# Patient Record
Sex: FEMALE | Race: WHITE | NOT HISPANIC OR LATINO | Employment: FULL TIME | URBAN - METROPOLITAN AREA
[De-identification: names, ages, dates, MRNs, and addresses within clinical notes are randomized per-mention and may not be internally consistent; named-entity substitution may affect disease eponyms.]

---

## 2017-01-03 ENCOUNTER — LAB VISIT (OUTPATIENT)
Dept: LAB | Facility: HOSPITAL | Age: 67
End: 2017-01-03
Attending: FAMILY MEDICINE
Payer: MEDICARE

## 2017-01-03 DIAGNOSIS — Z00.00 PREVENTATIVE HEALTH CARE: ICD-10-CM

## 2017-01-03 LAB
ALBUMIN SERPL BCP-MCNC: 4.1 G/DL
ALP SERPL-CCNC: 82 U/L
ALT SERPL W/O P-5'-P-CCNC: 43 U/L
ANION GAP SERPL CALC-SCNC: 9 MMOL/L
AST SERPL-CCNC: 45 U/L
BASOPHILS # BLD AUTO: 0.04 K/UL
BASOPHILS NFR BLD: 0.6 %
BILIRUB SERPL-MCNC: 0.6 MG/DL
BUN SERPL-MCNC: 9 MG/DL
CALCIUM SERPL-MCNC: 9.6 MG/DL
CHLORIDE SERPL-SCNC: 98 MMOL/L
CHOLEST/HDLC SERPL: 2.7 {RATIO}
CO2 SERPL-SCNC: 30 MMOL/L
CREAT SERPL-MCNC: 0.7 MG/DL
DIFFERENTIAL METHOD: ABNORMAL
EOSINOPHIL # BLD AUTO: 0.3 K/UL
EOSINOPHIL NFR BLD: 4 %
ERYTHROCYTE [DISTWIDTH] IN BLOOD BY AUTOMATED COUNT: 12.8 %
EST. GFR  (AFRICAN AMERICAN): >60 ML/MIN/1.73 M^2
EST. GFR  (NON AFRICAN AMERICAN): >60 ML/MIN/1.73 M^2
GLUCOSE SERPL-MCNC: 105 MG/DL
HCT VFR BLD AUTO: 42.5 %
HDL/CHOLESTEROL RATIO: 36.4 %
HDLC SERPL-MCNC: 206 MG/DL
HDLC SERPL-MCNC: 75 MG/DL
HGB BLD-MCNC: 14.5 G/DL
LDLC SERPL CALC-MCNC: 102.4 MG/DL
LYMPHOCYTES # BLD AUTO: 2 K/UL
LYMPHOCYTES NFR BLD: 27.9 %
MCH RBC QN AUTO: 32.2 PG
MCHC RBC AUTO-ENTMCNC: 34.1 %
MCV RBC AUTO: 94 FL
MONOCYTES # BLD AUTO: 0.6 K/UL
MONOCYTES NFR BLD: 8 %
NEUTROPHILS # BLD AUTO: 4.3 K/UL
NEUTROPHILS NFR BLD: 59.2 %
NONHDLC SERPL-MCNC: 131 MG/DL
PLATELET # BLD AUTO: 303 K/UL
PMV BLD AUTO: 9.3 FL
POTASSIUM SERPL-SCNC: 4.8 MMOL/L
PROT SERPL-MCNC: 7.6 G/DL
RBC # BLD AUTO: 4.51 M/UL
SODIUM SERPL-SCNC: 137 MMOL/L
TRIGL SERPL-MCNC: 143 MG/DL
WBC # BLD AUTO: 7.23 K/UL

## 2017-01-03 PROCEDURE — 83036 HEMOGLOBIN GLYCOSYLATED A1C: CPT

## 2017-01-03 PROCEDURE — 80053 COMPREHEN METABOLIC PANEL: CPT

## 2017-01-03 PROCEDURE — 85025 COMPLETE CBC W/AUTO DIFF WBC: CPT

## 2017-01-03 PROCEDURE — 80061 LIPID PANEL: CPT

## 2017-01-03 PROCEDURE — 36415 COLL VENOUS BLD VENIPUNCTURE: CPT | Mod: PO

## 2017-01-04 LAB
ESTIMATED AVG GLUCOSE: 120 MG/DL
HBA1C MFR BLD HPLC: 5.8 %

## 2017-01-12 ENCOUNTER — OFFICE VISIT (OUTPATIENT)
Dept: FAMILY MEDICINE | Facility: CLINIC | Age: 67
End: 2017-01-12
Payer: MEDICARE

## 2017-01-12 VITALS
TEMPERATURE: 98 F | DIASTOLIC BLOOD PRESSURE: 70 MMHG | BODY MASS INDEX: 26.51 KG/M2 | HEART RATE: 80 BPM | HEIGHT: 67 IN | SYSTOLIC BLOOD PRESSURE: 128 MMHG | WEIGHT: 168.88 LBS

## 2017-01-12 DIAGNOSIS — Z00.00 PREVENTATIVE HEALTH CARE: Primary | ICD-10-CM

## 2017-01-12 DIAGNOSIS — Z12.31 ENCOUNTER FOR SCREENING MAMMOGRAM FOR BREAST CANCER: ICD-10-CM

## 2017-01-12 DIAGNOSIS — G47.00 INSOMNIA, UNSPECIFIED TYPE: ICD-10-CM

## 2017-01-12 PROCEDURE — 99999 PR PBB SHADOW E&M-EST. PATIENT-LVL III: CPT | Mod: PBBFAC,,, | Performed by: FAMILY MEDICINE

## 2017-01-12 PROCEDURE — 90670 PCV13 VACCINE IM: CPT | Mod: S$GLB,,, | Performed by: FAMILY MEDICINE

## 2017-01-12 PROCEDURE — G0009 ADMIN PNEUMOCOCCAL VACCINE: HCPCS | Mod: S$GLB,,, | Performed by: FAMILY MEDICINE

## 2017-01-12 PROCEDURE — 3078F DIAST BP <80 MM HG: CPT | Mod: S$GLB,,, | Performed by: FAMILY MEDICINE

## 2017-01-12 PROCEDURE — 3074F SYST BP LT 130 MM HG: CPT | Mod: S$GLB,,, | Performed by: FAMILY MEDICINE

## 2017-01-12 PROCEDURE — 99397 PER PM REEVAL EST PAT 65+ YR: CPT | Mod: S$GLB,,, | Performed by: FAMILY MEDICINE

## 2017-01-12 RX ORDER — ZOLPIDEM TARTRATE 10 MG/1
TABLET ORAL
Qty: 30 TABLET | Refills: 5 | Status: SHIPPED | OUTPATIENT
Start: 2017-01-12 | End: 2017-07-18 | Stop reason: SDUPTHER

## 2017-01-12 RX ORDER — NITROGLYCERIN 0.4 MG/1
0.4 TABLET SUBLINGUAL EVERY 5 MIN PRN
COMMUNITY
Start: 2016-12-09

## 2017-01-12 RX ORDER — METOPROLOL SUCCINATE 50 MG/1
TABLET, EXTENDED RELEASE ORAL
COMMUNITY
Start: 2017-01-09 | End: 2017-03-06 | Stop reason: SDUPTHER

## 2017-01-12 NOTE — MR AVS SNAPSHOT
[facility]  [address]  [facility] [address]  Phone: [phone]  Fax: [phone]                  Dottie Galeana   2017 9:20 AM   Office Visit    Description:  Female : 1950   Provider:  Josh Li MD   Department:  [facility]           Reason for Visit     Preventative Health Care           Diagnoses this Visit        Comments    Preventative health care    -  Primary     Encounter for screening mammogram for breast cancer         Insomnia, unspecified type                To Do List           Future Appointments        Provider Department Dept Phone    2017 11:15 AM [facility] MAMMO1 [facility] [phone]      Goals (5 Years of Data)     None      Follow-Up and Disposition     Return in about 1 year (around 2018).       These Medications        Disp Refills Start End    zolpidem (AMBIEN) 10 mg Tab 30 tablet 5 2017     TAKE ONE TABLET BY MOUTH AT BEDTIME AS NEEDED    Pharmacy: [place] [address] [address]  Ph #: [phone]         [facility]     [facility] Line -  Assistance  Registered nurses in the [facility] provide clinical advisement, health education, appointment booking, and other advisory services.  Call for this free service at [phone].             Medications           Message regarding Medications     Verify the changes and/or additions to your medication regime listed below are the same as discussed with your clinician today.  If any of these changes or additions are incorrect, please notify your healthcare provider.             Verify that the below list of medications is an accurate representation of the medications you are currently taking.  If none reported, the list may be blank. If incorrect, please contact your healthcare provider. Carry this list with you in case of emergency.           Current Medications      "alprazolam (XANAX) 0.25 MG tablet Take 1 tablet (0.25 mg total) by mouth 3 (three) times daily.    amlodipine (NORVASC) 5 MG tablet Take 1 tablet (5 mg total) by mouth once daily.    aspirin 325 MG tablet 1 Tablet(s) Oral PRN Every day.      metoprolol succinate (TOPROL-XL) 50 MG 24 hr tablet     multivitamin (THERAGRAN) per tablet 1 Tablet(s) Oral PRN Every day.      NITROSTAT 0.4 mg SL tablet     pantoprazole (PROTONIX) 40 MG tablet Take 1 tablet (40 mg total) by mouth once daily.    rosuvastatin (CRESTOR) 10 MG tablet TAKE ONE TABLET BY MOUTH ONCE DAILY IN THE EVENING    valsartan-hydrochlorothiazide (DIOVAN-HCT) 320-25 mg per tablet Take 1 tablet by mouth once daily.    venlafaxine (EFFEXOR-XR) 150 MG Cp24 Take 1 capsule (150 mg total) by mouth once daily.    zolpidem (AMBIEN) 10 mg Tab TAKE ONE TABLET BY MOUTH AT BEDTIME AS NEEDED    clobetasol (TEMOVATE) 0.05 % external solution Apply topically 2 (two) times daily.    ketoconazole (NIZORAL) 2 % shampoo Apply topically once daily.           Clinical Reference Information           Vital Signs - Last Recorded  Most recent update: 1/12/2017  9:30 AM by Vashti Urbano MA    BP Pulse Temp Ht Wt LMP    128/70 80 97.9 °F (36.6 °C) (Oral) 5' 7" (1.702 m) 76.6 kg (168 lb 14 oz) 04/21/1994    BMI                26.45 kg/m2          Blood Pressure          Most Recent Value    BP  128/70      Allergies as of 1/12/2017     No Known Drug Allergies      Immunizations Administered on Date of Encounter - 1/12/2017     Name Date Dose VIS Date Route    Pneumococcal Conjugate - 13 Valent 1/12/2017 0.5 mL 11/5/2015 Intramuscular      Orders Placed During Today's Visit      Normal Orders This Visit    Pneumococcal Conjugate Vaccine (13 Valent) (IM)     Future Labs/Procedures Expected by Expires    Mammo Digital Screening Bilat with CAD  1/12/2017 3/14/2018      "

## 2017-01-12 NOTE — PROGRESS NOTES
Subjective:       Patient ID: Dottie Galeana is a 66 y.o. female.    Chief Complaint: Preventative Health Care (Physical with labs prior)    HPI Comments: She is tolerating her present meds.  She is following with cardiology.    Mood stable.  Sleep is good.          Past Medical History   Diagnosis Date    Depression     Encounter for long-term (current) use of other medications     Generalized anxiety disorder     GERD (gastroesophageal reflux disease)     HTN (hypertension)     Hyperlipidemia     Insomnia     Stroke      ocular behind left eye       Past Surgical History   Procedure Laterality Date    Breast surgery       lumpectomy    Orif right wrist         Review of patient's allergies indicates:   Allergen Reactions    No known drug allergies        Social History     Social History    Marital status:      Spouse name: N/A    Number of children: N/A    Years of education: N/A     Occupational History    realtor      Social History Main Topics    Smoking status: Never Smoker    Smokeless tobacco: Never Used    Alcohol use 0.0 oz/week     1 - 2 Glasses of wine per week      Comment: 2 glasses wine per day    Drug use: No    Sexual activity: Not on file     Other Topics Concern    Not on file     Social History Narrative       Current Outpatient Prescriptions on File Prior to Visit   Medication Sig Dispense Refill    alprazolam (XANAX) 0.25 MG tablet Take 1 tablet (0.25 mg total) by mouth 3 (three) times daily. 60 tablet 5    amlodipine (NORVASC) 5 MG tablet Take 1 tablet (5 mg total) by mouth once daily. 30 tablet 11    aspirin 325 MG tablet 1 Tablet(s) Oral PRN Every day.        multivitamin (THERAGRAN) per tablet 1 Tablet(s) Oral PRN Every day.        pantoprazole (PROTONIX) 40 MG tablet Take 1 tablet (40 mg total) by mouth once daily. 30 tablet 11    rosuvastatin (CRESTOR) 10 MG tablet TAKE ONE TABLET BY MOUTH ONCE DAILY IN THE EVENING 30 tablet 11     "valsartan-hydrochlorothiazide (DIOVAN-HCT) 320-25 mg per tablet Take 1 tablet by mouth once daily. 30 tablet 11    venlafaxine (EFFEXOR-XR) 150 MG Cp24 Take 1 capsule (150 mg total) by mouth once daily. 30 capsule 11    clobetasol (TEMOVATE) 0.05 % external solution Apply topically 2 (two) times daily. 25 mL 6    ketoconazole (NIZORAL) 2 % shampoo Apply topically once daily. 120 mL 3     No current facility-administered medications on file prior to visit.        No family history on file.    Review of Systems   Constitutional: Positive for fatigue. Negative for appetite change, chills, fever and unexpected weight change.   HENT: Negative for sore throat and trouble swallowing.    Eyes: Negative for pain and visual disturbance.   Respiratory: Negative for cough, shortness of breath and wheezing.    Cardiovascular: Negative for chest pain and palpitations.   Gastrointestinal: Negative for abdominal pain, blood in stool, diarrhea, nausea and vomiting.   Genitourinary: Negative for difficulty urinating, dysuria and hematuria.   Musculoskeletal: Negative for arthralgias, gait problem and neck pain.   Skin: Negative for rash and wound.   Neurological: Negative for dizziness, weakness, numbness and headaches.   Hematological: Negative for adenopathy.   Psychiatric/Behavioral: Negative for dysphoric mood.       Objective:      Visit Vitals    /70    Pulse 80    Temp 97.9 °F (36.6 °C) (Oral)    Ht 5' 7" (1.702 m)    Wt 76.6 kg (168 lb 14 oz)    LMP 04/21/1994    BMI 26.45 kg/m2     Physical Exam   Constitutional: She is oriented to person, place, and time. She appears well-developed and well-nourished.   HENT:   Head: Normocephalic.   Mouth/Throat: Oropharynx is clear and moist. No oropharyngeal exudate or posterior oropharyngeal erythema.   Eyes: Conjunctivae and EOM are normal. Pupils are equal, round, and reactive to light.   Neck: Normal range of motion. Neck supple. No thyromegaly present. "   Cardiovascular: Normal rate, regular rhythm, S1 normal, S2 normal, normal heart sounds and intact distal pulses.  Exam reveals no gallop and no friction rub.    No murmur heard.  Pulmonary/Chest: Effort normal and breath sounds normal. She has no wheezes. She has no rales.   Abdominal: Normal appearance.   Musculoskeletal:        Right lower leg: She exhibits no edema.        Left lower leg: She exhibits no edema.   Lymphadenopathy:     She has no cervical adenopathy.   Neurological: She is alert and oriented to person, place, and time. No cranial nerve deficit. Gait normal.   Skin: Skin is warm and intact. No rash noted.   Psychiatric: She has a normal mood and affect.       Results for orders placed or performed in visit on 01/03/17   Comprehensive metabolic panel   Result Value Ref Range    Sodium 137 136 - 145 mmol/L    Potassium 4.8 3.5 - 5.1 mmol/L    Chloride 98 95 - 110 mmol/L    CO2 30 (H) 23 - 29 mmol/L    Glucose 105 70 - 110 mg/dL    BUN, Bld 9 8 - 23 mg/dL    Creatinine 0.7 0.5 - 1.4 mg/dL    Calcium 9.6 8.7 - 10.5 mg/dL    Total Protein 7.6 6.0 - 8.4 g/dL    Albumin 4.1 3.5 - 5.2 g/dL    Total Bilirubin 0.6 0.1 - 1.0 mg/dL    Alkaline Phosphatase 82 55 - 135 U/L    AST 45 (H) 10 - 40 U/L    ALT 43 10 - 44 U/L    Anion Gap 9 8 - 16 mmol/L    eGFR if African American >60.0 >60 mL/min/1.73 m^2    eGFR if non African American >60.0 >60 mL/min/1.73 m^2   Hemoglobin A1c   Result Value Ref Range    Hemoglobin A1C 5.8 4.5 - 6.2 %    Estimated Avg Glucose 120 68 - 131 mg/dL   Lipid panel   Result Value Ref Range    Cholesterol 206 (H) 120 - 199 mg/dL    Triglycerides 143 30 - 150 mg/dL    HDL 75 40 - 75 mg/dL    LDL Cholesterol 102.4 63.0 - 159.0 mg/dL    HDL/Chol Ratio 36.4 20.0 - 50.0 %    Total Cholesterol/HDL Ratio 2.7 2.0 - 5.0    Non-HDL Cholesterol 131 mg/dL   CBC auto differential   Result Value Ref Range    WBC 7.23 3.90 - 12.70 K/uL    RBC 4.51 4.00 - 5.40 M/uL    Hemoglobin 14.5 12.0 - 16.0 g/dL     Hematocrit 42.5 37.0 - 48.5 %    MCV 94 82 - 98 fL    MCH 32.2 (H) 27.0 - 31.0 pg    MCHC 34.1 32.0 - 36.0 %    RDW 12.8 11.5 - 14.5 %    Platelets 303 150 - 350 K/uL    MPV 9.3 9.2 - 12.9 fL    Gran # 4.3 1.8 - 7.7 K/uL    Lymph # 2.0 1.0 - 4.8 K/uL    Mono # 0.6 0.3 - 1.0 K/uL    Eos # 0.3 0.0 - 0.5 K/uL    Baso # 0.04 0.00 - 0.20 K/uL    Gran% 59.2 38.0 - 73.0 %    Lymph% 27.9 18.0 - 48.0 %    Mono% 8.0 4.0 - 15.0 %    Eosinophil% 4.0 0.0 - 8.0 %    Basophil% 0.6 0.0 - 1.9 %    Differential Method Automated        Assessment:       1. Preventative health care    2. Encounter for screening mammogram for breast cancer    3. Insomnia, unspecified type        Plan:       Preventative health care  -     Pneumococcal Conjugate Vaccine (13 Valent) (IM)    Encounter for screening mammogram for breast cancer  -     Cancel: Mammo Digital Screening Bilat with CAD; Future; Expected date: 1/12/17    Insomnia, unspecified type  -     zolpidem (AMBIEN) 10 mg Tab; TAKE ONE TABLET BY MOUTH AT BEDTIME AS NEEDED  Dispense: 30 tablet; Refill: 5    Other orders  -     Cancel: Pneumococcal Polysaccharide Vaccine (23 Valent) (SQ/IM)        Counseled on regular exercise, maintenance of a healthy weight, balanced diet rich in fruits/vegetables and lean protein, and avoidance of unhealthy habits like smoking and excessive alcohol intake.

## 2017-01-13 ENCOUNTER — HOSPITAL ENCOUNTER (OUTPATIENT)
Dept: RADIOLOGY | Facility: HOSPITAL | Age: 67
Discharge: HOME | End: 2017-01-13
Attending: FAMILY MEDICINE
Payer: MEDICARE

## 2017-01-13 DIAGNOSIS — Z12.31 ENCOUNTER FOR SCREENING MAMMOGRAM FOR BREAST CANCER: ICD-10-CM

## 2017-01-13 PROCEDURE — 77067 SCR MAMMO BI INCL CAD: CPT | Mod: 26,,, | Performed by: RADIOLOGY

## 2017-01-13 PROCEDURE — 77067 SCR MAMMO BI INCL CAD: CPT | Mod: TC

## 2017-01-13 PROCEDURE — 77063 BREAST TOMOSYNTHESIS BI: CPT | Mod: 26,,, | Performed by: RADIOLOGY

## 2017-01-16 ENCOUNTER — TELEPHONE (OUTPATIENT)
Dept: RADIOLOGY | Facility: HOSPITAL | Age: 67
End: 2017-01-16

## 2017-01-20 ENCOUNTER — HOSPITAL ENCOUNTER (OUTPATIENT)
Dept: RADIOLOGY | Facility: HOSPITAL | Age: 67
Discharge: HOME | End: 2017-01-20
Attending: FAMILY MEDICINE
Payer: MEDICARE

## 2017-01-20 DIAGNOSIS — R92.8 ABNORMAL MAMMOGRAM OF LEFT BREAST: ICD-10-CM

## 2017-01-20 PROCEDURE — 76642 ULTRASOUND BREAST LIMITED: CPT | Mod: TC,PO,LT

## 2017-01-20 PROCEDURE — 77061 BREAST TOMOSYNTHESIS UNI: CPT | Mod: TC,LT

## 2017-01-20 PROCEDURE — 77061 BREAST TOMOSYNTHESIS UNI: CPT | Mod: 26,LT,, | Performed by: RADIOLOGY

## 2017-01-20 PROCEDURE — 76642 ULTRASOUND BREAST LIMITED: CPT | Mod: 26,LT,, | Performed by: RADIOLOGY

## 2017-01-20 PROCEDURE — 77065 DX MAMMO INCL CAD UNI: CPT | Mod: 26,LT,, | Performed by: RADIOLOGY

## 2017-03-06 RX ORDER — METOPROLOL SUCCINATE 50 MG/1
50 TABLET, EXTENDED RELEASE ORAL DAILY
Qty: 30 TABLET | Refills: 11 | Status: SHIPPED | OUTPATIENT
Start: 2017-03-06 | End: 2017-08-28 | Stop reason: ALTCHOICE

## 2017-03-06 NOTE — TELEPHONE ENCOUNTER
----- Message from Lynnette Parish sent at 3/6/2017 11:24 AM CST -----  Contact: Keyur Sinclair, [phone] patient, Patient has lost her Cardiologist with the [facility] and will need a refill on her medication Rx Metroprolol generic (Toprol XL 50 MG). Please call into [facility] at [phone]. Please advise if you can refill. Thanks.

## 2017-05-09 ENCOUNTER — LAB VISIT (OUTPATIENT)
Dept: LAB | Facility: HOSPITAL | Age: 67
End: 2017-05-09
Attending: FAMILY MEDICINE
Payer: MEDICARE

## 2017-05-09 DIAGNOSIS — E78.5 DYSLIPIDEMIA: ICD-10-CM

## 2017-05-09 DIAGNOSIS — I15.9 LABILE SECONDARY HYPERTENSION: Primary | ICD-10-CM

## 2017-05-09 LAB
ALBUMIN SERPL BCP-MCNC: 4.1 G/DL
ALP SERPL-CCNC: 85 U/L
ALT SERPL W/O P-5'-P-CCNC: 26 U/L
ANION GAP SERPL CALC-SCNC: 10 MMOL/L
AST SERPL-CCNC: 29 U/L
BILIRUB SERPL-MCNC: 0.6 MG/DL
BUN SERPL-MCNC: 10 MG/DL
CALCIUM SERPL-MCNC: 9.7 MG/DL
CHLORIDE SERPL-SCNC: 102 MMOL/L
CHOLEST/HDLC SERPL: 2.9 {RATIO}
CO2 SERPL-SCNC: 26 MMOL/L
CREAT SERPL-MCNC: 0.7 MG/DL
EST. GFR  (AFRICAN AMERICAN): >60 ML/MIN/1.73 M^2
EST. GFR  (NON AFRICAN AMERICAN): >60 ML/MIN/1.73 M^2
GLUCOSE SERPL-MCNC: 110 MG/DL
HDL/CHOLESTEROL RATIO: 34.8 %
HDLC SERPL-MCNC: 210 MG/DL
HDLC SERPL-MCNC: 73 MG/DL
LDLC SERPL CALC-MCNC: 103.6 MG/DL
NONHDLC SERPL-MCNC: 137 MG/DL
POTASSIUM SERPL-SCNC: 4.2 MMOL/L
PROT SERPL-MCNC: 7.7 G/DL
SODIUM SERPL-SCNC: 138 MMOL/L
TRIGL SERPL-MCNC: 167 MG/DL

## 2017-05-09 PROCEDURE — 80061 LIPID PANEL: CPT

## 2017-05-09 PROCEDURE — 80053 COMPREHEN METABOLIC PANEL: CPT

## 2017-05-09 PROCEDURE — 82384 ASSAY THREE CATECHOLAMINES: CPT

## 2017-05-09 PROCEDURE — 83835 ASSAY OF METANEPHRINES: CPT

## 2017-05-09 PROCEDURE — 36415 COLL VENOUS BLD VENIPUNCTURE: CPT | Mod: PO

## 2017-05-09 PROCEDURE — 82088 ASSAY OF ALDOSTERONE: CPT

## 2017-05-12 LAB
ALDOST SERPL-MCNC: 17.9 NG/DL
ALDOST/RENIN PLAS-RTO: 7.2 RATIO
RENIN PLAS-CCNC: 2.5 NG/ML/HR

## 2017-05-15 LAB
CATECHOLS PLAS-MCNC: 1099 PG/ML
DOPAMINE SERPL-MCNC: 36 PG/ML
EPINEPH PLAS-MCNC: 58 PG/ML
METANEPH FREE SERPL-MCNC: 39 PG/ML
METANEPHS SERPL-MCNC: 174 PG/ML
NOREPINEPH PLAS-MCNC: 1005 PG/ML
NORMETANEPH FREE SERPL-MCNC: 135 PG/ML

## 2017-06-30 RX ORDER — ALPRAZOLAM 0.25 MG/1
0.25 TABLET ORAL 3 TIMES DAILY
Qty: 60 TABLET | Refills: 1 | Status: SHIPPED | OUTPATIENT
Start: 2017-06-30 | End: 2017-09-26 | Stop reason: SDUPTHER

## 2017-07-18 DIAGNOSIS — G47.00 INSOMNIA, UNSPECIFIED TYPE: ICD-10-CM

## 2017-07-18 RX ORDER — ZOLPIDEM TARTRATE 10 MG/1
TABLET ORAL
Qty: 30 TABLET | Refills: 0 | Status: SHIPPED | OUTPATIENT
Start: 2017-07-18 | End: 2017-08-23 | Stop reason: SDUPTHER

## 2017-08-21 RX ORDER — ROSUVASTATIN CALCIUM 10 MG/1
TABLET, COATED ORAL
Qty: 30 TABLET | Refills: 11 | Status: SHIPPED | OUTPATIENT
Start: 2017-08-21 | End: 2018-09-26 | Stop reason: SDUPTHER

## 2017-08-23 ENCOUNTER — PATIENT MESSAGE (OUTPATIENT)
Dept: FAMILY MEDICINE | Facility: CLINIC | Age: 67
End: 2017-08-23

## 2017-08-23 DIAGNOSIS — G47.00 INSOMNIA, UNSPECIFIED TYPE: ICD-10-CM

## 2017-08-25 ENCOUNTER — PATIENT MESSAGE (OUTPATIENT)
Dept: FAMILY MEDICINE | Facility: CLINIC | Age: 67
End: 2017-08-25

## 2017-08-25 RX ORDER — ZOLPIDEM TARTRATE 10 MG/1
TABLET ORAL
Qty: 30 TABLET | Refills: 0 | Status: SHIPPED | OUTPATIENT
Start: 2017-08-25 | End: 2017-09-25 | Stop reason: SDUPTHER

## 2017-08-28 ENCOUNTER — OFFICE VISIT (OUTPATIENT)
Dept: FAMILY MEDICINE | Facility: CLINIC | Age: 67
End: 2017-08-28
Payer: MEDICARE

## 2017-08-28 VITALS
DIASTOLIC BLOOD PRESSURE: 70 MMHG | HEART RATE: 80 BPM | BODY MASS INDEX: 26.89 KG/M2 | SYSTOLIC BLOOD PRESSURE: 130 MMHG | RESPIRATION RATE: 18 BRPM | TEMPERATURE: 98 F | HEIGHT: 67 IN | WEIGHT: 171.31 LBS

## 2017-08-28 DIAGNOSIS — I10 ESSENTIAL HYPERTENSION: ICD-10-CM

## 2017-08-28 DIAGNOSIS — Z00.00 PREVENTATIVE HEALTH CARE: Primary | ICD-10-CM

## 2017-08-28 DIAGNOSIS — G47.00 INSOMNIA, UNSPECIFIED TYPE: ICD-10-CM

## 2017-08-28 PROCEDURE — 99499 UNLISTED E&M SERVICE: CPT | Mod: S$GLB,,, | Performed by: FAMILY MEDICINE

## 2017-08-28 PROCEDURE — 3008F BODY MASS INDEX DOCD: CPT | Mod: S$GLB,,, | Performed by: FAMILY MEDICINE

## 2017-08-28 PROCEDURE — 3075F SYST BP GE 130 - 139MM HG: CPT | Mod: S$GLB,,, | Performed by: FAMILY MEDICINE

## 2017-08-28 PROCEDURE — 3078F DIAST BP <80 MM HG: CPT | Mod: S$GLB,,, | Performed by: FAMILY MEDICINE

## 2017-08-28 PROCEDURE — 99999 PR PBB SHADOW E&M-EST. PATIENT-LVL III: CPT | Mod: PBBFAC,,, | Performed by: FAMILY MEDICINE

## 2017-08-28 PROCEDURE — 99213 OFFICE O/P EST LOW 20 MIN: CPT | Mod: S$GLB,,, | Performed by: FAMILY MEDICINE

## 2017-08-28 RX ORDER — NEBIVOLOL 10 MG/1
10 TABLET ORAL DAILY
COMMUNITY
End: 2018-01-03 | Stop reason: SDUPTHER

## 2017-08-28 NOTE — PROGRESS NOTES
Subjective:       Patient ID: Dottie Galeana is a 66 y.o. female.    Chief Complaint: Insomnia (Medication refill: Tdap, new babies)      Here for 6 months for f/u.    No complaints today  She is tolerating her present meds.  She is following with cardiology.    HTN - controlled on present meds  Anxiety - Mood stable. Using xanax 3 times a week.  Sleep is good.            Past Medical History:   Diagnosis Date    Depression     Encounter for long-term (current) use of other medications     Generalized anxiety disorder     GERD (gastroesophageal reflux disease)     HTN (hypertension)     Hyperlipidemia     Insomnia     Stroke     ocular behind left eye       Past Surgical History:   Procedure Laterality Date    BREAST SURGERY      lumpectomy    orif right wrist         Review of patient's allergies indicates:   Allergen Reactions    No known drug allergies        Social History     Social History    Marital status:      Spouse name: N/A    Number of children: N/A    Years of education: N/A     Occupational History    realtor      Social History Main Topics    Smoking status: Never Smoker    Smokeless tobacco: Never Used    Alcohol use 0.0 oz/week     1 - 2 Glasses of wine per week      Comment: 2 glasses wine per day    Drug use: No    Sexual activity: Not on file     Other Topics Concern    Not on file     Social History Narrative    No narrative on file       Current Outpatient Prescriptions on File Prior to Visit   Medication Sig Dispense Refill    alprazolam (XANAX) 0.25 MG tablet Take 1 tablet (0.25 mg total) by mouth 3 (three) times daily. 60 tablet 1    amlodipine (NORVASC) 5 MG tablet Take 1 tablet (5 mg total) by mouth once daily. 30 tablet 11    aspirin 325 MG tablet 1 Tablet(s) Oral PRN Every day.        multivitamin (THERAGRAN) per tablet 1 Tablet(s) Oral PRN Every day.        pantoprazole (PROTONIX) 40 MG tablet Take 1 tablet (40 mg total) by mouth once daily. 30  "tablet 11    rosuvastatin (CRESTOR) 10 MG tablet TAKE ONE TABLET BY MOUTH ONCE DAILY IN THE EVENING 30 tablet 11    valsartan-hydrochlorothiazide (DIOVAN-HCT) 320-25 mg per tablet Take 1 tablet by mouth once daily. 30 tablet 11    venlafaxine (EFFEXOR-XR) 150 MG Cp24 Take 1 capsule (150 mg total) by mouth once daily. 30 capsule 11    zolpidem (AMBIEN) 10 mg Tab TAKE ONE TABLET BY MOUTH AT BEDTIME AS NEEDED 30 tablet 0    clobetasol (TEMOVATE) 0.05 % external solution Apply topically 2 (two) times daily. 25 mL 6    ketoconazole (NIZORAL) 2 % shampoo Apply topically once daily. 120 mL 3    NITROSTAT 0.4 mg SL tablet        No current facility-administered medications on file prior to visit.        No family history on file.    Review of Systems   Constitutional: Positive for fatigue. Negative for appetite change, chills, fever and unexpected weight change.   HENT: Negative for sore throat and trouble swallowing.    Eyes: Negative for pain and visual disturbance.   Respiratory: Negative for cough, shortness of breath and wheezing.    Cardiovascular: Negative for chest pain and palpitations.   Gastrointestinal: Negative for abdominal pain, blood in stool, diarrhea, nausea and vomiting.   Genitourinary: Negative for difficulty urinating, dysuria and hematuria.   Musculoskeletal: Negative for arthralgias, gait problem and neck pain.   Skin: Negative for rash and wound.   Neurological: Negative for dizziness, weakness, numbness and headaches.   Hematological: Negative for adenopathy.   Psychiatric/Behavioral: Negative for dysphoric mood.       Objective:      /70 (BP Location: Left arm, Patient Position: Sitting, BP Method: Large (Manual))   Pulse 80   Temp 98.2 °F (36.8 °C) (Oral)   Resp 18   Ht 5' 7" (1.702 m)   Wt 77.7 kg (171 lb 4.8 oz)   LMP 04/21/1994   BMI 26.83 kg/m²   Physical Exam   Constitutional: She is oriented to person, place, and time. She appears well-developed and well-nourished. "   HENT:   Head: Normocephalic.   Mouth/Throat: Oropharynx is clear and moist. No oropharyngeal exudate or posterior oropharyngeal erythema.   Eyes: Conjunctivae and EOM are normal. Pupils are equal, round, and reactive to light.   Neck: Normal range of motion. Neck supple. No thyromegaly present.   Cardiovascular: Normal rate, regular rhythm, S1 normal, S2 normal, normal heart sounds and intact distal pulses.  Exam reveals no gallop and no friction rub.    No murmur heard.  Pulmonary/Chest: Effort normal and breath sounds normal. She has no wheezes. She has no rales.   Abdominal: Normal appearance.   Musculoskeletal:        Right lower leg: She exhibits no edema.        Left lower leg: She exhibits no edema.   Lymphadenopathy:     She has no cervical adenopathy.   Neurological: She is alert and oriented to person, place, and time. No cranial nerve deficit. Gait normal.   Skin: Skin is warm and intact. No rash noted.   Psychiatric: She has a normal mood and affect.       Results for orders placed or performed in visit on 05/09/17   Aldosterone/Renin Activity Ratio   Result Value Ref Range    Aldosterone 17.9 ng/dL    Renin 2.5 ng/mL/hr    Aldosterone/Renin Activity Calculation 7.2 <=25.0 ratio   COMPREHENSIVE METABOLIC PANEL   Result Value Ref Range    Sodium 138 136 - 145 mmol/L    Potassium 4.2 3.5 - 5.1 mmol/L    Chloride 102 95 - 110 mmol/L    CO2 26 23 - 29 mmol/L    Glucose 110 70 - 110 mg/dL    BUN, Bld 10 8 - 23 mg/dL    Creatinine 0.7 0.5 - 1.4 mg/dL    Calcium 9.7 8.7 - 10.5 mg/dL    Total Protein 7.7 6.0 - 8.4 g/dL    Albumin 4.1 3.5 - 5.2 g/dL    Total Bilirubin 0.6 0.1 - 1.0 mg/dL    Alkaline Phosphatase 85 55 - 135 U/L    AST 29 10 - 40 U/L    ALT 26 10 - 44 U/L    Anion Gap 10 8 - 16 mmol/L    eGFR if African American >60.0 >60 mL/min/1.73 m^2    eGFR if non African American >60.0 >60 mL/min/1.73 m^2   LIPID PANEL   Result Value Ref Range    Cholesterol 210 (H) 120 - 199 mg/dL    Triglycerides 167 (H)  30 - 150 mg/dL    HDL 73 40 - 75 mg/dL    LDL Cholesterol 103.6 63.0 - 159.0 mg/dL    HDL/Chol Ratio 34.8 20.0 - 50.0 %    Total Cholesterol/HDL Ratio 2.9 2.0 - 5.0    Non-HDL Cholesterol 137 mg/dL   CATECHOLAMINES, FRACTIONATED, PLASMA   Result Value Ref Range    Dopamine 36 (H) pg/mL    Epinephrine 58 pg/mL    Norepinephrine 1005 pg/mL    Catecholamine, Plasma 1099 pg/mL   METANEPHRINES, PLASMA FREE   Result Value Ref Range    Metanephrine, Free 39 < OR = 57 pg/mL    Metanephrine, Total, Plasma 174 < OR = 205 pg/mL    Normetanephrine, Free 135 < OR = 148 pg/mL       Assessment:       1. Preventative health care    2. Insomnia, unspecified type    3. Essential hypertension        Plan:       Preventative health care    Insomnia, unspecified type    Essential hypertension    Other orders  -     Cancel: Tdap Vaccine      continue Ambien  Counseled on regular exercise, maintenance of a healthy weight, balanced diet rich in fruits/vegetables and lean protein, and avoidance of unhealthy habits like smoking and excessive alcohol intake.

## 2017-09-25 DIAGNOSIS — G47.00 INSOMNIA, UNSPECIFIED TYPE: ICD-10-CM

## 2017-09-25 RX ORDER — ZOLPIDEM TARTRATE 10 MG/1
TABLET ORAL
Qty: 30 TABLET | Refills: 5 | Status: SHIPPED | OUTPATIENT
Start: 2017-09-25 | End: 2018-04-10 | Stop reason: SDUPTHER

## 2017-09-27 RX ORDER — ALPRAZOLAM 0.25 MG/1
0.25 TABLET ORAL 3 TIMES DAILY
Qty: 60 TABLET | Refills: 1 | Status: SHIPPED | OUTPATIENT
Start: 2017-09-27 | End: 2018-01-03 | Stop reason: SDUPTHER

## 2017-10-05 DIAGNOSIS — F41.1 GENERALIZED ANXIETY DISORDER: ICD-10-CM

## 2017-10-06 RX ORDER — VENLAFAXINE HYDROCHLORIDE 150 MG/1
CAPSULE, EXTENDED RELEASE ORAL
Qty: 90 CAPSULE | Refills: 3 | Status: SHIPPED | OUTPATIENT
Start: 2017-10-06 | End: 2018-02-28 | Stop reason: SDUPTHER

## 2017-10-06 RX ORDER — AMLODIPINE BESYLATE 5 MG/1
TABLET ORAL
Qty: 90 TABLET | Refills: 3 | Status: SHIPPED | OUTPATIENT
Start: 2017-10-06 | End: 2018-08-14

## 2017-10-06 RX ORDER — VALSARTAN AND HYDROCHLOROTHIAZIDE 320; 25 MG/1; MG/1
TABLET, FILM COATED ORAL
Qty: 90 TABLET | Refills: 3 | Status: SHIPPED | OUTPATIENT
Start: 2017-10-06 | End: 2018-07-12

## 2017-10-06 RX ORDER — PANTOPRAZOLE SODIUM 40 MG/1
TABLET, DELAYED RELEASE ORAL
Qty: 90 TABLET | Refills: 3 | Status: SHIPPED | OUTPATIENT
Start: 2017-10-06 | End: 2018-12-01 | Stop reason: SDUPTHER

## 2017-10-20 ENCOUNTER — OFFICE VISIT (OUTPATIENT)
Dept: FAMILY MEDICINE | Facility: CLINIC | Age: 67
End: 2017-10-20
Payer: MEDICARE

## 2017-10-20 VITALS
WEIGHT: 173.5 LBS | DIASTOLIC BLOOD PRESSURE: 74 MMHG | HEIGHT: 67 IN | BODY MASS INDEX: 27.23 KG/M2 | TEMPERATURE: 99 F | OXYGEN SATURATION: 95 % | SYSTOLIC BLOOD PRESSURE: 132 MMHG | RESPIRATION RATE: 18 BRPM | HEART RATE: 85 BPM

## 2017-10-20 DIAGNOSIS — R05.9 COUGH: ICD-10-CM

## 2017-10-20 DIAGNOSIS — M79.10 MYALGIA: ICD-10-CM

## 2017-10-20 DIAGNOSIS — R50.9 FEVER, UNSPECIFIED FEVER CAUSE: Primary | ICD-10-CM

## 2017-10-20 LAB
FLUAV AG SPEC QL IA: NEGATIVE
FLUBV AG SPEC QL IA: NEGATIVE
SPECIMEN SOURCE: NORMAL

## 2017-10-20 PROCEDURE — 99213 OFFICE O/P EST LOW 20 MIN: CPT | Mod: S$GLB,,, | Performed by: NURSE PRACTITIONER

## 2017-10-20 PROCEDURE — 87400 INFLUENZA A/B EACH AG IA: CPT | Mod: PO

## 2017-10-20 PROCEDURE — 99999 PR PBB SHADOW E&M-EST. PATIENT-LVL IV: CPT | Mod: PBBFAC,,, | Performed by: NURSE PRACTITIONER

## 2017-10-20 RX ORDER — AZITHROMYCIN 250 MG/1
TABLET, FILM COATED ORAL
Qty: 6 TABLET | Refills: 0 | Status: SHIPPED | OUTPATIENT
Start: 2017-10-20 | End: 2017-10-25

## 2017-10-20 RX ORDER — TETANUS TOXOID, REDUCED DIPHTHERIA TOXOID AND ACELLULAR PERTUSSIS VACCINE, ADSORBED 5; 2.5; 8; 8; 2.5 [IU]/.5ML; [IU]/.5ML; UG/.5ML; UG/.5ML; UG/.5ML
SUSPENSION INTRAMUSCULAR
COMMUNITY
Start: 2017-08-28 | End: 2018-07-11

## 2017-10-20 NOTE — PATIENT INSTRUCTIONS
"  If no improvement in temp or myalgia over 24 hours being antibiotic    Viral Syndrome (Adult)  A viral illness may cause a number of symptoms. The symptoms depend on the part of the body that the virus affects. If it settles in your nose, throat, and lungs, it may cause cough, sore throat, congestion, and sometimes headache. If it settles in your stomach and intestinal tract, it may cause vomiting and diarrhea. Sometimes it causes vague symptoms like "aching all over," feeling tired, loss of appetite, or fever.  A viral illness usually lasts 1 to 2 weeks, but sometimes it lasts longer. In some cases, a more serious infection can look like a viral syndrome in the first few days of the illness. You may need another exam and additional tests to know the difference. Watch for the warning signs listed below.  Home care  Follow these guidelines for taking care of yourself at home:  · If symptoms are severe, rest at home for the first 2 to 3 days.  · Stay away from cigarette smoke - both your smoke and the smoke from others.  · You may use over-the-counter acetaminophen or ibuprofen for fever, muscle aching, and headache, unless another medicine was prescribed for this. If you have chronic liver or kidney disease or ever had a stomach ulcer or GI bleeding, talk with your doctor before using these medicines. No one who is younger than 18 and ill with a fever should take aspirin. It may cause severe disease or death.  · Your appetite may be poor, so a light diet is fine. Avoid dehydration by drinking 8 to 12 8-ounce glasses of fluids each day. This may include water; orange juice; lemonade; apple, grape, and cranberry juice; clear fruit drinks; electrolyte replacement and sports drinks; and decaffeinated teas and coffee. If you have been diagnosed with a kidney disease, ask your doctor how much and what types of fluids you should drink to prevent dehydration. If you have kidney disease, drinking too much fluid can cause it " build up in the your body and be dangerous to your health.  · Over-the-counter remedies won't shorten the length of the illness but may be helpful for cough, sore throat; and nasal and sinus congestion. Don't use decongestants if you have high blood pressure.  Follow-up care  Follow up with your healthcare provider if you do not improve over the next week.  Call 911  Get emergency medical care if any of the following occur:  · Convulsion  · Feeling weak, dizzy, or like you are going to faint  · Chest pain, shortness of breath, wheezing, or difficulty breathing  When to seek medical advice  Call your healthcare provider right away if any of these occur:  · Cough with lots of colored sputum (mucus) or blood in your sputum  · Chest pain, shortness of breath, wheezing, or difficulty breathing  · Severe headache; face, neck, or ear pain  · Severe, constant pain in the lower right side of your belly (abdominal)  · Continued vomiting (cant keep liquids down)  · Frequent diarrhea (more than 5 times a day); blood (red or black color) or mucus in diarrhea  · Feeling weak, dizzy, or like you are going to faint  · Extreme thirst  · Fever of 100.4°F (38°C) or higher, or as directed by your healthcare provider  Date Last Reviewed: 9/25/2015  © 8803-5244 [employer]. [address] [place] [address], [place] [address]. All rights reserved. This information is not intended as a substitute for professional medical care. Always follow your healthcare professional's instructions.

## 2017-10-20 NOTE — PROGRESS NOTES
Subjective:       Patient ID: Dottie Galeana is a 67 y.o. female.    Chief Complaint: Cough  She was last seen in primary care by Dr. Li on 08/28/2017. This is her first time seeing me in the clinic.   Cough   This is a new problem. The current episode started 1 to 4 weeks ago (10 days). Associated symptoms include a fever. Associated symptoms comments: 101.1 last night, sore chest,myalgia. Treatments tried: tylenol, ibuprofen,zyrtec. The treatment provided mild relief.     Vitals:    10/20/17 0914   BP: 132/74   Pulse: 85   Resp: 18   Temp: 98.7 °F (37.1 °C)     Review of Systems   Constitutional: Positive for fever.   Respiratory: Positive for cough.      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 Vital signs are normal. She appears well-developed and well-nourished. She is active and cooperative.   HENT:   Head: Normocephalic and atraumatic.   Right Ear: Hearing, tympanic membrane, external ear and ear canal normal.   Left Ear: Hearing, tympanic membrane, external ear and ear canal normal.   Nose: Rhinorrhea present.   Mouth/Throat: Uvula is midline, oropharynx is clear and moist and mucous membranes are normal.   Eyes: Lids are normal.   Neck: Normal range of motion and full passive range of motion without pain. Neck supple.   Cardiovascular: Normal rate, regular rhythm and normal heart sounds.    Abdominal: Soft. Bowel sounds are normal. There is no tenderness.   Musculoskeletal: Normal range of motion.   Lymphadenopathy:        Head (right side): No submental, no submandibular, no tonsillar, no preauricular, no posterior  auricular and no occipital adenopathy present.        Head (left side): No submental, no submandibular, no tonsillar, no preauricular, no posterior auricular and no occipital adenopathy present.     She has no cervical adenopathy.   Neurological: She is alert and oriented to person, place, and time.   Skin: Skin is warm, dry and intact.   Psychiatric: She has a normal mood and affect. Her speech is normal and behavior is normal. Judgment and thought content normal. Cognition and memory are normal.   Nursing note and vitals reviewed.      Assessment & Plan:       Fever, unspecified fever cause  -     Influenza antigen Nasopharyngeal Swab  -     azithromycin (Z-HARMONY) 250 MG tablet; Take 2 tablets by mouth on day 1; Take 1 tablet by mouth on days 2-5  Dispense: 6 tablet; Refill: 0    Cough  -     Influenza antigen Nasopharyngeal Swab  -     azithromycin (Z-HARMONY) 250 MG tablet; Take 2 tablets by mouth on day 1; Take 1 tablet by mouth on days 2-5  Dispense: 6 tablet; Refill: 0    Myalgia  -     Influenza antigen Nasopharyngeal Swab  -     azithromycin (Z-HARMONY) 250 MG tablet; Take 2 tablets by mouth on day 1; Take 1 tablet by mouth on days 2-5  Dispense: 6 tablet; Refill: 0    If no improvement in temp or myalgia over 24 hours being antibiotic        Return if symptoms worsen or fail to improve.

## 2017-12-28 RX ORDER — OSELTAMIVIR PHOSPHATE 75 MG/1
75 CAPSULE ORAL DAILY
Qty: 10 CAPSULE | Refills: 0 | Status: SHIPPED | OUTPATIENT
Start: 2017-12-28 | End: 2018-01-07

## 2017-12-28 NOTE — TELEPHONE ENCOUNTER
----- Message from Christine Adhikari sent at 12/28/2017  9:13 AM CST -----  Patient has been exposed to flu and is not experiencing symptoms she is requesting a prescription for Tamiflu and cough medication [facility] at [phone].    Thank you

## 2017-12-28 NOTE — TELEPHONE ENCOUNTER
Pt requesting script for Tamiflu.   Patient states her live-in grandchildren tested positive for influenza (Dr. Joann Mukherjee). *Please advise.

## 2018-01-02 ENCOUNTER — NURSE TRIAGE (OUTPATIENT)
Dept: ADMINISTRATIVE | Facility: CLINIC | Age: 68
End: 2018-01-02

## 2018-01-02 NOTE — TELEPHONE ENCOUNTER
Reason for Disposition   SEVERE coughing spells (e.g., whooping sound after coughing, vomiting after coughing)    Answer Assessment - Initial Assessment Questions  Pt called Thursday for what she considered flu sx's. No appt but tamiflu called in - c/o now of dry cough, post nasal drip,nasal congestion , night sweats. Afebrile now but unable to sleep due to cough. Taking compounded  cough med prescribed by  with no relief- hoarse .    Protocols used: ST COUGH - ACUTE NON-PRODUCTIVE-A-AH

## 2018-01-03 ENCOUNTER — OFFICE VISIT (OUTPATIENT)
Dept: URGENT CARE | Facility: CLINIC | Age: 68
End: 2018-01-03
Payer: MEDICARE

## 2018-01-03 VITALS
DIASTOLIC BLOOD PRESSURE: 85 MMHG | TEMPERATURE: 99 F | OXYGEN SATURATION: 97 % | RESPIRATION RATE: 14 BRPM | HEART RATE: 73 BPM | SYSTOLIC BLOOD PRESSURE: 120 MMHG

## 2018-01-03 DIAGNOSIS — Z20.828 EXPOSURE TO THE FLU: ICD-10-CM

## 2018-01-03 DIAGNOSIS — B34.9 VIRAL SYNDROME: Primary | ICD-10-CM

## 2018-01-03 PROCEDURE — 96372 THER/PROPH/DIAG INJ SC/IM: CPT | Mod: S$GLB,,, | Performed by: EMERGENCY MEDICINE

## 2018-01-03 PROCEDURE — 99203 OFFICE O/P NEW LOW 30 MIN: CPT | Mod: 25,S$GLB,, | Performed by: PHYSICIAN ASSISTANT

## 2018-01-03 RX ORDER — HYDROCODONE BITARTRATE AND HOMATROPINE METHYLBROMIDE 1.5; 5 MG/5ML; MG/5ML
5 SYRUP ORAL EVERY 6 HOURS PRN
Qty: 115 ML | Refills: 0 | Status: SHIPPED | OUTPATIENT
Start: 2018-01-03 | End: 2018-01-08

## 2018-01-03 RX ORDER — BETAMETHASONE SODIUM PHOSPHATE AND BETAMETHASONE ACETATE 3; 3 MG/ML; MG/ML
9 INJECTION, SUSPENSION INTRA-ARTICULAR; INTRALESIONAL; INTRAMUSCULAR; SOFT TISSUE ONCE
Status: COMPLETED | OUTPATIENT
Start: 2018-01-03 | End: 2018-01-03

## 2018-01-03 RX ORDER — NEBIVOLOL 10 MG/1
10 TABLET ORAL DAILY
Qty: 30 TABLET | Refills: 11 | Status: SHIPPED | OUTPATIENT
Start: 2018-01-03 | End: 2019-01-09 | Stop reason: SDUPTHER

## 2018-01-03 RX ORDER — ALPRAZOLAM 0.25 MG/1
0.25 TABLET ORAL 3 TIMES DAILY
Qty: 60 TABLET | Refills: 1 | Status: SHIPPED | OUTPATIENT
Start: 2018-01-03 | End: 2018-06-19 | Stop reason: SDUPTHER

## 2018-01-03 RX ADMIN — BETAMETHASONE SODIUM PHOSPHATE AND BETAMETHASONE ACETATE 9 MG: 3; 3 INJECTION, SUSPENSION INTRA-ARTICULAR; INTRALESIONAL; INTRAMUSCULAR; SOFT TISSUE at 03:01

## 2018-01-03 NOTE — PROGRESS NOTES
Subjective:       Patient ID: Dottie Galeana is a 67 y.o. female.    Vitals:  oral temperature is 98.6 °F (37 °C). Her blood pressure is 120/85 and her pulse is 73. Her respiration is 14 and oxygen saturation is 97%.     Chief Complaint: Cough (Pt has been on tamiflu for a week as prescribed by her pcp.)    Patient's grandson was positive for flu last week and patient started having symptoms a few days afterwards. Was Rx Tamiflu by PCP. No visit or testing performed. Completed the Tamiflu. Still having cough. Concerned mostly about complications from the Flu since still having some symptoms. Current symptoms are not as severe as several days ago.       Cough   This is a new problem. The current episode started in the past 7 days. The problem has been gradually worsening. Associated symptoms include chills (resolved several days ago), a fever (resolved several days ago), headaches, myalgias (resolved several days ago), nasal congestion and a sore throat. Pertinent negatives include no chest pain, ear pain, eye redness, shortness of breath or wheezing.     Review of Systems   Constitution: Positive for chills (resolved several days ago) and fever (resolved several days ago). Negative for malaise/fatigue.   HENT: Positive for congestion and sore throat. Negative for ear pain and hoarse voice.    Eyes: Negative for discharge and redness.   Cardiovascular: Negative for chest pain, dyspnea on exertion and leg swelling.   Respiratory: Positive for cough. Negative for shortness of breath, sputum production and wheezing.    Musculoskeletal: Positive for myalgias (resolved several days ago).   Gastrointestinal: Negative for abdominal pain and nausea.   Neurological: Positive for headaches.       Objective:      Physical Exam   Constitutional: She is oriented to person, place, and time. She appears well-developed and well-nourished.  Non-toxic appearance. She does not have a sickly appearance. She does not appear ill.  No distress.   HENT:   Head: Normocephalic and atraumatic.   Right Ear: Hearing, tympanic membrane, external ear and ear canal normal.   Left Ear: Hearing, tympanic membrane, external ear and ear canal normal.   Nose: No mucosal edema. No epistaxis. Right sinus exhibits no maxillary sinus tenderness and no frontal sinus tenderness. Left sinus exhibits no maxillary sinus tenderness and no frontal sinus tenderness.   Mouth/Throat: Uvula is midline and mucous membranes are normal. No uvula swelling. Posterior oropharyngeal erythema present. No oropharyngeal exudate.   Bilateral clear nasal congestion and erythema    Eyes: Pupils are equal, round, and reactive to light.   Neck: Normal range of motion. Neck supple.   Cardiovascular: Normal rate, regular rhythm and normal heart sounds.  Exam reveals no gallop and no friction rub.    No murmur heard.  Pulmonary/Chest: Effort normal and breath sounds normal. No respiratory distress. She has no decreased breath sounds. She has no wheezes. She has no rhonchi. She has no rales.   Musculoskeletal: Normal range of motion.   Lymphadenopathy:        Head (right side): No submental, no submandibular, no preauricular, no posterior auricular and no occipital adenopathy present.        Head (left side): No submental, no submandibular, no preauricular, no posterior auricular and no occipital adenopathy present.     She has no cervical adenopathy.        Right cervical: No posterior cervical adenopathy present.       Left cervical: No posterior cervical adenopathy present.        Right: No supraclavicular adenopathy present.        Left: No supraclavicular adenopathy present.   Neurological: She is alert and oriented to person, place, and time. She is not disoriented. Coordination and gait normal.   Skin: No abrasion, no ecchymosis, no laceration and no rash noted. No erythema.   Psychiatric: She has a normal mood and affect. Her behavior is normal.   Nursing note and vitals reviewed.       Assessment:       1. Viral syndrome    2. Exposure to the flu        Plan:         Viral syndrome  -     betamethasone acetate-betamethasone sodium phosphate injection 9 mg; Inject 1.5 mLs (9 mg total) into the muscle once.  -     HYDROCODONE BIT/HOMATROP ME-BR (HYDROCODONE-HOMATROPINE 5-1.5 MG/5ML) 5-1.5 mg/5 mL (5 mL) Syrp; Take 5 mLs by mouth every 6 (six) hours as needed (cough).  Dispense: 115 mL; Refill: 0    Exposure to the flu

## 2018-01-03 NOTE — PATIENT INSTRUCTIONS
- Please return here or go to the Emergency Department for any concerns or worsening of condition.   - If you were prescribed a narcotic medication, do not drive or operate heavy equipment or machinery while taking these medications.  - Please follow up with your primary care provider (PCP) or discussed specialist(s) as needed.           Influenza (Adult)    Influenza is also called the flu. It is a viral illness that affects the air passages of your lungs. It is different from the common cold. The flu can easily be passed from one to person to another. It may be spread through the air by coughing and sneezing. Or it can be spread by touching the sick person and then touching your own eyes, nose, or mouth.  The flu starts 1 to 3 days after you are exposed to the flu virus. It may last for 1 to 2 weeks but many people feel tired or fatigued for many weeks afterward. You usually dont need to take antibiotics unless you have a complication. This might be an ear or sinus infection or pneumonia.  Symptoms of the flu may be mild or severe. They can include extreme tiredness (wanting to stay in bed all day), chills, fevers, muscle aches, soreness with eye movement, headache, and a dry, hacking cough.  Home care  Follow these guidelines when caring for yourself at home:  · Avoid being around cigarette smoke, whether yours or other peoples.  · Acetaminophen or ibuprofen will help ease your fever, muscle aches, and headache. Dont give aspirin to anyone younger than 18 who has the flu. Aspirin can harm the liver.  · Nausea and loss of appetite are common with the flu. Eat light meals. Drink 6 to 8 glasses of liquids every day. Good choices are water, sport drinks, soft drinks without caffeine, juices, tea, and soup. Extra fluids will also help loosen secretions in your nose and lungs.  · Over-the-counter cold medicines will not make the flu go away faster. But the medicines may help with coughing, sore throat, and  congestion in your nose and sinuses. Dont use a decongestant if you have high blood pressure.  · Stay home until your fever has been gone for at least 24 hours without using medicine to reduce fever.  Follow-up care  Follow up with your healthcare provider, or as advised, if you are not getting better over the next week.  If you are age 65 or older, talk with your provider about getting a pneumococcal vaccine every 5 years. You should also get this vaccine if you have chronic asthma or COPD. All adults should get a flu vaccine every fall. Ask your provider about this.  When to seek medical advice  Call your healthcare provider right away if any of these occur:  · Cough with lots of colored mucus (sputum) or blood in your mucus  · Chest pain, shortness of breath, wheezing, or trouble breathing  · Severe headache, or face, neck, or ear pain  · New rash with fever  · Fever of 100.4°F (38°C) or higher, or as directed by your healthcare provider  · Confusion, behavior change, or seizure  · Severe weakness or dizziness  · You get a new fever or cough after getting better for a few days  Date Last Reviewed: 1/1/2017  © 3194-0759 [employer]. [address] [place] [address], [place] [address]. All rights reserved. This information is not intended as a substitute for professional medical care. Always follow your healthcare professional's instructions.

## 2018-01-15 ENCOUNTER — OFFICE VISIT (OUTPATIENT)
Dept: URGENT CARE | Facility: CLINIC | Age: 68
End: 2018-01-15
Payer: MEDICARE

## 2018-01-15 VITALS
HEIGHT: 67 IN | TEMPERATURE: 97 F | BODY MASS INDEX: 27 KG/M2 | HEART RATE: 70 BPM | DIASTOLIC BLOOD PRESSURE: 81 MMHG | OXYGEN SATURATION: 98 % | RESPIRATION RATE: 16 BRPM | SYSTOLIC BLOOD PRESSURE: 116 MMHG | WEIGHT: 172 LBS

## 2018-01-15 DIAGNOSIS — N30.00 ACUTE CYSTITIS WITHOUT HEMATURIA: Primary | ICD-10-CM

## 2018-01-15 LAB
BILIRUB SERPL-MCNC: NEGATIVE MG/DL
BLOOD, POC UA: POSITIVE 10
GLUCOSE UR QL STRIP: NEGATIVE
KETONES UR QL STRIP: NEGATIVE
LEUKOCYTE ESTERASE URINE, POC: POSITIVE
NITRITE, POC UA: NEGATIVE
PH, POC UA: 6 (ref 5–8)
PROTEIN, POC: POSITIVE 10
SPECIFIC GRAVITY, POC UA: 1.02 (ref 1–1.03)
UROBILINOGEN, POC UA: NORMAL

## 2018-01-15 PROCEDURE — 81000 URINALYSIS NONAUTO W/SCOPE: CPT | Mod: S$GLB,,, | Performed by: INTERNAL MEDICINE

## 2018-01-15 PROCEDURE — 99213 OFFICE O/P EST LOW 20 MIN: CPT | Mod: 25,S$GLB,, | Performed by: INTERNAL MEDICINE

## 2018-01-15 RX ORDER — PHENAZOPYRIDINE HYDROCHLORIDE 200 MG/1
200 TABLET, FILM COATED ORAL 3 TIMES DAILY PRN
Qty: 20 TABLET | Refills: 0 | Status: SHIPPED | OUTPATIENT
Start: 2018-01-15 | End: 2018-02-09

## 2018-01-15 RX ORDER — SULFAMETHOXAZOLE AND TRIMETHOPRIM 800; 160 MG/1; MG/1
1 TABLET ORAL 2 TIMES DAILY
Qty: 20 TABLET | Refills: 0 | Status: SHIPPED | OUTPATIENT
Start: 2018-01-15 | End: 2018-01-25

## 2018-01-15 NOTE — PROGRESS NOTES
"Subjective:       Patient ID: Dottie Galeana is a 67 y.o. female.    Vitals:  height is 5' 7" (1.702 m) and weight is 78 kg (172 lb). Her temperature is 97.4 °F (36.3 °C). Her blood pressure is 116/81 and her pulse is 70. Her respiration is 16 and oxygen saturation is 98%.     Chief Complaint: Urinary Tract Infection    Urinary Tract Infection    This is a new problem. The current episode started in the past 7 days. The problem has been gradually worsening. Associated symptoms include urgency. Pertinent negatives include no chills, hematuria, nausea, vomiting or rash.     Review of Systems   Constitution: Negative for chills and fever.   Skin: Negative for itching and rash.   Musculoskeletal: Positive for back pain.   Gastrointestinal: Negative for abdominal pain, diarrhea, nausea and vomiting.   Genitourinary: Positive for dysuria and urgency. Negative for bladder incontinence and hematuria.       Objective:      Physical Exam   Constitutional: She is oriented to person, place, and time. She appears well-developed and well-nourished. No distress.   HENT:   Head: Normocephalic.   Right Ear: External ear normal.   Left Ear: External ear normal.   Nose: Nose normal. No nasal deformity. No epistaxis.   Mouth/Throat: Oropharynx is clear and moist and mucous membranes are normal.   Eyes: Conjunctivae and lids are normal.   Neck: Trachea normal, normal range of motion and phonation normal. Neck supple.   Cardiovascular: Normal rate, regular rhythm, normal heart sounds and normal pulses.    Pulmonary/Chest: Effort normal and breath sounds normal.   Abdominal: Soft. Normal appearance and bowel sounds are normal. There is no tenderness. There is no CVA tenderness.   Mild suprapubic TTP   Neurological: She is alert and oriented to person, place, and time.   Skin: Skin is warm, dry and intact. No rash noted.   Psychiatric: She has a normal mood and affect. Her speech is normal. Cognition and memory are normal.   Nursing " note and vitals reviewed.      Assessment:       1. Acute cystitis without hematuria        Plan:         Acute cystitis without hematuria  -     POCT URINALYSIS  -     Urine culture  -     sulfamethoxazole-trimethoprim 800-160mg (BACTRIM DS) 800-160 mg Tab; Take 1 tablet by mouth 2 (two) times daily.  Dispense: 20 tablet; Refill: 0  -     phenazopyridine (PYRIDIUM) 200 MG tablet; Take 1 tablet (200 mg total) by mouth 3 (three) times daily as needed for Pain.  Dispense: 20 tablet; Refill: 0

## 2018-01-15 NOTE — PATIENT INSTRUCTIONS

## 2018-01-19 ENCOUNTER — TELEPHONE (OUTPATIENT)
Dept: URGENT CARE | Facility: CLINIC | Age: 68
End: 2018-01-19

## 2018-01-19 LAB
BACTERIA UR CULT: ABNORMAL
BACTERIA UR CULT: ABNORMAL
OTHER ANTIBIOTIC SUSC ISLT: ABNORMAL

## 2018-02-04 ENCOUNTER — OFFICE VISIT (OUTPATIENT)
Dept: URGENT CARE | Facility: CLINIC | Age: 68
End: 2018-02-04
Payer: MEDICARE

## 2018-02-04 VITALS
WEIGHT: 172 LBS | SYSTOLIC BLOOD PRESSURE: 119 MMHG | OXYGEN SATURATION: 98 % | HEIGHT: 67 IN | TEMPERATURE: 99 F | HEART RATE: 79 BPM | RESPIRATION RATE: 16 BRPM | DIASTOLIC BLOOD PRESSURE: 79 MMHG | BODY MASS INDEX: 27 KG/M2

## 2018-02-04 DIAGNOSIS — R09.82 POSTNASAL DRIP: Primary | ICD-10-CM

## 2018-02-04 DIAGNOSIS — R05.8 POST-VIRAL COUGH SYNDROME: ICD-10-CM

## 2018-02-04 PROCEDURE — 3008F BODY MASS INDEX DOCD: CPT | Mod: S$GLB,,, | Performed by: INTERNAL MEDICINE

## 2018-02-04 PROCEDURE — 99213 OFFICE O/P EST LOW 20 MIN: CPT | Mod: S$GLB,,, | Performed by: INTERNAL MEDICINE

## 2018-02-04 PROCEDURE — 1159F MED LIST DOCD IN RCRD: CPT | Mod: S$GLB,,, | Performed by: INTERNAL MEDICINE

## 2018-02-04 RX ORDER — MOMETASONE FUROATE 50 UG/1
1 SPRAY, METERED NASAL 2 TIMES DAILY
Qty: 17 G | Refills: 1 | Status: SHIPPED | OUTPATIENT
Start: 2018-02-04 | End: 2018-04-10

## 2018-02-04 RX ORDER — METHYLPREDNISOLONE 4 MG/1
TABLET ORAL
Qty: 1 PACKAGE | Refills: 0 | Status: SHIPPED | OUTPATIENT
Start: 2018-02-04 | End: 2018-02-09

## 2018-02-04 RX ORDER — BENZONATATE 200 MG/1
200 CAPSULE ORAL 3 TIMES DAILY PRN
Qty: 30 CAPSULE | Refills: 1 | Status: SHIPPED | OUTPATIENT
Start: 2018-02-04 | End: 2018-02-14

## 2018-02-04 RX ORDER — CETIRIZINE HYDROCHLORIDE 10 MG/1
10 TABLET ORAL DAILY
COMMUNITY
End: 2018-06-06

## 2018-02-04 NOTE — PROGRESS NOTES
"Subjective:       Patient ID: Dottie Galeana is a 67 y.o. female.    Vitals:  height is 5' 7" (1.702 m) and weight is 78 kg (172 lb). Her temperature is 98.8 °F (37.1 °C). Her blood pressure is 119/79 and her pulse is 79. Her respiration is 16 and oxygen saturation is 98%.     Chief Complaint: Cough and Headache    She reports persistent cough productive of scant, clear sputum.  She denies chest pain, SOB.  Cough is worse at night. She does have a postnasal drip and the sniffles but does not use the Flonase regularly.        Cough   This is a new problem. The current episode started 1 to 4 weeks ago. The problem has been unchanged. Associated symptoms include headaches. Pertinent negatives include no chest pain, chills, fever, rash, shortness of breath or wheezing.   Headache    Associated symptoms include coughing. Pertinent negatives include no fever or nausea.     Review of Systems   Constitution: Negative for chills and fever.   HENT: Negative for congestion.    Cardiovascular: Negative for chest pain and dyspnea on exertion.   Respiratory: Positive for cough. Negative for shortness of breath and wheezing.    Skin: Negative for rash.   Musculoskeletal: Negative for joint pain.   Gastrointestinal: Negative for diarrhea and nausea.   Neurological: Positive for headaches.       Objective:      Physical Exam   Constitutional: She is oriented to person, place, and time. She appears well-developed and well-nourished. She is cooperative.  Non-toxic appearance. She does not appear ill. No distress.   HENT:   Head: Normocephalic.   Right Ear: Hearing, tympanic membrane, external ear and ear canal normal.   Left Ear: Hearing, tympanic membrane, external ear and ear canal normal.   Nose: Nose normal. No mucosal edema, rhinorrhea or nasal deformity. No epistaxis. Right sinus exhibits no maxillary sinus tenderness and no frontal sinus tenderness. Left sinus exhibits no maxillary sinus tenderness and no frontal sinus " tenderness.   Mouth/Throat: Uvula is midline, oropharynx is clear and moist and mucous membranes are normal. No trismus in the jaw. Normal dentition. No uvula swelling. No posterior oropharyngeal erythema.   Eyes: Conjunctivae and lids are normal.   Sclera clear bilat   Neck: Trachea normal, full passive range of motion without pain and phonation normal. Neck supple.   Cardiovascular: Normal rate, regular rhythm, normal heart sounds and normal pulses.    Pulmonary/Chest: Effort normal and breath sounds normal. No respiratory distress. She has no wheezes. She has no rales.   Abdominal: Soft. Normal appearance and bowel sounds are normal.   Lymphadenopathy:     She has no cervical adenopathy.   Neurological: She is alert and oriented to person, place, and time. She exhibits normal muscle tone. Coordination normal.   Skin: Skin is warm, dry and intact. No rash noted.   Psychiatric: She has a normal mood and affect. Her speech is normal. Cognition and memory are normal.   Nursing note and vitals reviewed.      Assessment:       1. Postnasal drip    2. Post-viral cough syndrome        Plan:         Postnasal drip  -     mometasone (NASONEX) 50 mcg/actuation nasal spray; 1 spray by Nasal route 2 (two) times daily.  Dispense: 17 g; Refill: 1  -     methylPREDNISolone (MEDROL DOSEPACK) 4 mg tablet; Take all pills on each row once daily  Dispense: 1 Package; Refill: 0    Post-viral cough syndrome  -     benzonatate (TESSALON) 200 MG capsule; Take 1 capsule (200 mg total) by mouth 3 (three) times daily as needed for Cough.  Dispense: 30 capsule; Refill: 1

## 2018-02-04 NOTE — PATIENT INSTRUCTIONS
Self-Care for Sore Throats    USE NASACORT NASAL SPRAY TWICE DAILY FOR AT LEAST ONE WEEK AND THEN DECREASE TO JUST AT NIGHT UNTIL SYMPTOMS HAVE COMPLETELY RESOLVED    Sore throats happen for many reasons, such as colds, allergies, and infections caused by viruses or bacteria. In any case, your throat becomes red and sore. Your goal for self-care is to reduce your discomfort while giving your throat a chance to heal.    Moisten and soothe your throat  Tips include the following:  · Try a sip of water first thing after waking up.  · Keep your throat moist by drinking 6 or more glasses of clear liquids every day.  · Run a cool-air humidifier in your room overnight.  · Avoid cigarette smoke.   · Suck on throat lozenges, cough drops, hard candy, ice chips, or frozen fruit-juice bars. Use the sugar-free versions if your diet or medical condition requires them.  Gargle to ease irritation  Gargling every hour or 2 can ease irritation. Try gargling with 1 of these solutions:  · 1/4 teaspoon of salt in 1/2 cup of warm water  · An over-the-counter anesthetic gargle  Use medicine for more relief  Over-the-counter medicine can reduce sore throat symptoms. Ask your pharmacist if you have questions about which medicine to use:  · Ease pain with anesthetic sprays. Aspirin or an aspirin substitute also helps. Remember, never give aspirin to anyone 18 or younger, or if you are already taking blood thinners.   · For sore throats caused by allergies, try antihistamines to block the allergic reaction.  · Remember: unless a sore throat is caused by a bacterial infection, antibiotics wont help you.  Prevent future sore throats  Prevention tips include the following:  · Stop smoking or reduce contact with secondhand smoke. Smoke irritates the tender throat lining.  · Limit contact with pets and with allergy-causing substances, such as pollen and mold.  · When youre around someone with a sore throat or cold, wash your hands often to  keep viruses or bacteria from spreading.  · Dont strain your vocal cords.  Call your healthcare provider  Contact your healthcare provider if you have:  · A temperature over 101°F (38.3°C)  · White spots on the throat  · Great difficulty swallowing  · Trouble breathing  · A skin rash  · Recent exposure to someone else with strep bacteria  · Severe hoarseness and swollen glands in the neck or jaw   Date Last Reviewed: 8/1/2016  © 2695-7318 [employer]. [address] [place], [place] [id number]. All rights reserved. This information is not intended as a substitute for professional medical care. Always follow your healthcare professional's instructions.

## 2018-02-06 ENCOUNTER — OFFICE VISIT (OUTPATIENT)
Dept: PODIATRY | Facility: CLINIC | Age: 68
End: 2018-02-06
Payer: MEDICARE

## 2018-02-06 ENCOUNTER — HOSPITAL ENCOUNTER (OUTPATIENT)
Dept: RADIOLOGY | Facility: HOSPITAL | Age: 68
Discharge: HOME | End: 2018-02-06
Attending: PODIATRIST
Payer: MEDICARE

## 2018-02-06 VITALS — BODY MASS INDEX: 26.68 KG/M2 | WEIGHT: 170 LBS | HEIGHT: 67 IN

## 2018-02-06 DIAGNOSIS — S92.301A CLOSED DISPLACED FRACTURE OF METATARSAL BONE OF RIGHT FOOT, UNSPECIFIED METATARSAL, INITIAL ENCOUNTER: ICD-10-CM

## 2018-02-06 DIAGNOSIS — S90.31XA CONTUSION OF FOOT INCLUDING TOES, RIGHT, INITIAL ENCOUNTER: Primary | ICD-10-CM

## 2018-02-06 DIAGNOSIS — M79.671 FOOT PAIN, RIGHT: ICD-10-CM

## 2018-02-06 DIAGNOSIS — S90.31XA CONTUSION OF FOOT INCLUDING TOES, RIGHT, INITIAL ENCOUNTER: ICD-10-CM

## 2018-02-06 DIAGNOSIS — S92.301A CLOSED NONDISPLACED FRACTURE OF METATARSAL BONE OF RIGHT FOOT, UNSPECIFIED METATARSAL, INITIAL ENCOUNTER: ICD-10-CM

## 2018-02-06 DIAGNOSIS — S90.121A CONTUSION OF FOOT INCLUDING TOES, RIGHT, INITIAL ENCOUNTER: Primary | ICD-10-CM

## 2018-02-06 DIAGNOSIS — S90.121A CONTUSION OF FOOT INCLUDING TOES, RIGHT, INITIAL ENCOUNTER: ICD-10-CM

## 2018-02-06 PROCEDURE — 1159F MED LIST DOCD IN RCRD: CPT | Mod: S$GLB,,, | Performed by: PODIATRIST

## 2018-02-06 PROCEDURE — 3008F BODY MASS INDEX DOCD: CPT | Mod: S$GLB,,, | Performed by: PODIATRIST

## 2018-02-06 PROCEDURE — 73630 X-RAY EXAM OF FOOT: CPT | Mod: TC,PO,RT

## 2018-02-06 PROCEDURE — 99999 PR PBB SHADOW E&M-EST. PATIENT-LVL III: CPT | Mod: PBBFAC,,, | Performed by: PODIATRIST

## 2018-02-06 PROCEDURE — 73630 X-RAY EXAM OF FOOT: CPT | Mod: 26,RT,, | Performed by: RADIOLOGY

## 2018-02-06 PROCEDURE — 99203 OFFICE O/P NEW LOW 30 MIN: CPT | Mod: S$GLB,,, | Performed by: PODIATRIST

## 2018-02-06 PROCEDURE — 1126F AMNT PAIN NOTED NONE PRSNT: CPT | Mod: S$GLB,,, | Performed by: PODIATRIST

## 2018-02-06 RX ORDER — MELOXICAM 15 MG/1
15 TABLET ORAL DAILY
Qty: 30 TABLET | Refills: 0 | Status: SHIPPED | OUTPATIENT
Start: 2018-02-06 | End: 2018-04-10

## 2018-02-06 NOTE — PROGRESS NOTES
Subjective:      Patient ID: Dottie Galeana is a 67 y.o. female.    Chief Complaint: Foot Problem (Possible broken right foot)    Sharp deep pain/swelling right forefoot.  sudden onset, no improvement since last night tripping on carpet, aggravated by increased weight bearing, shoe gear, pressure.  No previous medical treatment.  OTC pain med not helping.  Denies surgery right foot.      Review of Systems   Constitution: Negative for chills, diaphoresis, fever, malaise/fatigue and night sweats.   Cardiovascular: Negative for claudication, cyanosis, leg swelling and syncope.   Skin: Negative for color change, dry skin, nail changes, rash, suspicious lesions and unusual hair distribution.   Musculoskeletal: Negative for falls, joint pain, joint swelling, muscle cramps, muscle weakness and stiffness.   Gastrointestinal: Negative for constipation, diarrhea, nausea and vomiting.   Neurological: Negative for brief paralysis, disturbances in coordination, focal weakness, numbness, paresthesias, sensory change and tremors.           Objective:      Physical Exam   Constitutional: She is oriented to person, place, and time. She appears well-developed and well-nourished. She is cooperative. No distress.   Cardiovascular:   Pulses:       Popliteal pulses are 2+ on the right side, and 2+ on the left side.        Dorsalis pedis pulses are 2+ on the right side, and 2+ on the left side.        Posterior tibial pulses are 2+ on the right side, and 2+ on the left side.   Capillary refill 3 seconds all toes/distal feet, all toes/both feet warm to touch.      Negative lymphadenopathy bilateral popliteal fossa and tarsal tunnel.      Negavie lower extremity edema bilateral.     Musculoskeletal:        Right ankle: She exhibits normal range of motion, no swelling, no ecchymosis, no deformity, no laceration and normal pulse. Achilles tendon normal. Achilles tendon exhibits no pain, no defect and normal Gerard's test results.    Pain to palpation with guarding right forefoot.     No dang ecchymosis.    No gross deformity or loss of function.    Otherwise, Normal angle, base, station of gait. All ten toes without clubbing, cyanosis, or signs of ischemia.  No pain to palpation bilateral lower extremities.  Range of motion, stability, muscle strength, and muscle tone normal bilateral feet and legs.     Lymphadenopathy:   Negative lymphadenopathy bilateral popliteal fossa and tarsal tunnel.    Negative lymphangitic streaking bilateral feet/ankles/legs.   Neurological: She is alert and oriented to person, place, and time. She has normal strength. She displays no atrophy and no tremor. No sensory deficit. She exhibits normal muscle tone. Gait normal.   Reflex Scores:       Patellar reflexes are 2+ on the right side and 2+ on the left side.       Achilles reflexes are 2+ on the right side and 2+ on the left side.  Negative tinel sign to percussion sural, superficial peroneal, deep peroneal, saphenous, and posterior tibial nerves right and left ankles and feet.     Skin: Skin is warm, dry and intact. Capillary refill takes 2 to 3 seconds. No abrasion, no bruising, no burn, no ecchymosis, no laceration, no lesion and no rash noted. She is not diaphoretic. No cyanosis or erythema. No pallor. Nails show no clubbing.     Skin is normal age and health appropriate color, turgor, texture, and temperature bilateral lower extremities without ulceration, hyperpigmentation, discoloration, masses nodules or cords palpated.  No ecchymosis, erythema, edema, or cardinal signs of infection bilateral lower extremities.     Psychiatric: She has a normal mood and affect.             Assessment:       Encounter Diagnoses   Name Primary?    Contusion of foot including toes, right, initial encounter Yes    Closed nondisplaced fracture of metatarsal bone of right foot, unspecified metatarsal, initial encounter     Foot pain, right          Plan:       Dottie was  seen today for foot problem.    Diagnoses and all orders for this visit:    Contusion of foot including toes, right, initial encounter  -     X-Ray Foot Complete Right; Future  -     ORTHOTIC DEVICE (DME)    Closed nondisplaced fracture of metatarsal bone of right foot, unspecified metatarsal, initial encounter  -     X-Ray Foot Complete Right; Future  -     ORTHOTIC DEVICE (DME)    Foot pain, right  -     X-Ray Foot Complete Right; Future  -     ORTHOTIC DEVICE (DME)    Other orders  -     meloxicam (MOBIC) 15 MG tablet; Take 1 tablet (15 mg total) by mouth once daily.      I counseled the patient on her conditions, their implications and medical management.    Dispense fx boot right.  Ambulate to tolerance with quad cane for stability.    xrays right foot.    ACE right in neutral position.    RIICE.    Rx meloxicam.          No Follow-up on file.

## 2018-02-07 ENCOUNTER — HOSPITAL ENCOUNTER (OUTPATIENT)
Dept: RADIOLOGY | Facility: HOSPITAL | Age: 68
Discharge: HOME | End: 2018-02-07
Attending: PODIATRIST
Payer: MEDICARE

## 2018-02-07 DIAGNOSIS — M79.671 FOOT PAIN, RIGHT: ICD-10-CM

## 2018-02-07 DIAGNOSIS — S90.121A CONTUSION OF FOOT INCLUDING TOES, RIGHT, INITIAL ENCOUNTER: ICD-10-CM

## 2018-02-07 DIAGNOSIS — S92.301A CLOSED DISPLACED FRACTURE OF METATARSAL BONE OF RIGHT FOOT, UNSPECIFIED METATARSAL, INITIAL ENCOUNTER: ICD-10-CM

## 2018-02-07 DIAGNOSIS — S90.31XA CONTUSION OF FOOT INCLUDING TOES, RIGHT, INITIAL ENCOUNTER: ICD-10-CM

## 2018-02-07 PROCEDURE — 73700 CT LOWER EXTREMITY W/O DYE: CPT | Mod: TC,PO,RT

## 2018-02-07 PROCEDURE — 73700 CT LOWER EXTREMITY W/O DYE: CPT | Mod: 26,RT,, | Performed by: RADIOLOGY

## 2018-02-08 ENCOUNTER — TELEPHONE (OUTPATIENT)
Dept: FAMILY MEDICINE | Facility: CLINIC | Age: 68
End: 2018-02-08

## 2018-02-08 ENCOUNTER — TELEPHONE (OUTPATIENT)
Dept: PODIATRY | Facility: CLINIC | Age: 68
End: 2018-02-08

## 2018-02-08 NOTE — TELEPHONE ENCOUNTER
----- Message from Alpesh Davalos sent at 2/8/2018 10:29 AM CST -----  Contact: Patient  Dottie, [phone], returning nurse's call about scheduling pre-op appointment for broken foot. Please advise. Thanks.   DEPRESSION/ACTING OUT BEHAVIORS/SUICIDAL/PARANOIA/AGITATION/IRRITABILITY/ANXIETY

## 2018-02-08 NOTE — TELEPHONE ENCOUNTER
Spoke with  Lilia, she would like to have surgery on Thursday if possible, has an appointment with Dr Li to be cleared on 02/09/2018.

## 2018-02-08 NOTE — TELEPHONE ENCOUNTER
----- Message from Fabby Muirza sent at 2/7/2018  4:22 PM CST -----  Contact: self [phone]  She fell and broke her foot and she needs surgery with Dr Hook.  He needs clearance  For that surgery.  Can you fit her in or can she see someone else?  Your 1st available is 2/27.  Please call her.  Thank you!

## 2018-02-08 NOTE — TELEPHONE ENCOUNTER
----- Message from Lynnette Parish sent at 2/8/2018 10:46 AM CST -----  Contact: Patient  Dottie, patient [phone], Calling to speak with the Doctor regarding surgery, possibly next 2/15/18 Thursday. Stated a personal friend of Dr Hook. Please advise. Thanks.

## 2018-02-08 NOTE — TELEPHONE ENCOUNTER
----- Message from Lynnette Parish sent at 2/8/2018 10:46 AM CST -----  Contact: Patient  Dottie, patient [phone], Calling to speak with the Doctor regarding surgery, possibly next 2/15/18 Thursday. Stated a personal friend of Dr Hook. Please advise. Thanks.

## 2018-02-09 ENCOUNTER — LAB VISIT (OUTPATIENT)
Dept: LAB | Facility: HOSPITAL | Age: 68
End: 2018-02-09
Attending: FAMILY MEDICINE
Payer: MEDICARE

## 2018-02-09 ENCOUNTER — OFFICE VISIT (OUTPATIENT)
Dept: FAMILY MEDICINE | Facility: CLINIC | Age: 68
End: 2018-02-09
Payer: MEDICARE

## 2018-02-09 VITALS
OXYGEN SATURATION: 97 % | BODY MASS INDEX: 26.68 KG/M2 | TEMPERATURE: 98 F | HEIGHT: 67 IN | WEIGHT: 170 LBS | DIASTOLIC BLOOD PRESSURE: 68 MMHG | SYSTOLIC BLOOD PRESSURE: 126 MMHG | HEART RATE: 78 BPM

## 2018-02-09 DIAGNOSIS — I10 ESSENTIAL HYPERTENSION: ICD-10-CM

## 2018-02-09 DIAGNOSIS — S92.321G CLOSED DISPLACED FRACTURE OF SECOND METATARSAL BONE OF RIGHT FOOT WITH DELAYED HEALING, SUBSEQUENT ENCOUNTER: ICD-10-CM

## 2018-02-09 DIAGNOSIS — S92.331G: ICD-10-CM

## 2018-02-09 DIAGNOSIS — Z01.818 PREOP EXAMINATION: Primary | ICD-10-CM

## 2018-02-09 DIAGNOSIS — Z01.818 PREOP EXAMINATION: ICD-10-CM

## 2018-02-09 LAB
ALBUMIN SERPL BCP-MCNC: 3.7 G/DL
ALP SERPL-CCNC: 93 U/L
ALT SERPL W/O P-5'-P-CCNC: 34 U/L
ANION GAP SERPL CALC-SCNC: 10 MMOL/L
AST SERPL-CCNC: 24 U/L
BASOPHILS # BLD AUTO: 0.05 K/UL
BASOPHILS NFR BLD: 0.3 %
BILIRUB SERPL-MCNC: 0.5 MG/DL
BUN SERPL-MCNC: 17 MG/DL
CALCIUM SERPL-MCNC: 9.7 MG/DL
CHLORIDE SERPL-SCNC: 93 MMOL/L
CO2 SERPL-SCNC: 30 MMOL/L
CREAT SERPL-MCNC: 0.8 MG/DL
DIFFERENTIAL METHOD: ABNORMAL
EOSINOPHIL # BLD AUTO: 0.1 K/UL
EOSINOPHIL NFR BLD: 0.5 %
ERYTHROCYTE [DISTWIDTH] IN BLOOD BY AUTOMATED COUNT: 12.5 %
EST. GFR  (AFRICAN AMERICAN): >60 ML/MIN/1.73 M^2
EST. GFR  (NON AFRICAN AMERICAN): >60 ML/MIN/1.73 M^2
GLUCOSE SERPL-MCNC: 134 MG/DL
HCT VFR BLD AUTO: 42.2 %
HGB BLD-MCNC: 14.4 G/DL
IMM GRANULOCYTES # BLD AUTO: 0.2 K/UL
IMM GRANULOCYTES NFR BLD AUTO: 1 %
LYMPHOCYTES # BLD AUTO: 3.2 K/UL
LYMPHOCYTES NFR BLD: 16 %
MCH RBC QN AUTO: 32.4 PG
MCHC RBC AUTO-ENTMCNC: 34.1 G/DL
MCV RBC AUTO: 95 FL
MONOCYTES # BLD AUTO: 1.5 K/UL
MONOCYTES NFR BLD: 7.5 %
NEUTROPHILS # BLD AUTO: 14.7 K/UL
NEUTROPHILS NFR BLD: 74.7 %
NRBC BLD-RTO: 0 /100 WBC
PLATELET # BLD AUTO: 417 K/UL
PMV BLD AUTO: 8.8 FL
POTASSIUM SERPL-SCNC: 3.6 MMOL/L
PROT SERPL-MCNC: 7.3 G/DL
RBC # BLD AUTO: 4.44 M/UL
SODIUM SERPL-SCNC: 133 MMOL/L
WBC # BLD AUTO: 19.72 K/UL

## 2018-02-09 PROCEDURE — 36415 COLL VENOUS BLD VENIPUNCTURE: CPT | Mod: PO

## 2018-02-09 PROCEDURE — 80053 COMPREHEN METABOLIC PANEL: CPT

## 2018-02-09 PROCEDURE — 99213 OFFICE O/P EST LOW 20 MIN: CPT | Mod: S$GLB,,, | Performed by: FAMILY MEDICINE

## 2018-02-09 PROCEDURE — 3008F BODY MASS INDEX DOCD: CPT | Mod: S$GLB,,, | Performed by: FAMILY MEDICINE

## 2018-02-09 PROCEDURE — 1159F MED LIST DOCD IN RCRD: CPT | Mod: S$GLB,,, | Performed by: FAMILY MEDICINE

## 2018-02-09 PROCEDURE — 99499 UNLISTED E&M SERVICE: CPT | Mod: S$GLB,,, | Performed by: FAMILY MEDICINE

## 2018-02-09 PROCEDURE — 1126F AMNT PAIN NOTED NONE PRSNT: CPT | Mod: S$GLB,,, | Performed by: FAMILY MEDICINE

## 2018-02-09 PROCEDURE — 99999 PR PBB SHADOW E&M-EST. PATIENT-LVL IV: CPT | Mod: PBBFAC,,, | Performed by: FAMILY MEDICINE

## 2018-02-09 PROCEDURE — 85025 COMPLETE CBC W/AUTO DIFF WBC: CPT

## 2018-02-09 NOTE — PROGRESS NOTES
Subjective:       Patient ID: Dottie Galeana is a 67 y.o. female.    Chief Complaint: Pre-op Exam (foot surgery)    Here for preop for right foot surgery.  She fell and fractured the foot 3 days ago after tripping.  She will be getting surgery with Dr. Hook next week.    CT of foot showed:  1. Fractures are noted involving the first metatarsal base, second metatarsal base, and third metatarsal base as described above along with fracture involving the medial cuneiform      BP has been well-controlled    C/o resolving URI symptoms        Past Medical History:   Diagnosis Date    Depression     Encounter for long-term (current) use of other medications     Generalized anxiety disorder     GERD (gastroesophageal reflux disease)     HTN (hypertension)     Hyperlipidemia     Insomnia     Stroke     ocular behind left eye       Past Surgical History:   Procedure Laterality Date    BREAST SURGERY      lumpectomy    orif right wrist         Review of patient's allergies indicates:   Allergen Reactions    No known drug allergies        Social History     Social History    Marital status:      Spouse name: N/A    Number of children: N/A    Years of education: N/A     Occupational History    realtor      Social History Main Topics    Smoking status: Never Smoker    Smokeless tobacco: Never Used    Alcohol use 0.0 oz/week     1 - 2 Glasses of wine per week      Comment: 2 glasses wine per day    Drug use: No    Sexual activity: Not on file     Other Topics Concern    Not on file     Social History Narrative    No narrative on file       Current Outpatient Prescriptions on File Prior to Visit   Medication Sig Dispense Refill    ALPRAZolam (XANAX) 0.25 MG tablet Take 1 tablet (0.25 mg total) by mouth 3 (three) times daily. 60 tablet 1    amlodipine (NORVASC) 5 MG tablet TAKE 1 TABLET ONE TIME DAILY 90 tablet 3    benzonatate (TESSALON) 200 MG capsule Take 1 capsule (200 mg total) by mouth 3  (three) times daily as needed for Cough. 30 capsule 1    cetirizine (ZYRTEC) 10 MG tablet Take 10 mg by mouth once daily.      meloxicam (MOBIC) 15 MG tablet Take 1 tablet (15 mg total) by mouth once daily. 30 tablet 0    mometasone (NASONEX) 50 mcg/actuation nasal spray 1 spray by Nasal route 2 (two) times daily. 17 g 1    multivitamin (THERAGRAN) per tablet 1 Tablet(s) Oral PRN Every day.        nebivolol (BYSTOLIC) 10 MG Tab Take 1 tablet (10 mg total) by mouth once daily. 30 tablet 11    NITROSTAT 0.4 mg SL tablet       pantoprazole (PROTONIX) 40 MG tablet TAKE 1 TABLET ONE TIME DAILY 90 tablet 3    rosuvastatin (CRESTOR) 10 MG tablet TAKE ONE TABLET BY MOUTH ONCE DAILY IN THE EVENING 30 tablet 11    valsartan-hydrochlorothiazide (DIOVAN-HCT) 320-25 mg per tablet TAKE 1 TABLET ONE TIME DAILY 90 tablet 3    venlafaxine (EFFEXOR-XR) 150 MG Cp24 TAKE 1 CAPSULE ONE TIME DAILY 90 capsule 3    zolpidem (AMBIEN) 10 mg Tab TAKE ONE TABLET BY MOUTH AT BEDTIME AS NEEDED 30 tablet 5    aspirin 325 MG tablet 1 Tablet(s) Oral PRN Every day.        BOOSTRIX TDAP 2.5-8-5 Lf-mcg-Lf/0.5mL Syrg injection        No current facility-administered medications on file prior to visit.        No family history on file.    Review of Systems   Constitutional: Negative for appetite change, chills, fever and unexpected weight change.   HENT: Negative for sore throat and trouble swallowing.    Eyes: Negative for pain and visual disturbance.   Respiratory: Negative for cough, shortness of breath and wheezing.    Cardiovascular: Negative for chest pain and palpitations.   Gastrointestinal: Negative for abdominal pain, blood in stool, diarrhea, nausea and vomiting.   Genitourinary: Negative for difficulty urinating, dysuria and hematuria.   Musculoskeletal: Positive for arthralgias (right foot). Negative for gait problem and neck pain.   Skin: Negative for rash and wound.   Neurological: Negative for dizziness, weakness, numbness  "and headaches.   Hematological: Negative for adenopathy.   Psychiatric/Behavioral: Negative for dysphoric mood.       Objective:      /68 (BP Location: Left arm, Patient Position: Sitting, BP Method: Medium (Manual))   Pulse 78   Temp 97.7 °F (36.5 °C) (Oral)   Ht 5' 7" (1.702 m)   Wt 77.1 kg (170 lb)   LMP 04/21/1994   SpO2 97%   BMI 26.63 kg/m²   Physical Exam   Constitutional: She is oriented to person, place, and time. She appears well-developed and well-nourished.   HENT:   Head: Normocephalic.   Mouth/Throat: Oropharynx is clear and moist. No oropharyngeal exudate or posterior oropharyngeal erythema.   Eyes: Conjunctivae and EOM are normal. Pupils are equal, round, and reactive to light.   Neck: Normal range of motion. Neck supple. No thyromegaly present.   Cardiovascular: Normal rate, regular rhythm, S1 normal, S2 normal, normal heart sounds and intact distal pulses.  Exam reveals no gallop and no friction rub.    No murmur heard.  Pulmonary/Chest: Effort normal and breath sounds normal. She has no wheezes. She has no rales.   Abdominal: Normal appearance.   Musculoskeletal:        Right lower leg: She exhibits no edema.        Left lower leg: She exhibits no edema.   Right foot walking boot in place   Lymphadenopathy:     She has no cervical adenopathy.   Neurological: She is alert and oriented to person, place, and time. No cranial nerve deficit. Gait normal.   Skin: Skin is warm and intact. No rash noted.   Psychiatric: She has a normal mood and affect.       Labs reviewed    Assessment:       1. Preop examination    2. Essential hypertension    3. Closed displaced fracture of second metatarsal bone of right foot with delayed healing, subsequent encounter    4. Closed displaced fracture of third metatarsal bone of right foot with delayed healing, subsequent encounter        Plan:       Preop examination  -     CBC auto differential; Future; Expected date: 02/09/2018  -     Comprehensive " metabolic panel; Future; Expected date: 02/09/2018    Essential hypertension    Closed displaced fracture of second metatarsal bone of right foot with delayed healing, subsequent encounter    Closed displaced fracture of third metatarsal bone of right foot with delayed healing, subsequent encounter        Medically cleared for surgery  Hold aspirin starting now  Elevated WBC likely related to resolving URI or actual toe fractures. No other infectious symptoms present  BP well controlled  Continue other present medications  Counseled on regular exercise, maintenance of a healthy weight, balanced diet rich in fruits/vegetables and lean protein, and avoidance of unhealthy habits like smoking and excessive alcohol intake.

## 2018-02-12 NOTE — TELEPHONE ENCOUNTER
Appointment scheduled for 02/20/2018 at 1:30 Pm to discuss surgery and sign consents. Appointment time at her request offered appointment on 02/14/2018 she refused.

## 2018-02-14 ENCOUNTER — OFFICE VISIT (OUTPATIENT)
Dept: PODIATRY | Facility: CLINIC | Age: 68
End: 2018-02-14
Payer: MEDICARE

## 2018-02-14 VITALS — BODY MASS INDEX: 26.68 KG/M2 | WEIGHT: 170 LBS | HEIGHT: 67 IN

## 2018-02-14 DIAGNOSIS — S92.301A CLOSED DISPLACED FRACTURE OF METATARSAL BONE OF RIGHT FOOT, UNSPECIFIED METATARSAL, INITIAL ENCOUNTER: ICD-10-CM

## 2018-02-14 DIAGNOSIS — S90.31XA CONTUSION OF FOOT INCLUDING TOES, RIGHT, INITIAL ENCOUNTER: Primary | ICD-10-CM

## 2018-02-14 DIAGNOSIS — S90.121A CONTUSION OF FOOT INCLUDING TOES, RIGHT, INITIAL ENCOUNTER: Primary | ICD-10-CM

## 2018-02-14 DIAGNOSIS — M79.671 FOOT PAIN, RIGHT: ICD-10-CM

## 2018-02-14 PROCEDURE — 99499 UNLISTED E&M SERVICE: CPT | Mod: S$GLB,,, | Performed by: PODIATRIST

## 2018-02-14 PROCEDURE — 99999 PR PBB SHADOW E&M-EST. PATIENT-LVL III: CPT | Mod: PBBFAC,,, | Performed by: PODIATRIST

## 2018-02-14 RX ORDER — LIDOCAINE HYDROCHLORIDE 20 MG/ML
JELLY TOPICAL
Qty: 30 ML | Refills: 2 | Status: SHIPPED | OUTPATIENT
Start: 2018-02-14 | End: 2018-06-06

## 2018-02-14 RX ORDER — TRAMADOL HYDROCHLORIDE 50 MG/1
50 TABLET ORAL EVERY 6 HOURS PRN
Qty: 30 TABLET | Refills: 0 | Status: SHIPPED | OUTPATIENT
Start: 2018-02-14 | End: 2018-02-24

## 2018-02-14 NOTE — PROGRESS NOTES
Subjective:      Patient ID: Dottie Galeana is a 67 y.o. female.    Chief Complaint: Foot Problem (broken right foot sign consents)    Sharp deep pain/swelling right forefoot.  sudden onset, reasonable improvement since last visit, aggravated by increased weight bearing, shoe gear, pressure.  Nsaid, nwb, fx boot help.  Denies surgery right foot.  CT right shows displaced fracture right foot 3rd metatarsal base with non displaced fx 1 and 2.      Review of Systems   Constitution: Negative for chills, diaphoresis, fever, malaise/fatigue and night sweats.   Cardiovascular: Negative for claudication, cyanosis, leg swelling and syncope.   Skin: Negative for color change, dry skin, nail changes, rash, suspicious lesions and unusual hair distribution.   Musculoskeletal: Negative for falls, joint pain, joint swelling, muscle cramps, muscle weakness and stiffness.   Gastrointestinal: Negative for constipation, diarrhea, nausea and vomiting.   Neurological: Negative for brief paralysis, disturbances in coordination, focal weakness, numbness, paresthesias, sensory change and tremors.           Objective:      Physical Exam   Constitutional: She is oriented to person, place, and time. She appears well-developed and well-nourished. She is cooperative. No distress.   Cardiovascular:   Pulses:       Popliteal pulses are 2+ on the right side, and 2+ on the left side.        Dorsalis pedis pulses are 2+ on the right side, and 2+ on the left side.        Posterior tibial pulses are 2+ on the right side, and 2+ on the left side.   Capillary refill 3 seconds all toes/distal feet, all toes/both feet warm to touch.      Negative lymphadenopathy bilateral popliteal fossa and tarsal tunnel.      Negavie lower extremity edema bilateral.     Musculoskeletal:        Right ankle: She exhibits normal range of motion, no swelling, no ecchymosis, no deformity, no laceration and normal pulse. Achilles tendon normal. Achilles tendon exhibits  no pain, no defect and normal Gerard's test results.   Pain to palpation with minimal guarding right foot.     Ecchymosis forefoot and plantar arch.    No gross deformity or loss of function.    Otherwise, Normal angle, base, station of gait. All ten toes without clubbing, cyanosis, or signs of ischemia.  No pain to palpation bilateral lower extremities.  Range of motion, stability, muscle strength, and muscle tone normal bilateral feet and legs.     Lymphadenopathy:   Negative lymphadenopathy bilateral popliteal fossa and tarsal tunnel.    Negative lymphangitic streaking bilateral feet/ankles/legs.   Neurological: She is alert and oriented to person, place, and time. She has normal strength. She displays no atrophy and no tremor. No sensory deficit. She exhibits normal muscle tone. Gait normal.   Reflex Scores:       Patellar reflexes are 2+ on the right side and 2+ on the left side.       Achilles reflexes are 2+ on the right side and 2+ on the left side.  Negative tinel sign to percussion sural, superficial peroneal, deep peroneal, saphenous, and posterior tibial nerves right and left ankles and feet.     Skin: Skin is warm, dry and intact. Capillary refill takes 2 to 3 seconds. No abrasion, no bruising, no burn, no ecchymosis, no laceration, no lesion and no rash noted. She is not diaphoretic. No cyanosis or erythema. No pallor. Nails show no clubbing.     Skin is normal age and health appropriate color, turgor, texture, and temperature bilateral lower extremities without ulceration, hyperpigmentation, discoloration, masses nodules or cords palpated.  No ecchymosis, erythema, edema, or cardinal signs of infection bilateral lower extremities.     Psychiatric: She has a normal mood and affect.             Assessment:       Encounter Diagnoses   Name Primary?    Contusion of foot including toes, right, initial encounter Yes    Foot pain, right     Closed displaced fracture of metatarsal bone of right foot,  unspecified metatarsal, initial encounter          Plan:       Dottie was seen today for foot problem.    Diagnoses and all orders for this visit:    Contusion of foot including toes, right, initial encounter    Foot pain, right    Closed displaced fracture of metatarsal bone of right foot, unspecified metatarsal, initial encounter      I counseled the patient on her conditions, their implications and medical management.    Discussed more certain of fx healing with orif, but it may heal without symptom without surgery - its just more uncertain of the position and will the abnormal position cause symptoms.    Discussed patient options of orif, closed reduction, or observation with osteotomy only if symptomatic after healing attempted.  Declines present surgery.      Continue nwb right in fx boot/crutches.    xrays right foot before next visit.    ACE right in neutral position.    RIICE.    Continue prn meloxicam.    Rx tramadol, lidocaine gel.        Follow-up in about 6 weeks (around 3/28/2018).

## 2018-02-16 ENCOUNTER — TELEPHONE (OUTPATIENT)
Dept: PODIATRY | Facility: CLINIC | Age: 68
End: 2018-02-16

## 2018-02-16 NOTE — TELEPHONE ENCOUNTER
Spoke with pt. Informed her Rx would need to be handed to her b/c it is a hard script. Pt stated that was fine. I suggest to bring the Rx to our [place] location on Tuesday and have the pt pick it up since the pt lives in [place]. Pt agreed that would be the best solution.

## 2018-02-16 NOTE — TELEPHONE ENCOUNTER
----- Message from Kasia Casey sent at 2/15/2018  3:40 PM CST -----  Call pt at [phone] / pharmacy has not received the prescription for tramodal / please advise     [facility]/pharmacy #[id number] - [place] - [address]  [address]  [facility] [address]  Phone: [phone] Fax: [phone]

## 2018-02-20 ENCOUNTER — TELEPHONE (OUTPATIENT)
Dept: PODIATRY | Facility: CLINIC | Age: 68
End: 2018-02-20

## 2018-02-20 NOTE — TELEPHONE ENCOUNTER
Called pt informed her that her Rx would be downstairs at  for . Pt stated that she would have her daughter pick it up.

## 2018-02-28 DIAGNOSIS — F41.1 GENERALIZED ANXIETY DISORDER: ICD-10-CM

## 2018-02-28 RX ORDER — VENLAFAXINE HYDROCHLORIDE 150 MG/1
CAPSULE, EXTENDED RELEASE ORAL
Qty: 90 CAPSULE | Refills: 3 | Status: SHIPPED | OUTPATIENT
Start: 2018-02-28 | End: 2018-06-19 | Stop reason: SDUPTHER

## 2018-03-06 ENCOUNTER — OFFICE VISIT (OUTPATIENT)
Dept: PODIATRY | Facility: CLINIC | Age: 68
End: 2018-03-06
Payer: MEDICARE

## 2018-03-06 ENCOUNTER — HOSPITAL ENCOUNTER (OUTPATIENT)
Dept: RADIOLOGY | Facility: HOSPITAL | Age: 68
Discharge: HOME | End: 2018-03-06
Attending: PODIATRIST
Payer: MEDICARE

## 2018-03-06 VITALS — BODY MASS INDEX: 26.64 KG/M2 | HEIGHT: 67 IN | WEIGHT: 169.75 LBS

## 2018-03-06 DIAGNOSIS — S90.121A CONTUSION OF FOOT INCLUDING TOES, RIGHT, INITIAL ENCOUNTER: Primary | ICD-10-CM

## 2018-03-06 DIAGNOSIS — S90.121A CONTUSION OF FOOT INCLUDING TOES, RIGHT, INITIAL ENCOUNTER: ICD-10-CM

## 2018-03-06 DIAGNOSIS — S90.31XA CONTUSION OF FOOT INCLUDING TOES, RIGHT, INITIAL ENCOUNTER: ICD-10-CM

## 2018-03-06 DIAGNOSIS — S90.31XA CONTUSION OF FOOT INCLUDING TOES, RIGHT, INITIAL ENCOUNTER: Primary | ICD-10-CM

## 2018-03-06 DIAGNOSIS — M79.671 FOOT PAIN, RIGHT: ICD-10-CM

## 2018-03-06 DIAGNOSIS — S92.301A CLOSED DISPLACED FRACTURE OF METATARSAL BONE OF RIGHT FOOT, UNSPECIFIED METATARSAL, INITIAL ENCOUNTER: ICD-10-CM

## 2018-03-06 PROCEDURE — 73630 X-RAY EXAM OF FOOT: CPT | Mod: TC,PO,RT

## 2018-03-06 PROCEDURE — 99024 POSTOP FOLLOW-UP VISIT: CPT | Mod: S$GLB,,, | Performed by: PODIATRIST

## 2018-03-06 PROCEDURE — 99999 PR PBB SHADOW E&M-EST. PATIENT-LVL III: CPT | Mod: PBBFAC,,, | Performed by: PODIATRIST

## 2018-03-06 PROCEDURE — 73630 X-RAY EXAM OF FOOT: CPT | Mod: 26,RT,, | Performed by: RADIOLOGY

## 2018-03-06 NOTE — PROGRESS NOTES
Subjective:      Patient ID: Dottie Galeana is a 67 y.o. female.    Chief Complaint: Foot Injury (right foot check up )    Sharp deep pain/swelling right forefoot.  sudden onset falling 2/4/2018, reasonable improvement since last visit, aggravated by increased weight bearing, shoe gear, pressure.  Nsaid, fx boot help. Admits walking everywhere in fx boot without symptom.  Denies surgery right foot.  CT right shows displaced fracture right foot 3rd metatarsal base with non displaced fx 1 and 2.      Review of Systems   Constitution: Negative for chills, diaphoresis, fever, malaise/fatigue and night sweats.   Cardiovascular: Negative for claudication, cyanosis, leg swelling and syncope.   Skin: Negative for color change, dry skin, nail changes, rash, suspicious lesions and unusual hair distribution.   Musculoskeletal: Negative for falls, joint pain, joint swelling, muscle cramps, muscle weakness and stiffness.   Gastrointestinal: Negative for constipation, diarrhea, nausea and vomiting.   Neurological: Negative for brief paralysis, disturbances in coordination, focal weakness, numbness, paresthesias, sensory change and tremors.           Objective:      Physical Exam   Constitutional: She is oriented to person, place, and time. She appears well-developed and well-nourished. She is cooperative. No distress.   Cardiovascular:   Pulses:       Popliteal pulses are 2+ on the right side, and 2+ on the left side.        Dorsalis pedis pulses are 2+ on the right side, and 2+ on the left side.        Posterior tibial pulses are 2+ on the right side, and 2+ on the left side.   Capillary refill 3 seconds all toes/distal feet, all toes/both feet warm to touch.      Negative lymphadenopathy bilateral popliteal fossa and tarsal tunnel.      Negavie lower extremity edema bilateral.     Musculoskeletal:        Right ankle: She exhibits normal range of motion, no swelling, no ecchymosis, no deformity, no laceration and normal  pulse. Achilles tendon normal. Achilles tendon exhibits no pain, no defect and normal Gerard's test results.   No pain to palpation with minimal guarding right foot.     Ecchymosis resolved forefoot and plantar arch.    No gross deformity or loss of function.    Otherwise, Normal angle, base, station of gait. All ten toes without clubbing, cyanosis, or signs of ischemia.  No pain to palpation bilateral lower extremities.  Range of motion, stability, muscle strength, and muscle tone normal bilateral feet and legs.     Lymphadenopathy:   Negative lymphadenopathy bilateral popliteal fossa and tarsal tunnel.    Negative lymphangitic streaking bilateral feet/ankles/legs.   Neurological: She is alert and oriented to person, place, and time. She has normal strength. She displays no atrophy and no tremor. No sensory deficit. She exhibits normal muscle tone. Gait normal.   Reflex Scores:       Patellar reflexes are 2+ on the right side and 2+ on the left side.       Achilles reflexes are 2+ on the right side and 2+ on the left side.  Negative tinel sign to percussion sural, superficial peroneal, deep peroneal, saphenous, and posterior tibial nerves right and left ankles and feet.     Skin: Skin is warm, dry and intact. Capillary refill takes 2 to 3 seconds. No abrasion, no bruising, no burn, no ecchymosis, no laceration, no lesion and no rash noted. She is not diaphoretic. No cyanosis or erythema. No pallor. Nails show no clubbing.     Skin is normal age and health appropriate color, turgor, texture, and temperature bilateral lower extremities without ulceration, hyperpigmentation, discoloration, masses nodules or cords palpated.  No ecchymosis, erythema, edema, or cardinal signs of infection bilateral lower extremities.     Psychiatric: She has a normal mood and affect.             Assessment:       Encounter Diagnoses   Name Primary?    Contusion of foot including toes, right, initial encounter Yes    Foot pain, right      Closed displaced fracture of metatarsal bone of right foot, unspecified metatarsal, initial encounter          Plan:       Dottie was seen today for foot injury.    Diagnoses and all orders for this visit:    Contusion of foot including toes, right, initial encounter    Foot pain, right    Closed displaced fracture of metatarsal bone of right foot, unspecified metatarsal, initial encounter      I counseled the patient on her conditions, their implications and medical management.    Continue wb right in fx boot/crutches.    xrays right foot before next visit.    ACE right in neutral position.    RIICE    Continue prn meloxicam.            Follow-up in about 1 month (around 4/6/2018).

## 2018-04-04 ENCOUNTER — HOSPITAL ENCOUNTER (OUTPATIENT)
Dept: RADIOLOGY | Facility: HOSPITAL | Age: 68
Discharge: HOME | End: 2018-04-04
Attending: PODIATRIST
Payer: MEDICARE

## 2018-04-04 DIAGNOSIS — M79.671 FOOT PAIN, RIGHT: ICD-10-CM

## 2018-04-04 DIAGNOSIS — S92.301A CLOSED DISPLACED FRACTURE OF METATARSAL BONE OF RIGHT FOOT, UNSPECIFIED METATARSAL, INITIAL ENCOUNTER: ICD-10-CM

## 2018-04-04 DIAGNOSIS — S90.31XA CONTUSION OF FOOT INCLUDING TOES, RIGHT, INITIAL ENCOUNTER: ICD-10-CM

## 2018-04-04 DIAGNOSIS — S90.121A CONTUSION OF FOOT INCLUDING TOES, RIGHT, INITIAL ENCOUNTER: ICD-10-CM

## 2018-04-04 PROCEDURE — 73630 X-RAY EXAM OF FOOT: CPT | Mod: 26,RT,, | Performed by: RADIOLOGY

## 2018-04-04 PROCEDURE — 73630 X-RAY EXAM OF FOOT: CPT | Mod: TC,FY,PO,RT

## 2018-04-06 ENCOUNTER — TELEPHONE (OUTPATIENT)
Dept: FAMILY MEDICINE | Facility: CLINIC | Age: 68
End: 2018-04-06

## 2018-04-06 NOTE — TELEPHONE ENCOUNTER
----- Message from Bailey Rubio sent at 4/6/2018 12:51 PM CDT -----  Contact: Patient's , Al  Patient's  is calling to get a sooner appointment for patient then 5/30 and all I had to offer him was 5/9 and they thought that wasn't soon enough because the patient has had a cough for a while now that they doctor knows about and just can't get rid of it.  Call Back#[phone]  Thanks

## 2018-04-10 ENCOUNTER — HOSPITAL ENCOUNTER (OUTPATIENT)
Dept: RADIOLOGY | Facility: HOSPITAL | Age: 68
Discharge: HOME | End: 2018-04-10
Attending: NURSE PRACTITIONER
Payer: MEDICARE

## 2018-04-10 ENCOUNTER — OFFICE VISIT (OUTPATIENT)
Dept: FAMILY MEDICINE | Facility: CLINIC | Age: 68
End: 2018-04-10
Payer: MEDICARE

## 2018-04-10 ENCOUNTER — OFFICE VISIT (OUTPATIENT)
Dept: PODIATRY | Facility: CLINIC | Age: 68
End: 2018-04-10
Payer: MEDICARE

## 2018-04-10 ENCOUNTER — TELEPHONE (OUTPATIENT)
Dept: FAMILY MEDICINE | Facility: CLINIC | Age: 68
End: 2018-04-10

## 2018-04-10 VITALS
WEIGHT: 165.44 LBS | HEIGHT: 67 IN | WEIGHT: 172.81 LBS | OXYGEN SATURATION: 98 % | HEART RATE: 82 BPM | RESPIRATION RATE: 16 BRPM | TEMPERATURE: 98 F | BODY MASS INDEX: 27.12 KG/M2 | BODY MASS INDEX: 25.97 KG/M2 | SYSTOLIC BLOOD PRESSURE: 124 MMHG | DIASTOLIC BLOOD PRESSURE: 82 MMHG | HEIGHT: 67 IN

## 2018-04-10 DIAGNOSIS — F32.5 MAJOR DEPRESSION IN COMPLETE REMISSION: ICD-10-CM

## 2018-04-10 DIAGNOSIS — R05.3 CHRONIC COUGH: ICD-10-CM

## 2018-04-10 DIAGNOSIS — G47.00 INSOMNIA, UNSPECIFIED TYPE: ICD-10-CM

## 2018-04-10 DIAGNOSIS — S92.301A CLOSED DISPLACED FRACTURE OF METATARSAL BONE OF RIGHT FOOT, UNSPECIFIED METATARSAL, INITIAL ENCOUNTER: Primary | ICD-10-CM

## 2018-04-10 DIAGNOSIS — I10 ESSENTIAL HYPERTENSION: ICD-10-CM

## 2018-04-10 DIAGNOSIS — R05.3 CHRONIC COUGH: Primary | ICD-10-CM

## 2018-04-10 DIAGNOSIS — R05.8 POST-VIRAL COUGH SYNDROME: Primary | ICD-10-CM

## 2018-04-10 DIAGNOSIS — F41.9 ANXIETY: ICD-10-CM

## 2018-04-10 PROCEDURE — 99024 POSTOP FOLLOW-UP VISIT: CPT | Mod: S$GLB,,, | Performed by: PODIATRIST

## 2018-04-10 PROCEDURE — 71046 X-RAY EXAM CHEST 2 VIEWS: CPT | Mod: 26,,, | Performed by: RADIOLOGY

## 2018-04-10 PROCEDURE — 71046 X-RAY EXAM CHEST 2 VIEWS: CPT | Mod: TC,FY,PO

## 2018-04-10 PROCEDURE — 99214 OFFICE O/P EST MOD 30 MIN: CPT | Mod: S$GLB,,, | Performed by: NURSE PRACTITIONER

## 2018-04-10 PROCEDURE — 3074F SYST BP LT 130 MM HG: CPT | Mod: CPTII,S$GLB,, | Performed by: NURSE PRACTITIONER

## 2018-04-10 PROCEDURE — 99999 PR PBB SHADOW E&M-EST. PATIENT-LVL V: CPT | Mod: PBBFAC,,, | Performed by: NURSE PRACTITIONER

## 2018-04-10 PROCEDURE — 3079F DIAST BP 80-89 MM HG: CPT | Mod: CPTII,S$GLB,, | Performed by: NURSE PRACTITIONER

## 2018-04-10 PROCEDURE — 99499 UNLISTED E&M SERVICE: CPT | Mod: S$GLB,,, | Performed by: NURSE PRACTITIONER

## 2018-04-10 PROCEDURE — 99999 PR PBB SHADOW E&M-EST. PATIENT-LVL III: CPT | Mod: PBBFAC,,, | Performed by: PODIATRIST

## 2018-04-10 RX ORDER — BUDESONIDE AND FORMOTEROL FUMARATE DIHYDRATE 160; 4.5 UG/1; UG/1
2 AEROSOL RESPIRATORY (INHALATION) EVERY 12 HOURS
Qty: 1 INHALER | Refills: 0 | Status: SHIPPED | OUTPATIENT
Start: 2018-04-10 | End: 2018-04-10

## 2018-04-10 RX ORDER — ZOLPIDEM TARTRATE 10 MG/1
TABLET ORAL
Qty: 30 TABLET | Refills: 5 | Status: SHIPPED | OUTPATIENT
Start: 2018-04-10 | End: 2018-10-10 | Stop reason: SDUPTHER

## 2018-04-10 NOTE — TELEPHONE ENCOUNTER
----- Message from Kasia Casey sent at 4/10/2018 12:38 PM CDT -----  Type:  Pharmacy Calling to Clarify an RX    Name of Caller: Chucky   Pharm[facility] Name: [facility] Prescription Name:  Symbicort /  What do they need to clarify?: insurance not covering /  Best Call Back Number:  [phone]   Additional Information:  Pt is there waiting

## 2018-04-10 NOTE — PROGRESS NOTES
Subjective:      Patient ID: Dottie Galeana is a 67 y.o. female.    Chief Complaint: Foot Pain (right)    Sharp deep pain/swelling right forefoot.  sudden onset falling 2/4/2018, reasonable improvement since last visit, aggravated by increased weight bearing, shoe gear, pressure.  Nsaid, fx boot help. Admits walking everywhere in fx boot without symptom.  Denies surgery right foot.  CT right shows displaced fracture right foot 3rd metatarsal base with non displaced fx 1 and 2.    Ambulating in athletic shoes full WB past month without symptom except occasional soreness.  xrays show osseous bridging in acceptable position.      Review of Systems   Constitution: Negative for chills, diaphoresis, fever, malaise/fatigue and night sweats.   Cardiovascular: Negative for claudication, cyanosis, leg swelling and syncope.   Skin: Negative for color change, dry skin, nail changes, rash, suspicious lesions and unusual hair distribution.   Musculoskeletal: Negative for falls, joint pain, joint swelling, muscle cramps, muscle weakness and stiffness.   Gastrointestinal: Negative for constipation, diarrhea, nausea and vomiting.   Neurological: Negative for brief paralysis, disturbances in coordination, focal weakness, numbness, paresthesias, sensory change and tremors.           Objective:      Physical Exam   Constitutional: She is oriented to person, place, and time. She appears well-developed and well-nourished. She is cooperative. No distress.   Cardiovascular:   Pulses:       Popliteal pulses are 2+ on the right side, and 2+ on the left side.        Dorsalis pedis pulses are 2+ on the right side, and 2+ on the left side.        Posterior tibial pulses are 2+ on the right side, and 2+ on the left side.   Capillary refill 3 seconds all toes/distal feet, all toes/both feet warm to touch.      Negative lymphadenopathy bilateral popliteal fossa and tarsal tunnel.      Negavie lower extremity edema bilateral.      Musculoskeletal:        Right ankle: She exhibits normal range of motion, no swelling, no ecchymosis, no deformity, no laceration and normal pulse. Achilles tendon normal. Achilles tendon exhibits no pain, no defect and normal Gerard's test results.   No pain to palpation with no guarding right foot.     No gross deformity or loss of function.    Otherwise, Normal angle, base, station of gait. All ten toes without clubbing, cyanosis, or signs of ischemia.  No pain to palpation bilateral lower extremities.  Range of motion, stability, muscle strength, and muscle tone normal bilateral feet and legs.     Lymphadenopathy:   Negative lymphadenopathy bilateral popliteal fossa and tarsal tunnel.    Negative lymphangitic streaking bilateral feet/ankles/legs.   Neurological: She is alert and oriented to person, place, and time. She has normal strength. She displays no atrophy and no tremor. No sensory deficit. She exhibits normal muscle tone. Gait normal.   Reflex Scores:       Patellar reflexes are 2+ on the right side and 2+ on the left side.       Achilles reflexes are 2+ on the right side and 2+ on the left side.  Negative tinel sign to percussion sural, superficial peroneal, deep peroneal, saphenous, and posterior tibial nerves right and left ankles and feet.     Skin: Skin is warm, dry and intact. Capillary refill takes 2 to 3 seconds. No abrasion, no bruising, no burn, no ecchymosis, no laceration, no lesion and no rash noted. She is not diaphoretic. No cyanosis or erythema. No pallor. Nails show no clubbing.     Skin is normal age and health appropriate color, turgor, texture, and temperature bilateral lower extremities without ulceration, hyperpigmentation, discoloration, masses nodules or cords palpated.  No ecchymosis, erythema, edema, or cardinal signs of infection bilateral lower extremities.     Psychiatric: She has a normal mood and affect.             Assessment:       Encounter Diagnosis   Name Primary?     Closed displaced fracture of metatarsal bone of right foot, unspecified metatarsal, initial encounter Yes         Plan:       Dottie was seen today for foot pain.    Diagnoses and all orders for this visit:    Closed displaced fracture of metatarsal bone of right foot, unspecified metatarsal, initial encounter      I counseled the patient on her conditions, their implications and medical management.    Continue wb right in athletic shoes.  Gradually may introduce other shoes as tolerated.    Prn nsaid.            Follow-up if symptoms worsen or fail to improve.

## 2018-04-10 NOTE — PATIENT INSTRUCTIONS
Discharge Instructions: Using an Inhaler  Your healthcare provider prescribed medicine that you will breathe in (inhale) by using an inhaler or metered-dose inhaler (MDI). An inhaler is a pressurized sprayer that gives you a measured amount of medicine. A spacer (holding chamber) may also be used. Spacers make it easier to inhale medicine correctly. Using the inhaler the right way is important so that the medicine gets to your lungs to help you breathe better. MDIs are a common type of inhaler, and these instructions refer to this type of inhaler. Other type of inhalers are also available. These include dry powder inhalers (DPIs). Check with your healthcare provider if you aren't sure which type of inhaler you are to use when you get home.  Home care  MDI without spacer  · Remove the cap and shake the inhaler.  · Take a deep breath and breathe out (exhale) all the way.  · Place the inhaler in your mouth. Close your lips around it.  · As you breathe in deeply, press down on the inhaler to release the medicine. Hold your breath for a count of 10, or as long as you can comfortably. Then slowly breathe out.          MDI with spacer  · Remove the caps from the inhaler and spacer and shake the inhaler.  · Take a deep breath and breathe out (exhale) all the way. Put the spacer between your teeth and close your lips tightly around the spacer.  · Spray 1 puff into the spacer by pressing down on the inhaler. Then slowly breathe in as deeply as you can. If you breathe in too quickly, you may hear a whistling sound in the spacer.  · Take the spacer out of your mouth. Hold your breath for a count of 10, or as long as you can comfortably. Then slowly breathe out.    Follow-up with your provider  As soon as you can, make all of your follow-up appointments as directed by our staff.  When to call 911  Call 911 right away if you have:  · Shortness of breath that does not get better after taking your quick-relief medicine  · Trouble  walking and talking because of shortness of breath  · Blue lips or fingernails  · Severe wheezing  · Peak flow readings less than 50% of your personal best   Date Last Reviewed: 10/1/2016  © 2959-6685 [employer] [address] [place] [address], [place] [address]. All rights reserved. This information is not intended as a substitute for professional medical care. Always follow your healthcare professional's instructions.        Inhaler Use  The inhaler that you were prescribed contains a potent medicine. It should only be used as directed. The medicine in your inhaler must be breathed deeply into your lungs for it to work. It will not work at all if it only reaches your mouth and throat. Follow the instructions below for best results. And remember to follow your asthma action plan as given to you by your doctor.  1. Keep your inhaler at room temperature.  2. Hold the inhaler so that the part that goes into your mouth is at the bottom.  3. Shake the inhaler well and remove the cap.  4. Breathe out through your mouth to fully empty your lungs.  5. Place the inhaler in your mouth and close your lips tightly around it. (Or hold the inhaler 1 to 2 inches from your open mouth if told to do so by your healthcare provider.)  6. Squeeze the inhaler as you breathe in slowly through your mouth until your lungs are full of air, drawing the medicine deep into your lungs.  7. Hold your breath for 10 seconds, or as long as you can comfortably hold your breath. Then breathe out slowly.  8. If you have been advised to take 2 puffs, wait 5 minutes, then repeat steps 3-7 above. Waiting 5 minutes between puffs will alow the medicine to open up your lungs so the second puff can get deeper into the lungs. Replace the cap when done.  9. If you were prescribed both a steroid inhaler and a bronchodilator inhaler, use the bronchodilator first to open the air passages. Wait 5 minutes, then use the steroid inhaler.  10. Rinse your mouth  with water and spit it out (especially after using a steroid inhaler). This is very important if you are using a steroid inhaler to prevent thrush, a mild yeast infection of the mouth and back of the throat.  11. A special chamber (spacer) may be prescribed that attaches to your inhaler. This increases the amount of medicine that goes to your lungs. It also improves how well each treatment works. Ask your doctor about this if you did not receive one.    Keep it clean  Remove the metal canister and do not immerse it in water. Then clean the plastic mouthpiece, cap, and spacer if you have one, by rinsing them well in warm running water for 30 to 60 seconds. Shake off excess water and allow the mouthpiece to dry completely (overnight is recommended). This should be done once a week. If you need the inhaler before the mouthpiece is dry, shake off excess water, replace canister, and test spray 2 times (away from the face).  Warning  A steroid inhaler is used to prevent an asthma attack. Do not use this to treat an acute wheezing episode. Use only bronchodilator inhalers (quick relief) to treat an acute asthma attack.  If you find that your medicine is not working and you need to use it more often than prescribed, this could be a sign that your asthma is getting worse. Go to the emergency room or urgent care right away. An asthma attack is easiest to treat in the early stages before it becomes severe.  When to seek medical advice  Get prompt medical attention if any of the following occur:  · Increased wheezing or shortness of breath  · Need to use your inhalers more often than usual without relief  · Fever of 100.4°F (38°C) or higher, or as directed by your healthcare provider  · Coughing up lots of dark-colored or bloody sputum (mucus)  · Chest pain with each breath  · Blue lips or fingernails  · Peak flow reading less than 50% of your normal best  Date Last Reviewed: 12/2/2015  © 3424-3124 [employer]  [address] [place] [address]. All rights reserved. This information is not intended as a substitute for professional medical care. Always follow your healthcare professional's instructions.

## 2018-04-10 NOTE — TELEPHONE ENCOUNTER
Symbicort not covered. Needs a replacement. Pharmacist states that no alternative is coming up on his end. Please advise

## 2018-04-10 NOTE — PROGRESS NOTES
Subjective:       Patient ID: Dottie Galeana is a 67 y.o. female.    Chief Complaint: Cough (3 months )    Here today with chronic cough - patient Dr Li - has been seen 3 times before for the same issues - given steroids oral, given bactrim and cough meds- still with cough - was ill previously   This is the first time I have seen her for this.       Cough   This is a chronic problem. The current episode started more than 1 month ago (3 months ). The problem has been gradually worsening. The problem occurs constantly. The cough is productive of sputum. Pertinent negatives include no chest pain, chills, ear congestion, ear pain, fever, headaches, heartburn, hemoptysis, myalgias, nasal congestion, postnasal drip, rash, rhinorrhea, sore throat, shortness of breath, sweats, weight loss or wheezing. Nothing aggravates the symptoms. She has tried prescription cough suppressant and rest (cough meds and drops ) for the symptoms. The treatment provided mild relief. There is no history of asthma.     Vitals:    04/10/18 1131   BP: 124/82   Pulse: 82   Resp: 16   Temp: 97.7 °F (36.5 °C)     Review of Systems   Constitutional: Negative.  Negative for chills, diaphoresis, fatigue, fever and weight loss.   HENT: Negative.  Negative for congestion, ear pain, postnasal drip, rhinorrhea, sinus pain, sinus pressure and sore throat.    Eyes: Negative.    Respiratory: Positive for cough. Negative for apnea, hemoptysis, chest tightness, shortness of breath and wheezing.    Cardiovascular: Negative.  Negative for chest pain.   Gastrointestinal: Negative.  Negative for abdominal pain, diarrhea, heartburn and nausea.   Endocrine: Negative.    Genitourinary: Negative.  Negative for dysuria and hematuria.   Musculoskeletal: Negative.  Negative for myalgias.   Skin: Negative.  Negative for color change and rash.   Allergic/Immunologic: Negative.    Neurological: Negative.  Negative for speech difficulty, numbness and headaches.    Hematological: Negative.    Psychiatric/Behavioral: Negative.        Past Medical History:   Diagnosis Date    Depression     Encounter for long-term (current) use of other medications     Generalized anxiety disorder     GERD (gastroesophageal reflux disease)     HTN (hypertension)     Hyperlipidemia     Insomnia     Stroke     ocular behind left eye     Objective:      Physical Exam   Constitutional: She is oriented to person, place, and time. She appears well-developed and well-nourished.   HENT:   Head: Normocephalic and atraumatic.   Right Ear: External ear normal.   Left Ear: External ear normal.   Nose: Nose normal.   Mouth/Throat: Oropharynx is clear and moist.   Eyes: Conjunctivae and EOM are normal. Pupils are equal, round, and reactive to light.   Neck: Neck supple.   Cardiovascular: Normal rate, regular rhythm, normal heart sounds and intact distal pulses.    No murmur heard.  Pulmonary/Chest: Effort normal and breath sounds normal. No respiratory distress. She has no wheezes. She has no rales.   Abdominal: Soft. Bowel sounds are normal.   Musculoskeletal: Normal range of motion.   Neurological: She is alert and oriented to person, place, and time.   Skin: Skin is warm and dry.   Psychiatric: She has a normal mood and affect. Her behavior is normal.   Nursing note and vitals reviewed.      Assessment:       1. Chronic cough    2. Insomnia, unspecified type    3. Essential hypertension    4. Major depression in complete remission    5. Anxiety        Plan:       Chronic cough  -     X-Ray Chest PA And Lateral; Future; Expected date: 04/10/2018  -     budesonide-formoterol 160-4.5 mcg (SYMBICORT) 160-4.5 mcg/actuation HFAA; Inhale 2 puffs into the lungs every 12 (twelve) hours. Controller  Dispense: 1 Inhaler; Refill: 0    Insomnia, unspecified type  -     zolpidem (AMBIEN) 10 mg Tab; TAKE ONE TABLET BY MOUTH AT BEDTIME AS NEEDED  Dispense: 30 tablet; Refill: 5    Essential hypertension  Stable  on meds     Major depression in complete remission  Anxiety  On effexor and Xanax PRN - stable     Fu with Dr Li as directed

## 2018-04-10 NOTE — TELEPHONE ENCOUNTER
Tell her to go to [email or web address] and print off a FREE trial coupon for dulera - I am going to change it - she should only need 1 inhaler - so this will work for her - tell her to bring that to the pharmacy

## 2018-04-11 ENCOUNTER — TELEPHONE (OUTPATIENT)
Dept: FAMILY MEDICINE | Facility: CLINIC | Age: 68
End: 2018-04-11

## 2018-04-11 RX ORDER — FLUTICASONE FUROATE AND VILANTEROL 100; 25 UG/1; UG/1
1 POWDER RESPIRATORY (INHALATION) DAILY
Qty: 1 EACH | Refills: 0 | Status: SHIPPED | OUTPATIENT
Start: 2018-04-11 | End: 2018-05-30

## 2018-04-11 NOTE — TELEPHONE ENCOUNTER
We will try breo - the coupon works for medicare patients - I am not sure why they wont run it correctly - changed to BREO - sent to pharmacy

## 2018-04-11 NOTE — TELEPHONE ENCOUNTER
Spoke to patient. Patient states she cannot use any coupons because she has medicare. She states that she has tried to in the past. Please advise.

## 2018-04-11 NOTE — TELEPHONE ENCOUNTER
She can use the free inhaler one - I will print it and fax it to [facility] - call [facility] to make sure they got It and can run it please

## 2018-05-30 ENCOUNTER — OFFICE VISIT (OUTPATIENT)
Dept: FAMILY MEDICINE | Facility: CLINIC | Age: 68
End: 2018-05-30
Payer: MEDICARE

## 2018-05-30 VITALS
BODY MASS INDEX: 27.41 KG/M2 | HEIGHT: 67 IN | SYSTOLIC BLOOD PRESSURE: 110 MMHG | RESPIRATION RATE: 18 BRPM | HEART RATE: 69 BPM | DIASTOLIC BLOOD PRESSURE: 70 MMHG | OXYGEN SATURATION: 96 % | WEIGHT: 174.63 LBS

## 2018-05-30 DIAGNOSIS — G47.00 INSOMNIA, UNSPECIFIED TYPE: ICD-10-CM

## 2018-05-30 DIAGNOSIS — Z12.39 BREAST CANCER SCREENING: ICD-10-CM

## 2018-05-30 DIAGNOSIS — Z78.0 MENOPAUSE: ICD-10-CM

## 2018-05-30 DIAGNOSIS — I10 ESSENTIAL HYPERTENSION: Primary | ICD-10-CM

## 2018-05-30 DIAGNOSIS — Z00.00 PREVENTATIVE HEALTH CARE: ICD-10-CM

## 2018-05-30 DIAGNOSIS — R05.3 CHRONIC COUGH: ICD-10-CM

## 2018-05-30 DIAGNOSIS — F41.9 ANXIETY: ICD-10-CM

## 2018-05-30 PROCEDURE — 3074F SYST BP LT 130 MM HG: CPT | Mod: CPTII,S$GLB,, | Performed by: FAMILY MEDICINE

## 2018-05-30 PROCEDURE — G0009 ADMIN PNEUMOCOCCAL VACCINE: HCPCS | Mod: S$GLB,,, | Performed by: FAMILY MEDICINE

## 2018-05-30 PROCEDURE — 99214 OFFICE O/P EST MOD 30 MIN: CPT | Mod: S$GLB,,, | Performed by: FAMILY MEDICINE

## 2018-05-30 PROCEDURE — 99999 PR PBB SHADOW E&M-EST. PATIENT-LVL IV: CPT | Mod: PBBFAC,,, | Performed by: FAMILY MEDICINE

## 2018-05-30 PROCEDURE — 99499 UNLISTED E&M SERVICE: CPT | Mod: S$GLB,,, | Performed by: FAMILY MEDICINE

## 2018-05-30 PROCEDURE — 3078F DIAST BP <80 MM HG: CPT | Mod: CPTII,S$GLB,, | Performed by: FAMILY MEDICINE

## 2018-05-30 PROCEDURE — 90732 PPSV23 VACC 2 YRS+ SUBQ/IM: CPT | Mod: S$GLB,,, | Performed by: FAMILY MEDICINE

## 2018-05-30 NOTE — PROGRESS NOTES
Subjective:       Patient ID: Dottie Galeana is a 67 y.o. female.    Chief Complaint: Follow-up (Possibe uti taking Azo)      Here for 6 months for f/u.    She is tolerating her present meds.  She is following with cardiology.    HTN - controlled on present meds  HLD - controlled on Crestor  Anxiety - Mood stable. Using xanax 3 times a week.  Sleep is good on Ambien 10mg QHS    C/o persistent cough since December with some white mucus.  She is taking protonix daily            Past Medical History:   Diagnosis Date    Depression     Encounter for long-term (current) use of other medications     Generalized anxiety disorder     GERD (gastroesophageal reflux disease)     HTN (hypertension)     Hyperlipidemia     Insomnia     Stroke     ocular behind left eye       Past Surgical History:   Procedure Laterality Date    BREAST SURGERY      lumpectomy    orif right wrist         Review of patient's allergies indicates:   Allergen Reactions    No known drug allergies        Social History     Social History    Marital status:      Spouse name: N/A    Number of children: N/A    Years of education: N/A     Occupational History    realtor      Social History Main Topics    Smoking status: Never Smoker    Smokeless tobacco: Never Used    Alcohol use 0.0 oz/week     1 - 2 Glasses of wine per week      Comment: 2 glasses wine per day    Drug use: No    Sexual activity: Not on file     Other Topics Concern    Not on file     Social History Narrative    No narrative on file       Current Outpatient Prescriptions on File Prior to Visit   Medication Sig Dispense Refill    ALPRAZolam (XANAX) 0.25 MG tablet Take 1 tablet (0.25 mg total) by mouth 3 (three) times daily. 60 tablet 1    amlodipine (NORVASC) 5 MG tablet TAKE 1 TABLET ONE TIME DAILY 90 tablet 3    aspirin 325 MG tablet 1 Tablet(s) Oral PRN Every day.        cetirizine (ZYRTEC) 10 MG tablet Take 10 mg by mouth once daily.       multivitamin (THERAGRAN) per tablet 1 Tablet(s) Oral PRN Every day.        nebivolol (BYSTOLIC) 10 MG Tab Take 1 tablet (10 mg total) by mouth once daily. 30 tablet 11    NITROSTAT 0.4 mg SL tablet       pantoprazole (PROTONIX) 40 MG tablet TAKE 1 TABLET ONE TIME DAILY 90 tablet 3    rosuvastatin (CRESTOR) 10 MG tablet TAKE ONE TABLET BY MOUTH ONCE DAILY IN THE EVENING 30 tablet 11    valsartan-hydrochlorothiazide (DIOVAN-HCT) 320-25 mg per tablet TAKE 1 TABLET ONE TIME DAILY 90 tablet 3    venlafaxine (EFFEXOR-XR) 150 MG Cp24 TAKE 1 CAPSULE EVERY DAY 90 capsule 3    zolpidem (AMBIEN) 10 mg Tab TAKE ONE TABLET BY MOUTH AT BEDTIME AS NEEDED 30 tablet 5    BOOSTRIX TDAP 2.5-8-5 Lf-mcg-Lf/0.5mL Syrg injection       lidocaine HCL 2% (XYLOCAINE) 2 % jelly Apply topically as needed. Apply to affected area right foot once nightly. 30 mL 2    [DISCONTINUED] fluticasone-vilanterol (BREO ELLIPTA) 100-25 mcg/dose diskus inhaler Inhale 1 puff into the lungs once daily. Controller 1 each 0     No current facility-administered medications on file prior to visit.        No family history on file.    Review of Systems   Constitutional: Positive for fatigue. Negative for appetite change, chills, fever and unexpected weight change.   HENT: Negative for sore throat and trouble swallowing.    Eyes: Negative for pain and visual disturbance.   Respiratory: Positive for cough. Negative for shortness of breath and wheezing.    Cardiovascular: Negative for chest pain and palpitations.   Gastrointestinal: Negative for abdominal pain, blood in stool, diarrhea, nausea and vomiting.   Genitourinary: Negative for difficulty urinating, dysuria and hematuria.   Musculoskeletal: Negative for arthralgias, gait problem and neck pain.   Skin: Negative for rash and wound.   Neurological: Negative for dizziness, weakness, numbness and headaches.   Hematological: Negative for adenopathy.   Psychiatric/Behavioral: Negative for dysphoric mood.     "   Objective:      /70   Pulse 69   Resp 18   Ht 5' 7" (1.702 m)   Wt 79.2 kg (174 lb 9.7 oz)   LMP 04/21/1994   SpO2 96%   BMI 27.35 kg/m²   Physical Exam   Constitutional: She is oriented to person, place, and time. She appears well-developed and well-nourished.   HENT:   Head: Normocephalic.   Mouth/Throat: Oropharynx is clear and moist. No oropharyngeal exudate or posterior oropharyngeal erythema.   Eyes: Conjunctivae and EOM are normal. Pupils are equal, round, and reactive to light.   Neck: Normal range of motion. Neck supple. No thyromegaly present.   Cardiovascular: Normal rate, regular rhythm, S1 normal, S2 normal, normal heart sounds and intact distal pulses.  Exam reveals no gallop and no friction rub.    No murmur heard.  Pulmonary/Chest: Effort normal and breath sounds normal. She has no wheezes. She has no rales.   Abdominal: Normal appearance.   Musculoskeletal:        Right lower leg: She exhibits no edema.        Left lower leg: She exhibits no edema.   Lymphadenopathy:     She has no cervical adenopathy.   Neurological: She is alert and oriented to person, place, and time. No cranial nerve deficit. Gait normal.   Skin: Skin is warm and intact. No rash noted.   Psychiatric: She has a normal mood and affect.       Results for orders placed or performed in visit on 02/09/18   CBC auto differential   Result Value Ref Range    WBC 19.72 (H) 3.90 - 12.70 K/uL    RBC 4.44 4.00 - 5.40 M/uL    Hemoglobin 14.4 12.0 - 16.0 g/dL    Hematocrit 42.2 37.0 - 48.5 %    MCV 95 82 - 98 fL    MCH 32.4 (H) 27.0 - 31.0 pg    MCHC 34.1 32.0 - 36.0 g/dL    RDW 12.5 11.5 - 14.5 %    Platelets 417 (H) 150 - 350 K/uL    MPV 8.8 (L) 9.2 - 12.9 fL    Immature Granulocytes 1.0 (H) 0.0 - 0.5 %    Gran # (ANC) 14.7 (H) 1.8 - 7.7 K/uL    Immature Grans (Abs) 0.20 (H) 0.00 - 0.04 K/uL    Lymph # 3.2 1.0 - 4.8 K/uL    Mono # 1.5 (H) 0.3 - 1.0 K/uL    Eos # 0.1 0.0 - 0.5 K/uL    Baso # 0.05 0.00 - 0.20 K/uL    nRBC 0 0 " /100 WBC    Gran% 74.7 (H) 38.0 - 73.0 %    Lymph% 16.0 (L) 18.0 - 48.0 %    Mono% 7.5 4.0 - 15.0 %    Eosinophil% 0.5 0.0 - 8.0 %    Basophil% 0.3 0.0 - 1.9 %    Differential Method Automated    Comprehensive metabolic panel   Result Value Ref Range    Sodium 133 (L) 136 - 145 mmol/L    Potassium 3.6 3.5 - 5.1 mmol/L    Chloride 93 (L) 95 - 110 mmol/L    CO2 30 (H) 23 - 29 mmol/L    Glucose 134 (H) 70 - 110 mg/dL    BUN, Bld 17 8 - 23 mg/dL    Creatinine 0.8 0.5 - 1.4 mg/dL    Calcium 9.7 8.7 - 10.5 mg/dL    Total Protein 7.3 6.0 - 8.4 g/dL    Albumin 3.7 3.5 - 5.2 g/dL    Total Bilirubin 0.5 0.1 - 1.0 mg/dL    Alkaline Phosphatase 93 55 - 135 U/L    AST 24 10 - 40 U/L    ALT 34 10 - 44 U/L    Anion Gap 10 8 - 16 mmol/L    eGFR if African American >60.0 >60 mL/min/1.73 m^2    eGFR if non African American >60.0 >60 mL/min/1.73 m^2       Assessment:       1. Essential hypertension    2. Insomnia, unspecified type    3. Anxiety    4. Preventative health care    5. Breast cancer screening    6. Menopause    7. Chronic cough        Plan:       Essential hypertension    Insomnia, unspecified type    Anxiety    Preventative health care  -     (In Office Administered) Pneumococcal Polysaccharide Vaccine (23 Valent) (SQ/IM)    Breast cancer screening  -     Mammo Digital Screening Bilateral With CAD; Future; Expected date: 05/30/2018    Menopause  -     DXA Bone Density Spine And Hip; Future; Expected date: 08/30/2018    Chronic cough      continue Ambien and xanax and other present meds  Ok to try OTC Zyrtec for likely allergic cough  Counseled on regular exercise, maintenance of a healthy weight, balanced diet rich in fruits/vegetables and lean protein, and avoidance of unhealthy habits like smoking and excessive alcohol intake.

## 2018-06-01 ENCOUNTER — TELEPHONE (OUTPATIENT)
Dept: FAMILY MEDICINE | Facility: CLINIC | Age: 68
End: 2018-06-01

## 2018-06-06 ENCOUNTER — OFFICE VISIT (OUTPATIENT)
Dept: FAMILY MEDICINE | Facility: CLINIC | Age: 68
End: 2018-06-06
Payer: MEDICARE

## 2018-06-06 VITALS
BODY MASS INDEX: 27.55 KG/M2 | DIASTOLIC BLOOD PRESSURE: 80 MMHG | WEIGHT: 175.5 LBS | TEMPERATURE: 98 F | HEART RATE: 95 BPM | SYSTOLIC BLOOD PRESSURE: 108 MMHG | HEIGHT: 67 IN | OXYGEN SATURATION: 96 %

## 2018-06-06 DIAGNOSIS — Z86.73 HISTORY OF CEREBRAL INFARCTION: ICD-10-CM

## 2018-06-06 DIAGNOSIS — R09.82 PND (POST-NASAL DRIP): ICD-10-CM

## 2018-06-06 DIAGNOSIS — R05.3 CHRONIC COUGH: ICD-10-CM

## 2018-06-06 DIAGNOSIS — R30.0 DYSURIA: Primary | ICD-10-CM

## 2018-06-06 LAB
BACTERIA #/AREA URNS HPF: ABNORMAL /HPF
BILIRUB UR QL STRIP: NEGATIVE
CLARITY UR: ABNORMAL
COLOR UR: YELLOW
GLUCOSE UR QL STRIP: NEGATIVE
HGB UR QL STRIP: ABNORMAL
KETONES UR QL STRIP: NEGATIVE
LEUKOCYTE ESTERASE UR QL STRIP: ABNORMAL
MICROSCOPIC COMMENT: ABNORMAL
NITRITE UR QL STRIP: NEGATIVE
NON-SQ EPI CELLS #/AREA URNS HPF: 1 /HPF
PH UR STRIP: 8 [PH] (ref 5–8)
PROT UR QL STRIP: NEGATIVE
RBC #/AREA URNS HPF: 3 /HPF (ref 0–4)
SP GR UR STRIP: <=1.005 (ref 1–1.03)
SQUAMOUS #/AREA URNS HPF: 1 /HPF
URN SPEC COLLECT METH UR: ABNORMAL
WBC #/AREA URNS HPF: 50 /HPF (ref 0–5)
WBC CLUMPS URNS QL MICRO: ABNORMAL

## 2018-06-06 PROCEDURE — 99214 OFFICE O/P EST MOD 30 MIN: CPT | Mod: S$GLB,,, | Performed by: NURSE PRACTITIONER

## 2018-06-06 PROCEDURE — 3079F DIAST BP 80-89 MM HG: CPT | Mod: CPTII,S$GLB,, | Performed by: NURSE PRACTITIONER

## 2018-06-06 PROCEDURE — 87077 CULTURE AEROBIC IDENTIFY: CPT

## 2018-06-06 PROCEDURE — 87086 URINE CULTURE/COLONY COUNT: CPT

## 2018-06-06 PROCEDURE — 87186 SC STD MICRODIL/AGAR DIL: CPT

## 2018-06-06 PROCEDURE — 99999 PR PBB SHADOW E&M-EST. PATIENT-LVL V: CPT | Mod: PBBFAC,,, | Performed by: NURSE PRACTITIONER

## 2018-06-06 PROCEDURE — 87088 URINE BACTERIA CULTURE: CPT

## 2018-06-06 PROCEDURE — 81000 URINALYSIS NONAUTO W/SCOPE: CPT | Mod: PO

## 2018-06-06 PROCEDURE — 3074F SYST BP LT 130 MM HG: CPT | Mod: CPTII,S$GLB,, | Performed by: NURSE PRACTITIONER

## 2018-06-06 RX ORDER — FLUTICASONE PROPIONATE 50 MCG
1 SPRAY, SUSPENSION (ML) NASAL DAILY
Qty: 1 BOTTLE | Refills: 6 | Status: SHIPPED | OUTPATIENT
Start: 2018-06-06 | End: 2019-06-07 | Stop reason: SDUPTHER

## 2018-06-06 RX ORDER — NITROFURANTOIN 25; 75 MG/1; MG/1
100 CAPSULE ORAL 2 TIMES DAILY
Qty: 30 CAPSULE | Refills: 0 | Status: SHIPPED | OUTPATIENT
Start: 2018-06-06 | End: 2018-07-11 | Stop reason: ALTCHOICE

## 2018-06-06 NOTE — PROGRESS NOTES
Subjective:       Patient ID: Dottie Galeana is a 67 y.o. female.    Chief Complaint: Urinary Tract Infection (pt believes she has a uti)    Urinary Tract Infection    This is a new problem. The current episode started in the past 7 days. The problem has been gradually worsening. The quality of the pain is described as aching. Associated symptoms include frequency. Pertinent negatives include no behavior changes, chills, discharge, flank pain, hematuria, hesitancy, nausea, possible pregnancy, sweats, urgency, vomiting, weight loss, bubble bath use, constipation, rash or withholding.     Vitals:    06/06/18 1302   BP: 108/80   Pulse: 95   Temp: 98.1 °F (36.7 °C)     Review of Systems   Constitutional: Negative.  Negative for chills, diaphoresis, fatigue, fever and weight loss.   HENT: Negative.    Eyes: Negative.    Respiratory: Negative.  Negative for cough, shortness of breath and wheezing.    Cardiovascular: Negative.  Negative for chest pain.   Gastrointestinal: Negative.  Negative for abdominal pain, constipation, diarrhea, nausea and vomiting.   Endocrine: Negative.    Genitourinary: Positive for dysuria and frequency. Negative for flank pain, hematuria, hesitancy and urgency.   Musculoskeletal: Negative.    Skin: Negative.  Negative for color change and rash.   Allergic/Immunologic: Negative.    Neurological: Negative.  Negative for speech difficulty and numbness.   Hematological: Negative.    Psychiatric/Behavioral: Negative.        Past Medical History:   Diagnosis Date    Depression     Encounter for long-term (current) use of other medications     Generalized anxiety disorder     GERD (gastroesophageal reflux disease)     HTN (hypertension)     Hyperlipidemia     Insomnia     Stroke     ocular behind left eye     Objective:      Physical Exam   Constitutional: She is oriented to person, place, and time. She appears well-developed and well-nourished.   HENT:   Head: Normocephalic and  atraumatic.   Right Ear: External ear normal.   Left Ear: External ear normal.   Mouth/Throat: Oropharynx is clear and moist.   Eyes: Conjunctivae and EOM are normal. Pupils are equal, round, and reactive to light.   Neck: Neck supple.   Cardiovascular: Normal rate, regular rhythm, normal heart sounds and intact distal pulses.    Pulmonary/Chest: Effort normal and breath sounds normal. No respiratory distress. She has no wheezes.   Abdominal: Soft. Bowel sounds are normal. She exhibits no distension. There is no tenderness. There is no guarding.   Musculoskeletal: Normal range of motion.   Neurological: She is alert and oriented to person, place, and time.   Skin: Skin is warm and dry.   Psychiatric: She has a normal mood and affect. Her behavior is normal.   Nursing note and vitals reviewed.      Assessment:       1. Dysuria    2. Chronic cough    3. PND (post-nasal drip)    4. History of cerebral infarction        Plan:       Dysuria  -     Urine culture  -     Urinalysis Microscopic  -     Urinalysis  -     nitrofurantoin, macrocrystal-monohydrate, (MACROBID) 100 MG capsule; Take 1 capsule (100 mg total) by mouth 2 (two) times daily.  Dispense: 30 capsule; Refill: 0  Discussed that she needs to take 5 days of meds and can use this med post- intercourse - only take night after intercourse and one day after.   Call if any increased pain or fever worsening     Chronic cough  Add zyrtec     PND (post-nasal drip)  -     fluticasone (FLONASE) 50 mcg/actuation nasal spray; 1 spray (50 mcg total) by Each Nare route once daily.  Dispense: 1 Bottle; Refill: 6    History of cerebral infarction  Stable on meds       Fu if not better

## 2018-06-09 LAB — BACTERIA UR CULT: NORMAL

## 2018-06-18 ENCOUNTER — TELEPHONE (OUTPATIENT)
Dept: FAMILY MEDICINE | Facility: CLINIC | Age: 68
End: 2018-06-18

## 2018-06-18 NOTE — TELEPHONE ENCOUNTER
----- Message from Angela Ware sent at 6/18/2018  1:55 PM CDT -----  Verifying that you received there paperwork on patient.  Please call Olesya Valles at [phone].

## 2018-06-19 DIAGNOSIS — F41.1 GENERALIZED ANXIETY DISORDER: ICD-10-CM

## 2018-06-19 RX ORDER — VENLAFAXINE HYDROCHLORIDE 150 MG/1
CAPSULE, EXTENDED RELEASE ORAL
Qty: 90 CAPSULE | Refills: 3 | Status: SHIPPED | OUTPATIENT
Start: 2018-06-19 | End: 2018-09-24 | Stop reason: SDUPTHER

## 2018-06-20 RX ORDER — ALPRAZOLAM 0.25 MG/1
TABLET ORAL
Qty: 60 TABLET | Refills: 1 | Status: SHIPPED | OUTPATIENT
Start: 2018-06-20 | End: 2018-07-10 | Stop reason: SDUPTHER

## 2018-06-26 ENCOUNTER — TELEPHONE (OUTPATIENT)
Dept: FAMILY MEDICINE | Facility: CLINIC | Age: 68
End: 2018-06-26

## 2018-06-26 NOTE — TELEPHONE ENCOUNTER
----- Message from Uir Mason sent at 6/26/2018  2:26 PM CDT -----  Contact: Reena a nurse with [employer]  Reena a nurse with [employer] need to speak with a nurse regarding patient bone density orders, Please call back at [phone], she also stated the second fax was sent on the 19th.

## 2018-06-28 ENCOUNTER — HOSPITAL ENCOUNTER (OUTPATIENT)
Dept: RADIOLOGY | Facility: HOSPITAL | Age: 68
Discharge: HOME | End: 2018-06-28
Attending: FAMILY MEDICINE
Payer: MEDICARE

## 2018-06-28 DIAGNOSIS — Z78.0 MENOPAUSE: ICD-10-CM

## 2018-06-28 DIAGNOSIS — Z12.39 BREAST CANCER SCREENING: ICD-10-CM

## 2018-06-28 PROCEDURE — 77080 DXA BONE DENSITY AXIAL: CPT | Mod: TC,PO

## 2018-06-28 PROCEDURE — 77080 DXA BONE DENSITY AXIAL: CPT | Mod: 26,,, | Performed by: RADIOLOGY

## 2018-06-28 PROCEDURE — 77063 BREAST TOMOSYNTHESIS BI: CPT | Mod: 26,,, | Performed by: RADIOLOGY

## 2018-06-28 PROCEDURE — 77067 SCR MAMMO BI INCL CAD: CPT | Mod: 26,,, | Performed by: RADIOLOGY

## 2018-06-28 PROCEDURE — 77067 SCR MAMMO BI INCL CAD: CPT | Mod: TC,PO

## 2018-07-02 ENCOUNTER — PES CALL (OUTPATIENT)
Dept: ADMINISTRATIVE | Facility: CLINIC | Age: 68
End: 2018-07-02

## 2018-07-10 RX ORDER — ALPRAZOLAM 0.25 MG/1
TABLET ORAL
Qty: 60 TABLET | Refills: 1 | Status: SHIPPED | OUTPATIENT
Start: 2018-07-10 | End: 2019-02-26 | Stop reason: SDUPTHER

## 2018-07-11 ENCOUNTER — LAB VISIT (OUTPATIENT)
Dept: LAB | Facility: HOSPITAL | Age: 68
End: 2018-07-11
Attending: NURSE PRACTITIONER
Payer: MEDICARE

## 2018-07-11 ENCOUNTER — OFFICE VISIT (OUTPATIENT)
Dept: FAMILY MEDICINE | Facility: CLINIC | Age: 68
End: 2018-07-11
Payer: MEDICARE

## 2018-07-11 VITALS
OXYGEN SATURATION: 99 % | DIASTOLIC BLOOD PRESSURE: 78 MMHG | BODY MASS INDEX: 27.41 KG/M2 | WEIGHT: 174.63 LBS | SYSTOLIC BLOOD PRESSURE: 118 MMHG | RESPIRATION RATE: 16 BRPM | HEIGHT: 67 IN | HEART RATE: 92 BPM | TEMPERATURE: 98 F

## 2018-07-11 DIAGNOSIS — R25.2 MUSCLE CRAMP: ICD-10-CM

## 2018-07-11 DIAGNOSIS — F41.1 GENERALIZED ANXIETY DISORDER: ICD-10-CM

## 2018-07-11 DIAGNOSIS — N32.89 BLADDER SPASMS: ICD-10-CM

## 2018-07-11 DIAGNOSIS — K21.9 GASTROESOPHAGEAL REFLUX DISEASE WITHOUT ESOPHAGITIS: ICD-10-CM

## 2018-07-11 DIAGNOSIS — R05.9 COUGH: ICD-10-CM

## 2018-07-11 DIAGNOSIS — R42 DIZZY SPELLS: ICD-10-CM
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
## 2018-07-11 LAB
ALBUMIN SERPL BCP-MCNC: 4.1 G/DL
ALP SERPL-CCNC: 95 U/L
ALT SERPL W/O P-5'-P-CCNC: 23 U/L
ANION GAP SERPL CALC-SCNC: 12 MMOL/L
AST SERPL-CCNC: 25 U/L
BASOPHILS # BLD AUTO: 0.08 K/UL
BASOPHILS NFR BLD: 0.9 %
BILIRUB SERPL-MCNC: 0.4 MG/DL
BILIRUB UR QL STRIP: NEGATIVE
BUN SERPL-MCNC: 13 MG/DL
CALCIUM SERPL-MCNC: 9.7 MG/DL
CHLORIDE SERPL-SCNC: 91 MMOL/L
CLARITY UR: CLEAR
CO2 SERPL-SCNC: 28 MMOL/L
COLOR UR: YELLOW
CREAT SERPL-MCNC: 0.8 MG/DL
DIFFERENTIAL METHOD: ABNORMAL
EOSINOPHIL # BLD AUTO: 0.1 K/UL
EOSINOPHIL NFR BLD: 1.3 %
ERYTHROCYTE [DISTWIDTH] IN BLOOD BY AUTOMATED COUNT: 12.4 %
EST. GFR  (AFRICAN AMERICAN): >60 ML/MIN/1.73 M^2
EST. GFR  (NON AFRICAN AMERICAN): >60 ML/MIN/1.73 M^2
GLUCOSE SERPL-MCNC: 179 MG/DL
GLUCOSE UR QL STRIP: NEGATIVE
HCT VFR BLD AUTO: 41.5 %
HGB BLD-MCNC: 13.9 G/DL
HGB UR QL STRIP: NEGATIVE
IMM GRANULOCYTES # BLD AUTO: 0.03 K/UL
IMM GRANULOCYTES NFR BLD AUTO: 0.3 %
KETONES UR QL STRIP: NEGATIVE
LEUKOCYTE ESTERASE UR QL STRIP: NEGATIVE
LYMPHOCYTES # BLD AUTO: 2.4 K/UL
LYMPHOCYTES NFR BLD: 26.5 %
MCH RBC QN AUTO: 32.5 PG
MCHC RBC AUTO-ENTMCNC: 33.5 G/DL
MCV RBC AUTO: 97 FL
MICROSCOPIC COMMENT: ABNORMAL
MONOCYTES # BLD AUTO: 0.5 K/UL
MONOCYTES NFR BLD: 6 %
NEUTROPHILS # BLD AUTO: 5.8 K/UL
NEUTROPHILS NFR BLD: 65 %
NITRITE UR QL STRIP: NEGATIVE
NRBC BLD-RTO: 0 /100 WBC
PH UR STRIP: 7 [PH] (ref 5–8)
PLATELET # BLD AUTO: 390 K/UL
PMV BLD AUTO: 8.8 FL
POTASSIUM SERPL-SCNC: 3.7 MMOL/L
PROT SERPL-MCNC: 7.5 G/DL
PROT UR QL STRIP: NEGATIVE
RBC # BLD AUTO: 4.28 M/UL
SODIUM SERPL-SCNC: 131 MMOL/L
SP GR UR STRIP: <=1.005 (ref 1–1.03)
SQUAMOUS #/AREA URNS HPF: 1 /HPF
TSH SERPL DL<=0.005 MIU/L-ACNC: 2.21 UIU/ML
URN SPEC COLLECT METH UR: ABNORMAL
WBC # BLD AUTO: 8.94 K/UL
WBC #/AREA URNS HPF: 1 /HPF (ref 0–5)
WBC CLUMPS URNS QL MICRO: ABNORMAL

## 2018-07-11 PROCEDURE — 81000 URINALYSIS NONAUTO W/SCOPE: CPT | Mod: PO

## 2018-07-11 PROCEDURE — 84443 ASSAY THYROID STIM HORMONE: CPT

## 2018-07-11 PROCEDURE — 80053 COMPREHEN METABOLIC PANEL: CPT

## 2018-07-11 PROCEDURE — 36415 COLL VENOUS BLD VENIPUNCTURE: CPT | Mod: PO

## 2018-07-11 PROCEDURE — 3074F SYST BP LT 130 MM HG: CPT | Mod: CPTII,S$GLB,, | Performed by: NURSE PRACTITIONER

## 2018-07-11 PROCEDURE — 85025 COMPLETE CBC W/AUTO DIFF WBC: CPT

## 2018-07-11 PROCEDURE — 99999 PR PBB SHADOW E&M-EST. PATIENT-LVL III: CPT | Mod: PBBFAC,,, | Performed by: NURSE PRACTITIONER

## 2018-07-11 PROCEDURE — 86618 LYME DISEASE ANTIBODY: CPT

## 2018-07-11 PROCEDURE — 3078F DIAST BP <80 MM HG: CPT | Mod: CPTII,S$GLB,, | Performed by: NURSE PRACTITIONER

## 2018-07-11 PROCEDURE — 99214 OFFICE O/P EST MOD 30 MIN: CPT | Mod: S$GLB,,, | Performed by: NURSE PRACTITIONER

## 2018-07-11 NOTE — PROGRESS NOTES
Subjective:       Patient ID: Dottie Galeana is a 67 y.o. female.    Chief Complaint: Cough (clear sputum for month); Muscle Pain; Dizziness; and Spasms (groin area)    Ms. Galeana is a new patient to me. She presents today for tick bite    HPI   She found a tick on her head a few days ago and is concerned about lyme disease. Pulled tick off without difficulty. Started having dizzy spells, generalized myalgias/arthralgias, bladder spasms. She denies SOB, numbness/tingling, headache, neck stiffness, visual change, tinnitus.     Dizziness: started Sunday--lightheaded/near syncope feeling, yesterday did feel spinning sensation. Has checked BP at home during spells and it is normal. Tolerating PO. Lasts seconds to minutes. Denies ear pain/congestion. No dizziness currently. Denies palpitations     Bladders spasms: suffers from tilted bladder. Denies dysuria, frequency    Cough: dry, >6 months. Has tried flonase without relief. On pantoprazole for reflux, denies burning sensation. Sounds like throat clearance. CXR without abnormality   Vitals:    07/11/18 1215   BP: 118/78   Pulse: 92   Resp: 16   Temp: 98.4 °F (36.9 °C)     Review of Systems   Constitutional: Negative for appetite change, diaphoresis and fever.   HENT: Negative for ear pain, facial swelling, hearing loss, postnasal drip and trouble swallowing.    Eyes: Negative for photophobia, discharge, redness and visual disturbance.   Respiratory: Positive for cough. Negative for shortness of breath.    Cardiovascular: Negative for chest pain and palpitations.   Gastrointestinal: Negative for abdominal pain and diarrhea.   Genitourinary: Negative for difficulty urinating.   Musculoskeletal: Positive for arthralgias and myalgias. Negative for gait problem.   Skin: Negative for rash and wound.   Neurological: Positive for dizziness and light-headedness. Negative for facial asymmetry, speech difficulty, weakness and numbness.   Psychiatric/Behavioral: Negative  for confusion. The patient is not nervous/anxious.        Past Medical History:   Diagnosis Date    Depression     Encounter for long-term (current) use of other medications     Generalized anxiety disorder     GERD (gastroesophageal reflux disease)     HTN (hypertension)     Hyperlipidemia     Insomnia     Stroke     ocular behind left eye     Objective:      Physical Exam   Constitutional: She is oriented to person, place, and time. She does not have a sickly appearance. No distress.   HENT:   Head: Normocephalic.   Right Ear: Hearing, tympanic membrane, external ear and ear canal normal.   Left Ear: Hearing, tympanic membrane, external ear and ear canal normal.   Nose: Nose normal.   Mouth/Throat: Uvula is midline, oropharynx is clear and moist and mucous membranes are normal.   Negative erythema migrans rash    Eyes: Conjunctivae and lids are normal.   Neck: No JVD present. No tracheal deviation present.   Cardiovascular: Normal rate and normal heart sounds.    Pulmonary/Chest: Effort normal and breath sounds normal.   Abdominal: Normal appearance. She exhibits no distension.   Musculoskeletal: She exhibits no deformity.   Neurological: She is alert and oriented to person, place, and time.   Skin: She is not diaphoretic. No pallor.   Psychiatric: She has a normal mood and affect. Her speech is normal and behavior is normal. Judgment and thought content normal. Cognition and memory are normal.   Nursing note and vitals reviewed.      Assessment:       1. Tick bite, initial encounter    2. Dizzy spells    3. Muscle cramp    4. Bladder spasms    5. Generalized anxiety disorder    6. Cough    7. Gastroesophageal reflux disease without esophagitis        Plan:       Tick bite, initial encounter  -     B. burgdorferi Abs (Lyme Disease); Future; Expected date: 07/11/2018    Dizzy spells  -     CBC auto differential; Future; Expected date: 07/11/2018  -     Comprehensive metabolic panel; Future; Expected date:  07/11/2018  -     TSH; Future; Expected date: 07/11/2018  -     B. burgdorferi Abs (Lyme Disease); Future; Expected date: 07/11/2018    Muscle cramp  -     B. burgdorferi Abs (Lyme Disease); Future; Expected date: 07/11/2018    Bladder spasms  -     Urinalysis  -     Urinalysis Microscopic  -     B. burgdorferi Abs (Lyme Disease); Future; Expected date: 07/11/2018    Generalized anxiety disorder  -     TSH; Future; Expected date: 07/11/2018    Cough  -     ranitidine (ZANTAC) 300 MG tablet; Take 1 tablet (300 mg total) by mouth every evening.  Dispense: 30 tablet; Refill: 5    Gastroesophageal reflux disease without esophagitis  -     ranitidine (ZANTAC) 300 MG tablet; Take 1 tablet (300 mg total) by mouth every evening.  Dispense: 30 tablet; Refill: 5        Follow-up for eval if s/s worsen/fail to improve/new symptoms.

## 2018-07-12 ENCOUNTER — TELEPHONE (OUTPATIENT)
Dept: FAMILY MEDICINE | Facility: CLINIC | Age: 68
End: 2018-07-12

## 2018-07-12 DIAGNOSIS — I10 ESSENTIAL HYPERTENSION: Primary | ICD-10-CM

## 2018-07-12 RX ORDER — VALSARTAN AND HYDROCHLOROTHIAZIDE 320; 12.5 MG/1; MG/1
1 TABLET, FILM COATED ORAL DAILY
Qty: 30 TABLET | Refills: 0 | Status: SHIPPED | OUTPATIENT
Start: 2018-07-12 | End: 2018-07-23 | Stop reason: SDUPTHER

## 2018-07-12 NOTE — TELEPHONE ENCOUNTER
"Spoke with pt. States that her and pharmacy have resolved the issue. States that she had received call from pharmacy stating Abtx was ready for . Pt was confused, she had no reason to be on Abtx.  States that there is a customer of pharmacy that is "one letter" different with names.   No additional needs voiced at this time.   "

## 2018-07-12 NOTE — TELEPHONE ENCOUNTER
----- Message from Suyapa Arnold sent at 7/12/2018  1:17 PM CDT -----  Contact: patient  Type: Needs Medical Advice    Who Called:  patient  Symptoms (please be specific):    How long has patient had these symptoms:    Pharmacy name and phone #:    Best Call Back Number: [phone]  Additional Information: have questions regarding medications that was prescribe today? Call back

## 2018-07-13 LAB — B BURGDOR AB SER IA-ACNC: 0.06 INDEX VALUE

## 2018-07-23 ENCOUNTER — TELEPHONE (OUTPATIENT)
Dept: FAMILY MEDICINE | Facility: CLINIC | Age: 68
End: 2018-07-23

## 2018-07-23 RX ORDER — LOSARTAN POTASSIUM AND HYDROCHLOROTHIAZIDE 12.5; 1 MG/1; MG/1
1 TABLET ORAL DAILY
Qty: 90 TABLET | Refills: 3 | Status: SHIPPED | OUTPATIENT
Start: 2018-07-23 | End: 2018-07-27 | Stop reason: SINTOL

## 2018-07-23 NOTE — TELEPHONE ENCOUNTER
----- Message from Haja Zurita sent at 7/23/2018  9:26 AM CDT -----  Type: Needs Medical Advice    Who Called:  Self   Symptoms (please be specific):  NA  How long has patient had these symptoms:  OSCAR   Pharmacy name and phone #:  [facility]  Best Call Back Number: [phone]  Additional Information: Patient called asking for an alternative rx for valsartan

## 2018-07-26 ENCOUNTER — OFFICE VISIT (OUTPATIENT)
Dept: FAMILY MEDICINE | Facility: CLINIC | Age: 68
End: 2018-07-26
Payer: MEDICARE

## 2018-07-26 ENCOUNTER — LAB VISIT (OUTPATIENT)
Dept: LAB | Facility: HOSPITAL | Age: 68
End: 2018-07-26
Attending: NURSE PRACTITIONER
Payer: MEDICARE

## 2018-07-26 VITALS
TEMPERATURE: 98 F | RESPIRATION RATE: 16 BRPM | BODY MASS INDEX: 27.27 KG/M2 | WEIGHT: 173.75 LBS | SYSTOLIC BLOOD PRESSURE: 118 MMHG | HEIGHT: 67 IN | DIASTOLIC BLOOD PRESSURE: 76 MMHG | OXYGEN SATURATION: 96 % | HEART RATE: 64 BPM

## 2018-07-26 DIAGNOSIS — I10 ESSENTIAL HYPERTENSION: ICD-10-CM

## 2018-07-26 DIAGNOSIS — R09.A2 GLOBUS SENSATION: ICD-10-CM

## 2018-07-26 DIAGNOSIS — R73.9 HYPERGLYCEMIA: ICD-10-CM

## 2018-07-26 DIAGNOSIS — I10 ESSENTIAL HYPERTENSION: Primary | ICD-10-CM

## 2018-07-26 LAB
ANION GAP SERPL CALC-SCNC: 11 MMOL/L
BUN SERPL-MCNC: 9 MG/DL
CALCIUM SERPL-MCNC: 9.8 MG/DL
CHLORIDE SERPL-SCNC: 93 MMOL/L
CO2 SERPL-SCNC: 27 MMOL/L
CREAT SERPL-MCNC: 0.7 MG/DL
EST. GFR  (AFRICAN AMERICAN): >60 ML/MIN/1.73 M^2
EST. GFR  (NON AFRICAN AMERICAN): >60 ML/MIN/1.73 M^2
GLUCOSE SERPL-MCNC: 159 MG/DL
POTASSIUM SERPL-SCNC: 3.8 MMOL/L
SODIUM SERPL-SCNC: 131 MMOL/L

## 2018-07-26 PROCEDURE — 3078F DIAST BP <80 MM HG: CPT | Mod: CPTII,S$GLB,, | Performed by: NURSE PRACTITIONER

## 2018-07-26 PROCEDURE — 99499 UNLISTED E&M SERVICE: CPT | Mod: S$GLB,,, | Performed by: NURSE PRACTITIONER

## 2018-07-26 PROCEDURE — 36415 COLL VENOUS BLD VENIPUNCTURE: CPT | Mod: PO

## 2018-07-26 PROCEDURE — 99214 OFFICE O/P EST MOD 30 MIN: CPT | Mod: S$GLB,,, | Performed by: NURSE PRACTITIONER

## 2018-07-26 PROCEDURE — 99999 PR PBB SHADOW E&M-EST. PATIENT-LVL V: CPT | Mod: PBBFAC,,, | Performed by: NURSE PRACTITIONER

## 2018-07-26 PROCEDURE — 80048 BASIC METABOLIC PNL TOTAL CA: CPT

## 2018-07-26 PROCEDURE — 3074F SYST BP LT 130 MM HG: CPT | Mod: CPTII,S$GLB,, | Performed by: NURSE PRACTITIONER

## 2018-07-26 NOTE — PROGRESS NOTES
Subjective:       Patient ID: Dottie Galeana is a 67 y.o. female.    Chief Complaint: Cough and Fatigue    Ms. Galeana is a known patient to me. She presents today for FU    HPI   HCTZ was decreased from 25 to 12.5 due to hyponatremia. She also has been switched from valsartan-hctz to losartan-hctz due to valsartan recall---HTN remains controlled. Reports muscle aches and dizziness have resolved since decreased dose of HCTZ    Repeat BMP drawn today--in process  Last labs showed hyperglycemia: history of gestational diabetes. Check a1c    Globus sensation/GERD: gradually worsening, needs to be scoped; last scope 2014 with dilation per Dr. Santiago  Vitals:    07/26/18 1304   BP: 118/76   Pulse: 64   Resp: 16   Temp: 98.2 °F (36.8 °C)     Review of Systems   Constitutional: Negative for diaphoresis and fever.   HENT: Positive for trouble swallowing. Negative for facial swelling.    Eyes: Negative for discharge and redness.   Respiratory: Positive for cough. Negative for shortness of breath.    Cardiovascular: Negative for chest pain and palpitations.   Gastrointestinal: Negative for abdominal pain and diarrhea.   Genitourinary: Negative for difficulty urinating.   Musculoskeletal: Negative for gait problem.   Skin: Negative for rash and wound.   Neurological: Negative for facial asymmetry and speech difficulty.   Psychiatric/Behavioral: Negative for confusion. The patient is not nervous/anxious.        Past Medical History:   Diagnosis Date    Depression     Encounter for long-term (current) use of other medications     Generalized anxiety disorder     GERD (gastroesophageal reflux disease)     HTN (hypertension)     Hyperlipidemia     Insomnia     Stroke     ocular behind left eye     Objective:      Physical Exam   Constitutional: She is oriented to person, place, and time. She does not have a sickly appearance. No distress.   HENT:   Head: Normocephalic.   Right Ear: Hearing normal.   Left Ear:  Hearing normal.   Nose: Nose normal.   Eyes: Conjunctivae and lids are normal.   Neck: No JVD present. No tracheal deviation present.   Cardiovascular: Normal rate and normal heart sounds.    Pulmonary/Chest: Effort normal and breath sounds normal.   Abdominal: Normal appearance. She exhibits no distension.   Musculoskeletal: She exhibits no deformity.   Neurological: She is alert and oriented to person, place, and time.   Skin: She is not diaphoretic. No pallor.   Psychiatric: She has a normal mood and affect. Her speech is normal and behavior is normal. Judgment and thought content normal. Cognition and memory are normal.   Nursing note and vitals reviewed.      Assessment:       1. Essential hypertension    2. Globus sensation    3. Hyperglycemia        Plan:       Essential hypertension   controlled    Globus sensation  -     Case request GI: EGD (ESOPHAGOGASTRODUODENOSCOPY)    Hyperglycemia  -     Hemoglobin A1c; Future; Expected date: 07/26/2018  - Diabetic diet, exercise, weight loss        Follow-up for routinely q6months with PCP, with me as needed.

## 2018-07-27 ENCOUNTER — PATIENT MESSAGE (OUTPATIENT)
Dept: FAMILY MEDICINE | Facility: CLINIC | Age: 68
End: 2018-07-27

## 2018-07-27 DIAGNOSIS — I10 ESSENTIAL HYPERTENSION: Primary | ICD-10-CM

## 2018-07-27 DIAGNOSIS — E87.1 HYPONATREMIA: ICD-10-CM

## 2018-07-27 RX ORDER — LOSARTAN POTASSIUM 100 MG/1
100 TABLET ORAL DAILY
Qty: 90 TABLET | Refills: 1 | Status: SHIPPED | OUTPATIENT
Start: 2018-07-27 | End: 2019-01-20 | Stop reason: SDUPTHER

## 2018-08-06 ENCOUNTER — LAB VISIT (OUTPATIENT)
Dept: LAB | Facility: HOSPITAL | Age: 68
End: 2018-08-06
Attending: NURSE PRACTITIONER
Payer: MEDICARE

## 2018-08-06 DIAGNOSIS — E87.1 HYPONATREMIA: ICD-10-CM

## 2018-08-06 LAB
ANION GAP SERPL CALC-SCNC: 11 MMOL/L
BUN SERPL-MCNC: 13 MG/DL
CALCIUM SERPL-MCNC: 9.7 MG/DL
CHLORIDE SERPL-SCNC: 103 MMOL/L
CO2 SERPL-SCNC: 26 MMOL/L
CREAT SERPL-MCNC: 0.8 MG/DL
EST. GFR  (AFRICAN AMERICAN): >60 ML/MIN/1.73 M^2
EST. GFR  (NON AFRICAN AMERICAN): >60 ML/MIN/1.73 M^2
GLUCOSE SERPL-MCNC: 123 MG/DL
POTASSIUM SERPL-SCNC: 4.2 MMOL/L
SODIUM SERPL-SCNC: 140 MMOL/L

## 2018-08-06 PROCEDURE — 36415 COLL VENOUS BLD VENIPUNCTURE: CPT | Mod: PO

## 2018-08-06 PROCEDURE — 80048 BASIC METABOLIC PNL TOTAL CA: CPT

## 2018-08-07 ENCOUNTER — PATIENT MESSAGE (OUTPATIENT)
Dept: FAMILY MEDICINE | Facility: CLINIC | Age: 68
End: 2018-08-07

## 2018-08-08 ENCOUNTER — LAB VISIT (OUTPATIENT)
Dept: LAB | Facility: HOSPITAL | Age: 68
End: 2018-08-08
Attending: NURSE PRACTITIONER
Payer: MEDICARE

## 2018-08-08 ENCOUNTER — PATIENT MESSAGE (OUTPATIENT)
Dept: FAMILY MEDICINE | Facility: CLINIC | Age: 68
End: 2018-08-08

## 2018-08-08 ENCOUNTER — OFFICE VISIT (OUTPATIENT)
Dept: FAMILY MEDICINE | Facility: CLINIC | Age: 68
End: 2018-08-08
Payer: MEDICARE

## 2018-08-08 VITALS
TEMPERATURE: 98 F | BODY MASS INDEX: 27.44 KG/M2 | HEIGHT: 67 IN | SYSTOLIC BLOOD PRESSURE: 118 MMHG | OXYGEN SATURATION: 98 % | WEIGHT: 174.81 LBS | HEART RATE: 86 BPM | DIASTOLIC BLOOD PRESSURE: 74 MMHG

## 2018-08-08 DIAGNOSIS — E87.1 HYPONATREMIA: ICD-10-CM

## 2018-08-08 DIAGNOSIS — I10 ESSENTIAL HYPERTENSION: Primary | ICD-10-CM

## 2018-08-08 LAB — SODIUM SERPL-SCNC: 139 MMOL/L

## 2018-08-08 PROCEDURE — 99999 PR PBB SHADOW E&M-EST. PATIENT-LVL IV: CPT | Mod: PBBFAC,,, | Performed by: NURSE PRACTITIONER

## 2018-08-08 PROCEDURE — 99213 OFFICE O/P EST LOW 20 MIN: CPT | Mod: S$GLB,,, | Performed by: NURSE PRACTITIONER

## 2018-08-08 PROCEDURE — 3078F DIAST BP <80 MM HG: CPT | Mod: CPTII,S$GLB,, | Performed by: NURSE PRACTITIONER

## 2018-08-08 PROCEDURE — 84295 ASSAY OF SERUM SODIUM: CPT

## 2018-08-08 PROCEDURE — 36415 COLL VENOUS BLD VENIPUNCTURE: CPT | Mod: PO

## 2018-08-08 PROCEDURE — 3074F SYST BP LT 130 MM HG: CPT | Mod: CPTII,S$GLB,, | Performed by: NURSE PRACTITIONER

## 2018-08-08 NOTE — PROGRESS NOTES
Subjective:       Patient ID: Dottie Galeana is a 67 y.o. female.    Chief Complaint: Results    Ms. Galeana is a known patient to me. She presents today for HTN FU    HPI   Recall hyponatremic on hctz-losartan, HCTZ DCd and sodium corrected. She is worried that is was a false normal reading because she had chinese food (high Na) meal night before, would like to recheck to be sure. BP controlled today, reports this AM before taking medications she was 130/95 (takes bystolic, losartan and amlodipine all in AM)  Vitals:    08/08/18 1332   BP: 118/74   Pulse: 86   Temp: 97.5 °F (36.4 °C)     Review of Systems   Constitutional: Negative for diaphoresis and fever.   HENT: Negative for facial swelling and trouble swallowing.    Eyes: Negative for discharge and redness.   Respiratory: Negative for cough and shortness of breath.    Cardiovascular: Negative for chest pain and palpitations.   Genitourinary: Negative for difficulty urinating.   Musculoskeletal: Negative for gait problem.   Skin: Negative for rash and wound.   Neurological: Negative for facial asymmetry and speech difficulty.   Psychiatric/Behavioral: Negative for confusion. The patient is not nervous/anxious.        Past Medical History:   Diagnosis Date    Depression     Encounter for long-term (current) use of other medications     Generalized anxiety disorder     GERD (gastroesophageal reflux disease)     HTN (hypertension)     Hyperlipidemia     Insomnia     Stroke     ocular behind left eye     Objective:      Physical Exam   Constitutional: She is oriented to person, place, and time. She does not have a sickly appearance. No distress.   HENT:   Head: Normocephalic.   Right Ear: Hearing normal.   Left Ear: Hearing normal.   Nose: Nose normal.   Eyes: Conjunctivae and lids are normal.   Neck: No JVD present. No tracheal deviation present.   Cardiovascular: Normal rate and normal heart sounds.    Pulmonary/Chest: Effort normal and breath  sounds normal.   Musculoskeletal: She exhibits no deformity.   Neurological: She is alert and oriented to person, place, and time.   Skin: She is not diaphoretic. No pallor.   Psychiatric: She has a normal mood and affect. Her speech is normal and behavior is normal. Judgment and thought content normal. Cognition and memory are normal.   Nursing note and vitals reviewed.      Assessment:       1. Essential hypertension    2. Hyponatremia        Plan:       Essential hypertension   Start taking amlodipine in PM   Continue losartan and bystolic in AM    Continue checking BP at home; FU if >140/90 or <100/60    Hyponatremia  -     Sodium; Future; Expected date: 08/08/2018        Follow-up for annual with PCP end of year, me as needed.

## 2018-08-09 ENCOUNTER — PATIENT MESSAGE (OUTPATIENT)
Dept: FAMILY MEDICINE | Facility: CLINIC | Age: 68
End: 2018-08-09

## 2018-08-13 ENCOUNTER — PATIENT MESSAGE (OUTPATIENT)
Dept: FAMILY MEDICINE | Facility: CLINIC | Age: 68
End: 2018-08-13

## 2018-08-13 NOTE — TELEPHONE ENCOUNTER
Pt called- I advised pt message was sent to Meghan this morning and we are in clinic- someone will contact her once reviewed. Pt advised me bp today was 148/98 and wants to know if any changes should be made.     Please see message below.

## 2018-08-13 NOTE — TELEPHONE ENCOUNTER
Please see [employer] message below:    Pt states BP has been running high over weekend since changing medication: 146/104, 152/91, 140/98, 139/84, and Sunday 139/100.  Please advise.

## 2018-08-14 ENCOUNTER — TELEPHONE (OUTPATIENT)
Dept: FAMILY MEDICINE | Facility: CLINIC | Age: 68
End: 2018-08-14

## 2018-08-14 RX ORDER — AMLODIPINE BESYLATE 5 MG/1
10 TABLET ORAL DAILY
Qty: 90 TABLET | Refills: 3
Start: 2018-08-14 | End: 2018-09-24 | Stop reason: SDUPTHER

## 2018-08-14 NOTE — TELEPHONE ENCOUNTER
Spoke with patient, advised to increase amlodipine to 10mg nightly. Continue checking BP at home and update me with numbers

## 2018-08-16 ENCOUNTER — PATIENT MESSAGE (OUTPATIENT)
Dept: FAMILY MEDICINE | Facility: CLINIC | Age: 68
End: 2018-08-16

## 2018-08-17 ENCOUNTER — ANESTHESIA EVENT (OUTPATIENT)
Dept: ENDOSCOPY | Facility: HOSPITAL | Age: 68
End: 2018-08-17
Payer: MEDICARE

## 2018-08-17 ENCOUNTER — HOSPITAL ENCOUNTER (OUTPATIENT)
Facility: HOSPITAL | Age: 68
Discharge: HOME | End: 2018-08-17
Attending: INTERNAL MEDICINE | Admitting: INTERNAL MEDICINE
Payer: MEDICARE

## 2018-08-17 ENCOUNTER — ANESTHESIA (OUTPATIENT)
Dept: ENDOSCOPY | Facility: HOSPITAL | Age: 68
End: 2018-08-17
Payer: MEDICARE

## 2018-08-17 VITALS
HEIGHT: 67 IN | TEMPERATURE: 98 F | OXYGEN SATURATION: 94 % | HEART RATE: 65 BPM | RESPIRATION RATE: 16 BRPM | BODY MASS INDEX: 27.15 KG/M2 | DIASTOLIC BLOOD PRESSURE: 63 MMHG | WEIGHT: 173 LBS | SYSTOLIC BLOOD PRESSURE: 145 MMHG

## 2018-08-17 DIAGNOSIS — R13.10 DYSPHAGIA: ICD-10-CM

## 2018-08-17 PROCEDURE — 37000009 HC ANESTHESIA EA ADD 15 MINS: Mod: PO | Performed by: INTERNAL MEDICINE

## 2018-08-17 PROCEDURE — 43248 EGD GUIDE WIRE INSERTION: CPT | Mod: PO | Performed by: INTERNAL MEDICINE

## 2018-08-17 PROCEDURE — D9220A PRA ANESTHESIA: Mod: CRNA,,, | Performed by: NURSE ANESTHETIST, CERTIFIED REGISTERED

## 2018-08-17 PROCEDURE — 43239 EGD BIOPSY SINGLE/MULTIPLE: CPT | Mod: PO | Performed by: INTERNAL MEDICINE

## 2018-08-17 PROCEDURE — 88305 TISSUE EXAM BY PATHOLOGIST: CPT | Mod: 26,,, | Performed by: PATHOLOGY

## 2018-08-17 PROCEDURE — 43248 EGD GUIDE WIRE INSERTION: CPT | Mod: ,,, | Performed by: INTERNAL MEDICINE

## 2018-08-17 PROCEDURE — 88305 TISSUE EXAM BY PATHOLOGIST: CPT | Mod: 59 | Performed by: PATHOLOGY

## 2018-08-17 PROCEDURE — D9220A PRA ANESTHESIA: Mod: ANES,,, | Performed by: ANESTHESIOLOGY

## 2018-08-17 PROCEDURE — 37000008 HC ANESTHESIA 1ST 15 MINUTES: Mod: PO | Performed by: INTERNAL MEDICINE

## 2018-08-17 PROCEDURE — 27201012 HC FORCEPS, HOT/COLD, DISP: Mod: PO | Performed by: INTERNAL MEDICINE

## 2018-08-17 PROCEDURE — 25000003 PHARM REV CODE 250: Mod: PO | Performed by: INTERNAL MEDICINE

## 2018-08-17 PROCEDURE — 63600175 PHARM REV CODE 636 W HCPCS: Mod: PO | Performed by: NURSE ANESTHETIST, CERTIFIED REGISTERED

## 2018-08-17 RX ORDER — LIDOCAINE HCL/PF 100 MG/5ML
SYRINGE (ML) INTRAVENOUS
Status: DISCONTINUED | OUTPATIENT
Start: 2018-08-17 | End: 2018-08-17

## 2018-08-17 RX ORDER — PROPOFOL 10 MG/ML
VIAL (ML) INTRAVENOUS
Status: DISCONTINUED | OUTPATIENT
Start: 2018-08-17 | End: 2018-08-17

## 2018-08-17 RX ORDER — SODIUM CHLORIDE, SODIUM LACTATE, POTASSIUM CHLORIDE, CALCIUM CHLORIDE 600; 310; 30; 20 MG/100ML; MG/100ML; MG/100ML; MG/100ML
INJECTION, SOLUTION INTRAVENOUS CONTINUOUS
Status: DISCONTINUED | OUTPATIENT
Start: 2018-08-17 | End: 2018-08-17 | Stop reason: HOSPADM

## 2018-08-17 RX ORDER — SODIUM CHLORIDE 0.9 % (FLUSH) 0.9 %
3 SYRINGE (ML) INJECTION
Status: DISCONTINUED | OUTPATIENT
Start: 2018-08-17 | End: 2018-08-17 | Stop reason: HOSPADM

## 2018-08-17 RX ADMIN — PROPOFOL 50 MG: 10 INJECTION, EMULSION INTRAVENOUS at 08:08

## 2018-08-17 RX ADMIN — SODIUM CHLORIDE, SODIUM LACTATE, POTASSIUM CHLORIDE, AND CALCIUM CHLORIDE: .6; .31; .03; .02 INJECTION, SOLUTION INTRAVENOUS at 08:08

## 2018-08-17 RX ADMIN — LIDOCAINE HYDROCHLORIDE 100 MG: 20 INJECTION, SOLUTION INTRAVENOUS at 08:08

## 2018-08-17 RX ADMIN — PROPOFOL 125 MG: 10 INJECTION, EMULSION INTRAVENOUS at 08:08

## 2018-08-17 NOTE — ANESTHESIA POSTPROCEDURE EVALUATION
"Anesthesia Post Evaluation    Patient: Dottie Galeana    Procedure(s) Performed: Procedure(s) (LRB):  EGD (ESOPHAGOGASTRODUODENOSCOPY) (N/A)    Final Anesthesia Type: general  Patient location during evaluation: PACU  Patient participation: Yes- Able to Participate  Level of consciousness: awake and alert  Post-procedure vital signs: reviewed and stable  Pain management: adequate  Airway patency: patent  PONV status at discharge: No PONV  Anesthetic complications: no      Cardiovascular status: hemodynamically stable and blood pressure returned to baseline  Respiratory status: unassisted, spontaneous ventilation and room air  Hydration status: euvolemic  Follow-up not needed.        Visit Vitals  BP (!) 145/63   Pulse 65   Temp 36.6 °C (97.9 °F) (Skin)   Resp 16   Ht 5' 7" (1.702 m)   Wt 78.5 kg (173 lb)   LMP 04/21/1994   SpO2 (!) 94%   Breastfeeding? No   BMI 27.10 kg/m²       Pain/Mercedes Score: Pain Assessment Performed: Yes (8/17/2018  9:25 AM)  Presence of Pain: denies (8/17/2018  8:09 AM)        "

## 2018-08-17 NOTE — DISCHARGE SUMMARY
Discharge Note  Short Stay      SUMMARY     Admit Date: 8/17/2018    Attending Physician: Al Santiago MD     Discharge Physician: Al Santiago MD    Discharge Date: 8/17/2018 9:02 AM    Final Diagnosis: Globus sensation [F45.8]    Disposition: HOME OR SELF CARE    Patient Instructions:   Current Discharge Medication List      CONTINUE these medications which have NOT CHANGED    Details   ALPRAZolam (XANAX) 0.25 MG tablet TAKE ONE TABLET THREE TIMES A DAY AS NEEDED.  Qty: 60 tablet, Refills: 1      amLODIPine (NORVASC) 5 MG tablet Take 2 tablets (10 mg total) by mouth once daily.  Qty: 90 tablet, Refills: 3      aspirin 325 MG tablet Take 325 mg by mouth once daily.       fluticasone (FLONASE) 50 mcg/actuation nasal spray 1 spray (50 mcg total) by Each Nare route once daily.  Qty: 1 Bottle, Refills: 6    Associated Diagnoses: PND (post-nasal drip)      losartan (COZAAR) 100 MG tablet Take 1 tablet (100 mg total) by mouth once daily.  Qty: 90 tablet, Refills: 1    Associated Diagnoses: Essential hypertension      multivitamin (THERAGRAN) per tablet Take 1 tablet by mouth once daily.       nebivolol (BYSTOLIC) 10 MG Tab Take 1 tablet (10 mg total) by mouth once daily.  Qty: 30 tablet, Refills: 11      pantoprazole (PROTONIX) 40 MG tablet TAKE 1 TABLET ONE TIME DAILY  Qty: 90 tablet, Refills: 3      ranitidine (ZANTAC) 300 MG tablet Take 1 tablet (300 mg total) by mouth every evening.  Qty: 30 tablet, Refills: 5    Associated Diagnoses: Cough; Gastroesophageal reflux disease without esophagitis      rosuvastatin (CRESTOR) 10 MG tablet TAKE ONE TABLET BY MOUTH ONCE DAILY IN THE EVENING  Qty: 30 tablet, Refills: 11      venlafaxine (EFFEXOR-XR) 150 MG Cp24 TAKE 1 CAPSULE EVERY DAY  Qty: 90 capsule, Refills: 3    Associated Diagnoses: Generalized anxiety disorder      zolpidem (AMBIEN) 10 mg Tab TAKE ONE TABLET BY MOUTH AT BEDTIME AS NEEDED  Qty: 30 tablet, Refills: 5    Associated Diagnoses: Insomnia,  unspecified type      NITROSTAT 0.4 mg SL tablet Place 0.4 mg under the tongue every 5 (five) minutes as needed.              Discharge Procedure Orders (must include Diet, Follow-up, Activity)    Follow Up:  Follow up with PCP as previously scheduled  Resume routine diet.  Activity as tolerated.    No driving day of procedure.

## 2018-08-17 NOTE — [employer] PATIENT INSTRUCTIONS
Discharge Summary/Instructions after an Endoscopic Procedure  Patient Name: Dottie Galeana  Patient MRN: [id number]  Patient YOB: 1950  Friday, August 17, 2018  Al Santiago MD  RESTRICTIONS:  During your procedure today, you received medications for sedation.  These   medications may affect your judgment, balance and coordination.  Therefore,   for 24 hours, you have the following restrictions:   - DO NOT drive a car, operate machinery, make legal/financial decisions,   sign important papers or drink alcohol.    ACTIVITY:  Today: no heavy lifting, straining or running due to procedural   sedation/anesthesia.  The following day: return to full activity including work.  DIET:  Eat and drink normally unless instructed otherwise.     TREATMENT FOR COMMON SIDE EFFECTS:  - Mild abdominal pain, nausea, belching, bloating or excessive gas:  rest,   eat lightly and use a heating pad.  - Sore Throat: treat with throat lozenges and/or gargle with warm salt   water.  - Because air was used during the procedure, expelling large amounts of air   from your rectum or belching is normal.  - If a bowel prep was taken, you may not have a bowel movement for 1-3 days.    This is normal.  SYMPTOMS TO WATCH FOR AND REPORT TO YOUR PHYSICIAN:  1. Abdominal pain or bloating, other than gas cramps.  2. Chest pain.  3. Back pain.  4. Signs of infection such as: chills or fever occurring within 24 hours   after the procedure.  5. Rectal bleeding, which would show as bright red, maroon, or black stools.   (A tablespoon of blood from the rectum is not serious, especially if   hemorrhoids are present.)  6. Vomiting.  7. Weakness or dizziness.  GO DIRECTLY TO THE NEAREST EMERGENCY ROOM IF YOU HAVE ANY OF THE FOLLOWING:      Difficulty breathing              Chills and/or fever over 101 F   Persistent vomiting and/or vomiting blood   Severe abdominal pain   Severe chest pain   Black, tarry stools   Bleeding- more than one  tablespoon   Any other symptom or condition that you feel may need urgent attention  Your doctor recommends these additional instructions:  If any biopsies were taken, your doctors clinic will contact you in 1 to 2   weeks with any results.  We are waiting for your pathology results.   Continue your present medications.   You are being discharged to home.  For questions, problems or results please call your physician - Al Santiago MD at Work: [phone].  EMERGENCY PHONE NUMBER: [phone], LAB RESULTS: [phone]  IF A COMPLICATION OR EMERGENCY SITUATION ARISES AND YOU ARE UNABLE TO REACH   YOUR PHYSICIAN - GO DIRECTLY TO THE EMERGENCY ROOM.  ___________________________________________  Nurse Signature  ___________________________________________  Patient/Designated Responsible Party Signature  Al Santiago MD  8/17/2018 9:02:02 AM  This report has been verified and signed electronically.  [employer]

## 2018-08-17 NOTE — ANESTHESIA PREPROCEDURE EVALUATION
08/17/2018  Dottie Galeana is a 67 y.o., female.    Anesthesia Evaluation         Review of Systems  Anesthesia Hx:  No problems with previous Anesthesia   Social:  Non-Smoker, No Alcohol Use    Hematology/Oncology:  Hematology Normal        Cardiovascular:   Hypertension hyperlipidemia    Pulmonary:  Pulmonary Normal    Renal/:  Renal/ Normal     Hepatic/GI:   GERD    Neurological:   CVA (ocular stroke)    Endocrine:  Endocrine Normal Hx   Psych:   anxiety          Physical Exam  General:  Well nourished    Airway/Jaw/Neck:  Airway Findings: Mouth Opening: Normal General Airway Assessment: Adult  Mallampati: II  Jaw/Neck Findings:  Neck ROM: Extension Decreased, Mild       Chest/Lungs:  Chest/Lungs Findings: Clear to auscultation, Normal Respiratory Rate     Heart/Vascular:  Heart Findings: Rate: Normal  Rhythm: Regular Rhythm  Sounds: Normal  Heart murmur: negative Vascular Findings: Normal (No carotid bruits.)       Mental Status:  Mental Status Findings:  Cooperative, Alert and Oriented         Anesthesia Plan  Type of Anesthesia, risks & benefits discussed:  Anesthesia Type:  general  Patient's Preference:   Intra-op Monitoring Plan:   Intra-op Monitoring Plan Comments:   Post Op Pain Control Plan:   Post Op Pain Control Plan Comments:   Induction:   IV  Beta Blocker:  Patient is on a Beta-Blocker and has received one dose within the past 24 hours (No further documentation required).       Informed Consent: Patient understands risks and agrees with Anesthesia plan.  Questions answered. Anesthesia consent signed with patient.  ASA Score: 2     Day of Surgery Review of History & Physical:        Anesthesia Plan Notes: Propofol general.        Ready For Surgery From Anesthesia Perspective.

## 2018-08-17 NOTE — PLAN OF CARE
Discharge instructions given to patient and son.  Patient verbalized understanding.  In NAD, VSS.

## 2018-08-17 NOTE — H&P
History & Physical - Short Stay  Gastroenterology      SUBJECTIVE:     Procedure: EGD    Chief Complaint/Indication for Procedure: Dysphagia and Reflux    PTA Medications   Medication Sig    ALPRAZolam (XANAX) 0.25 MG tablet TAKE ONE TABLET THREE TIMES A DAY AS NEEDED.    amLODIPine (NORVASC) 5 MG tablet Take 2 tablets (10 mg total) by mouth once daily. (Patient taking differently: Take 5 mg by mouth 2 (two) times daily. )    aspirin 325 MG tablet Take 325 mg by mouth once daily.     fluticasone (FLONASE) 50 mcg/actuation nasal spray 1 spray (50 mcg total) by Each Nare route once daily.    losartan (COZAAR) 100 MG tablet Take 1 tablet (100 mg total) by mouth once daily.    multivitamin (THERAGRAN) per tablet Take 1 tablet by mouth once daily.     nebivolol (BYSTOLIC) 10 MG Tab Take 1 tablet (10 mg total) by mouth once daily.    pantoprazole (PROTONIX) 40 MG tablet TAKE 1 TABLET ONE TIME DAILY    ranitidine (ZANTAC) 300 MG tablet Take 1 tablet (300 mg total) by mouth every evening.    rosuvastatin (CRESTOR) 10 MG tablet TAKE ONE TABLET BY MOUTH ONCE DAILY IN THE EVENING    venlafaxine (EFFEXOR-XR) 150 MG Cp24 TAKE 1 CAPSULE EVERY DAY    zolpidem (AMBIEN) 10 mg Tab TAKE ONE TABLET BY MOUTH AT BEDTIME AS NEEDED    NITROSTAT 0.4 mg SL tablet Place 0.4 mg under the tongue every 5 (five) minutes as needed.        Review of patient's allergies indicates:   Allergen Reactions    No known drug allergies         Past Medical History:   Diagnosis Date    Depression     Encounter for long-term (current) use of other medications     General anesthetics causing adverse effect in therapeutic use     Generalized anxiety disorder     GERD (gastroesophageal reflux disease)     HTN (hypertension)     Hyperlipidemia     Hyponatremia     Insomnia     Stroke     ocular behind left eye     Past Surgical History:   Procedure Laterality Date    BREAST BIOPSY Left 1998    benign    ORIF WRIST FRACTURE Right       Family History   Problem Relation Age of Onset    Ovarian cancer Mother 42     Social History     Tobacco Use    Smoking status: Never Smoker    Smokeless tobacco: Never Used   Substance Use Topics    Alcohol use: Yes     Alcohol/week: 8.4 oz     Types: 14 Glasses of wine per week     Comment: 2 glasses wine per day    Drug use: No         OBJECTIVE:     Vital Signs (Most Recent)  Temp: 97.7 °F (36.5 °C) (08/17/18 0815)  Pulse: 82 (08/17/18 0815)  Resp: 16 (08/17/18 0815)  BP: (!) 158/87 (08/17/18 0815)  SpO2: 100 % (08/17/18 0815)    Physical Exam:                                                       GENERAL:  Comfortable, in no acute distress.                                 HEENT EXAM:  Nonicteric.  No adenopathy.  Oropharynx is clear.               NECK:  Supple.                                                               LUNGS:  Clear.                                                               CARDIAC:  Regular rate and rhythm.  S1, S2.  No murmur.                      ABDOMEN:  Soft, positive bowel sounds, nontender.  No hepatosplenomegaly or masses.  No rebound or guarding.                                             EXTREMITIES:  No edema.     MENTAL STATUS:  Normal, alert and oriented.      ASSESSMENT/PLAN:     Assessment: Dysphagia and Reflux    Plan: EGD    Anesthesia Plan: General    ASA Grade: ASA 2 - Patient with mild systemic disease with no functional limitations    MALLAMPATI SCORE:  I (soft palate, uvula, fauces, and tonsillar pillars visible)

## 2018-08-17 NOTE — TRANSFER OF CARE
"Anesthesia Transfer of Care Note    Patient: Dottie Galeana    Procedure(s) Performed: Procedure(s) (LRB):  EGD (ESOPHAGOGASTRODUODENOSCOPY) (N/A)    Patient location: PACU    Anesthesia Type: general    Transport from OR: Transported from OR on room air with adequate spontaneous ventilation    Post pain: adequate analgesia    Post assessment: no apparent anesthetic complications and tolerated procedure well    Post vital signs: stable    Level of consciousness: awake    Nausea/Vomiting: no nausea/vomiting    Complications: none    Transfer of care protocol was followed      Last vitals:   Visit Vitals  BP (!) 158/87 (BP Location: Right arm, Patient Position: Lying)   Pulse 82   Temp 36.5 °C (97.7 °F) (Skin)   Resp 16   Ht 5' 7" (1.702 m)   Wt 78.5 kg (173 lb)   LMP 04/21/1994   SpO2 100%   Breastfeeding? No   BMI 27.10 kg/m²     "

## 2018-09-24 DIAGNOSIS — F41.1 GENERALIZED ANXIETY DISORDER: ICD-10-CM

## 2018-09-24 RX ORDER — AMLODIPINE BESYLATE 5 MG/1
TABLET ORAL
Qty: 90 TABLET | Refills: 3 | Status: SHIPPED | OUTPATIENT
Start: 2018-09-24 | End: 2019-05-31 | Stop reason: DRUGHIGH

## 2018-09-24 RX ORDER — VENLAFAXINE HYDROCHLORIDE 150 MG/1
CAPSULE, EXTENDED RELEASE ORAL
Qty: 90 CAPSULE | Refills: 3 | Status: SHIPPED | OUTPATIENT
Start: 2018-09-24 | End: 2019-08-26 | Stop reason: SDUPTHER

## 2018-09-26 RX ORDER — ROSUVASTATIN CALCIUM 10 MG/1
TABLET, COATED ORAL
Qty: 30 TABLET | Refills: 11 | Status: SHIPPED | OUTPATIENT
Start: 2018-09-26 | End: 2019-10-23 | Stop reason: SDUPTHER

## 2018-09-26 NOTE — TELEPHONE ENCOUNTER
----- Message from Sondra Ortega sent at 9/26/2018  2:02 PM CDT -----   Matilda friend  Calling   For a  Refill on [facility] // please call [phone]

## 2018-09-26 NOTE — TELEPHONE ENCOUNTER
----- Message from Sondra Ortega sent at 9/26/2018  2:02 PM CDT -----   Matilda friend  Calling   For a  Refill on [facility] // please call [phone]

## 2018-10-10 DIAGNOSIS — G47.00 INSOMNIA, UNSPECIFIED TYPE: ICD-10-CM

## 2018-10-10 RX ORDER — ZOLPIDEM TARTRATE 10 MG/1
TABLET ORAL
Qty: 30 TABLET | Refills: 5 | Status: SHIPPED | OUTPATIENT
Start: 2018-10-10 | End: 2019-02-26 | Stop reason: SDUPTHER

## 2018-10-17 ENCOUNTER — OFFICE VISIT (OUTPATIENT)
Dept: FAMILY MEDICINE | Facility: CLINIC | Age: 68
End: 2018-10-17
Payer: MEDICARE

## 2018-10-17 VITALS
WEIGHT: 175.69 LBS | SYSTOLIC BLOOD PRESSURE: 134 MMHG | RESPIRATION RATE: 14 BRPM | TEMPERATURE: 98 F | DIASTOLIC BLOOD PRESSURE: 80 MMHG | HEART RATE: 81 BPM | HEIGHT: 67 IN | BODY MASS INDEX: 27.57 KG/M2 | OXYGEN SATURATION: 95 %

## 2018-10-17 DIAGNOSIS — E78.5 HYPERLIPIDEMIA, UNSPECIFIED HYPERLIPIDEMIA TYPE: ICD-10-CM

## 2018-10-17 DIAGNOSIS — G47.00 INSOMNIA, UNSPECIFIED TYPE: ICD-10-CM

## 2018-10-17 DIAGNOSIS — F41.9 ANXIETY: ICD-10-CM

## 2018-10-17 DIAGNOSIS — I10 ESSENTIAL HYPERTENSION: Primary | ICD-10-CM

## 2018-10-17 PROCEDURE — 99999 PR PBB SHADOW E&M-EST. PATIENT-LVL IV: CPT | Mod: PBBFAC,,, | Performed by: FAMILY MEDICINE

## 2018-10-17 PROCEDURE — 3075F SYST BP GE 130 - 139MM HG: CPT | Mod: CPTII,,, | Performed by: FAMILY MEDICINE

## 2018-10-17 PROCEDURE — 1100F PTFALLS ASSESS-DOCD GE2>/YR: CPT | Mod: CPTII,,, | Performed by: FAMILY MEDICINE

## 2018-10-17 PROCEDURE — 99214 OFFICE O/P EST MOD 30 MIN: CPT | Mod: PBBFAC,PO | Performed by: FAMILY MEDICINE

## 2018-10-17 PROCEDURE — 3288F FALL RISK ASSESSMENT DOCD: CPT | Mod: CPTII,,, | Performed by: FAMILY MEDICINE

## 2018-10-17 PROCEDURE — 3079F DIAST BP 80-89 MM HG: CPT | Mod: CPTII,,, | Performed by: FAMILY MEDICINE

## 2018-10-17 PROCEDURE — 99214 OFFICE O/P EST MOD 30 MIN: CPT | Mod: S$PBB,,, | Performed by: FAMILY MEDICINE

## 2018-10-17 NOTE — PROGRESS NOTES
Subjective:       Patient ID: Dottie Galeana is a 68 y.o. female.    Chief Complaint: Follow-up (Pt stated her sodium level was really low, she experienced muscle spasms, and fainting. )      Here for 6 months for f/u.    She is tolerating her present meds.  She is following with cardiology.    Low sodium resolvd after stopping HCTZ    HTN - controlled on amlodipine 10mg, losartan 100mg, Bystolic 10mg; BP has been well-controlled  HLD - controlled on Crestor 10mg  Anxiety - Mood stable. Using xanax 0.25mg 3 times a week.  Sleep is good on Ambien 10mg QHS    She is acitvely exercising about weekly              Past Medical History:   Diagnosis Date    Depression     Encounter for long-term (current) use of other medications     General anesthetics causing adverse effect in therapeutic use     Generalized anxiety disorder     GERD (gastroesophageal reflux disease)     HTN (hypertension)     Hyperlipidemia     Hyponatremia     Insomnia     Stroke     ocular behind left eye       Past Surgical History:   Procedure Laterality Date    BREAST BIOPSY Left 1998    benign    EGD (ESOPHAGOGASTRODUODENOSCOPY) N/A 8/17/2018    [facility]    EGD (ESOPHAGOGASTRODUODENOSCOPY) N/A 4/25/2014    [facility]    EGD (ESOPHAGOGASTRODUODENOSCOPY) N/A 4/30/2013    [facility]    ESOPHAGOGASTRODUODENOSCOPY N/A 8/17/2018    Procedure: EGD (ESOPHAGOGASTRODUODENOSCOPY);  Surgeon: Al Santiago MD;  Location: [facility];  Service: Endoscopy;  Laterality: N/A;    ORIF WRIST FRACTURE Right        Review of patient's allergies indicates:   Allergen Reactions    No known drug allergies        Social History     Socioeconomic History    Marital status:      Spouse name: Not on file    Number of children: Not on file    Years of education: Not on file    Highest education level: Not on file   Social  Needs    Financial resource strain: Not on file    Food insecurity - worry: Not on file    Food insecurity - inability: Not on file    Transportation needs - medical: Not on file    Transportation needs - non-medical: Not on file   Occupational History    Occupation: realtor   Tobacco Use    Smoking status: Never Smoker    Smokeless tobacco: Never Used   Substance and Sexual Activity    Alcohol use: Yes     Alcohol/week: 8.4 oz     Types: 14 Glasses of wine per week     Comment: 2 glasses wine per day    Drug use: No    Sexual activity: Not on file   Other Topics Concern    Not on file   Social History Narrative    Not on file       Current Outpatient Medications on File Prior to Visit   Medication Sig Dispense Refill    ALPRAZolam (XANAX) 0.25 MG tablet TAKE ONE TABLET THREE TIMES A DAY AS NEEDED. 60 tablet 1    amLODIPine (NORVASC) 5 MG tablet TAKE 1 TABLET EVERY DAY (Patient taking differently: Take 2 tablets by mouth, one in the morning and one at night) 90 tablet 3    aspirin 325 MG tablet Take 325 mg by mouth once daily.       fluticasone (FLONASE) 50 mcg/actuation nasal spray 1 spray (50 mcg total) by Each Nare route once daily. 1 Bottle 6    losartan (COZAAR) 100 MG tablet Take 1 tablet (100 mg total) by mouth once daily. 90 tablet 1    multivitamin (THERAGRAN) per tablet Take 1 tablet by mouth once daily.       nebivolol (BYSTOLIC) 10 MG Tab Take 1 tablet (10 mg total) by mouth once daily. 30 tablet 11    pantoprazole (PROTONIX) 40 MG tablet TAKE 1 TABLET ONE TIME DAILY 90 tablet 3    rosuvastatin (CRESTOR) 10 MG tablet TAKE ONE TABLET BY MOUTH ONCE DAILY IN THE EVENING 30 tablet 11    venlafaxine (EFFEXOR-XR) 150 MG Cp24 TAKE 1 CAPSULE EVERY DAY 90 capsule 3    zolpidem (AMBIEN) 10 mg Tab TAKE ONE TABLET BY MOUTH AT BEDTIME AS NEEDED 30 tablet 5    NITROSTAT 0.4 mg SL tablet Place 0.4 mg under the tongue every 5 (five) minutes as needed.       ranitidine (ZANTAC) 300 MG tablet  "Take 1 tablet (300 mg total) by mouth every evening. 30 tablet 5     No current facility-administered medications on file prior to visit.        Family History   Problem Relation Age of Onset    Ovarian cancer Mother 42       Review of Systems   Constitutional: Positive for fatigue. Negative for appetite change, chills, fever and unexpected weight change.   HENT: Negative for sore throat and trouble swallowing.    Eyes: Negative for pain and visual disturbance.   Respiratory: Negative for cough, shortness of breath and wheezing.    Cardiovascular: Negative for chest pain and palpitations.   Gastrointestinal: Negative for abdominal pain, blood in stool, diarrhea, nausea and vomiting.   Genitourinary: Negative for difficulty urinating, dysuria and hematuria.   Musculoskeletal: Negative for arthralgias, gait problem and neck pain.   Skin: Negative for rash and wound.   Neurological: Negative for dizziness, weakness, numbness and headaches.   Hematological: Negative for adenopathy.   Psychiatric/Behavioral: Negative for dysphoric mood.       Objective:      /80   Pulse 81   Temp 97.9 °F (36.6 °C) (Oral)   Resp 14   Ht 5' 7" (1.702 m)   Wt 79.7 kg (175 lb 11.3 oz)   LMP 04/21/1994   SpO2 95%   BMI 27.52 kg/m²   Physical Exam   Constitutional: She is oriented to person, place, and time. She appears well-developed and well-nourished.   HENT:   Head: Normocephalic.   Mouth/Throat: Oropharynx is clear and moist. No oropharyngeal exudate or posterior oropharyngeal erythema.   Eyes: Conjunctivae and EOM are normal. Pupils are equal, round, and reactive to light.   Neck: Normal range of motion. Neck supple. No thyromegaly present.   Cardiovascular: Normal rate, regular rhythm, S1 normal, S2 normal, normal heart sounds and intact distal pulses. Exam reveals no gallop and no friction rub.   No murmur heard.  Pulmonary/Chest: Effort normal and breath sounds normal. She has no wheezes. She has no rales.   Abdominal: " Normal appearance.   Musculoskeletal:        Right lower leg: She exhibits no edema.        Left lower leg: She exhibits no edema.   Lymphadenopathy:     She has no cervical adenopathy.   Neurological: She is alert and oriented to person, place, and time. No cranial nerve deficit. Gait normal.   Skin: Skin is warm and intact. No rash noted.   Psychiatric: She has a normal mood and affect.       Results for orders placed or performed in visit on 08/08/18   Sodium   Result Value Ref Range    Sodium 139 136 - 145 mmol/L       Assessment:       1. Essential hypertension    2. Anxiety    3. Insomnia, unspecified type    4. Hyperlipidemia, unspecified hyperlipidemia type        Plan:       Essential hypertension    Anxiety    Insomnia, unspecified type    Hyperlipidemia, unspecified hyperlipidemia type        Continue present BP medications  continue Ambien and xanax and other present meds  Counseled on regular exercise, maintenance of a healthy weight, balanced diet rich in fruits/vegetables and lean protein, and avoidance of unhealthy habits like smoking and excessive alcohol intake.

## 2018-11-01 ENCOUNTER — PATIENT MESSAGE (OUTPATIENT)
Dept: FAMILY MEDICINE | Facility: CLINIC | Age: 68
End: 2018-11-01

## 2018-11-02 ENCOUNTER — PATIENT MESSAGE (OUTPATIENT)
Dept: FAMILY MEDICINE | Facility: CLINIC | Age: 68
End: 2018-11-02

## 2018-11-02 RX ORDER — ALENDRONATE SODIUM 70 MG/1
70 TABLET ORAL
Qty: 4 TABLET | Refills: 11 | Status: SHIPPED | OUTPATIENT
Start: 2018-11-02 | End: 2019-02-26

## 2018-11-02 NOTE — TELEPHONE ENCOUNTER
With osteopenia and a compression fracture, she is advised to take calcium 1200mg and vitamin D 2,000 IU daily in addition to regular walking to help strengthen her bones.  Medication is also advised at this time. Erx for Fosamax weekly sent.  We will repeat the BMD in 1 year.

## 2018-11-05 ENCOUNTER — PATIENT MESSAGE (OUTPATIENT)
Dept: FAMILY MEDICINE | Facility: CLINIC | Age: 68
End: 2018-11-05

## 2018-11-05 NOTE — TELEPHONE ENCOUNTER
LOV 10/17/2018 for a non related issue.   Was seen in walk in clinic and was diagnosed with compression fracture, but is not satisfied with the results of that testing.     Scheduled for tomorrow with Leila.

## 2018-11-06 ENCOUNTER — OFFICE VISIT (OUTPATIENT)
Dept: FAMILY MEDICINE | Facility: CLINIC | Age: 68
End: 2018-11-06
Payer: MEDICARE

## 2018-11-06 ENCOUNTER — HOSPITAL ENCOUNTER (OUTPATIENT)
Dept: RADIOLOGY | Facility: HOSPITAL | Age: 68
Discharge: HOME | End: 2018-11-06
Attending: NURSE PRACTITIONER
Payer: MEDICARE

## 2018-11-06 VITALS
WEIGHT: 174.19 LBS | OXYGEN SATURATION: 95 % | BODY MASS INDEX: 27.34 KG/M2 | HEART RATE: 74 BPM | TEMPERATURE: 98 F | HEIGHT: 67 IN | DIASTOLIC BLOOD PRESSURE: 84 MMHG | SYSTOLIC BLOOD PRESSURE: 126 MMHG

## 2018-11-06 DIAGNOSIS — M54.50 LOW BACK PAIN, NON-SPECIFIC: ICD-10-CM

## 2018-11-06 DIAGNOSIS — M54.50 ACUTE BILATERAL LOW BACK PAIN WITHOUT SCIATICA: ICD-10-CM

## 2018-11-06 DIAGNOSIS — S32.010D CLOSED COMPRESSION FRACTURE OF L1 LUMBAR VERTEBRA WITH ROUTINE HEALING, SUBSEQUENT ENCOUNTER: Primary | ICD-10-CM

## 2018-11-06 DIAGNOSIS — S32.010D CLOSED COMPRESSION FRACTURE OF L1 LUMBAR VERTEBRA WITH ROUTINE HEALING, SUBSEQUENT ENCOUNTER: ICD-10-CM

## 2018-11-06 PROCEDURE — 3079F DIAST BP 80-89 MM HG: CPT | Mod: CPTII,HCWC,S$GLB, | Performed by: NURSE PRACTITIONER

## 2018-11-06 PROCEDURE — 1101F PT FALLS ASSESS-DOCD LE1/YR: CPT | Mod: CPTII,HCWC,S$GLB, | Performed by: NURSE PRACTITIONER

## 2018-11-06 PROCEDURE — 72110 X-RAY EXAM L-2 SPINE 4/>VWS: CPT | Mod: 26,HCWC,, | Performed by: RADIOLOGY

## 2018-11-06 PROCEDURE — 72110 X-RAY EXAM L-2 SPINE 4/>VWS: CPT | Mod: TC,FY,HCWC,PO

## 2018-11-06 PROCEDURE — 99214 OFFICE O/P EST MOD 30 MIN: CPT | Mod: HCWC,S$GLB,, | Performed by: NURSE PRACTITIONER

## 2018-11-06 PROCEDURE — 3074F SYST BP LT 130 MM HG: CPT | Mod: CPTII,HCWC,S$GLB, | Performed by: NURSE PRACTITIONER

## 2018-11-06 PROCEDURE — 99999 PR PBB SHADOW E&M-EST. PATIENT-LVL V: CPT | Mod: PBBFAC,HCWC,, | Performed by: NURSE PRACTITIONER

## 2018-11-06 RX ORDER — TRAMADOL HYDROCHLORIDE 50 MG/1
TABLET ORAL
Refills: 0 | COMMUNITY
Start: 2018-11-02 | End: 2019-02-26

## 2018-11-06 RX ORDER — HYDROCODONE BITARTRATE AND ACETAMINOPHEN 7.5; 325 MG/1; MG/1
1 TABLET ORAL EVERY 6 HOURS PRN
Qty: 20 TABLET | Refills: 0 | Status: SHIPPED | OUTPATIENT
Start: 2018-11-06 | End: 2019-02-26

## 2018-11-06 NOTE — PROGRESS NOTES
Subjective:       Patient ID: Dottie Galeana is a 68 y.o. female.    Chief Complaint: Compression fracture (pain 8 out 10)    Here today she is following up on Fall at home - She went to ortho walk in and they told her she had compression fracture with xray done - pain 8/10- fell 1 week ago   She fell on her buttocks   Dr Li has reviewed her BMD and placed her on fosamax due to this being 2nd fracture in 18 months   She reports that she didn't have too much pain the first day - she called back and they gave her pain meds         Back Pain   This is a new problem. The current episode started in the past 7 days. The problem occurs daily. The problem has been gradually worsening since onset. The pain is present in the lumbar spine. The quality of the pain is described as aching. The pain does not radiate. The pain is at a severity of 8/10. The pain is severe. The pain is the same all the time. The symptoms are aggravated by standing. Pertinent negatives include no abdominal pain, bladder incontinence, bowel incontinence, chest pain, dysuria, fever, headaches, leg pain, numbness, paresis, paresthesias, pelvic pain, perianal numbness, tingling, weakness or weight loss.     Vitals:    11/06/18 0904   BP: 126/84   Pulse: 74   Temp: 97.8 °F (36.6 °C)     Review of Systems   Constitutional: Negative.  Negative for diaphoresis, fatigue, fever and weight loss.   HENT: Negative.    Eyes: Negative.    Respiratory: Negative.  Negative for cough, shortness of breath and wheezing.    Cardiovascular: Negative.  Negative for chest pain.   Gastrointestinal: Negative.  Negative for abdominal pain, bowel incontinence, diarrhea and nausea.   Endocrine: Negative.    Genitourinary: Negative.  Negative for bladder incontinence, dysuria, hematuria and pelvic pain.   Musculoskeletal: Positive for arthralgias, back pain and gait problem.   Skin: Negative.  Negative for color change and rash.   Allergic/Immunologic: Negative.     Neurological: Negative for tingling, speech difficulty, weakness, numbness, headaches and paresthesias.   Hematological: Negative.    Psychiatric/Behavioral: Negative.        Past Medical History:   Diagnosis Date    Depression     Encounter for long-term (current) use of other medications     General anesthetics causing adverse effect in therapeutic use     Generalized anxiety disorder     GERD (gastroesophageal reflux disease)     HTN (hypertension)     Hyperlipidemia     Hyponatremia     Insomnia     Stroke     ocular behind left eye     Objective:      Physical Exam   Constitutional: She is oriented to person, place, and time. She appears well-developed and well-nourished.   HENT:   Head: Normocephalic and atraumatic.   Mouth/Throat: Oropharynx is clear and moist.   Eyes: Conjunctivae and EOM are normal. Pupils are equal, round, and reactive to light.   Neck: Neck supple.   Cardiovascular: Normal rate, regular rhythm, normal heart sounds and intact distal pulses.   Pulmonary/Chest: Effort normal and breath sounds normal.   Abdominal: Soft. Bowel sounds are normal.   Musculoskeletal:        Lumbar back: She exhibits decreased range of motion, tenderness, bony tenderness and pain.        Back:    Neurological: She is alert and oriented to person, place, and time.   Skin: Skin is warm and dry.   Psychiatric: She has a normal mood and affect. Her behavior is normal.   Nursing note and vitals reviewed.      Assessment:       1. Closed compression fracture of L1 lumbar vertebra with routine healing, subsequent encounter    2. Low back pain, non-specific    3. Acute bilateral low back pain without sciatica        Plan:       Closed compression fracture of L1 lumbar vertebra with routine healing, subsequent encounter  -     X-Ray Lumbar Spine Complete 5 View; Future; Expected date: 11/06/2018  -     MRI Lumbar Spine Without Contrast; Future; Expected date: 11/06/2018  -     Ambulatory referral to Pain  Clinic  -     HYDROcodone-acetaminophen (NORCO) 7.5-325 mg per tablet; Take 1 tablet by mouth every 6 (six) hours as needed for Pain.  Dispense: 20 tablet; Refill: 0    Low back pain, non-specific  -     X-Ray Lumbar Spine Complete 5 View; Future; Expected date: 11/06/2018  -     MRI Lumbar Spine Without Contrast; Future; Expected date: 11/06/2018  -     Ambulatory referral to Pain Clinic    Acute bilateral low back pain without sciatica  -     X-Ray Lumbar Spine Complete 5 View; Future; Expected date: 11/06/2018  -     MRI Lumbar Spine Without Contrast; Future; Expected date: 11/06/2018  -     Ambulatory referral to Pain Clinic  -     HYDROcodone-acetaminophen (NORCO) 7.5-325 mg per tablet; Take 1 tablet by mouth every 6 (six) hours as needed for Pain.  Dispense: 20 tablet; Refill: 0            Keep taking the fosamax   Get MRI tonight and will forward message to Dr Cano for ? Injection

## 2018-11-07 ENCOUNTER — PATIENT MESSAGE (OUTPATIENT)
Dept: FAMILY MEDICINE | Facility: CLINIC | Age: 68
End: 2018-11-07

## 2018-11-07 ENCOUNTER — HOSPITAL ENCOUNTER (OUTPATIENT)
Dept: RADIOLOGY | Facility: HOSPITAL | Age: 68
Discharge: HOME | End: 2018-11-07
Attending: NURSE PRACTITIONER
Payer: MEDICARE

## 2018-11-07 ENCOUNTER — OFFICE VISIT (OUTPATIENT)
Dept: PAIN MEDICINE | Facility: CLINIC | Age: 68
End: 2018-11-07
Payer: MEDICARE

## 2018-11-07 VITALS
RESPIRATION RATE: 18 BRPM | OXYGEN SATURATION: 97 % | DIASTOLIC BLOOD PRESSURE: 85 MMHG | HEART RATE: 82 BPM | SYSTOLIC BLOOD PRESSURE: 132 MMHG | WEIGHT: 173.06 LBS | BODY MASS INDEX: 27.16 KG/M2 | HEIGHT: 67 IN | TEMPERATURE: 98 F

## 2018-11-07 DIAGNOSIS — M48.50XA VERTEBRAL COMPRESSION FRACTURE: ICD-10-CM

## 2018-11-07 DIAGNOSIS — M54.50 ACUTE BILATERAL LOW BACK PAIN WITHOUT SCIATICA: ICD-10-CM

## 2018-11-07 DIAGNOSIS — S32.010A CLOSED COMPRESSION FRACTURE OF FIRST LUMBAR VERTEBRA, INITIAL ENCOUNTER: Primary | ICD-10-CM

## 2018-11-07 DIAGNOSIS — S32.010D CLOSED COMPRESSION FRACTURE OF L1 LUMBAR VERTEBRA WITH ROUTINE HEALING, SUBSEQUENT ENCOUNTER: ICD-10-CM

## 2018-11-07 DIAGNOSIS — M47.816 LUMBAR SPONDYLOSIS: ICD-10-CM

## 2018-11-07 DIAGNOSIS — M54.50 LOW BACK PAIN, NON-SPECIFIC: ICD-10-CM

## 2018-11-07 PROCEDURE — 99999 PR PBB SHADOW E&M-EST. PATIENT-LVL III: CPT | Mod: PBBFAC,HCWC,, | Performed by: ANESTHESIOLOGY

## 2018-11-07 PROCEDURE — 1101F PT FALLS ASSESS-DOCD LE1/YR: CPT | Mod: CPTII,HCWC,S$GLB, | Performed by: ANESTHESIOLOGY

## 2018-11-07 PROCEDURE — 3075F SYST BP GE 130 - 139MM HG: CPT | Mod: CPTII,HCWC,S$GLB, | Performed by: ANESTHESIOLOGY

## 2018-11-07 PROCEDURE — 3079F DIAST BP 80-89 MM HG: CPT | Mod: CPTII,HCWC,S$GLB, | Performed by: ANESTHESIOLOGY

## 2018-11-07 PROCEDURE — 72148 MRI LUMBAR SPINE W/O DYE: CPT | Mod: TC,HCWC,PO

## 2018-11-07 PROCEDURE — 99204 OFFICE O/P NEW MOD 45 MIN: CPT | Mod: HCWC,S$GLB,, | Performed by: ANESTHESIOLOGY

## 2018-11-07 PROCEDURE — 72148 MRI LUMBAR SPINE W/O DYE: CPT | Mod: 26,HCWC,, | Performed by: RADIOLOGY

## 2018-11-07 RX ORDER — SODIUM CHLORIDE, SODIUM LACTATE, POTASSIUM CHLORIDE, CALCIUM CHLORIDE 600; 310; 30; 20 MG/100ML; MG/100ML; MG/100ML; MG/100ML
INJECTION, SOLUTION INTRAVENOUS CONTINUOUS
Status: CANCELLED | OUTPATIENT
Start: 2018-11-13

## 2018-11-07 NOTE — PROGRESS NOTES
[facility] New Patient Evaluation    Referred by: Louise Velazquez NP  Reason for referral: back pain    CC:   Chief Complaint   Patient presents with    Establish Care    Low-back Pain      Last 3 PDI Scores 11/7/2018   Pain Disability Index (PDI) 8       HPI:   Dottie Galeana is a 68 y.o. female who complains of back pain    Here today she is following up on Fall at home - She went to ortho walk in and they told her she had compression fracture with xray done - pain 8/10- fell 1 week ago     Onset: 1 week ago  Inciting Event: slipped and fell, landed on her rear end  Progression: since onset, pain is unchanged  Current Pain Score: 8/10  Typical Range: 4-10/10  Timing: constant  Quality: Aching and Dull  Radiation: no  Associated numbness or weakness: no numbness, no weakness  Exacerbated by: back flexion, activity  Allievated by: laying down, rest, ice  Is Pain Level Acceptable?: No    Previous Therapies:  PT/OT:   HEP: ice  Interventions:   Surgery:  Medications:   - NSAIDS: aleve  - MSK Relaxants:   - TCAs:   - SNRIs:   - Topicals:   - Anticonvulsants:  - Opioids: hydrocodone    Current Pain Medications:  1. hydrocodone    History:    Current Outpatient Medications:     alendronate (FOSAMAX) 70 MG tablet, Take 1 tablet (70 mg total) by mouth every 7 days., Disp: 4 tablet, Rfl: 11    ALPRAZolam (XANAX) 0.25 MG tablet, TAKE ONE TABLET THREE TIMES A DAY AS NEEDED., Disp: 60 tablet, Rfl: 1    amLODIPine (NORVASC) 5 MG tablet, TAKE 1 TABLET EVERY DAY (Patient taking differently: Take 2 tablets by mouth, one in the morning and one at night), Disp: 90 tablet, Rfl: 3    aspirin 325 MG tablet, Take 325 mg by mouth once daily. , Disp: , Rfl:     fluticasone (FLONASE) 50 mcg/actuation nasal spray, 1 spray (50 mcg total) by Each Nare route once daily., Disp: 1 Bottle, Rfl: 6    HYDROcodone-acetaminophen (NORCO) 7.5-325 mg per tablet, Take 1 tablet by mouth every 6 (six) hours as needed for Pain.,  Disp: 20 tablet, Rfl: 0    losartan (COZAAR) 100 MG tablet, Take 1 tablet (100 mg total) by mouth once daily., Disp: 90 tablet, Rfl: 1    multivitamin (THERAGRAN) per tablet, Take 1 tablet by mouth once daily. , Disp: , Rfl:     nebivolol (BYSTOLIC) 10 MG Tab, Take 1 tablet (10 mg total) by mouth once daily., Disp: 30 tablet, Rfl: 11    NITROSTAT 0.4 mg SL tablet, Place 0.4 mg under the tongue every 5 (five) minutes as needed. , Disp: , Rfl:     pantoprazole (PROTONIX) 40 MG tablet, TAKE 1 TABLET ONE TIME DAILY, Disp: 90 tablet, Rfl: 3    rosuvastatin (CRESTOR) 10 MG tablet, TAKE ONE TABLET BY MOUTH ONCE DAILY IN THE EVENING, Disp: 30 tablet, Rfl: 11    traMADol (ULTRAM) 50 mg tablet, , Disp: , Rfl: 0    venlafaxine (EFFEXOR-XR) 150 MG Cp24, TAKE 1 CAPSULE EVERY DAY, Disp: 90 capsule, Rfl: 3    zolpidem (AMBIEN) 10 mg Tab, TAKE ONE TABLET BY MOUTH AT BEDTIME AS NEEDED, Disp: 30 tablet, Rfl: 5    Past Medical History:   Diagnosis Date    Depression     Encounter for long-term (current) use of other medications     General anesthetics causing adverse effect in therapeutic use     Generalized anxiety disorder     GERD (gastroesophageal reflux disease)     HTN (hypertension)     Hyperlipidemia     Hyponatremia     Insomnia     Stroke     ocular behind left eye       Past Surgical History:   Procedure Laterality Date    BREAST BIOPSY Left 1998    benign    EGD (ESOPHAGOGASTRODUODENOSCOPY) N/A 8/17/2018    [facility]    EGD (ESOPHAGOGASTRODUODENOSCOPY) N/A 4/25/2014    [facility]    EGD (ESOPHAGOGASTRODUODENOSCOPY) N/A 4/30/2013    [facility]    ESOPHAGOGASTRODUODENOSCOPY N/A 8/17/2018    Procedure: EGD (ESOPHAGOGASTRODUODENOSCOPY);  Surgeon: Al Santiago MD;  Location: [facility];  Service: Endoscopy;  Laterality: N/A;    ORIF WRIST FRACTURE Right        Family History   Problem  "Relation Age of Onset    Ovarian cancer Mother 42       Social History     Socioeconomic History    Marital status:      Spouse name: None    Number of children: None    Years of education: None    Highest education level: None   Social Needs    Financial resource strain: None    Food insecurity - worry: None    Food insecurity - inability: None    Transportation needs - medical: None    Transportation needs - non-medical: None   Occupational History    Occupation: realtor   Tobacco Use    Smoking status: Never Smoker    Smokeless tobacco: Never Used   Substance and Sexual Activity    Alcohol use: Yes     Alcohol/week: 8.4 oz     Types: 14 Glasses of wine per week     Comment: 2 glasses wine per day    Drug use: No    Sexual activity: None   Other Topics Concern    None   Social History Narrative    None       Review of patient's allergies indicates:   Allergen Reactions    No known drug allergies        Review of Systems:  General ROS: negative for - fever, night sweats or weight loss  Psychological ROS: negative for - hallucinations, hostility or irritability  Hematological and Lymphatic ROS: negative for - bleeding problems  Endocrine ROS: negative for - temperature intolerance or unexpected weight changes  Respiratory ROS: no cough, shortness of breath, or wheezing  Cardiovascular ROS: no chest pain or dyspnea on exertion  Gastrointestinal ROS: positive for - heartburn  Musculoskeletal ROS: negative for - gait disturbance or muscular weakness  Neurological ROS: negative for - bowel and bladder control changes, numbness/tingling or weakness  Dermatological ROS: negative for rash    Physical Exam:  Vitals:    11/07/18 1022   BP: 132/85   Pulse: 82   Resp: 18   Temp: 97.7 °F (36.5 °C)   TempSrc: Oral   SpO2: 97%   Weight: 78.5 kg (173 lb 1 oz)   Height: 5' 7" (1.702 m)   PainSc:   4   PainLoc: Back     Body mass index is 27.11 kg/m².    Gen: NAD  Gait: gait intact  Psych:  Mood appropriate " for given condition  HEENT: eyes anicteric   GI: Abd soft  CV: 2+ pedal pulses  Lungs: breathing unlabored   ROM: limited AROM of the L spine in all planes, full ROM at ankles, knees and hips  Lumbar flexion 90 degrees, extension 50 degrees, side bending 30 degrees.    Sensation: intact to light touch in all dermatomes tested from L2-S1 bilaterally  Reflexes: 2+ b/l patella and Achilles  Palpation: Diffusely tender over lumbar paraspinals  +TTP over the midline L1 vertebrae   Tone: normal in the b/l knees and hips   Lymph: no lymphadenopathy at groin or popliteal fossa  Skin: intact  Extremities: No edema in b/l ankles or hands         Right Left   L2/3 Iliacus Hip flexion  5  5   L3/4 Qudratus Femoris Knee Extension  5  5   L4/5 Tib Anterior Ankle Dorsiflexion   5  5   L5/S1 Extensor Hallicus Longus Great toe extension  5  5   L4/5 Tib Anterior/Posterior Inversion  5  5   L5/S1 Extensor Digitorum Longus, Peronues Eversion  5  5   L5/S1 Glut Med Hip Abudction  5  5   S1/2 Glut max and Hamstring Hip Extension  5  5   S1/2 Gastroc/Soleus Plantar Fexion  5  5       Imaging:  MRI lumbar spine 11/6/18  FINDINGS:  There is a proximally 40% anterior wedging of the L1 vertebral body. There is mixed edema and sclerosis involving much of the vertebral body suggesting a subacute fracture. Edema extends into the anterior pedicles. I do not see evidence of significant retropulsion.  The conus terminates at L2 and has an unremarkable appearance. There is mild anterior osteophyte formation throughout the lumbar region.    There is diffuse disc desiccation with loss of disc space height seen at L3/L4 and L4/L5.  Mixed but primarily fatty degenerative endplate is changes are noted at multiple levels the most prominently L3/L4 through L5/S1.  The lumbar vertebrae otherwise display normal signal, morphology and alignment.  The individual disc levels appears follows:    T12/L1: There is mild annular disc bulging with mild effacement of  the anterior thecal sac.  The foramina are intact.    L1/L2: There is annular disc bulging with mild effacement of the anterior thecal sac and there is mild degenerative facet disease.    L2/L3: Annular disc bulging is evident with a mild superimposed right posterolateral disc protrusion with extension into the inferior right foramen.  There is mild narrowing of the canal and there is mild ligamentous and facet hypertrophy.    L3/L4: There is annular disc bulging with a superimposed left posterolateral small disc extrusion distorting the left anterolateral canal and left foramen.  The exiting left L3 nerve root is contacted.  Degenerative facet changes are noted bilaterally.    L4/L5: There is annular disc bulging which combines with degenerative facet disease and a right posterolateral disc extrusion to produce moderate right and mild left-sided foraminal narrowing.  The central canal is mildly distorted.  Bilateral facet effusions are noted.    L5/S1: Moderate degenerative facet changes are present in the central canal is only mildly distorted.  There is mild inferior foraminal narrowing.    Lumbar xray 11/6/18  FINDINGS:  There is a mild-to-moderate superior endplate compression deformity of the L1 vertebral body which is age indeterminate.  There is, however, a provided history of L1 compression fracture.  Comparison with any prior studies would be of interest.  There is no significant retropulsion into the spinal canal at this level.  There is mild kyphosis at the T12-L1 level.  There is moderate disc space narrowing at the L4-5 level.  There is facet joint arthropathy at the lower 2 lumbar levels.  There is also moderate disc space narrowing at the L3-4 level with mild disc space narrowing at the L2-3 level.  There is multilevel marginal osteophyte formation.    Labs:  BMP  Lab Results   Component Value Date     08/08/2018    K 4.2 08/06/2018     08/06/2018    CO2 26 08/06/2018    BUN 13 08/06/2018     CREATININE 0.8 08/06/2018    CALCIUM 9.7 08/06/2018    ANIONGAP 11 08/06/2018    ESTGFRAFRICA >60.0 08/06/2018    EGFRNONAA >60.0 08/06/2018     Lab Results   Component Value Date    ALT 23 07/11/2018    AST 25 07/11/2018    ALKPHOS 95 07/11/2018    BILITOT 0.4 07/11/2018       Assessment:  Problem List Items Addressed This Visit        Neuro    Closed compression fracture of L1 lumbar vertebra - Primary      Other Visit Diagnoses     Vertebral compression fracture        Relevant Orders    HME - OTHER    Lumbar spondylosis            Treatment Plan:   68 y.o. year old female with acute lower back pain secondary to L1 vertebral compression fracture.  Her pain started about a week ago after she slipped and fell in her bedroom.  No radicular pain, LE weakness, or LE numbness.  MRI consistent with subacute L1 vertebral body compression fracture with about 40% height loss without significant retropulsion.  Her pain is limiting her mobility and interfering with her ADL's.  Will schedule for L1 kyphoplasty.  Procedure explained using an anatomical model.  Risks, benefits, alternatives explained to patient who verbalized understanding, including increased risk of infection, bleeding, need for additional procedures or surgery, and nerve damage.  Questions regarding the procedure, risks, expected outcome, and possible side effects were solicited and answered to the patient's satisfaction.  MARGIE wishes to proceed with the injection.  Verbal and written consent were obtained in clinic today.  Will fit her for TLSO brace for back stability in the office today.  Certainly reasonable for patient to continue short term hydrocodone 7.5/325mg prn for pain related to compression fracture.  She takes ASA 325mg daily.  She has not taken it since 11/2/18 and will continue to hold until after procedure.  Follow up in the office 2 weeks post procedure.     : Reviewed and consistent with medication use as prescribed.  Fill Date ID  Written Drug Qty Days Prescriber Rx # Pharmacy Refill Daily Dose * Pymt Type    11/06/2018  2   11/06/2018  Hydrocodone-Acetamin 7.5-325  20 [address]  [id number] M a (5427)  0 30.00 [place]   11/02/2018  2   10/10/2018  Zolpidem Tartrate 10 MG Tablet  30 30 Ke Komal  [id number] M a (5427)  1  [place]   11/02/2018  2   11/02/2018  Tramadol Hcl 50 MG Tablet  30 8 Ch Md  [id number] M a (5427)  0 18.75 [place]   10/10/2018  2   10/10/2018  Zolpidem Tartrate 10 MG Tablet  30 30 Ke Komal  [id number] [place] a (5427)  [place]   09/05/2018  2   06/20/2018  Alprazolam 0.25 MG Tablet  60 20 Ke Komal  [id number] M a (5427)  1  [place]   09/05/2018  2   04/10/2018  Zolpidem Tartrate 10 MG Tablet  30 30 St Sta  [id number] M a (5427)  5  [place]   08/04/2018  2   04/10/2018  Zolpidem Tartrate 10 MG Tablet  30 30 St Sta  [id number] M a (5427)  4  [place]   07/13/2018  2   07/10/2018  Alprazolam 0.25 MG Tablet  60 20 Ri Mar  [id number] Hum (9851)  [place]   07/09/2018  2   04/10/2018  Zolpidem Tartrate 10 MG Tablet  30 30 St Sta  [id number] M a (5427)  3  [place]   06/20/2018  2   06/20/2018  Alprazolam 0.25 MG Tablet  60 20 Ke Komal  [id number] M a (5427)  [place]   06/06/2018  2   04/10/2018  Zolpidem Tartrate 10 MG Tablet  30 30 St Sta  [id number] Bra (3043)  2  [place]   05/09/2018  2   04/10/2018  Zolpidem Tartrate 10 MG Tablet  30 30 St Sta  [id number] Bra (7423)  1  [place]   04/10/2018  2   04/10/2018  Zolpidem Tartrate 10 MG Tablet  30 30 St Sta  [id number] Bra (9523)  [place]   03/01/2018  2   09/25/2017  Zolpidem Tartrate 10 MG Tablet  30 30 Ke Komal  [id number] Bra (1500)  5  [place]   02/16/2018  2   01/03/2018  Alprazolam 0.25 MG Tablet  60 20 Ke Komal  [id number] Bra (4616)  [place]   01/29/2018  2   09/25/2017  Zolpidem Tartrate 10 MG Tablet  30 30 Ke Komal  [id number] Bra (2998)  4  [place]   01/03/2018  2   01/03/2018  Alprazolam 0.25 MG Tablet  60 20 Ke Komal   [id number] Bra (3393)  [place]   01/03/2018  2   01/03/2018  Hydrocodone-Homatropine Syrup  115 6 Ma [place]  [id number] Bra (7103)  0 19.17 M[place]   12/29/2017  2   09/25/2017  Zolpidem Tartrate 10 MG Tablet  30 30 Ke Komal  [id number] Bra (3393)  3  [place] 11/30/2017  2   09/25/2017  Zolpidem Tartrate 10 MG Tablet  30 30 Ke Komal  [id number] Bra (3393)  2  [place] 11/20/2017  2   09/27/2017  Alprazolam 0.25 MG Tablet  60 20 Ke Komal  [id number] Bra (3393)  1  [place] 11/02/2017  2   09/25/2017  Zolpidem Tartrate 10 MG Tablet  30 30 Ke Komal  [id number] Bra (3393)  1  [place] 09/27/2017  2   09/27/2017  Alprazolam 0.25 MG Tablet  60 20 Ke Komal  [id number] Bra (3393)  0  [place] 09/26/2017  2   09/25/2017  Zolpidem Tartrate 10 MG Tablet  30 30 Ke Komal  [id number] Bra (3393)  0  [place] 09/04/2017  2   06/30/2017  Alprazolam 0.25 MG Tablet  60 20 Ke Komal  [id number] Bra (3393)  1  [place] 08/25/2017  2   08/25/2017  Zolpidem Tartrate 10 MG Tablet  30 30 Ke Komal  [id number] Bra (3393)  0  [place] 07/18/2017  2   07/18/2017  Zolpidem Tartrate 10 MG Tablet  30 30 Ke Komal  [id number] Bra (3393)  0  [place] 07/01/2017  2   06/30/2017  Alprazolam 0.25 MG Tablet  60 20 Ke Komal  [id number] Bra (3393)  0  [place] 06/14/2017  1   01/12/2017  Zolpidem Tartrate 10 MG Tablet  30 30 Ke Komal  [id number] Bra (3393)  3  [place] 05/19/2017  1   12/22/2016  Alprazolam 0.25 MG Tablet  60 20 Ke Komal  [id number] Bra (3393)  4  [place] 05/12/2017  1   01/12/2017  Zolpidem Tartrate 10 MG Tablet  30 30 Ke Komal  [id number] Bra (8752)  2  [place] 04/17/2017  1   12/22/2016  Alprazolam 0.25 MG Tablet  60 20 Ke Komal  [id number] Bra (2032)  3  [place] 04/14/2017  1   01/12/2017  Zolpidem Tartrate 10 MG Tablet  30 30 Ke Komal  [id number] Bra (2297)  1  [place] 03/11/2017  1   12/22/2016  Alprazolam 0.25 MG Tablet  60 20 Ke Komal  [id number] Bra (2142)  2  [place] 03/07/2017   2   11/17/2016  Zolpidem Tartrate 10 MG Tablet  30 30 Ke Komal  [id number] Hum (9851)  2  [place]   02/04/2017  2   11/17/2016  Zolpidem Tartrate 10 MG Tablet  30 30 Ke Komal  [id number] Hum (9851)  1  [place]   01/28/2017  1   12/22/2016  Alprazolam 0.25 MG Tablet  60 20 Ke Komal  [id number] Bra (4063)  1  [place]   01/12/2017  1   01/12/2017  Zolpidem Tartrate 10 MG Tablet  30 30 Ke Komal  [id number] Bra (8143)  0  [place]   12/22/2016  1   12/22/2016  Alprazolam 0.25 MG Tablet  60 20 Ke Komal  [id number] Bra (9543)  0  [place]   11/19/2016  2   11/17/2016  Zolpidem Tartrate 10 MG Tablet  30 30 Ke Komal  [id number] Hum (9851)  0  [place]   11/14/2016  1   06/08/2016  Zolpidem Tartrate 10 MG Tablet  30 30 Ke Komal  [id number] Bra (6443)  5  [place]

## 2018-11-07 NOTE — LETTER
November 7, 2018      Louise Velazquez, RAUDEL  [address] [facility] [address] [place] [address]           [facility]  [address] [facility] [address] [place] [address]  Phone: [phone]  Fax: [phone]          Patient: Dottie Galeana   MR Number: [id number]   YOB: 1950   Date of Visit: 11/7/2018       Dear Louise Velazquez:    Thank you for referring Dottie Galeana to me for evaluation. Attached you will find relevant portions of my assessment and plan of care.    If you have questions, please do not hesitate to call me. I look forward to following Dottie Galeana along with you.    Sincerely,    Russ Gramajo MD    Enclosure  CC:  No Recipients    If you would like to receive this communication electronically, please contact [email or web address] or [phone] to request more information on [employer] access.    For providers and/or their staff who would like to refer a patient to [facility], please contact us through our one-stop-shop provider referral line, [facility], at [phone].    If you feel you have received this communication in error or would no longer like to receive these types of communications, please e-mail [email or web address]

## 2018-11-07 NOTE — H&P (VIEW-ONLY)
[facility] New Patient Evaluation    Referred by: Louise Velazquez NP  Reason for referral: back pain    CC:   Chief Complaint   Patient presents with    Establish Care    Low-back Pain      Last 3 PDI Scores 11/7/2018   Pain Disability Index (PDI) 8       HPI:   Dottie Galeana is a 68 y.o. female who complains of back pain    Here today she is following up on Fall at home - She went to ortho walk in and they told her she had compression fracture with xray done - pain 8/10- fell 1 week ago     Onset: 1 week ago  Inciting Event: slipped and fell, landed on her rear end  Progression: since onset, pain is unchanged  Current Pain Score: 8/10  Typical Range: 4-10/10  Timing: constant  Quality: Aching and Dull  Radiation: no  Associated numbness or weakness: no numbness, no weakness  Exacerbated by: back flexion, activity  Allievated by: laying down, rest, ice  Is Pain Level Acceptable?: No    Previous Therapies:  PT/OT:   HEP: ice  Interventions:   Surgery:  Medications:   - NSAIDS: aleve  - MSK Relaxants:   - TCAs:   - SNRIs:   - Topicals:   - Anticonvulsants:  - Opioids: hydrocodone    Current Pain Medications:  1. hydrocodone    History:    Current Outpatient Medications:     alendronate (FOSAMAX) 70 MG tablet, Take 1 tablet (70 mg total) by mouth every 7 days., Disp: 4 tablet, Rfl: 11    ALPRAZolam (XANAX) 0.25 MG tablet, TAKE ONE TABLET THREE TIMES A DAY AS NEEDED., Disp: 60 tablet, Rfl: 1    amLODIPine (NORVASC) 5 MG tablet, TAKE 1 TABLET EVERY DAY (Patient taking differently: Take 2 tablets by mouth, one in the morning and one at night), Disp: 90 tablet, Rfl: 3    aspirin 325 MG tablet, Take 325 mg by mouth once daily. , Disp: , Rfl:     fluticasone (FLONASE) 50 mcg/actuation nasal spray, 1 spray (50 mcg total) by Each Nare route once daily., Disp: 1 Bottle, Rfl: 6    HYDROcodone-acetaminophen (NORCO) 7.5-325 mg per tablet, Take 1 tablet by mouth every 6 (six) hours as needed for Pain.,  Disp: 20 tablet, Rfl: 0    losartan (COZAAR) 100 MG tablet, Take 1 tablet (100 mg total) by mouth once daily., Disp: 90 tablet, Rfl: 1    multivitamin (THERAGRAN) per tablet, Take 1 tablet by mouth once daily. , Disp: , Rfl:     nebivolol (BYSTOLIC) 10 MG Tab, Take 1 tablet (10 mg total) by mouth once daily., Disp: 30 tablet, Rfl: 11    NITROSTAT 0.4 mg SL tablet, Place 0.4 mg under the tongue every 5 (five) minutes as needed. , Disp: , Rfl:     pantoprazole (PROTONIX) 40 MG tablet, TAKE 1 TABLET ONE TIME DAILY, Disp: 90 tablet, Rfl: 3    rosuvastatin (CRESTOR) 10 MG tablet, TAKE ONE TABLET BY MOUTH ONCE DAILY IN THE EVENING, Disp: 30 tablet, Rfl: 11    traMADol (ULTRAM) 50 mg tablet, , Disp: , Rfl: 0    venlafaxine (EFFEXOR-XR) 150 MG Cp24, TAKE 1 CAPSULE EVERY DAY, Disp: 90 capsule, Rfl: 3    zolpidem (AMBIEN) 10 mg Tab, TAKE ONE TABLET BY MOUTH AT BEDTIME AS NEEDED, Disp: 30 tablet, Rfl: 5    Past Medical History:   Diagnosis Date    Depression     Encounter for long-term (current) use of other medications     General anesthetics causing adverse effect in therapeutic use     Generalized anxiety disorder     GERD (gastroesophageal reflux disease)     HTN (hypertension)     Hyperlipidemia     Hyponatremia     Insomnia     Stroke     ocular behind left eye       Past Surgical History:   Procedure Laterality Date    BREAST BIOPSY Left 1998    benign    EGD (ESOPHAGOGASTRODUODENOSCOPY) N/A 8/17/2018    [facility]    EGD (ESOPHAGOGASTRODUODENOSCOPY) N/A 4/25/2014    Performed by [facility]    EGD (ESOPHAGOGASTRODUODENOSCOPY) N/A 4/30/2013    Per[facility]    ESOPHAGOGASTRODUODENOSCOPY N/A 8/17/2018    Procedure: EGD (ESOPHAGOGASTRODUODENOSCOPY);  Surgeon: Al Santiago MD;  Location: [facility];  Service: Endoscopy;  Laterality: N/A;    ORIF WRIST FRACTURE Right        Family History   Problem  "Relation Age of Onset    Ovarian cancer Mother 42       Social History     Socioeconomic History    Marital status:      Spouse name: None    Number of children: None    Years of education: None    Highest education level: None   Social Needs    Financial resource strain: None    Food insecurity - worry: None    Food insecurity - inability: None    Transportation needs - medical: None    Transportation needs - non-medical: None   Occupational History    Occupation: realtor   Tobacco Use    Smoking status: Never Smoker    Smokeless tobacco: Never Used   Substance and Sexual Activity    Alcohol use: Yes     Alcohol/week: 8.4 oz     Types: 14 Glasses of wine per week     Comment: 2 glasses wine per day    Drug use: No    Sexual activity: None   Other Topics Concern    None   Social History Narrative    None       Review of patient's allergies indicates:   Allergen Reactions    No known drug allergies        Review of Systems:  General ROS: negative for - fever, night sweats or weight loss  Psychological ROS: negative for - hallucinations, hostility or irritability  Hematological and Lymphatic ROS: negative for - bleeding problems  Endocrine ROS: negative for - temperature intolerance or unexpected weight changes  Respiratory ROS: no cough, shortness of breath, or wheezing  Cardiovascular ROS: no chest pain or dyspnea on exertion  Gastrointestinal ROS: positive for - heartburn  Musculoskeletal ROS: negative for - gait disturbance or muscular weakness  Neurological ROS: negative for - bowel and bladder control changes, numbness/tingling or weakness  Dermatological ROS: negative for rash    Physical Exam:  Vitals:    11/07/18 1022   BP: 132/85   Pulse: 82   Resp: 18   Temp: 97.7 °F (36.5 °C)   TempSrc: Oral   SpO2: 97%   Weight: 78.5 kg (173 lb 1 oz)   Height: 5' 7" (1.702 m)   PainSc:   4   PainLoc: Back     Body mass index is 27.11 kg/m².    Gen: NAD  Gait: gait intact  Psych:  Mood appropriate " for given condition  HEENT: eyes anicteric   GI: Abd soft  CV: 2+ pedal pulses  Lungs: breathing unlabored   ROM: limited AROM of the L spine in all planes, full ROM at ankles, knees and hips  Lumbar flexion 90 degrees, extension 50 degrees, side bending 30 degrees.    Sensation: intact to light touch in all dermatomes tested from L2-S1 bilaterally  Reflexes: 2+ b/l patella and Achilles  Palpation: Diffusely tender over lumbar paraspinals  +TTP over the midline L1 vertebrae   Tone: normal in the b/l knees and hips   Lymph: no lymphadenopathy at groin or popliteal fossa  Skin: intact  Extremities: No edema in b/l ankles or hands         Right Left   L2/3 Iliacus Hip flexion  5  5   L3/4 Qudratus Femoris Knee Extension  5  5   L4/5 Tib Anterior Ankle Dorsiflexion   5  5   L5/S1 Extensor Hallicus Longus Great toe extension  5  5   L4/5 Tib Anterior/Posterior Inversion  5  5   L5/S1 Extensor Digitorum Longus, Peronues Eversion  5  5   L5/S1 Glut Med Hip Abudction  5  5   S1/2 Glut max and Hamstring Hip Extension  5  5   S1/2 Gastroc/Soleus Plantar Fexion  5  5       Imaging:  MRI lumbar spine 11/6/18  FINDINGS:  There is a proximally 40% anterior wedging of the L1 vertebral body. There is mixed edema and sclerosis involving much of the vertebral body suggesting a subacute fracture. Edema extends into the anterior pedicles. I do not see evidence of significant retropulsion.  The conus terminates at L2 and has an unremarkable appearance. There is mild anterior osteophyte formation throughout the lumbar region.    There is diffuse disc desiccation with loss of disc space height seen at L3/L4 and L4/L5.  Mixed but primarily fatty degenerative endplate is changes are noted at multiple levels the most prominently L3/L4 through L5/S1.  The lumbar vertebrae otherwise display normal signal, morphology and alignment.  The individual disc levels appears follows:    T12/L1: There is mild annular disc bulging with mild effacement of  the anterior thecal sac.  The foramina are intact.    L1/L2: There is annular disc bulging with mild effacement of the anterior thecal sac and there is mild degenerative facet disease.    L2/L3: Annular disc bulging is evident with a mild superimposed right posterolateral disc protrusion with extension into the inferior right foramen.  There is mild narrowing of the canal and there is mild ligamentous and facet hypertrophy.    L3/L4: There is annular disc bulging with a superimposed left posterolateral small disc extrusion distorting the left anterolateral canal and left foramen.  The exiting left L3 nerve root is contacted.  Degenerative facet changes are noted bilaterally.    L4/L5: There is annular disc bulging which combines with degenerative facet disease and a right posterolateral disc extrusion to produce moderate right and mild left-sided foraminal narrowing.  The central canal is mildly distorted.  Bilateral facet effusions are noted.    L5/S1: Moderate degenerative facet changes are present in the central canal is only mildly distorted.  There is mild inferior foraminal narrowing.    Lumbar xray 11/6/18  FINDINGS:  There is a mild-to-moderate superior endplate compression deformity of the L1 vertebral body which is age indeterminate.  There is, however, a provided history of L1 compression fracture.  Comparison with any prior studies would be of interest.  There is no significant retropulsion into the spinal canal at this level.  There is mild kyphosis at the T12-L1 level.  There is moderate disc space narrowing at the L4-5 level.  There is facet joint arthropathy at the lower 2 lumbar levels.  There is also moderate disc space narrowing at the L3-4 level with mild disc space narrowing at the L2-3 level.  There is multilevel marginal osteophyte formation.    Labs:  BMP  Lab Results   Component Value Date     08/08/2018    K 4.2 08/06/2018     08/06/2018    CO2 26 08/06/2018    BUN 13 08/06/2018     CREATININE 0.8 08/06/2018    CALCIUM 9.7 08/06/2018    ANIONGAP 11 08/06/2018    ESTGFRAFRICA >60.0 08/06/2018    EGFRNONAA >60.0 08/06/2018     Lab Results   Component Value Date    ALT 23 07/11/2018    AST 25 07/11/2018    ALKPHOS 95 07/11/2018    BILITOT 0.4 07/11/2018       Assessment:  Problem List Items Addressed This Visit        Neuro    Closed compression fracture of L1 lumbar vertebra - Primary      Other Visit Diagnoses     Vertebral compression fracture        Relevant Orders    HME - OTHER    Lumbar spondylosis            Treatment Plan:   68 y.o. year old female with acute lower back pain secondary to L1 vertebral compression fracture.  Her pain started about a week ago after she slipped and fell in her bedroom.  No radicular pain, LE weakness, or LE numbness.  MRI consistent with subacute L1 vertebral body compression fracture with about 40% height loss without significant retropulsion.  Her pain is limiting her mobility and interfering with her ADL's.  Will schedule for L1 kyphoplasty.  Procedure explained using an anatomical model.  Risks, benefits, alternatives explained to patient who verbalized understanding, including increased risk of infection, bleeding, need for additional procedures or surgery, and nerve damage.  Questions regarding the procedure, risks, expected outcome, and possible side effects were solicited and answered to the patient's satisfaction.  MARGIE wishes to proceed with the injection.  Verbal and written consent were obtained in clinic today.  Will fit her for TLSO brace for back stability in the office today.  Certainly reasonable for patient to continue short term hydrocodone 7.5/325mg prn for pain related to compression fracture.  She takes ASA 325mg daily.  She has not taken it since 11/2/18 and will continue to hold until after procedure.  Follow up in the office 2 weeks post procedure.     : Reviewed and consistent with medication use as prescribed.  Fill Date ID  Written Drug Qty Days Prescriber Rx # Pharmacy Refill Daily Dose * Pymt Type    11/06/2018  2   11/06/2018  Hydrocodone-Acetamin 7.5-325  [address]  [id number] M a (5427)  0 30.00 [place]   11/02/2018  2   10/10/2018  Zolpidem Tartrate 10 MG Tablet  30 30 Ke Komal  [id number] M a (5427)  1  [place]   11/02/2018  2   11/02/2018  Tramadol Hcl 50 MG Tablet  30 8 Ch Md  [id number] M a (5427)  0 18.75 [place]   10/10/2018  2   10/10/2018  Zolpidem Tartrate 10 MG Tablet  30 30 Ke Komal  [id number] M a (5427)  [place]   09/05/2018  2   06/20/2018  Alprazolam 0.25 MG Tablet  60 20 Ke Komal  [id number] M a (5427)  1  [place]   09/05/2018  2   04/10/2018  Zolpidem Tartrate 10 MG Tablet  30 30 St Sta  [id number] M a (5427)  5  [place]   08/04/2018  2   04/10/2018  Zolpidem Tartrate 10 MG Tablet  30 30 St Sta  [id number] M a (5427)  4  [place]   07/13/2018  2   07/10/2018  Alprazolam 0.25 MG Tablet  60 20 Ri Mar  [id number] Hum (9851)  [place]   07/09/2018  2   04/10/2018  Zolpidem Tartrate 10 MG Tablet  30 30 St Sta  [id number] M a (5427)  3  [place]   06/20/2018  2   06/20/2018  Alprazolam 0.25 MG Tablet  60 20 Ke Komal  [id number] M a (5427)  [place]   06/06/2018  2   04/10/2018  Zolpidem Tartrate 10 MG Tablet  30 30 St Sta  [id number] Bra (2693)  2  [place]   05/09/2018  2   04/10/2018  Zolpidem Tartrate 10 MG Tablet  30 30 St Sta  [id number] Bra (5483)  1  [place]   04/10/2018  2   04/10/2018  Zolpidem Tartrate 10 MG Tablet  30 30 St Sta  [id number] Bra (3043)  [place]   03/01/2018  2   09/25/2017  Zolpidem Tartrate 10 MG Tablet  30 30 Ke Komal  [id number] Bra (4117)  5  [place]   02/16/2018  2   01/03/2018  Alprazolam 0.25 MG Tablet  60 20 Ke Komal  [id number] Bra (7104)  [place]   01/29/2018  2   09/25/2017  Zolpidem Tartrate 10 MG Tablet  30 30 Ke Komal  [id number] Bra (1579)  4  [place]   01/03/2018  2   01/03/2018  Alprazolam 0.25 MG Tablet  60 20 Ke Komal   [id number] Bra (3393)  [place]   01/03/2018  2   01/03/2018  Hydrocodone-Homatropine Syrup  115 6 Ma [place]  [id number] Bra (8163)  0 19.17 M[place]   12/29/2017  2   09/25/2017  Zolpidem Tartrate 10 MG Tablet  30 30 Ke Komal  [id number] Bra (3393)  3  [place] 11/30/2017  2   09/25/2017  Zolpidem Tartrate 10 MG Tablet  30 30 Ke Komal  [id number] Bra (3393)  2  [place] 11/20/2017  2   09/27/2017  Alprazolam 0.25 MG Tablet  60 20 Ke Komal  [id number] Bra (3393)  1  [place] 11/02/2017  2   09/25/2017  Zolpidem Tartrate 10 MG Tablet  30 30 Ke Komal  [id number] Bra (3393)  1  [place] 09/27/2017  2   09/27/2017  Alprazolam 0.25 MG Tablet  60 20 Ke Komal  [id number] Bra (3393)  0  [place] 09/26/2017  2   09/25/2017  Zolpidem Tartrate 10 MG Tablet  30 30 Ke Komal  [id number] Bra (3393)  0  [place] 09/04/2017  2   06/30/2017  Alprazolam 0.25 MG Tablet  60 20 Ke Komal  [id number] Bra (3393)  1  [place] 08/25/2017  2   08/25/2017  Zolpidem Tartrate 10 MG Tablet  30 30 Ke Komal  [id number] Bra (3393)  0  [place] 07/18/2017  2   07/18/2017  Zolpidem Tartrate 10 MG Tablet  30 30 Ke Komal  [id number] Bra (3393)  0  [place] 07/01/2017  2   06/30/2017  Alprazolam 0.25 MG Tablet  60 20 Ke Komal  [id number] Bra (3393)  0  [place] 06/14/2017  1   01/12/2017  Zolpidem Tartrate 10 MG Tablet  30 30 Ke Komal  [id number] Bra (3393)  3  [place] 05/19/2017  1   12/22/2016  Alprazolam 0.25 MG Tablet  60 20 Ke Komal  [id number] Bra (3393)  4  [place] 05/12/2017  1   01/12/2017  Zolpidem Tartrate 10 MG Tablet  30 30 Ke Komal  [id number] Bra (3784)  2  [place] 04/17/2017  1   12/22/2016  Alprazolam 0.25 MG Tablet  60 20 Ke Komal  [id number] Bra (7394)  3  [place] 04/14/2017  1   01/12/2017  Zolpidem Tartrate 10 MG Tablet  30 30 Ke Komal  [id number] Bra (9575)  1  [place] 03/11/2017  1   12/22/2016  Alprazolam 0.25 MG Tablet  60 20 Ke Komal  [id number] Bra (3838)  2  [place] 03/07/2017   2   11/17/2016  Zolpidem Tartrate 10 MG Tablet  30 30 Ke Komal  [id number] Hum (9851)  2  [place]   02/04/2017  2   11/17/2016  Zolpidem Tartrate 10 MG Tablet  30 30 Ke Komal  [id number] Hum (9851)  1  [place]   01/28/2017  1   12/22/2016  Alprazolam 0.25 MG Tablet  60 20 Ke Komal  [id number] Bra (2553)  1  [place]   01/12/2017  1   01/12/2017  Zolpidem Tartrate 10 MG Tablet  30 30 Ke Komal  [id number] Bra (4023)  0  [place]   12/22/2016  1   12/22/2016  Alprazolam 0.25 MG Tablet  60 20 Ke Komal  [id number] Bra (8823)  0  [place]   11/19/2016  2   11/17/2016  Zolpidem Tartrate 10 MG Tablet  30 30 Ke Komal  [id number] Hum (9851)  0  [place]   11/14/2016  1   06/08/2016  Zolpidem Tartrate 10 MG Tablet  30 30 Ke Komal  [id number] Bra (1333)  5  [place]

## 2018-11-12 ENCOUNTER — ANESTHESIA EVENT (OUTPATIENT)
Dept: SURGERY | Facility: HOSPITAL | Age: 68
End: 2018-11-12
Payer: MEDICARE

## 2018-11-12 DIAGNOSIS — M51.36 DDD (DEGENERATIVE DISC DISEASE), LUMBAR: Primary | ICD-10-CM

## 2018-11-12 RX ORDER — FENTANYL CITRATE 50 UG/ML
25 INJECTION, SOLUTION INTRAMUSCULAR; INTRAVENOUS EVERY 5 MIN PRN
Status: CANCELLED | OUTPATIENT
Start: 2018-11-12

## 2018-11-12 RX ORDER — OXYCODONE HYDROCHLORIDE 5 MG/1
5 TABLET ORAL
Status: CANCELLED | OUTPATIENT
Start: 2018-11-12

## 2018-11-12 RX ORDER — HYDROMORPHONE HYDROCHLORIDE 2 MG/ML
0.2 INJECTION, SOLUTION INTRAMUSCULAR; INTRAVENOUS; SUBCUTANEOUS EVERY 5 MIN PRN
Status: CANCELLED | OUTPATIENT
Start: 2018-11-12

## 2018-11-12 RX ORDER — DIPHENHYDRAMINE HYDROCHLORIDE 50 MG/ML
25 INJECTION INTRAMUSCULAR; INTRAVENOUS EVERY 6 HOURS PRN
Status: CANCELLED | OUTPATIENT
Start: 2018-11-12

## 2018-11-12 RX ORDER — MEPERIDINE HYDROCHLORIDE 50 MG/ML
12.5 INJECTION INTRAMUSCULAR; INTRAVENOUS; SUBCUTANEOUS ONCE AS NEEDED
Status: CANCELLED | OUTPATIENT
Start: 2018-11-12 | End: 2018-11-12

## 2018-11-13 ENCOUNTER — ANESTHESIA (OUTPATIENT)
Dept: SURGERY | Facility: HOSPITAL | Age: 68
End: 2018-11-13
Payer: MEDICARE

## 2018-11-13 ENCOUNTER — HOSPITAL ENCOUNTER (OUTPATIENT)
Facility: HOSPITAL | Age: 68
Discharge: HOME | End: 2018-11-13
Attending: ANESTHESIOLOGY | Admitting: ANESTHESIOLOGY
Payer: MEDICARE

## 2018-11-13 ENCOUNTER — HOSPITAL ENCOUNTER (OUTPATIENT)
Dept: RADIOLOGY | Facility: HOSPITAL | Age: 68
Discharge: HOME | End: 2018-11-13
Attending: ANESTHESIOLOGY | Admitting: ANESTHESIOLOGY
Payer: MEDICARE

## 2018-11-13 VITALS
HEIGHT: 67 IN | BODY MASS INDEX: 27.34 KG/M2 | TEMPERATURE: 98 F | RESPIRATION RATE: 18 BRPM | SYSTOLIC BLOOD PRESSURE: 140 MMHG | OXYGEN SATURATION: 98 % | WEIGHT: 174.19 LBS | DIASTOLIC BLOOD PRESSURE: 76 MMHG | HEART RATE: 72 BPM

## 2018-11-13 DIAGNOSIS — M51.36 DDD (DEGENERATIVE DISC DISEASE), LUMBAR: ICD-10-CM

## 2018-11-13 DIAGNOSIS — S32.010A CLOSED COMPRESSION FRACTURE OF FIRST LUMBAR VERTEBRA, INITIAL ENCOUNTER: Primary | ICD-10-CM

## 2018-11-13 PROCEDURE — 25000003 PHARM REV CODE 250: Mod: HCWC,PO | Performed by: NURSE ANESTHETIST, CERTIFIED REGISTERED

## 2018-11-13 PROCEDURE — 76000 FLUOROSCOPY <1 HR PHYS/QHP: CPT | Mod: TC,HCWC,PO

## 2018-11-13 PROCEDURE — 36000707: Mod: HCWC,PO | Performed by: ANESTHESIOLOGY

## 2018-11-13 PROCEDURE — 71000033 HC RECOVERY, INTIAL HOUR: Mod: HCWC,PO | Performed by: ANESTHESIOLOGY

## 2018-11-13 PROCEDURE — 22514 PERQ VERTEBRAL AUGMENTATION: CPT | Mod: HCWC,,, | Performed by: ANESTHESIOLOGY

## 2018-11-13 PROCEDURE — 27201423 OPTIME MED/SURG SUP & DEVICES STERILE SUPPLY: Mod: HCWC,PO | Performed by: ANESTHESIOLOGY

## 2018-11-13 PROCEDURE — 25500020 PHARM REV CODE 255: Mod: HCWC,PO | Performed by: ANESTHESIOLOGY

## 2018-11-13 PROCEDURE — 25000003 PHARM REV CODE 250: Mod: HCWC,PO | Performed by: ANESTHESIOLOGY

## 2018-11-13 PROCEDURE — 37000009 HC ANESTHESIA EA ADD 15 MINS: Mod: HCWC,PO | Performed by: ANESTHESIOLOGY

## 2018-11-13 PROCEDURE — 63600175 PHARM REV CODE 636 W HCPCS: Mod: HCWC,PO | Performed by: NURSE ANESTHETIST, CERTIFIED REGISTERED

## 2018-11-13 PROCEDURE — 37000008 HC ANESTHESIA 1ST 15 MINUTES: Mod: HCWC,PO | Performed by: ANESTHESIOLOGY

## 2018-11-13 PROCEDURE — C1713 ANCHOR/SCREW BN/BN,TIS/BN: HCPCS | Mod: HCWC,PO | Performed by: ANESTHESIOLOGY

## 2018-11-13 PROCEDURE — 71000015 HC POSTOP RECOV 1ST HR: Mod: HCWC,PO | Performed by: ANESTHESIOLOGY

## 2018-11-13 PROCEDURE — 63600175 PHARM REV CODE 636 W HCPCS: Mod: HCWC,PO | Performed by: ANESTHESIOLOGY

## 2018-11-13 PROCEDURE — 36000706: Mod: HCWC,PO | Performed by: ANESTHESIOLOGY

## 2018-11-13 PROCEDURE — D9220A PRA ANESTHESIA: Mod: HCWC,ANES,, | Performed by: ANESTHESIOLOGY

## 2018-11-13 PROCEDURE — C1726 CATH, BAL DIL, NON-VASCULAR: HCPCS | Mod: HCWC,PO | Performed by: ANESTHESIOLOGY

## 2018-11-13 DEVICE — CEMENT AUTOPLEX KIT W/VERTEPLE: Type: IMPLANTABLE DEVICE | Site: BACK | Status: FUNCTIONAL

## 2018-11-13 RX ORDER — ACETAMINOPHEN 10 MG/ML
INJECTION, SOLUTION INTRAVENOUS
Status: DISCONTINUED | OUTPATIENT
Start: 2018-11-13 | End: 2018-11-13

## 2018-11-13 RX ORDER — FENTANYL CITRATE 50 UG/ML
25 INJECTION, SOLUTION INTRAMUSCULAR; INTRAVENOUS EVERY 5 MIN PRN
Status: DISCONTINUED | OUTPATIENT
Start: 2018-11-13 | End: 2018-11-13 | Stop reason: HOSPADM

## 2018-11-13 RX ORDER — LIDOCAINE HCL/PF 100 MG/5ML
SYRINGE (ML) INTRAVENOUS
Status: DISCONTINUED | OUTPATIENT
Start: 2018-11-13 | End: 2018-11-13

## 2018-11-13 RX ORDER — OXYCODONE HYDROCHLORIDE 5 MG/1
5 TABLET ORAL
Status: DISCONTINUED | OUTPATIENT
Start: 2018-11-13 | End: 2018-11-13 | Stop reason: HOSPADM

## 2018-11-13 RX ORDER — LIDOCAINE HYDROCHLORIDE 10 MG/ML
1 INJECTION, SOLUTION EPIDURAL; INFILTRATION; INTRACAUDAL; PERINEURAL ONCE
Status: COMPLETED | OUTPATIENT
Start: 2018-11-13 | End: 2018-11-13

## 2018-11-13 RX ORDER — MEPERIDINE HYDROCHLORIDE 50 MG/ML
12.5 INJECTION INTRAMUSCULAR; INTRAVENOUS; SUBCUTANEOUS ONCE AS NEEDED
Status: DISCONTINUED | OUTPATIENT
Start: 2018-11-13 | End: 2018-11-13 | Stop reason: HOSPADM

## 2018-11-13 RX ORDER — PROPOFOL 10 MG/ML
VIAL (ML) INTRAVENOUS
Status: DISCONTINUED | OUTPATIENT
Start: 2018-11-13 | End: 2018-11-13

## 2018-11-13 RX ORDER — GLYCOPYRROLATE 0.2 MG/ML
INJECTION INTRAMUSCULAR; INTRAVENOUS
Status: DISCONTINUED | OUTPATIENT
Start: 2018-11-13 | End: 2018-11-13

## 2018-11-13 RX ORDER — EPHEDRINE SULFATE 50 MG/ML
INJECTION, SOLUTION INTRAVENOUS
Status: DISCONTINUED | OUTPATIENT
Start: 2018-11-13 | End: 2018-11-13

## 2018-11-13 RX ORDER — DIPHENHYDRAMINE HYDROCHLORIDE 50 MG/ML
25 INJECTION INTRAMUSCULAR; INTRAVENOUS EVERY 6 HOURS PRN
Status: DISCONTINUED | OUTPATIENT
Start: 2018-11-13 | End: 2018-11-13 | Stop reason: HOSPADM

## 2018-11-13 RX ORDER — HYDROMORPHONE HYDROCHLORIDE 2 MG/ML
0.2 INJECTION, SOLUTION INTRAMUSCULAR; INTRAVENOUS; SUBCUTANEOUS EVERY 5 MIN PRN
Status: DISCONTINUED | OUTPATIENT
Start: 2018-11-13 | End: 2018-11-13 | Stop reason: HOSPADM

## 2018-11-13 RX ORDER — SODIUM CHLORIDE, SODIUM LACTATE, POTASSIUM CHLORIDE, CALCIUM CHLORIDE 600; 310; 30; 20 MG/100ML; MG/100ML; MG/100ML; MG/100ML
INJECTION, SOLUTION INTRAVENOUS CONTINUOUS
Status: DISCONTINUED | OUTPATIENT
Start: 2018-11-13 | End: 2018-11-13

## 2018-11-13 RX ORDER — CEFAZOLIN SODIUM 2 G/50ML
2 SOLUTION INTRAVENOUS
Status: COMPLETED | OUTPATIENT
Start: 2018-11-13 | End: 2018-11-13

## 2018-11-13 RX ORDER — SODIUM CHLORIDE, SODIUM LACTATE, POTASSIUM CHLORIDE, CALCIUM CHLORIDE 600; 310; 30; 20 MG/100ML; MG/100ML; MG/100ML; MG/100ML
INJECTION, SOLUTION INTRAVENOUS CONTINUOUS
Status: DISCONTINUED | OUTPATIENT
Start: 2018-11-13 | End: 2018-11-13 | Stop reason: HOSPADM

## 2018-11-13 RX ORDER — FENTANYL CITRATE 50 UG/ML
INJECTION, SOLUTION INTRAMUSCULAR; INTRAVENOUS
Status: DISCONTINUED | OUTPATIENT
Start: 2018-11-13 | End: 2018-11-13

## 2018-11-13 RX ORDER — PROPOFOL 10 MG/ML
VIAL (ML) INTRAVENOUS CONTINUOUS PRN
Status: DISCONTINUED | OUTPATIENT
Start: 2018-11-13 | End: 2018-11-13

## 2018-11-13 RX ORDER — LIDOCAINE HYDROCHLORIDE 10 MG/ML
INJECTION, SOLUTION EPIDURAL; INFILTRATION; INTRACAUDAL; PERINEURAL
Status: DISCONTINUED | OUTPATIENT
Start: 2018-11-13 | End: 2018-11-13 | Stop reason: HOSPADM

## 2018-11-13 RX ORDER — MIDAZOLAM HYDROCHLORIDE 1 MG/ML
INJECTION, SOLUTION INTRAMUSCULAR; INTRAVENOUS
Status: DISCONTINUED | OUTPATIENT
Start: 2018-11-13 | End: 2018-11-13

## 2018-11-13 RX ADMIN — SODIUM CHLORIDE, SODIUM LACTATE, POTASSIUM CHLORIDE, AND CALCIUM CHLORIDE: .6; .31; .03; .02 INJECTION, SOLUTION INTRAVENOUS at 06:11

## 2018-11-13 RX ADMIN — LIDOCAINE HYDROCHLORIDE 50 MG: 20 INJECTION PARENTERAL at 06:11

## 2018-11-13 RX ADMIN — FENTANYL CITRATE 25 MCG: 50 INJECTION, SOLUTION INTRAMUSCULAR; INTRAVENOUS at 07:11

## 2018-11-13 RX ADMIN — LIDOCAINE HYDROCHLORIDE: 10 INJECTION, SOLUTION EPIDURAL; INFILTRATION; INTRACAUDAL; PERINEURAL at 06:11

## 2018-11-13 RX ADMIN — PROPOFOL 100 MCG/KG/MIN: 10 INJECTION, EMULSION INTRAVENOUS at 07:11

## 2018-11-13 RX ADMIN — KETAMINE HYDROCHLORIDE 25 MG: 100 INJECTION, SOLUTION, CONCENTRATE INTRAMUSCULAR; INTRAVENOUS at 07:11

## 2018-11-13 RX ADMIN — ACETAMINOPHEN 1000 MG: 10 INJECTION, SOLUTION INTRAVENOUS at 07:11

## 2018-11-13 RX ADMIN — GLYCOPYRROLATE 0.2 MG: 0.2 INJECTION, SOLUTION INTRAMUSCULAR; INTRAVENOUS at 07:11

## 2018-11-13 RX ADMIN — CEFAZOLIN SODIUM 2 G: 2 SOLUTION INTRAVENOUS at 07:11

## 2018-11-13 RX ADMIN — EPHEDRINE SULFATE 5 MG: 50 INJECTION, SOLUTION INTRAMUSCULAR; INTRAVENOUS; SUBCUTANEOUS at 07:11

## 2018-11-13 RX ADMIN — PROPOFOL 50 MG: 10 INJECTION, EMULSION INTRAVENOUS at 06:11

## 2018-11-13 RX ADMIN — EPHEDRINE SULFATE 10 MG: 50 INJECTION, SOLUTION INTRAMUSCULAR; INTRAVENOUS; SUBCUTANEOUS at 07:11

## 2018-11-13 RX ADMIN — MIDAZOLAM HYDROCHLORIDE 2 MG: 1 INJECTION, SOLUTION INTRAMUSCULAR; INTRAVENOUS at 06:11

## 2018-11-13 NOTE — DISCHARGE INSTRUCTIONS
Discharge Instructions: After Your Surgery  Youve just had surgery. During surgery, you were given medicine called anesthesia to keep you relaxed and free of pain. After surgery, you may have some pain or nausea. This is common. Here are some tips for feeling better and getting well after surgery.     Stay on schedule with your medicine.   Going home  Your healthcare provider will show you how to take care of yourself when you go home. He or she will also answer your questions. Have an adult family member or friend drive you home. For the first 24 hours after your surgery:  · Do not drive or use heavy equipment.  · Do not make important decisions or sign legal papers.  · Do not drink alcohol.  · Have someone stay with you, if needed. He or she can watch for problems and help keep you safe.  Be sure to go to all follow-up visits with your healthcare provider. And rest after your surgery for as long as your healthcare provider tells you to.  Coping with pain  If you have pain after surgery, pain medicine will help you feel better. Take it as told, before pain becomes severe. Also, ask your healthcare provider or pharmacist about other ways to control pain. This might be with heat, ice, or relaxation. And follow any other instructions your surgeon or nurse gives you.  Tips for taking pain medicine  To get the best relief possible, remember these points:  · Pain medicines can upset your stomach. Taking them with a little food may help.  · Most pain relievers taken by mouth need at least 20 to 30 minutes to start to work.  · Taking medicine on a schedule can help you remember to take it. Try to time your medicine so that you can take it before starting an activity. This might be before you get dressed, go for a walk, or sit down for dinner.  · Constipation is a common side effect of pain medicines. Call your healthcare provider before taking any medicines such as laxatives or stool softeners to help ease constipation.  Also ask if you should skip any foods. Drinking lots of fluids and eating foods such as fruits and vegetables that are high in fiber can also help. Remember, do not take laxatives unless your surgeon has prescribed them.  · Drinking alcohol and taking pain medicine can cause dizziness and slow your breathing. It can even be deadly. Do not drink alcohol while taking pain medicine.  · Pain medicine can make you react more slowly to things. Do not drive or run machinery while taking pain medicine.  Your healthcare provider may tell you to take acetaminophen to help ease your pain. Ask him or her how much you are supposed to take each day. Acetaminophen or other pain relievers may interact with your prescription medicines or other over-the-counter (OTC) medicines. Some prescription medicines have acetaminophen and other ingredients. Using both prescription and OTC acetaminophen for pain can cause you to overdose. Read the labels on your OTC medicines with care. This will help you to clearly know the list of ingredients, how much to take, and any warnings. It may also help you not take too much acetaminophen. If you have questions or do not understand the information, ask your pharmacist or healthcare provider to explain it to you before you take the OTC medicine.  Managing nausea  Some people have an upset stomach after surgery. This is often because of anesthesia, pain, or pain medicine, or the stress of surgery. These tips will help you handle nausea and eat healthy foods as you get better. If you were on a special food plan before surgery, ask your healthcare provider if you should follow it while you get better. These tips may help:  · Do not push yourself to eat. Your body will tell you when to eat and how much.  · Start off with clear liquids and soup. They are easier to digest.  · Next try semi-solid foods, such as mashed potatoes, applesauce, and gelatin, as you feel ready.  · Slowly move to solid foods. Dont  eat fatty, rich, or spicy foods at first.  · Do not force yourself to have 3 large meals a day. Instead eat smaller amounts more often.  · Take pain medicines with a small amount of solid food, such as crackers or toast, to avoid nausea.     Call your surgeon if  · You still have pain an hour after taking medicine. The medicine may not be strong enough.  · You feel too sleepy, dizzy, or groggy. The medicine may be too strong.  · You have side effects like nausea, vomiting, or skin changes, such as rash, itching, or hives.       If you have obstructive sleep apnea  You were given anesthesia medicine during surgery to keep you comfortable and free of pain. After surgery, you may have more apnea spells because of this medicine and other medicines you were given. The spells may last longer than usual.   At home:  · Keep using the continuous positive airway pressure (CPAP) device when you sleep. Unless your healthcare provider tells you not to, use it when you sleep, day or night. CPAP is a common device used to treat obstructive sleep apnea.  · Talk with your provider before taking any pain medicine, muscle relaxants, or sedatives. Your provider will tell you about the possible dangers of taking these medicines.  Date Last Reviewed: 12/1/2016 © 2000-2017 [employer]. [address] [place] [address]. All rights reserved. This information is not intended as a substitute for professional medical care. Always follow your healthcare professional's instructions.        Discharge Instructions for Kyphoplasty  Fractures in the bones of the spine (vertebrae) can cause severe back pain and loss of movement. You had a procedure called kyphoplasty to cement the fractures in your spine, restore the height of the vertebrae, and help relieve pain. Using image-guided X-rays, your doctor made 1 or 2 small cuts (incisions) in your back for each vertebra treated. The doctor put a balloon on each side of the  broken vertebra and inflated the balloons until they expanded the right amount. Then the balloons were removed. The spaces created by the balloons were filled with orthopedic cement. This gave strength and stability to your vertebra. The following are instructions to help you care for your back when you are at home.  Home care  · Take your medicine exactly as directed.  · Remove the small bandages on your incision 24 to 48 hours after the surgery.  · Dont shower or soak in a bathtub for 1 to 2 days after the surgery. SHOWERS ONLY 4 DAYS  · Use an ice pack or bag of frozen peas--or something similar--wrapped in a thin towel to reduce the swelling and pain around incision sites. Put the ice pack on the area for 20 minutes, then remove it for 20 minutes. Repeat as needed.  · Wear your brace, if you were told to do so by your doctor. And to help stay flexible, bend as much as the brace allows you to.  · For the first 1 to 2 days after the surgery, keep your head raised up when you are lying down.  · Take short walks. Start by walking for 5 minutes at a time. Then gradually build up your time and distance.  · Dont drive for 2 days after surgery. And never drive while taking opioid pain medicine.  · Ask your healthcare provider when you can begin lifting objects again. Ask him or her about any weight limits for lifting.  Follow-up  Make a follow-up appointment as directed by your doctor.     When to seek medical attention  Call 911 right away if you have any of the following:  · Chest pain  · Shortness of breath  Otherwise, call your doctor right away if you have any of the following:  · Increased redness, swelling, drainage, or warmth around the incision sites  · Severe pain at the incision site  · Weakness, numbness, or tingling in your legs  · Fever above 100.4°F  (38.0°C) or shaking chills   Date Last Reviewed: 11/16/2015 © 2000-2017 [employer]. [address] [place] [address], [place] [address]. All rights  reserved. This information is not intended as a substitute for professional medical care. Always follow your healthcare professional's instructions.

## 2018-11-13 NOTE — OP NOTE
PROCEDURE PERFORMED: Transpedicular Kyphoplasty at the L1 level(s), on the right side                                                                               PREPROCEDURE DIAGNOSIS:                                                      1.  Compression fracture of the lumbar vertebra at the L1 level(s).         2.  Pain at these levels of compression fracture.                            3.  No myelopathy or retropulsed fragments.                                                                                                             POSTPROCEDURE DIAGNOSIS:   Same    SURGEON:  Russ Gramajo MD     Assistant:  Dr. Cano                                                                                                                                                                               Anesthesia: MAC                                                                                                              LOCAL ANESTHETIC USED:  Lidocaine 1% at each side of vertebra, 1 ml at each level                                                                               ESTIMATED BLOOD LOSS: <10cc                                                                                                                        COMPLICATIONS: none                                                                                                                                 BONE MARROW BIOPSY:  none                                                                               TECHNIQUE: A time out was taken to identify patient, procedure and side, and allergies were reviewed. With the patient lying in the prone position and after receiving the intravenous sedation, the area was prepped and draped using the usual sterile fashion, using ChloraPrep and a body fenestrated drape. The area of the compression fracture(s) was determined under fluoroscopic guidance using the AP and lateral views.  Local anesthetic was given with a  25-gauge 1.5 inch needle.  That was followed with completing the local anesthetic injection with a 3.5inch 22-gauge spinal needle going down all the way to the periosteum of the desired pedicle. A 2-mm longitudinal incision was made around the 22-gauge spinal needle to allow introduction of the trocar and cannula to the vertebral body.  Through a transpedicular approach, the trocar and cannula were introduced and advanced slowly.  Once in the posterior one-third of the vertebral body, a drill was placed and advanced to the anterior one-third of the vertebral body. This was performed L1 at each level.  Kyphon balloons were then placed in each trochar and inflated under live fluoroscopy until noted to be filling the vertebral body, no height change noted, no movement of the end plates noted. The methylmethacrylate resin was mixed and approximately 1.5ml was injected using the right trocars. no escape was observed while introducing the cement and this was done under live fluoroscopy.  The cannula was applied to each trocar under live fluoroscopy as well.  Each trocar and cannula was removed under live fluoroscopy in a very slow fashion to observe the contrast-impregnated cement. After the trocars were removed, the sites were cleaned and dried. Dermabond was then used to close the incisions.                                                                                The patient was monitored after the procedure with no change in neuromuscular status. The patient was given post-procedure and discharge instructions at home.  Instruction regarding bandage were given.  The patient was advised to call if developing any severe pain neurologic changes. Otherwise the patient will follow up with us in 1 week at our clinic.

## 2018-11-13 NOTE — ANESTHESIA PREPROCEDURE EVALUATION
11/13/2018  Dottie Galeana is a 68 y.o., female.    Pre-op Assessment    I have reviewed the Patient Summary Reports.     I have reviewed the Nursing Notes.   I have reviewed the Medications.     Review of Systems  Anesthesia Hx:  Hx of Anesthetic complications  Denies Family Hx of Anesthesia complications.  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    Endocrine:  Endocrine Normal Hx   Psych:   Psychiatric History anxiety          Physical Exam  General:  Well nourished    Airway/Jaw/Neck:  Airway Findings: Mouth Opening: Normal General Airway Assessment: Adult  Mallampati: II  Jaw/Neck Findings:  Neck ROM: Extension Decreased, Mild       Chest/Lungs:  Chest/Lungs Findings: Clear to auscultation, Normal Respiratory Rate     Heart/Vascular:  Heart Findings: Rate: Normal  Rhythm: Regular Rhythm  Sounds: Normal  Heart murmur: negative Vascular Findings: Normal (No carotid bruits.)       Mental Status:  Mental Status Findings:  Cooperative, Alert and Oriented         Anesthesia Plan  Type of Anesthesia, risks & benefits discussed:  Anesthesia Type:  MAC  Patient's Preference:   Intra-op Monitoring Plan:   Intra-op Monitoring Plan Comments:   Post Op Pain Control Plan:   Post Op Pain Control Plan Comments:   Induction:   IV  Beta Blocker:  Patient is on a Beta-Blocker and has received one dose within the past 24 hours (No further documentation required).       Informed Consent: Patient understands risks and agrees  with Anesthesia plan.  Questions answered. Anesthesia consent signed with patient.  ASA Score: 2     Day of Surgery Review of History & Physical:            Ready For Surgery From Anesthesia Perspective.

## 2018-11-13 NOTE — DISCHARGE SUMMARY
[facility]  Discharge Note  Short Stay    Admit Date: 11/13/2018    Discharge Date: 11/13/2018    Attending Physician: Russ Gramajo     Discharge Provider: Russ Gramajo    Diagnoses:  Active Hospital Problems    Diagnosis  POA    Closed compression fracture of L1 lumbar vertebra [S32.010A]  Yes      Resolved Hospital Problems   No resolved problems to display.       Discharged Condition: Good    Final Diagnoses: Closed compression fracture of first lumbar vertebra, initial encounter [S32.010A]    Disposition: Home or Self Care    Hospital Course: No complications, uneventful    Outcome of Hospitalization, Treatment, Procedure, or Surgery:  Patient was admitted for outpatient interventional pain management procedure. The patient tolerated the procedure well with no complications.    Follow up/Patient Instructions:  Follow up as scheduled in Pain Management office in 3-4 weeks.  Patient has received instructions and follow up date and time.    Medications:  Continue previous medications    Discharge Procedure Orders   Notify your health care provider if you experience any of the following:  temperature >100.4     Notify your health care provider if you experience any of the following:  persistent nausea and vomiting or diarrhea     Notify your health care provider if you experience any of the following:  severe uncontrolled pain     Notify your health care provider if you experience any of the following:  redness, tenderness, or signs of infection (pain, swelling, redness, odor or green/yellow discharge around incision site)     Notify your health care provider if you experience any of the following:  difficulty breathing or increased cough     Notify your health care provider if you experience any of the following:  severe persistent headache     Notify your health care provider if you experience any of the following:  worsening rash     Notify your health care provider if you experience any of the  following:  persistent dizziness, light-headedness, or visual disturbances     Notify your health care provider if you experience any of the following:  increased confusion or weakness     Activity as tolerated         Discharge Procedure Orders (must include Diet, Follow-up, Activity):   Discharge Procedure Orders (must include Diet, Follow-up, Activity)   Notify your health care provider if you experience any of the following:  temperature >100.4     Notify your health care provider if you experience any of the following:  persistent nausea and vomiting or diarrhea     Notify your health care provider if you experience any of the following:  severe uncontrolled pain     Notify your health care provider if you experience any of the following:  redness, tenderness, or signs of infection (pain, swelling, redness, odor or green/yellow discharge around incision site)     Notify your health care provider if you experience any of the following:  difficulty breathing or increased cough     Notify your health care provider if you experience any of the following:  severe persistent headache     Notify your health care provider if you experience any of the following:  worsening rash     Notify your health care provider if you experience any of the following:  persistent dizziness, light-headedness, or visual disturbances     Notify your health care provider if you experience any of the following:  increased confusion or weakness     Activity as tolerated

## 2018-11-13 NOTE — TRANSFER OF CARE
"Anesthesia Transfer of Care Note    Patient: Dottie Galeana    Procedure(s) Performed: Procedure(s) (LRB):  Kyphoplasty L1 (N/A)    Patient location: PACU    Anesthesia Type: MAC    Transport from OR: Transported from OR on room air with adequate spontaneous ventilation    Post pain: adequate analgesia    Post assessment: no apparent anesthetic complications    Post vital signs: stable    Level of consciousness: awake, alert and oriented    Nausea/Vomiting: no nausea/vomiting    Complications: none    Transfer of care protocol was followed      Last vitals:   Visit Vitals  /78 (BP Location: Right arm, Patient Position: Lying)   Pulse 70   Temp 36.4 °C (97.5 °F) (Skin)   Resp 18   Ht 5' 7" (1.702 m)   Wt 79 kg (174 lb 2.6 oz)   LMP 04/21/1994   SpO2 99%   Breastfeeding? No   BMI 27.28 kg/m²     " ----- Message from Georgie Dawson MA sent at 3/20/2018 10:09 AM CDT -----  Contact: patient called  Please call the patient [phone] she need to talk to you about her insurance and her medications. Thank you.

## 2018-11-13 NOTE — INTERVAL H&P NOTE
The patient has been examined and the H&P has been reviewed:    I concur with the findings and no changes have occurred since H&P was written.    Anesthesia/Surgery risks, benefits and alternative options discussed and understood by patient/family.          Active Hospital Problems    Diagnosis  POA    Closed compression fracture of L1 lumbar vertebra [S32.010A]  Yes      Resolved Hospital Problems   No resolved problems to display.

## 2018-11-13 NOTE — ANESTHESIA POSTPROCEDURE EVALUATION
"Anesthesia Post Evaluation    Patient: Dottie Galeana    Procedure(s) Performed: Procedure(s) (LRB):  Kyphoplasty L1 (N/A)    Final Anesthesia Type: general  Patient location during evaluation: PACU  Patient participation: Yes- Able to Participate  Level of consciousness: awake and alert and oriented  Post-procedure vital signs: reviewed and stable  Pain management: adequate  Airway patency: patent  PONV status at discharge: No PONV  Anesthetic complications: no      Cardiovascular status: blood pressure returned to baseline  Respiratory status: unassisted, spontaneous ventilation and room air  Hydration status: euvolemic  Follow-up not needed.        Visit Vitals  /69 (BP Location: Left arm, Patient Position: Lying)   Pulse 84   Temp 36.6 °C (97.8 °F) (Skin)   Resp 18   Ht 5' 7" (1.702 m)   Wt 79 kg (174 lb 2.6 oz)   LMP 04/21/1994   SpO2 98%   Breastfeeding? No   BMI 27.28 kg/m²       Pain/Mercedes Score: Pain Assessment Performed: Yes (11/13/2018  8:00 AM)  Presence of Pain: non-verbal indicators absent (11/13/2018  8:00 AM)  Mercedes Score: 9 (11/13/2018  8:00 AM)        "

## 2018-11-16 ENCOUNTER — OFFICE VISIT (OUTPATIENT)
Dept: PAIN MEDICINE | Facility: CLINIC | Age: 68
End: 2018-11-16
Payer: MEDICARE

## 2018-11-16 VITALS
SYSTOLIC BLOOD PRESSURE: 138 MMHG | WEIGHT: 173.94 LBS | OXYGEN SATURATION: 96 % | TEMPERATURE: 97 F | HEART RATE: 87 BPM | BODY MASS INDEX: 27.24 KG/M2 | RESPIRATION RATE: 18 BRPM | DIASTOLIC BLOOD PRESSURE: 91 MMHG

## 2018-11-16 DIAGNOSIS — S32.010D CLOSED COMPRESSION FRACTURE OF L1 LUMBAR VERTEBRA WITH ROUTINE HEALING, SUBSEQUENT ENCOUNTER: ICD-10-CM

## 2018-11-16 DIAGNOSIS — M47.816 LUMBAR SPONDYLOSIS: Primary | ICD-10-CM

## 2018-11-16 PROCEDURE — 3075F SYST BP GE 130 - 139MM HG: CPT | Mod: CPTII,HCWC,S$GLB, | Performed by: ANESTHESIOLOGY

## 2018-11-16 PROCEDURE — 99999 PR PBB SHADOW E&M-EST. PATIENT-LVL III: CPT | Mod: PBBFAC,HCWC,, | Performed by: ANESTHESIOLOGY

## 2018-11-16 PROCEDURE — 99214 OFFICE O/P EST MOD 30 MIN: CPT | Mod: HCWC,S$GLB,, | Performed by: ANESTHESIOLOGY

## 2018-11-16 PROCEDURE — 3080F DIAST BP >= 90 MM HG: CPT | Mod: CPTII,HCWC,S$GLB, | Performed by: ANESTHESIOLOGY

## 2018-11-16 PROCEDURE — 1101F PT FALLS ASSESS-DOCD LE1/YR: CPT | Mod: CPTII,HCWC,S$GLB, | Performed by: ANESTHESIOLOGY

## 2018-11-16 NOTE — PROGRESS NOTES
[facility] Follow up Evaluation    Referred by: Louise Velazquez NP  Reason for referral: back pain    CC:   Chief Complaint   Patient presents with    Follow-up      Last 3 PDI Scores 11/16/2018 11/7/2018   Pain Disability Index (PDI) 0 8     Interval HPI 11/16/18: patient with complete relief of spinal pain following kyphoplasty on 11/13/18.  Today c/o of axial lower back pain, dull and aching.    HPI:   Dottie Galeana is a 68 y.o. female who complains of back pain    Here today she is following up on Fall at home - She went to ortho walk in and they told her she had compression fracture with xray done - pain 8/10- fell 1 week ago     Onset: 1 week ago  Inciting Event: slipped and fell, landed on her rear end  Progression: since onset, pain is unchanged  Current Pain Score: 8/10  Typical Range: 4-10/10  Timing: constant  Quality: Aching and Dull  Radiation: no  Associated numbness or weakness: no numbness, no weakness  Exacerbated by: back flexion, activity  Allievated by: laying down, rest, ice  Is Pain Level Acceptable?: No    Previous Therapies:  PT/OT:   HEP: ice  Interventions:   Surgery:  Medications:   - NSAIDS: aleve  - MSK Relaxants:   - TCAs:   - SNRIs:   - Topicals:   - Anticonvulsants:  - Opioids: hydrocodone    Current Pain Medications:  1. hydrocodone    History:    Current Outpatient Medications:     alendronate (FOSAMAX) 70 MG tablet, Take 1 tablet (70 mg total) by mouth every 7 days., Disp: 4 tablet, Rfl: 11    ALPRAZolam (XANAX) 0.25 MG tablet, TAKE ONE TABLET THREE TIMES A DAY AS NEEDED., Disp: 60 tablet, Rfl: 1    amLODIPine (NORVASC) 5 MG tablet, TAKE 1 TABLET EVERY DAY (Patient taking differently: Take 2 tablets by mouth, one in the morning and one at night), Disp: 90 tablet, Rfl: 3    aspirin 325 MG tablet, Take 325 mg by mouth once daily. , Disp: , Rfl:     fluticasone (FLONASE) 50 mcg/actuation nasal spray, 1 spray (50 mcg total) by Each Nare route once daily.,  Disp: 1 Bottle, Rfl: 6    HYDROcodone-acetaminophen (NORCO) 7.5-325 mg per tablet, Take 1 tablet by mouth every 6 (six) hours as needed for Pain., Disp: 20 tablet, Rfl: 0    losartan (COZAAR) 100 MG tablet, Take 1 tablet (100 mg total) by mouth once daily., Disp: 90 tablet, Rfl: 1    multivitamin (THERAGRAN) per tablet, Take 1 tablet by mouth once daily. , Disp: , Rfl:     nebivolol (BYSTOLIC) 10 MG Tab, Take 1 tablet (10 mg total) by mouth once daily., Disp: 30 tablet, Rfl: 11    NITROSTAT 0.4 mg SL tablet, Place 0.4 mg under the tongue every 5 (five) minutes as needed. , Disp: , Rfl:     pantoprazole (PROTONIX) 40 MG tablet, TAKE 1 TABLET ONE TIME DAILY, Disp: 90 tablet, Rfl: 3    rosuvastatin (CRESTOR) 10 MG tablet, TAKE ONE TABLET BY MOUTH ONCE DAILY IN THE EVENING, Disp: 30 tablet, Rfl: 11    traMADol (ULTRAM) 50 mg tablet, , Disp: , Rfl: 0    venlafaxine (EFFEXOR-XR) 150 MG Cp24, TAKE 1 CAPSULE EVERY DAY, Disp: 90 capsule, Rfl: 3    zolpidem (AMBIEN) 10 mg Tab, TAKE ONE TABLET BY MOUTH AT BEDTIME AS NEEDED, Disp: 30 tablet, Rfl: 5    Past Medical History:   Diagnosis Date    Arthritis     Depression     General anesthetics causing adverse effect in therapeutic use     Generalized anxiety disorder     GERD (gastroesophageal reflux disease)     History of seasonal allergies     HTN (hypertension)     Hyperlipidemia     Hyponatremia     Insomnia     Osteoporosis     Stress incontinence     Stroke     ocular behind left eye       Past Surgical History:   Procedure Laterality Date    BREAST BIOPSY Left 1998    benign    EGD (ESOPHAGOGASTRODUODENOSCOPY) N/A 8/17/2018    Per[facility] at [facility]    EGD (ESOPHAGOGASTRODUODENOSCOPY) N/A 4/25/2014    [facility]    EGD (ESOPHAGOGASTRODUODENOSCOPY) N/A 4/30/2013    [facility]    ESOPHAGOGASTRODUODENOSCOPY N/A 8/17/2018    Procedure: EGD  (ESOPHAGOGASTRODUODENOSCOPY);  Surgeon: Al Santiago MD;  Location: [facility] ENDO;  Service: Endoscopy;  Laterality: N/A;    FIXATION KYPHOPLASTY N/A 11/13/2018    Procedure: Kyphoplasty L1;  Surgeon: Russ Gramajo MD;  Location: [facility] OR;  Service: Pain Management;  Laterality: N/A;    Kyphoplasty L1 N/A 11/13/2018    Per[facility] by [facility] at [facility] OR    ORIF WRIST FRACTURE Right        Family History   Problem Relation Age of Onset    Ovarian cancer Mother 42       Social History     Socioeconomic History    Marital status:      Spouse name: None    Number of children: None    Years of education: None    Highest education level: None   Social Needs    Financial resource strain: None    Food insecurity - worry: None    Food insecurity - inability: None    Transportation needs - medical: None    Transportation needs - non-medical: None   Occupational History    Occupation: realtor   Tobacco Use    Smoking status: Never Smoker    Smokeless tobacco: Never Used   Substance and Sexual Activity    Alcohol use: Yes     Alcohol/week: 8.4 oz     Types: 14 Glasses of wine per week     Comment: 2 glasses wine per day    Drug use: Yes     Types: Hydrocodone, Benzodiazepines    Sexual activity: None   Other Topics Concern    None   Social History Narrative    None       Review of patient's allergies indicates:   Allergen Reactions    No known drug allergies        Review of Systems:  General ROS: negative for - fever, night sweats or weight loss  Psychological ROS: negative for - hallucinations, hostility or irritability  Hematological and Lymphatic ROS: negative for - bleeding problems  Endocrine ROS: negative for - temperature intolerance or unexpected weight changes  Respiratory ROS: no cough, shortness of breath, or wheezing  Cardiovascular ROS: no chest pain or dyspnea on exertion  Gastrointestinal ROS: positive for - heartburn  Musculoskeletal ROS: negative for - gait disturbance  or muscular weakness  Neurological ROS: negative for - bowel and bladder control changes, numbness/tingling or weakness  Dermatological ROS: negative for rash    Physical Exam:  Vitals:    11/16/18 1130   BP: (!) 138/91   Pulse: 87   Resp: 18   Temp: 96.9 °F (36.1 °C)   TempSrc: Oral   SpO2: 96%   Weight: 78.9 kg (173 lb 15.1 oz)   PainSc: 0-No pain     Body mass index is 27.24 kg/m².    Gen: NAD  Gait: gait intact  Psych:  Mood appropriate for given condition  HEENT: eyes anicteric   GI: Abd soft  CV: 2+ pedal pulses  Lungs: breathing unlabored   ROM: limited AROM of the L spine in all planes, full ROM at ankles, knees and hips  Lumbar flexion 90 degrees, extension 50 degrees, side bending 30 degrees.    Sensation: intact to light touch in all dermatomes tested from L2-S1 bilaterally  Reflexes: 2+ b/l patella and Achilles  Palpation: Diffusely tender over lumbar paraspinals  +TTP over the midline L1 vertebrae   Tone: normal in the b/l knees and hips   Lymph: no lymphadenopathy at groin or popliteal fossa  Skin: intact  Extremities: No edema in b/l ankles or hands       Right Left   L2/3 Iliacus Hip flexion  5  5   L3/4 Qudratus Femoris Knee Extension  5  5   L4/5 Tib Anterior Ankle Dorsiflexion   5  5   L5/S1 Extensor Hallicus Longus Great toe extension  5  5   L4/5 Tib Anterior/Posterior Inversion  5  5   L5/S1 Extensor Digitorum Longus, Peronues Eversion  5  5   L5/S1 Glut Med Hip Abudction  5  5   S1/2 Glut max and Hamstring Hip Extension  5  5   S1/2 Gastroc/Soleus Plantar Fexion  5  5       Imaging:  MRI lumbar spine 11/6/18  FINDINGS:  There is a proximally 40% anterior wedging of the L1 vertebral body. There is mixed edema and sclerosis involving much of the vertebral body suggesting a subacute fracture. Edema extends into the anterior pedicles. I do not see evidence of significant retropulsion.  The conus terminates at L2 and has an unremarkable appearance. There is mild anterior osteophyte formation  throughout the lumbar region.    There is diffuse disc desiccation with loss of disc space height seen at L3/L4 and L4/L5.  Mixed but primarily fatty degenerative endplate is changes are noted at multiple levels the most prominently L3/L4 through L5/S1.  The lumbar vertebrae otherwise display normal signal, morphology and alignment.  The individual disc levels appears follows:    T12/L1: There is mild annular disc bulging with mild effacement of the anterior thecal sac.  The foramina are intact.    L1/L2: There is annular disc bulging with mild effacement of the anterior thecal sac and there is mild degenerative facet disease.    L2/L3: Annular disc bulging is evident with a mild superimposed right posterolateral disc protrusion with extension into the inferior right foramen.  There is mild narrowing of the canal and there is mild ligamentous and facet hypertrophy.    L3/L4: There is annular disc bulging with a superimposed left posterolateral small disc extrusion distorting the left anterolateral canal and left foramen.  The exiting left L3 nerve root is contacted.  Degenerative facet changes are noted bilaterally.    L4/L5: There is annular disc bulging which combines with degenerative facet disease and a right posterolateral disc extrusion to produce moderate right and mild left-sided foraminal narrowing.  The central canal is mildly distorted.  Bilateral facet effusions are noted.    L5/S1: Moderate degenerative facet changes are present in the central canal is only mildly distorted.  There is mild inferior foraminal narrowing.    Lumbar xray 11/6/18  FINDINGS:  There is a mild-to-moderate superior endplate compression deformity of the L1 vertebral body which is age indeterminate.  There is, however, a provided history of L1 compression fracture.  Comparison with any prior studies would be of interest.  There is no significant retropulsion into the spinal canal at this level.  There is mild kyphosis at the  T12-L1 level.  There is moderate disc space narrowing at the L4-5 level.  There is facet joint arthropathy at the lower 2 lumbar levels.  There is also moderate disc space narrowing at the L3-4 level with mild disc space narrowing at the L2-3 level.  There is multilevel marginal osteophyte formation.    Labs:  BMP  Lab Results   Component Value Date     08/08/2018    K 4.2 08/06/2018     08/06/2018    CO2 26 08/06/2018    BUN 13 08/06/2018    CREATININE 0.8 08/06/2018    CALCIUM 9.7 08/06/2018    ANIONGAP 11 08/06/2018    ESTGFRAFRICA >60.0 08/06/2018    EGFRNONAA >60.0 08/06/2018     Lab Results   Component Value Date    ALT 23 07/11/2018    AST 25 07/11/2018    ALKPHOS 95 07/11/2018    BILITOT 0.4 07/11/2018       Assessment:  Problem List Items Addressed This Visit        Neuro    Closed compression fracture of L1 lumbar vertebra    Lumbar spondylosis - Primary    Relevant Orders    Ambulatory Referral to Physical/Occupational Therapy        Treatment Plan:   68 y.o. year old female with acute lower back pain secondary to L1 vertebral compression fracture.  Her pain started about a week ago after she slipped and fell in her bedroom.  No radicular pain, LE weakness, or LE numbness.  MRI consistent with subacute L1 vertebral body compression fracture with about 40% height loss without significant retropulsion.  Her pain is limiting her mobility and interfering with her ADL's.  Will schedule for L1 kyphoplasty.  Procedure explained using an anatomical model.  Risks, benefits, alternatives explained to patient who verbalized understanding, including increased risk of infection, bleeding, need for additional procedures or surgery, and nerve damage.  Questions regarding the procedure, risks, expected outcome, and possible side effects were solicited and answered to the patient's satisfaction.  MARGIE wishes to proceed with the injection.  Verbal and written consent were obtained in clinic today.  Will fit  her for TLSO brace for back stability in the office today.  Certainly reasonable for patient to continue short term hydrocodone 7.5/325mg prn for pain related to compression fracture.  She takes ASA 325mg daily.  She has not taken it since 11/2/18 and will continue to hold until after procedure.  Follow up in the office 2 weeks post procedure.     11/16/18 - patient with complete relief of spinal pain following kyphoplasty on 11/13/18.  Today she c/o some residual axial lower back pain.  MRI consistent with bilateral lumbar facet arthropathy.  Will continue ibuprofen prn, start physical therapy.  Referral given for physical therapy to improve function and strength, and to receive training towards establishing a safe and effective home exercise program (HEP).  Follow up in the office in 2 months if pain doesn't improve or worsens with conservative treatment.     : Reviewed and consistent with medication use as prescribed.

## 2018-11-29 RX ORDER — ALPRAZOLAM 0.25 MG/1
TABLET ORAL
Qty: 60 TABLET | Refills: 1 | Status: SHIPPED | OUTPATIENT
Start: 2018-11-29 | End: 2019-02-20 | Stop reason: SDUPTHER

## 2018-12-02 RX ORDER — PANTOPRAZOLE SODIUM 40 MG/1
TABLET, DELAYED RELEASE ORAL
Qty: 90 TABLET | Refills: 3 | Status: SHIPPED | OUTPATIENT
Start: 2018-12-02 | End: 2019-11-01 | Stop reason: SDUPTHER

## 2018-12-20 ENCOUNTER — TELEPHONE (OUTPATIENT)
Dept: FAMILY MEDICINE | Facility: CLINIC | Age: 68
End: 2018-12-20

## 2018-12-20 DIAGNOSIS — R25.2 LEG CRAMPS: Primary | ICD-10-CM

## 2018-12-20 NOTE — TELEPHONE ENCOUNTER
----- Message from Harper Flores sent at 12/20/2018 12:29 PM CST -----  Contact: Self  Patient is requesting that Meghan put lab orders in the system to have her sodium levels checked, she is having the same feeling.  Call back Dottie at [phone] (home) to let her know so she can have them done.

## 2018-12-20 NOTE — TELEPHONE ENCOUNTER
Phoned pt in regards to message. Pt is having same symptoms as last time that she saw Meghan with leg cramps. Please review and advise. Thank you. CLC

## 2018-12-21 ENCOUNTER — LAB VISIT (OUTPATIENT)
Dept: LAB | Facility: HOSPITAL | Age: 68
End: 2018-12-21
Attending: NURSE PRACTITIONER
Payer: MEDICARE

## 2018-12-21 DIAGNOSIS — R25.2 LEG CRAMPS: ICD-10-CM

## 2018-12-21 LAB
ALBUMIN SERPL BCP-MCNC: 3.9 G/DL
ALP SERPL-CCNC: 106 U/L
ALT SERPL W/O P-5'-P-CCNC: 23 U/L
ANION GAP SERPL CALC-SCNC: 9 MMOL/L
AST SERPL-CCNC: 26 U/L
BILIRUB SERPL-MCNC: 0.4 MG/DL
BUN SERPL-MCNC: 11 MG/DL
CALCIUM SERPL-MCNC: 9.6 MG/DL
CHLORIDE SERPL-SCNC: 101 MMOL/L
CO2 SERPL-SCNC: 27 MMOL/L
CREAT SERPL-MCNC: 0.7 MG/DL
EST. GFR  (AFRICAN AMERICAN): >60 ML/MIN/1.73 M^2
EST. GFR  (NON AFRICAN AMERICAN): >60 ML/MIN/1.73 M^2
GLUCOSE SERPL-MCNC: 123 MG/DL
POTASSIUM SERPL-SCNC: 4.9 MMOL/L
PROT SERPL-MCNC: 7.2 G/DL
SODIUM SERPL-SCNC: 137 MMOL/L

## 2018-12-21 PROCEDURE — 36415 COLL VENOUS BLD VENIPUNCTURE: CPT | Mod: PO

## 2018-12-21 PROCEDURE — 80053 COMPREHEN METABOLIC PANEL: CPT

## 2019-01-09 DIAGNOSIS — I10 ESSENTIAL HYPERTENSION: Primary | ICD-10-CM

## 2019-01-09 NOTE — PROGRESS NOTES
Refill Authorization Note     is requesting a refill authorization.    Brief assessment and rationale for refill: APPROVE: prr  Amount/Quantity of medication ordered: 90d        Refills Authorized: Yes  If authorized number of refills: 2           Medication Therapy Plan: HTN LCO 10/18 controlled; approve 9 more  Name and strength of medication: BYSTOLIC 10 MG TAB 10 TAB  How patient will take medication: t1t qd  Medication reconciliation completed: No        Comments:   BP Readings from Last 3 Encounters:   11/16/18 (!) 138/91   11/13/18 (!) 140/76   11/07/18 132/85     Pulse Readings from Last 3 Encounters:   11/16/18 87   11/13/18 72   11/07/18 82

## 2019-01-10 RX ORDER — NEBIVOLOL HYDROCHLORIDE 10 MG/1
TABLET ORAL
Qty: 90 TABLET | Refills: 2 | Status: SHIPPED | OUTPATIENT
Start: 2019-01-10 | End: 2019-10-02 | Stop reason: SDUPTHER

## 2019-01-20 DIAGNOSIS — I10 ESSENTIAL HYPERTENSION: ICD-10-CM

## 2019-01-21 RX ORDER — LOSARTAN POTASSIUM 100 MG/1
100 TABLET ORAL DAILY
Qty: 90 TABLET | Refills: 1 | Status: SHIPPED | OUTPATIENT
Start: 2019-01-21 | End: 2019-07-21 | Stop reason: SDUPTHER

## 2019-02-21 RX ORDER — ALPRAZOLAM 0.25 MG/1
TABLET ORAL
Qty: 60 TABLET | Refills: 0 | Status: SHIPPED | OUTPATIENT
Start: 2019-02-21 | End: 2019-02-26 | Stop reason: SDUPTHER

## 2019-02-21 NOTE — PROGRESS NOTES
Refill Authorization Note     is requesting a refill authorization.    Brief assessment and rationale for refill: ROUTE: op  Name and strength of medication: ALPRAZolam (XANAX) 0.25 MG tablet            Medication reconciliation completed: No                         Comments:   APPOINTMENTS (past 12 m or future 3m authorizing provider)  LAST VISIT DATE  Josh Li MD 10/17/2018         NEXT VISIT DATE  Josh Li MD Visit date not found

## 2019-02-22 ENCOUNTER — TELEPHONE (OUTPATIENT)
Dept: URGENT CARE | Facility: CLINIC | Age: 69
End: 2019-02-22

## 2019-02-22 ENCOUNTER — HOSPITAL ENCOUNTER (OUTPATIENT)
Dept: RADIOLOGY | Facility: HOSPITAL | Age: 69
Discharge: HOME | End: 2019-02-22
Attending: FAMILY MEDICINE
Payer: MEDICARE

## 2019-02-22 ENCOUNTER — OFFICE VISIT (OUTPATIENT)
Dept: URGENT CARE | Facility: CLINIC | Age: 69
End: 2019-02-22
Payer: MEDICARE

## 2019-02-22 VITALS — RESPIRATION RATE: 16 BRPM | WEIGHT: 173 LBS | BODY MASS INDEX: 27.15 KG/M2 | HEIGHT: 67 IN

## 2019-02-22 DIAGNOSIS — R22.1 LOCALIZED SWELLING, MASS AND LUMP, NECK: Primary | ICD-10-CM

## 2019-02-22 DIAGNOSIS — R22.1 LOCALIZED SWELLING, MASS AND LUMP, NECK: ICD-10-CM

## 2019-02-22 PROCEDURE — 76536 US EXAM OF HEAD AND NECK: CPT | Mod: TC,HCNC,PO

## 2019-02-22 PROCEDURE — 76536 US SOFT TISSUE HEAD NECK THYROID: ICD-10-PCS | Mod: 26,HCNC,, | Performed by: RADIOLOGY

## 2019-02-22 PROCEDURE — 76536 US EXAM OF HEAD AND NECK: CPT | Mod: 26,HCNC,, | Performed by: RADIOLOGY

## 2019-02-22 PROCEDURE — 1101F PT FALLS ASSESS-DOCD LE1/YR: CPT | Mod: CPTII,S$GLB,, | Performed by: FAMILY MEDICINE

## 2019-02-22 PROCEDURE — 99214 PR OFFICE/OUTPT VISIT, EST, LEVL IV, 30-39 MIN: ICD-10-PCS | Mod: S$GLB,,, | Performed by: FAMILY MEDICINE

## 2019-02-22 PROCEDURE — 1101F PR PT FALLS ASSESS DOC 0-1 FALLS W/OUT INJ PAST YR: ICD-10-PCS | Mod: CPTII,S$GLB,, | Performed by: FAMILY MEDICINE

## 2019-02-22 PROCEDURE — 99214 OFFICE O/P EST MOD 30 MIN: CPT | Mod: S$GLB,,, | Performed by: FAMILY MEDICINE

## 2019-02-22 NOTE — PROGRESS NOTES
"Subjective:       Patient ID: Dottie Galeana is a 68 y.o. female.    Vitals:  height is 5' 7" (1.702 m) and weight is 78.5 kg (173 lb). Her respiration is 16.     Chief Complaint: Rash    Pt has "bump" on the left side of her neck x 3 days. Pt states there is no pain.      Rash   This is a new problem. The affected locations include the neck. Pertinent negatives include no congestion, cough, diarrhea, fatigue, fever, shortness of breath, sore throat or vomiting.       Constitution: Negative for chills, fatigue and fever.   HENT: Negative for congestion and sore throat.    Neck: Negative for painful lymph nodes.   Cardiovascular: Negative for chest pain and leg swelling.   Eyes: Negative for double vision and blurred vision.   Respiratory: Negative for cough and shortness of breath.    Gastrointestinal: Negative for nausea, vomiting and diarrhea.   Genitourinary: Negative for dysuria, frequency, urgency and history of kidney stones.   Musculoskeletal: Negative for joint pain, joint swelling, muscle cramps and muscle ache.   Skin: Negative for color change, pale and bruising.   Allergic/Immunologic: Negative for seasonal allergies.   Neurological: Negative for dizziness, history of vertigo, light-headedness, passing out and headaches.   Hematologic/Lymphatic: Negative for swollen lymph nodes.   Psychiatric/Behavioral: Negative for nervous/anxious, sleep disturbance and depression. The patient is not nervous/anxious.        Objective:      Physical Exam   Constitutional: She is oriented to person, place, and time. She appears well-developed and well-nourished. She is cooperative.  Non-toxic appearance. She does not appear ill. No distress.   HENT:   Head: Normocephalic and atraumatic.   Right Ear: Hearing, tympanic membrane, external ear and ear canal normal.   Left Ear: Hearing, tympanic membrane, external ear and ear canal normal.   Nose: Nose normal. No mucosal edema, rhinorrhea or nasal deformity. No " epistaxis. Right sinus exhibits no maxillary sinus tenderness and no frontal sinus tenderness. Left sinus exhibits no maxillary sinus tenderness and no frontal sinus tenderness.   Mouth/Throat: Uvula is midline, oropharynx is clear and moist and mucous membranes are normal. No trismus in the jaw. Normal dentition. No uvula swelling. No posterior oropharyngeal erythema.   Eyes: Conjunctivae and lids are normal. No scleral icterus.   Sclera clear bilat   Neck: Trachea normal, normal range of motion, full passive range of motion without pain and phonation normal. Neck supple.   Pt has some supraclavicular soft tissue enlargement. No tenderness. More pronounced with valsalva   Cardiovascular: Normal rate, regular rhythm, normal heart sounds, intact distal pulses and normal pulses.   Pulmonary/Chest: Effort normal and breath sounds normal. No respiratory distress.   Abdominal: Normal appearance. She exhibits no distension. There is no tenderness.   Musculoskeletal: Normal range of motion. She exhibits no edema or deformity.   Lymphadenopathy:     She has no cervical adenopathy.   Neurological: She is alert and oriented to person, place, and time. She exhibits normal muscle tone. Coordination normal.   Skin: Skin is warm, dry and intact. She is not diaphoretic. No pallor.   Psychiatric: She has a normal mood and affect. Her speech is normal and behavior is normal. Judgment and thought content normal. Cognition and memory are normal.   Nursing note and vitals reviewed.      Assessment:       1. Localized swelling, mass and lump, neck        Plan:         Localized swelling, mass and lump, neck  -     US Soft Tissue Head Neck Thyroid; Future; Expected date: 02/22/2019    pt was with family member and another provider told her 'you should get to urgent care for that.' no constitutional sx of dyspnea, night sweats, fever, LAD or any other abnormal sx. Will send for US as pt is concerned something is wrong. Advised likely a  benign finding. Pt reassured but would still prefer imaging. Has f/u with PCP on wed.

## 2019-02-25 ENCOUNTER — TELEPHONE (OUTPATIENT)
Dept: URGENT CARE | Facility: CLINIC | Age: 69
End: 2019-02-25

## 2019-02-26 ENCOUNTER — OFFICE VISIT (OUTPATIENT)
Dept: FAMILY MEDICINE | Facility: CLINIC | Age: 69
End: 2019-02-26
Payer: MEDICARE

## 2019-02-26 VITALS
OXYGEN SATURATION: 96 % | WEIGHT: 170.63 LBS | BODY MASS INDEX: 26.78 KG/M2 | TEMPERATURE: 98 F | HEIGHT: 67 IN | RESPIRATION RATE: 20 BRPM | HEART RATE: 72 BPM | SYSTOLIC BLOOD PRESSURE: 120 MMHG | DIASTOLIC BLOOD PRESSURE: 84 MMHG

## 2019-02-26 DIAGNOSIS — G47.00 INSOMNIA, UNSPECIFIED TYPE: ICD-10-CM

## 2019-02-26 DIAGNOSIS — F41.1 GENERALIZED ANXIETY DISORDER: Primary | ICD-10-CM

## 2019-02-26 DIAGNOSIS — E04.1 THYROID NODULE: Primary | ICD-10-CM

## 2019-02-26 PROCEDURE — 3079F PR MOST RECENT DIASTOLIC BLOOD PRESSURE 80-89 MM HG: ICD-10-PCS | Mod: HCNC,CPTII,S$GLB, | Performed by: NURSE PRACTITIONER

## 2019-02-26 PROCEDURE — 99214 OFFICE O/P EST MOD 30 MIN: CPT | Mod: HCNC,S$GLB,, | Performed by: NURSE PRACTITIONER

## 2019-02-26 PROCEDURE — 3079F DIAST BP 80-89 MM HG: CPT | Mod: HCNC,CPTII,S$GLB, | Performed by: NURSE PRACTITIONER

## 2019-02-26 PROCEDURE — 99214 PR OFFICE/OUTPT VISIT, EST, LEVL IV, 30-39 MIN: ICD-10-PCS | Mod: HCNC,S$GLB,, | Performed by: NURSE PRACTITIONER

## 2019-02-26 PROCEDURE — 1101F PT FALLS ASSESS-DOCD LE1/YR: CPT | Mod: HCNC,CPTII,S$GLB, | Performed by: NURSE PRACTITIONER

## 2019-02-26 PROCEDURE — 3074F SYST BP LT 130 MM HG: CPT | Mod: HCNC,CPTII,S$GLB, | Performed by: NURSE PRACTITIONER

## 2019-02-26 PROCEDURE — 99999 PR PBB SHADOW E&M-EST. PATIENT-LVL III: CPT | Mod: PBBFAC,HCNC,, | Performed by: NURSE PRACTITIONER

## 2019-02-26 PROCEDURE — 99999 PR PBB SHADOW E&M-EST. PATIENT-LVL III: ICD-10-PCS | Mod: PBBFAC,HCNC,, | Performed by: NURSE PRACTITIONER

## 2019-02-26 PROCEDURE — 1101F PR PT FALLS ASSESS DOC 0-1 FALLS W/OUT INJ PAST YR: ICD-10-PCS | Mod: HCNC,CPTII,S$GLB, | Performed by: NURSE PRACTITIONER

## 2019-02-26 PROCEDURE — 3074F PR MOST RECENT SYSTOLIC BLOOD PRESSURE < 130 MM HG: ICD-10-PCS | Mod: HCNC,CPTII,S$GLB, | Performed by: NURSE PRACTITIONER

## 2019-02-26 RX ORDER — ZOLPIDEM TARTRATE 10 MG/1
10 TABLET ORAL NIGHTLY PRN
Qty: 30 TABLET | Refills: 5 | Status: CANCELLED | OUTPATIENT
Start: 2019-02-26

## 2019-02-26 RX ORDER — ALPRAZOLAM 0.25 MG/1
TABLET ORAL
Qty: 60 TABLET | Refills: 5 | Status: SHIPPED | OUTPATIENT
Start: 2019-02-26 | End: 2019-08-26 | Stop reason: SDUPTHER

## 2019-02-26 RX ORDER — ZOLPIDEM TARTRATE 10 MG/1
10 TABLET ORAL NIGHTLY PRN
Qty: 30 TABLET | Refills: 5 | Status: SHIPPED | OUTPATIENT
Start: 2019-02-26 | End: 2019-08-26 | Stop reason: SDUPTHER

## 2019-02-26 RX ORDER — ALPRAZOLAM 0.25 MG/1
TABLET ORAL
Qty: 60 TABLET | Refills: 0 | Status: CANCELLED | OUTPATIENT
Start: 2019-02-26

## 2019-02-26 NOTE — PROGRESS NOTES
"Subjective:       Patient ID: Dottie Galeana is a 68 y.o. female.    Chief Complaint: Results    Ms. Galeana is a known patient to me. She presents today for follow up/results review    HPI   Seen at urgent care on 2/22/19 for neck swelling. US head/neck/thyroid completed: showed small benign-appearing nodule in the right thyroid lobe and small benign thyroid cysts. Radiology report also says "The remainder of the soft tissues of the neck are unremarkable and no lymphadenopathy is present.  Specifically, no sonographic abnormality is noted in an area of concern in the left lateral neck."    Reports swelling improving.     GERD: controlled on zantac.   Vitals:    02/26/19 1057   BP: 120/84   Pulse: 72   Resp: 20   Temp: 98.3 °F (36.8 °C)     Review of Systems   Constitutional: Negative for diaphoresis and fever.   HENT: Negative for facial swelling and trouble swallowing.    Eyes: Negative for discharge and redness.   Respiratory: Negative for cough and shortness of breath.    Cardiovascular: Negative for chest pain and palpitations.   Gastrointestinal: Negative for vomiting.   Genitourinary: Negative for difficulty urinating.   Musculoskeletal: Negative for gait problem.   Skin: Negative for pallor and rash.   Neurological: Negative for facial asymmetry and speech difficulty.   Psychiatric/Behavioral: Negative for confusion. The patient is not nervous/anxious.        Past Medical History:   Diagnosis Date    Arthritis     Depression     General anesthetics causing adverse effect in therapeutic use     Generalized anxiety disorder     GERD (gastroesophageal reflux disease)     History of seasonal allergies     HTN (hypertension)     Hyperlipidemia     Hyponatremia     Insomnia     Osteoporosis     Stress incontinence     Stroke     ocular behind left eye     Objective:      Physical Exam   Constitutional: She is oriented to person, place, and time. She does not have a sickly appearance. No " distress.   HENT:   Head: Normocephalic.   Right Ear: Hearing normal.   Left Ear: Hearing normal.   Nose: Nose normal.   Eyes: Conjunctivae and lids are normal.   Neck: No JVD present. No tracheal deviation present.   Cardiovascular: Normal rate.   Pulmonary/Chest: Effort normal.       Musculoskeletal: She exhibits no deformity.   Neurological: She is alert and oriented to person, place, and time.   Skin: She is not diaphoretic. No pallor.   Psychiatric: She has a normal mood and affect. Her speech is normal and behavior is normal. Judgment and thought content normal. Cognition and memory are normal.   Nursing note and vitals reviewed.      Assessment:       1. Thyroid nodule        Plan:       Thyroid nodule  -     US Soft Tissue Head Neck Thyroid; Future; Expected date: 02/26/2020    Results reviewed  Repeat US 1 year for size stability         Follow-up for 6 month FU in April with PCP, sooner as needed.    Medication List with Changes/Refills   Current Medications    ALPRAZOLAM (XANAX) 0.25 MG TABLET    TAKE ONE TABLET THREE TIMES A DAY AS NEEDED.    AMLODIPINE (NORVASC) 5 MG TABLET    TAKE 1 TABLET EVERY DAY    ASPIRIN 325 MG TABLET    Take 325 mg by mouth once daily.     BYSTOLIC 10 MG TAB    TAKE ONE TABLET DAILY FOR BLOOD PRESSURE.    FLUTICASONE (FLONASE) 50 MCG/ACTUATION NASAL SPRAY    1 spray (50 mcg total) by Each Nare route once daily.    LOSARTAN (COZAAR) 100 MG TABLET    TAKE 1 TABLET (100 MG TOTAL) BY MOUTH ONCE DAILY.    MULTIVITAMIN (THERAGRAN) PER TABLET    Take 1 tablet by mouth once daily.     NITROSTAT 0.4 MG SL TABLET    Place 0.4 mg under the tongue every 5 (five) minutes as needed.     PANTOPRAZOLE (PROTONIX) 40 MG TABLET    TAKE 1 TABLET EVERY DAY    ROSUVASTATIN (CRESTOR) 10 MG TABLET    TAKE ONE TABLET BY MOUTH ONCE DAILY IN THE EVENING    VENLAFAXINE (EFFEXOR-XR) 150 MG CP24    TAKE 1 CAPSULE EVERY DAY    ZOLPIDEM (AMBIEN) 10 MG TAB    TAKE ONE TABLET BY MOUTH AT BEDTIME AS NEEDED    Discontinued Medications    ALENDRONATE (FOSAMAX) 70 MG TABLET    Take 1 tablet (70 mg total) by mouth every 7 days.    ALPRAZOLAM (XANAX) 0.25 MG TABLET    TAKE ONE TABLET THREE TIMES A DAY AS NEEDED.    HYDROCODONE-ACETAMINOPHEN (NORCO) 7.5-325 MG PER TABLET    Take 1 tablet by mouth every 6 (six) hours as needed for Pain.    TRAMADOL (ULTRAM) 50 MG TABLET

## 2019-02-28 ENCOUNTER — TELEPHONE (OUTPATIENT)
Dept: FAMILY MEDICINE | Facility: CLINIC | Age: 69
End: 2019-02-28

## 2019-02-28 NOTE — TELEPHONE ENCOUNTER
Pt states that she tried medications years ago other than ambien. Nothing recently other than benadryl, but doesn't like hang over the next day from benadryl. Please advise. Tired melatonin, no help at all.

## 2019-02-28 NOTE — TELEPHONE ENCOUNTER
----- Message from Keila Brice sent at 2/28/2019 10:57 AM CST -----  Contact: self  Patient need to speak to nurse regarding she has tried multiple over the counter medications       Please call to advice [phone] (home)       Patient states she was informed to call and state if she tried anything other than amiben

## 2019-03-11 ENCOUNTER — DOCUMENTATION ONLY (OUTPATIENT)
Dept: FAMILY MEDICINE | Facility: CLINIC | Age: 69
End: 2019-03-11

## 2019-03-11 NOTE — PROGRESS NOTES
PA for Zolpidem 10mg tablets has been APPROVED by patient's benefit plan ([employer]).  Valid through 2/28/2021

## 2019-03-26 DIAGNOSIS — R05.9 COUGH: ICD-10-CM

## 2019-03-26 DIAGNOSIS — G47.00 INSOMNIA, UNSPECIFIED TYPE: ICD-10-CM

## 2019-03-26 DIAGNOSIS — K21.9 GASTROESOPHAGEAL REFLUX DISEASE WITHOUT ESOPHAGITIS: ICD-10-CM

## 2019-03-27 NOTE — TELEPHONE ENCOUNTER
Quick DC: duplicate request/ Refill Too soon (8/2019); last refilled 2/2019 for 6 months; unable to deny controls

## 2019-03-27 NOTE — PROGRESS NOTES
Refill Authorization Note     is requesting a refill authorization.    Brief assessment and rationale for refill: Quick DC: duplicate request/ Refill Too soon (8/2019)  Name and strength of medication: ALPRAZolam (XANAX) 0.25 MG tablet /  zolpidem (AMBIEN) 10 mg Tab       Medication Therapy Plan: both medications were escripted to [facility]'s on 02/26/2019 for 6 month supplies; pt should have enough to last until August 2019 (08/2019); refill too soon Quick DC    Medication reconciliation completed: No                         Comments:

## 2019-03-28 RX ORDER — ZOLPIDEM TARTRATE 10 MG/1
TABLET ORAL
Qty: 30 TABLET | Refills: 5 | OUTPATIENT
Start: 2019-03-28

## 2019-03-28 RX ORDER — ALPRAZOLAM 0.25 MG/1
TABLET ORAL
Qty: 60 TABLET | Refills: 0 | OUTPATIENT
Start: 2019-03-28

## 2019-04-15 ENCOUNTER — PATIENT OUTREACH (OUTPATIENT)
Dept: ADMINISTRATIVE | Facility: HOSPITAL | Age: 69
End: 2019-04-15

## 2019-04-15 NOTE — LETTER
April 23, 2019    Dottie Galeana  [address]  [place] [address]             [facility]  [address]  [facility] [address]  Phone: [phone] Dear Ms. Galeana:    We have tried to reach you by [facility] email unsuccessfully.      [facility] is committed to your overall health.  To help you get the most out of each of your visits, we will review your information to make sure you are up to date on all of your recommended tests and/or procedures.       Josh Li MD   has found that your chart shows you may be due for the following:     Colonoscopy     Also, You have been selected for enrollment in a Hypertension Digital Medicine Program byJosh Li MD .  As a participant, you will be able to send home BLOOD PRESSURE readings directly from your smartphone into your medical record at [facility]. Josh Li MD.  Josh Li MD will discuss more about the program at your upcoming appointment.     If you have had any of the above done at another facility, please bring the records with you or fax them to [phone] so that your record at [facility] will be complete. If you have not had any of these tests or procedures done recently and would like to complete this testing ,  please call [phone] or send a message through your [facility] portal to your provider's office.     If you have an upcoming scheduled appointment for the above test and/or procedures, please disregard this letter.     Sincerely,    Eneida Sánchez, Care Coordinator  [facility]  Phone: [phone]  Fax: [phone]

## 2019-04-16 ENCOUNTER — OFFICE VISIT (OUTPATIENT)
Dept: PODIATRY | Facility: CLINIC | Age: 69
End: 2019-04-16
Payer: MEDICARE

## 2019-04-16 ENCOUNTER — HOSPITAL ENCOUNTER (OUTPATIENT)
Dept: RADIOLOGY | Facility: CLINIC | Age: 69
Discharge: HOME | End: 2019-04-16
Attending: PODIATRIST
Payer: MEDICARE

## 2019-04-16 VITALS
HEART RATE: 83 BPM | BODY MASS INDEX: 26.68 KG/M2 | DIASTOLIC BLOOD PRESSURE: 95 MMHG | HEIGHT: 67 IN | WEIGHT: 170 LBS | SYSTOLIC BLOOD PRESSURE: 154 MMHG

## 2019-04-16 DIAGNOSIS — M19.079 ARTHRITIS OF FOOT: ICD-10-CM

## 2019-04-16 DIAGNOSIS — M24.573 EQUINUS CONTRACTURE OF ANKLE: ICD-10-CM

## 2019-04-16 DIAGNOSIS — M79.671 FOOT PAIN, RIGHT: ICD-10-CM

## 2019-04-16 DIAGNOSIS — M79.671 FOOT PAIN, RIGHT: Primary | ICD-10-CM

## 2019-04-16 PROCEDURE — 1101F PR PT FALLS ASSESS DOC 0-1 FALLS W/OUT INJ PAST YR: ICD-10-PCS | Mod: HCNC,CPTII,S$GLB, | Performed by: PODIATRIST

## 2019-04-16 PROCEDURE — 3077F SYST BP >= 140 MM HG: CPT | Mod: HCNC,CPTII,S$GLB, | Performed by: PODIATRIST

## 2019-04-16 PROCEDURE — 1101F PT FALLS ASSESS-DOCD LE1/YR: CPT | Mod: HCNC,CPTII,S$GLB, | Performed by: PODIATRIST

## 2019-04-16 PROCEDURE — 3080F DIAST BP >= 90 MM HG: CPT | Mod: HCNC,CPTII,S$GLB, | Performed by: PODIATRIST

## 2019-04-16 PROCEDURE — 3077F PR MOST RECENT SYSTOLIC BLOOD PRESSURE >= 140 MM HG: ICD-10-PCS | Mod: HCNC,CPTII,S$GLB, | Performed by: PODIATRIST

## 2019-04-16 PROCEDURE — 99213 OFFICE O/P EST LOW 20 MIN: CPT | Mod: HCNC,S$GLB,, | Performed by: PODIATRIST

## 2019-04-16 PROCEDURE — 73630 X-RAY EXAM OF FOOT: CPT | Mod: TC,HCNC,FY,PO,RT

## 2019-04-16 PROCEDURE — 99999 PR PBB SHADOW E&M-EST. PATIENT-LVL III: CPT | Mod: PBBFAC,HCNC,, | Performed by: PODIATRIST

## 2019-04-16 PROCEDURE — 3080F PR MOST RECENT DIASTOLIC BLOOD PRESSURE >= 90 MM HG: ICD-10-PCS | Mod: HCNC,CPTII,S$GLB, | Performed by: PODIATRIST

## 2019-04-16 PROCEDURE — 73630 XR FOOT COMPLETE 3 VIEW RIGHT: ICD-10-PCS | Mod: 26,HCNC,RT,S$GLB | Performed by: RADIOLOGY

## 2019-04-16 PROCEDURE — 99213 PR OFFICE/OUTPT VISIT, EST, LEVL III, 20-29 MIN: ICD-10-PCS | Mod: HCNC,S$GLB,, | Performed by: PODIATRIST

## 2019-04-16 PROCEDURE — 99999 PR PBB SHADOW E&M-EST. PATIENT-LVL III: ICD-10-PCS | Mod: PBBFAC,HCNC,, | Performed by: PODIATRIST

## 2019-04-16 PROCEDURE — 73630 X-RAY EXAM OF FOOT: CPT | Mod: 26,HCNC,RT,S$GLB | Performed by: RADIOLOGY

## 2019-04-16 RX ORDER — ALENDRONATE SODIUM 70 MG/1
TABLET ORAL
Refills: 11 | COMMUNITY
Start: 2019-03-25 | End: 2020-01-03 | Stop reason: SDUPTHER

## 2019-04-16 RX ORDER — DICLOFENAC SODIUM 10 MG/G
2 GEL TOPICAL 4 TIMES DAILY
Qty: 100 G | Refills: 2 | Status: SHIPPED | OUTPATIENT
Start: 2019-04-16 | End: 2021-09-07

## 2019-04-16 NOTE — PROGRESS NOTES
Subjective:      Patient ID: Dottie Galeana is a 68 y.o. female.    Chief Complaint: Foot Pain (right)    Sharp deep aching pain right midfoot.  Gradual onset by failure to totally resolve after fx midfoot about a year ago, worsening over past several months, aggravated by increased weight bearing (particularly in AM), shoe gear, pressure.  No previous medical treatment.  OTC pain med not helping. Denies re-injury and surgery.    Review of Systems   Constitution: Negative for chills, diaphoresis, fever, malaise/fatigue and night sweats.   Cardiovascular: Negative for claudication, cyanosis, leg swelling and syncope.   Skin: Negative for color change, dry skin, nail changes, rash, suspicious lesions and unusual hair distribution.   Musculoskeletal: Positive for joint pain. Negative for falls, joint swelling, muscle cramps, muscle weakness and stiffness.   Gastrointestinal: Negative for constipation, diarrhea, nausea and vomiting.   Neurological: Negative for brief paralysis, disturbances in coordination, focal weakness, numbness, paresthesias, sensory change and tremors.           Objective:      Physical Exam   Constitutional: She is oriented to person, place, and time. She appears well-developed and well-nourished. She is cooperative. No distress.   Cardiovascular:   Pulses:       Popliteal pulses are 2+ on the right side, and 2+ on the left side.        Dorsalis pedis pulses are 2+ on the right side, and 2+ on the left side.        Posterior tibial pulses are 1+ on the right side, and 1+ on the left side.   Capillary refill 3 seconds all toes/distal feet, all toes/both feet warm to touch.      Negative lymphadenopathy bilateral popliteal fossa and tarsal tunnel.      Negavie lower extremity edema bilateral.     Musculoskeletal:        Right ankle: She exhibits normal range of motion, no swelling, no ecchymosis, no deformity, no laceration and normal pulse. Achilles tendon normal. Achilles tendon exhibits no  pain, no defect and normal Gerard's test results.   Deep aching, sometimes sharp pain in metatarsal diaphysis area right 4,5 mets with loading in neutral position.    Ankle dorsiflexion decreased at <10 degrees bilateral with moderate increase with knee flexion bilateral.    No deformity, no loss of function.    Otherwise ,Normal angle, base, station of gait. All ten toes without clubbing, cyanosis, or signs of ischemia.  No pain to palpation bilateral lower extremities.  Range of motion, stability, muscle strength, and muscle tone normal bilateral feet and legs.    Lymphadenopathy: No inguinal adenopathy noted on the right or left side.   Negative lymphadenopathy bilateral popliteal fossa and tarsal tunnel.    Negative lymphangitic streaking bilateral feet/ankles/legs.   Neurological: She is alert and oriented to person, place, and time. She has normal strength. She displays no atrophy and no tremor. No sensory deficit. She exhibits normal muscle tone. Gait normal.   Reflex Scores:       Patellar reflexes are 2+ on the right side and 2+ on the left side.       Achilles reflexes are 2+ on the right side and 2+ on the left side.  Negative tinel sign to percussion sural, superficial peroneal, deep peroneal, saphenous, and posterior tibial nerves right and left ankles and feet.     Skin: Skin is warm, dry and intact. Capillary refill takes 2 to 3 seconds. No abrasion, no bruising, no burn, no ecchymosis, no laceration, no lesion and no rash noted. She is not diaphoretic. No cyanosis or erythema. No pallor. Nails show no clubbing.     Skin is normal age and health appropriate color, turgor, texture, and temperature bilateral lower extremities without ulceration, hyperpigmentation, discoloration, masses nodules or cords palpated.  No ecchymosis, erythema, edema, or cardinal signs of infection bilateral lower extremities.   Psychiatric: She has a normal mood and affect.             Assessment:       Encounter Diagnoses    Name Primary?    Foot pain, right Yes    Arthritis of foot     Equinus contracture of ankle          Plan:       Dottie was seen today for foot pain.    Diagnoses and all orders for this visit:    Foot pain, right  -     X-Ray Foot Complete Right; Future    Arthritis of foot  -     X-Ray Foot Complete Right; Future    Equinus contracture of ankle  -     X-Ray Foot Complete Right; Future    Other orders  -     diclofenac sodium (VOLTAREN) 1 % Gel; Apply 2 g topically 4 (four) times daily.      I counseled the patient on her conditions, their implications and medical management.    Patient will stretch the tendo achilles complex three times daily as demonstrated in the office.  Literature was dispensed illustrating proper stretching technique.    Patient will obtain over the counter arch supports and wear them in shoes whenever possible.  Athletic shoes intended for walking or running are usually best.    The patient was advised that NSAID-type medications have two very important potential side effects: gastrointestinal irritation including hemorrhage and renal injuries. She was asked to take the medication with food and to stop if she experiences any GI upset. I asked her to call for vomiting, abdominal pain or black/bloody stools. The patient expresses understanding of these issues and questions were answered.    Discussed conservative treatment with shoes of adequate dimensions, material, and style to alleviate symptoms and delay or prevent surgical intervention.    Rx xrays, voltaren gel.          Follow up in about 1 month (around 5/16/2019).

## 2019-04-17 ENCOUNTER — PATIENT MESSAGE (OUTPATIENT)
Dept: PODIATRY | Facility: CLINIC | Age: 69
End: 2019-04-17

## 2019-04-23 ENCOUNTER — LAB VISIT (OUTPATIENT)
Dept: LAB | Facility: HOSPITAL | Age: 69
End: 2019-04-23
Attending: INTERNAL MEDICINE
Payer: MEDICARE

## 2019-04-23 ENCOUNTER — CLINICAL SUPPORT (OUTPATIENT)
Dept: FAMILY MEDICINE | Facility: CLINIC | Age: 69
End: 2019-04-23
Payer: MEDICARE

## 2019-04-23 ENCOUNTER — TELEPHONE (OUTPATIENT)
Dept: FAMILY MEDICINE | Facility: CLINIC | Age: 69
End: 2019-04-23

## 2019-04-23 VITALS — HEART RATE: 72 BPM | SYSTOLIC BLOOD PRESSURE: 118 MMHG | DIASTOLIC BLOOD PRESSURE: 82 MMHG

## 2019-04-23 DIAGNOSIS — R53.83 FATIGUE: Primary | ICD-10-CM

## 2019-04-23 DIAGNOSIS — I10 ESSENTIAL HYPERTENSION: Primary | ICD-10-CM

## 2019-04-23 DIAGNOSIS — E78.5 HYPERLIPEMIA: ICD-10-CM

## 2019-04-23 LAB
ALBUMIN SERPL BCP-MCNC: 4.1 G/DL (ref 3.5–5.2)
ALP SERPL-CCNC: 104 U/L (ref 55–135)
ALT SERPL W/O P-5'-P-CCNC: 36 U/L (ref 10–44)
ANION GAP SERPL CALC-SCNC: 11 MMOL/L (ref 8–16)
AST SERPL-CCNC: 37 U/L (ref 10–40)
BASOPHILS # BLD AUTO: 0.08 K/UL (ref 0–0.2)
BASOPHILS NFR BLD: 1.3 % (ref 0–1.9)
BILIRUB SERPL-MCNC: 0.7 MG/DL (ref 0.1–1)
BUN SERPL-MCNC: 10 MG/DL (ref 8–23)
CALCIUM SERPL-MCNC: 9.8 MG/DL (ref 8.7–10.5)
CHLORIDE SERPL-SCNC: 101 MMOL/L (ref 95–110)
CHOLEST SERPL-MCNC: 207 MG/DL (ref 120–199)
CHOLEST/HDLC SERPL: 2.9 {RATIO} (ref 2–5)
CO2 SERPL-SCNC: 28 MMOL/L (ref 23–29)
CREAT SERPL-MCNC: 0.7 MG/DL (ref 0.5–1.4)
DIFFERENTIAL METHOD: ABNORMAL
EOSINOPHIL # BLD AUTO: 0.1 K/UL (ref 0–0.5)
EOSINOPHIL NFR BLD: 1.6 % (ref 0–8)
ERYTHROCYTE [DISTWIDTH] IN BLOOD BY AUTOMATED COUNT: 12.8 % (ref 11.5–14.5)
EST. GFR  (AFRICAN AMERICAN): >60 ML/MIN/1.73 M^2
EST. GFR  (NON AFRICAN AMERICAN): >60 ML/MIN/1.73 M^2
GLUCOSE SERPL-MCNC: 127 MG/DL (ref 70–110)
HCT VFR BLD AUTO: 43.4 % (ref 37–48.5)
HDLC SERPL-MCNC: 71 MG/DL (ref 40–75)
HDLC SERPL: 34.3 % (ref 20–50)
HGB BLD-MCNC: 14 G/DL (ref 12–16)
IMM GRANULOCYTES # BLD AUTO: 0.02 K/UL (ref 0–0.04)
IMM GRANULOCYTES NFR BLD AUTO: 0.3 % (ref 0–0.5)
LDLC SERPL CALC-MCNC: 104.8 MG/DL (ref 63–159)
LYMPHOCYTES # BLD AUTO: 1.4 K/UL (ref 1–4.8)
LYMPHOCYTES NFR BLD: 23.2 % (ref 18–48)
MCH RBC QN AUTO: 32.4 PG (ref 27–31)
MCHC RBC AUTO-ENTMCNC: 32.3 G/DL (ref 32–36)
MCV RBC AUTO: 101 FL (ref 82–98)
MONOCYTES # BLD AUTO: 0.5 K/UL (ref 0.3–1)
MONOCYTES NFR BLD: 7.3 % (ref 4–15)
NEUTROPHILS # BLD AUTO: 4.1 K/UL (ref 1.8–7.7)
NEUTROPHILS NFR BLD: 66.3 % (ref 38–73)
NONHDLC SERPL-MCNC: 136 MG/DL
NRBC BLD-RTO: 0 /100 WBC
PLATELET # BLD AUTO: 341 K/UL (ref 150–350)
PMV BLD AUTO: 9.1 FL (ref 9.2–12.9)
POTASSIUM SERPL-SCNC: 3.7 MMOL/L (ref 3.5–5.1)
PROT SERPL-MCNC: 7.5 G/DL (ref 6–8.4)
RBC # BLD AUTO: 4.32 M/UL (ref 4–5.4)
SODIUM SERPL-SCNC: 140 MMOL/L (ref 136–145)
TRIGL SERPL-MCNC: 156 MG/DL (ref 30–150)
TSH SERPL DL<=0.005 MIU/L-ACNC: 2.43 UIU/ML (ref 0.4–4)
WBC # BLD AUTO: 6.13 K/UL (ref 3.9–12.7)

## 2019-04-23 PROCEDURE — 85025 COMPLETE CBC W/AUTO DIFF WBC: CPT | Mod: HCNC

## 2019-04-23 PROCEDURE — 80053 COMPREHEN METABOLIC PANEL: CPT | Mod: HCNC

## 2019-04-23 PROCEDURE — 84443 ASSAY THYROID STIM HORMONE: CPT | Mod: HCNC

## 2019-04-23 PROCEDURE — 99999 PR PBB SHADOW E&M-EST. PATIENT-LVL I: CPT | Mod: PBBFAC,HCNC,,

## 2019-04-23 PROCEDURE — 80061 LIPID PANEL: CPT | Mod: HCNC

## 2019-04-23 PROCEDURE — 99499 NO LOS: ICD-10-PCS | Mod: HCNC,S$GLB,, | Performed by: FAMILY MEDICINE

## 2019-04-23 PROCEDURE — 99499 UNLISTED E&M SERVICE: CPT | Mod: HCNC,S$GLB,, | Performed by: FAMILY MEDICINE

## 2019-04-23 PROCEDURE — 36415 COLL VENOUS BLD VENIPUNCTURE: CPT | Mod: HCNC,PO

## 2019-04-23 PROCEDURE — 99999 PR PBB SHADOW E&M-EST. PATIENT-LVL I: ICD-10-PCS | Mod: PBBFAC,HCNC,,

## 2019-04-23 NOTE — PROGRESS NOTES
Dottie Galeana 68 y.o. female is here today for Blood Pressure check.   History of HTN yes.    Review of patient's allergies indicates:   Allergen Reactions    No known drug allergies      Creatinine   Date Value Ref Range Status   12/21/2018 0.7 0.5 - 1.4 mg/dL Final     Sodium   Date Value Ref Range Status   12/21/2018 137 136 - 145 mmol/L Final     Potassium   Date Value Ref Range Status   12/21/2018 4.9 3.5 - 5.1 mmol/L Final   ]  Patient verifies taking blood pressure medications on a regular basis at the same time of the day.     Current Outpatient Medications:     alendronate (FOSAMAX) 70 MG tablet, , Disp: , Rfl: 11    ALPRAZolam (XANAX) 0.25 MG tablet, TAKE ONE TABLET THREE TIMES A DAY AS NEEDED., Disp: 60 tablet, Rfl: 5    amLODIPine (NORVASC) 5 MG tablet, TAKE 1 TABLET EVERY DAY (Patient taking differently: Take 1 tablets by mouth, one at night), Disp: 90 tablet, Rfl: 3    aspirin 325 MG tablet, Take 325 mg by mouth once daily. , Disp: , Rfl:     BYSTOLIC 10 mg Tab, TAKE ONE TABLET DAILY FOR BLOOD PRESSURE., Disp: 90 tablet, Rfl: 2    diclofenac sodium (VOLTAREN) 1 % Gel, Apply 2 g topically 4 (four) times daily., Disp: 100 g, Rfl: 2    fluticasone (FLONASE) 50 mcg/actuation nasal spray, 1 spray (50 mcg total) by Each Nare route once daily., Disp: 1 Bottle, Rfl: 6    losartan (COZAAR) 100 MG tablet, TAKE 1 TABLET (100 MG TOTAL) BY MOUTH ONCE DAILY., Disp: 90 tablet, Rfl: 1    multivitamin (THERAGRAN) per tablet, Take 1 tablet by mouth once daily. , Disp: , Rfl:     NITROSTAT 0.4 mg SL tablet, Place 0.4 mg under the tongue every 5 (five) minutes as needed. , Disp: , Rfl:     pantoprazole (PROTONIX) 40 MG tablet, TAKE 1 TABLET EVERY DAY, Disp: 90 tablet, Rfl: 3    ranitidine (ZANTAC) 300 MG tablet, TAKE 1 TABLET (300 MG TOTAL) BY MOUTH EVERY EVENING., Disp: 30 tablet, Rfl: 5    rosuvastatin (CRESTOR) 10 MG tablet, TAKE ONE TABLET BY MOUTH ONCE DAILY IN THE EVENING, Disp: 30 tablet, Rfl:  11    venlafaxine (EFFEXOR-XR) 150 MG Cp24, TAKE 1 CAPSULE EVERY DAY, Disp: 90 capsule, Rfl: 3    zolpidem (AMBIEN) 10 mg Tab, Take 1 tablet (10 mg total) by mouth nightly as needed., Disp: 30 tablet, Rfl: 5  Does patient have record of home blood pressure readings yes. Readings have been averaging high.   Last dose of blood pressure medication was taken at 7a.  Patient is asymptomatic.   Complains of n/a.      ,     Blood pressure reading after 15 minutes was 118/82, Pulse 74.  Dr. Li notified.

## 2019-04-23 NOTE — Clinical Note
Patient in clinic for b/p due to her home cuff giving her high reading 154/95 once pt in clinic b/p 118/82 P 72

## 2019-04-23 NOTE — TELEPHONE ENCOUNTER
----- Message from Harper lFores sent at 4/23/2019  9:37 AM CDT -----  Contact: Self  Patient is coming to the clinic later this morning and would like to have a nurse check her BP and her BP cup.  Please call back to let her know if this can be done this morning at .[phone] (home).  Thanks

## 2019-05-31 ENCOUNTER — OFFICE VISIT (OUTPATIENT)
Dept: FAMILY MEDICINE | Facility: CLINIC | Age: 69
End: 2019-05-31
Payer: MEDICARE

## 2019-05-31 DIAGNOSIS — Z86.73 HISTORY OF CEREBRAL INFARCTION: ICD-10-CM

## 2019-05-31 DIAGNOSIS — F32.5 MAJOR DEPRESSION IN COMPLETE REMISSION: ICD-10-CM

## 2019-05-31 DIAGNOSIS — M51.26 HERNIATED NUCLEUS PULPOSUS, L4-5 RIGHT: ICD-10-CM

## 2019-05-31 DIAGNOSIS — M79.604 PAIN OF RIGHT LOWER EXTREMITY: ICD-10-CM

## 2019-05-31 DIAGNOSIS — I10 ESSENTIAL HYPERTENSION: ICD-10-CM

## 2019-05-31 DIAGNOSIS — M51.36 DDD (DEGENERATIVE DISC DISEASE), LUMBAR: Primary | ICD-10-CM

## 2019-05-31 PROCEDURE — 99214 PR OFFICE/OUTPT VISIT, EST, LEVL IV, 30-39 MIN: ICD-10-PCS | Mod: HCNC,S$GLB,, | Performed by: NURSE PRACTITIONER

## 2019-05-31 PROCEDURE — 3075F PR MOST RECENT SYSTOLIC BLOOD PRESS GE 130-139MM HG: ICD-10-PCS | Mod: HCNC,CPTII,S$GLB, | Performed by: NURSE PRACTITIONER

## 2019-05-31 PROCEDURE — 99499 RISK ADDL DX/OHS AUDIT: ICD-10-PCS | Mod: HCNC,S$GLB,, | Performed by: NURSE PRACTITIONER

## 2019-05-31 PROCEDURE — 3079F DIAST BP 80-89 MM HG: CPT | Mod: HCNC,CPTII,S$GLB, | Performed by: NURSE PRACTITIONER

## 2019-05-31 PROCEDURE — 3079F PR MOST RECENT DIASTOLIC BLOOD PRESSURE 80-89 MM HG: ICD-10-PCS | Mod: HCNC,CPTII,S$GLB, | Performed by: NURSE PRACTITIONER

## 2019-05-31 PROCEDURE — 99999 PR PBB SHADOW E&M-EST. PATIENT-LVL V: ICD-10-PCS | Mod: PBBFAC,HCNC,, | Performed by: NURSE PRACTITIONER

## 2019-05-31 PROCEDURE — 1101F PT FALLS ASSESS-DOCD LE1/YR: CPT | Mod: HCNC,CPTII,S$GLB, | Performed by: NURSE PRACTITIONER

## 2019-05-31 PROCEDURE — 99214 OFFICE O/P EST MOD 30 MIN: CPT | Mod: HCNC,S$GLB,, | Performed by: NURSE PRACTITIONER

## 2019-05-31 PROCEDURE — 3075F SYST BP GE 130 - 139MM HG: CPT | Mod: HCNC,CPTII,S$GLB, | Performed by: NURSE PRACTITIONER

## 2019-05-31 PROCEDURE — 99999 PR PBB SHADOW E&M-EST. PATIENT-LVL V: CPT | Mod: PBBFAC,HCNC,, | Performed by: NURSE PRACTITIONER

## 2019-05-31 PROCEDURE — 99499 UNLISTED E&M SERVICE: CPT | Mod: HCNC,S$GLB,, | Performed by: NURSE PRACTITIONER

## 2019-05-31 PROCEDURE — 1101F PR PT FALLS ASSESS DOC 0-1 FALLS W/OUT INJ PAST YR: ICD-10-PCS | Mod: HCNC,CPTII,S$GLB, | Performed by: NURSE PRACTITIONER

## 2019-05-31 RX ORDER — AMLODIPINE BESYLATE 10 MG/1
10 TABLET ORAL DAILY
Qty: 90 TABLET | Refills: 1 | Status: SHIPPED | OUTPATIENT
Start: 2019-05-31 | End: 2019-11-01 | Stop reason: SDUPTHER

## 2019-05-31 RX ORDER — GABAPENTIN 300 MG/1
300 CAPSULE ORAL 2 TIMES DAILY
Qty: 60 CAPSULE | Refills: 1 | Status: SHIPPED | OUTPATIENT
Start: 2019-05-31 | End: 2019-07-13 | Stop reason: SDUPTHER

## 2019-05-31 NOTE — PROGRESS NOTES
Subjective:       Patient ID: Dottie Galeana is a 68 y.o. female.    Chief Complaint: Leg Pain (right, hurting for months)    Here today with right leg pain     Working out in [employer] at [employer]  With various exercises 2-3 times week   Pain doesn't stop her from exercisng   Reviewed MRI  - lumbar spine     L3/L4: There is annular disc bulging with a superimposed left posterolateral small disc extrusion distorting the left anterolateral canal and left foramen.  The exiting left L3 nerve root is contacted.  Degenerative facet changes are noted bilaterally.      Leg Pain    The incident occurred more than 1 week ago. The incident occurred at home. There was no injury mechanism. The pain is present in the right thigh and right knee. The quality of the pain is described as aching. The pain is at a severity of 4/10. The pain is moderate. The pain has been intermittent since onset. Associated symptoms include muscle weakness. Pertinent negatives include no inability to bear weight, loss of motion, loss of sensation, numbness or tingling. She has tried rest and NSAIDs for the symptoms.     Vitals:    05/31/19 1424   BP: 138/88   Pulse: 69   Temp: 98 °F (36.7 °C)     Review of Systems   Constitutional: Positive for activity change. Negative for diaphoresis, fatigue and fever.   HENT: Negative.    Eyes: Negative.    Respiratory: Negative.  Negative for cough, shortness of breath and wheezing.    Cardiovascular: Negative.  Negative for chest pain.   Gastrointestinal: Negative.  Negative for abdominal pain, diarrhea and nausea.   Endocrine: Negative.    Genitourinary: Negative.  Negative for dysuria and hematuria.   Musculoskeletal: Positive for arthralgias, back pain and myalgias.   Skin: Negative for color change and rash.   Allergic/Immunologic: Negative.    Neurological: Negative.  Negative for tingling, speech difficulty and numbness.   Hematological: Negative.    Psychiatric/Behavioral: Negative.        Past  Medical History:   Diagnosis Date    Arthritis     Closed compression fracture of L1 lumbar vertebra 11/7/2018    Depression     General anesthetics causing adverse effect in therapeutic use     Generalized anxiety disorder     GERD (gastroesophageal reflux disease)     History of seasonal allergies     HTN (hypertension)     Hyperlipidemia     Hyponatremia     Insomnia     Osteoporosis     Stress incontinence     Stroke     ocular behind left eye       Objective:      Physical Exam   Constitutional: She is oriented to person, place, and time. She appears well-developed and well-nourished.   HENT:   Head: Normocephalic and atraumatic.   Right Ear: Hearing, tympanic membrane, external ear and ear canal normal.   Left Ear: Hearing, tympanic membrane, external ear and ear canal normal.   Nose: Nose normal. No mucosal edema, rhinorrhea or sinus tenderness. Right sinus exhibits no maxillary sinus tenderness and no frontal sinus tenderness. Left sinus exhibits no maxillary sinus tenderness and no frontal sinus tenderness.   Mouth/Throat: Uvula is midline, oropharynx is clear and moist and mucous membranes are normal. No oropharyngeal exudate or posterior oropharyngeal edema.   Eyes: Pupils are equal, round, and reactive to light. Conjunctivae and EOM are normal.   Neck: Normal range of motion. Neck supple.   Cardiovascular: Normal rate, regular rhythm, normal heart sounds and intact distal pulses. Exam reveals no friction rub.   No murmur heard.  Pulmonary/Chest: Effort normal and breath sounds normal. No stridor. No respiratory distress. She has no wheezes. She has no rales.   Abdominal: Soft. Bowel sounds are normal.   Musculoskeletal: Normal range of motion. She exhibits no edema.        Lumbar back: She exhibits pain.        Right upper leg: She exhibits tenderness.        Legs:  Neurological: She is alert and oriented to person, place, and time. No cranial nerve deficit. Coordination normal.   Skin:  Skin is warm and dry.   Psychiatric: She has a normal mood and affect. Her behavior is normal. Judgment and thought content normal.   Nursing note and vitals reviewed.      Assessment:       1. DDD (degenerative disc disease), lumbar    2. Herniated nucleus pulposus, L4-5 right    3. Pain of right lower extremity    4. Major depression in complete remission    5. Essential hypertension    6. History of cerebral infarction        Plan:       DDD (degenerative disc disease), lumbar  -     Ambulatory referral to Pain Clinic  -     gabapentin (NEURONTIN) 300 MG capsule; Take 1 capsule (300 mg total) by mouth 2 (two) times daily.  Dispense: 60 capsule; Refill: 1    Herniated nucleus pulposus, L4-5 right  -     Ambulatory referral to Pain Clinic  -     gabapentin (NEURONTIN) 300 MG capsule; Take 1 capsule (300 mg total) by mouth 2 (two) times daily.  Dispense: 60 capsule; Refill: 1    Pain of right lower extremity  -     Ambulatory referral to Pain Clinic  -     gabapentin (NEURONTIN) 300 MG capsule; Take 1 capsule (300 mg total) by mouth 2 (two) times daily.  Dispense: 60 capsule; Refill: 1    Major depression in complete remission  Stable on meds     Essential hypertension    amLODIPine (NORVASC) 10 MG tablet; Take 1 tablet (10 mg total) by mouth once daily.  Dispense: 90 tablet; Refill: 1  BP has been staying 130-140/80-90s at home   Increase to 10 mg     History of cerebral infarction  Stable   -         will try PT and gabapentin to help with leg pain and refer back to Dr Gramajo for L3-4 JOE   Fu if not better   She will work at gym to help her back

## 2019-06-03 ENCOUNTER — PES CALL (OUTPATIENT)
Dept: ADMINISTRATIVE | Facility: CLINIC | Age: 69
End: 2019-06-03

## 2019-06-03 VITALS
HEIGHT: 67 IN | BODY MASS INDEX: 27.09 KG/M2 | WEIGHT: 172.63 LBS | OXYGEN SATURATION: 97 % | DIASTOLIC BLOOD PRESSURE: 88 MMHG | TEMPERATURE: 98 F | HEART RATE: 69 BPM | SYSTOLIC BLOOD PRESSURE: 138 MMHG

## 2019-06-03 PROBLEM — S32.010A CLOSED COMPRESSION FRACTURE OF L1 LUMBAR VERTEBRA: Status: RESOLVED | Noted: 2018-11-07 | Resolved: 2019-06-03

## 2019-06-03 PROBLEM — F32.5 MAJOR DEPRESSION IN COMPLETE REMISSION: Status: ACTIVE | Noted: 2019-06-03

## 2019-06-07 DIAGNOSIS — R09.82 PND (POST-NASAL DRIP): ICD-10-CM

## 2019-06-07 RX ORDER — FLUTICASONE PROPIONATE 50 MCG
SPRAY, SUSPENSION (ML) NASAL
Qty: 16 G | Refills: 6 | Status: SHIPPED | OUTPATIENT
Start: 2019-06-07 | End: 2020-03-30 | Stop reason: SDUPTHER

## 2019-06-17 ENCOUNTER — OFFICE VISIT (OUTPATIENT)
Dept: PAIN MEDICINE | Facility: CLINIC | Age: 69
End: 2019-06-17
Payer: MEDICARE

## 2019-06-17 ENCOUNTER — OFFICE VISIT (OUTPATIENT)
Dept: FAMILY MEDICINE | Facility: CLINIC | Age: 69
End: 2019-06-17
Payer: MEDICARE

## 2019-06-17 VITALS
HEIGHT: 67 IN | OXYGEN SATURATION: 96 % | SYSTOLIC BLOOD PRESSURE: 120 MMHG | HEART RATE: 74 BPM | TEMPERATURE: 98 F | DIASTOLIC BLOOD PRESSURE: 80 MMHG | WEIGHT: 174.19 LBS | BODY MASS INDEX: 27.34 KG/M2

## 2019-06-17 VITALS
SYSTOLIC BLOOD PRESSURE: 138 MMHG | DIASTOLIC BLOOD PRESSURE: 76 MMHG | BODY MASS INDEX: 27.12 KG/M2 | WEIGHT: 173.19 LBS | TEMPERATURE: 97 F | RESPIRATION RATE: 18 BRPM | OXYGEN SATURATION: 98 % | HEART RATE: 75 BPM

## 2019-06-17 DIAGNOSIS — M54.16 LUMBAR RADICULITIS: Primary | ICD-10-CM

## 2019-06-17 DIAGNOSIS — G89.29 CHRONIC BILATERAL LOW BACK PAIN WITH RIGHT-SIDED SCIATICA: ICD-10-CM

## 2019-06-17 DIAGNOSIS — L02.31 ABSCESS OF BUTTOCK: Primary | ICD-10-CM

## 2019-06-17 DIAGNOSIS — M51.26 HERNIATED NUCLEUS PULPOSUS, L4-5 RIGHT: ICD-10-CM

## 2019-06-17 DIAGNOSIS — M54.41 CHRONIC BILATERAL LOW BACK PAIN WITH RIGHT-SIDED SCIATICA: ICD-10-CM

## 2019-06-17 PROCEDURE — 99214 PR OFFICE/OUTPT VISIT, EST, LEVL IV, 30-39 MIN: ICD-10-PCS | Mod: HCNC,S$GLB,, | Performed by: ANESTHESIOLOGY

## 2019-06-17 PROCEDURE — 1101F PR PT FALLS ASSESS DOC 0-1 FALLS W/OUT INJ PAST YR: ICD-10-PCS | Mod: HCNC,CPTII,S$GLB, | Performed by: ANESTHESIOLOGY

## 2019-06-17 PROCEDURE — 1101F PT FALLS ASSESS-DOCD LE1/YR: CPT | Mod: HCNC,CPTII,S$GLB, | Performed by: NURSE PRACTITIONER

## 2019-06-17 PROCEDURE — 3078F DIAST BP <80 MM HG: CPT | Mod: HCNC,CPTII,S$GLB, | Performed by: ANESTHESIOLOGY

## 2019-06-17 PROCEDURE — 3074F PR MOST RECENT SYSTOLIC BLOOD PRESSURE < 130 MM HG: ICD-10-PCS | Mod: HCNC,CPTII,S$GLB, | Performed by: NURSE PRACTITIONER

## 2019-06-17 PROCEDURE — 99999 PR PBB SHADOW E&M-EST. PATIENT-LVL IV: ICD-10-PCS | Mod: PBBFAC,HCNC,, | Performed by: NURSE PRACTITIONER

## 2019-06-17 PROCEDURE — 99999 PR PBB SHADOW E&M-EST. PATIENT-LVL III: ICD-10-PCS | Mod: PBBFAC,HCNC,, | Performed by: ANESTHESIOLOGY

## 2019-06-17 PROCEDURE — 99214 PR OFFICE/OUTPT VISIT, EST, LEVL IV, 30-39 MIN: ICD-10-PCS | Mod: HCNC,S$GLB,, | Performed by: NURSE PRACTITIONER

## 2019-06-17 PROCEDURE — 3079F DIAST BP 80-89 MM HG: CPT | Mod: HCNC,CPTII,S$GLB, | Performed by: NURSE PRACTITIONER

## 2019-06-17 PROCEDURE — 3074F SYST BP LT 130 MM HG: CPT | Mod: HCNC,CPTII,S$GLB, | Performed by: NURSE PRACTITIONER

## 2019-06-17 PROCEDURE — 3078F PR MOST RECENT DIASTOLIC BLOOD PRESSURE < 80 MM HG: ICD-10-PCS | Mod: HCNC,CPTII,S$GLB, | Performed by: ANESTHESIOLOGY

## 2019-06-17 PROCEDURE — 99999 PR PBB SHADOW E&M-EST. PATIENT-LVL III: CPT | Mod: PBBFAC,HCNC,, | Performed by: ANESTHESIOLOGY

## 2019-06-17 PROCEDURE — 99214 OFFICE O/P EST MOD 30 MIN: CPT | Mod: HCNC,S$GLB,, | Performed by: ANESTHESIOLOGY

## 2019-06-17 PROCEDURE — 3079F PR MOST RECENT DIASTOLIC BLOOD PRESSURE 80-89 MM HG: ICD-10-PCS | Mod: HCNC,CPTII,S$GLB, | Performed by: NURSE PRACTITIONER

## 2019-06-17 PROCEDURE — 3075F PR MOST RECENT SYSTOLIC BLOOD PRESS GE 130-139MM HG: ICD-10-PCS | Mod: HCNC,CPTII,S$GLB, | Performed by: ANESTHESIOLOGY

## 2019-06-17 PROCEDURE — 99999 PR PBB SHADOW E&M-EST. PATIENT-LVL IV: CPT | Mod: PBBFAC,HCNC,, | Performed by: NURSE PRACTITIONER

## 2019-06-17 PROCEDURE — 1101F PT FALLS ASSESS-DOCD LE1/YR: CPT | Mod: HCNC,CPTII,S$GLB, | Performed by: ANESTHESIOLOGY

## 2019-06-17 PROCEDURE — 3075F SYST BP GE 130 - 139MM HG: CPT | Mod: HCNC,CPTII,S$GLB, | Performed by: ANESTHESIOLOGY

## 2019-06-17 PROCEDURE — 1101F PR PT FALLS ASSESS DOC 0-1 FALLS W/OUT INJ PAST YR: ICD-10-PCS | Mod: HCNC,CPTII,S$GLB, | Performed by: NURSE PRACTITIONER

## 2019-06-17 PROCEDURE — 99214 OFFICE O/P EST MOD 30 MIN: CPT | Mod: HCNC,S$GLB,, | Performed by: NURSE PRACTITIONER

## 2019-06-17 RX ORDER — TRAMADOL HYDROCHLORIDE 50 MG/1
50 TABLET ORAL EVERY 12 HOURS PRN
Qty: 14 TABLET | Refills: 0 | Status: SHIPPED | OUTPATIENT
Start: 2019-06-17 | End: 2019-10-22

## 2019-06-17 RX ORDER — SULFAMETHOXAZOLE AND TRIMETHOPRIM 800; 160 MG/1; MG/1
1 TABLET ORAL 2 TIMES DAILY
Qty: 20 TABLET | Refills: 0 | Status: SHIPPED | OUTPATIENT
Start: 2019-06-17 | End: 2019-10-22

## 2019-06-17 RX ORDER — FLUCONAZOLE 100 MG/1
100 TABLET ORAL DAILY
Qty: 5 TABLET | Refills: 0 | Status: SHIPPED | OUTPATIENT
Start: 2019-06-17 | End: 2019-07-17

## 2019-06-17 NOTE — PROGRESS NOTES
Subjective:       Patient ID: Dottie Galeana is a 68 y.o. female.    Chief Complaint: Rash (upper buttocks)    Here today with wound to right buttock check getting worse   She has been taking care of her grandchild with staph infection.  She can not remember any incident that would have caused this- no insect bite   She is planning to have JOE with Dr Gramajo and he wants to make sure that she has no infection     Vitals:    06/17/19 1252   BP: 120/80   Pulse: 74   Temp: 97.6 °F (36.4 °C)     Review of Systems   Constitutional: Negative.  Negative for diaphoresis, fatigue and fever.   HENT: Negative.    Eyes: Negative.    Respiratory: Negative.  Negative for cough, shortness of breath and wheezing.    Cardiovascular: Negative.  Negative for chest pain.   Gastrointestinal: Negative.  Negative for abdominal pain, diarrhea and nausea.   Endocrine: Negative.    Genitourinary: Negative.  Negative for dysuria and hematuria.   Musculoskeletal: Negative.    Skin: Negative for color change and rash.        Hard area to right buttock with nickel sized area of black dark with open blister   Allergic/Immunologic: Negative.    Neurological: Negative for speech difficulty and numbness.   Hematological: Negative.    Psychiatric/Behavioral: Negative.        Past Medical History:   Diagnosis Date    Arthritis     Closed compression fracture of L1 lumbar vertebra 11/7/2018    Depression     General anesthetics causing adverse effect in therapeutic use     Generalized anxiety disorder     GERD (gastroesophageal reflux disease)     History of seasonal allergies     HTN (hypertension)     Hyperlipidemia     Hyponatremia     Insomnia     Osteoporosis     Stress incontinence     Stroke     ocular behind left eye       Objective:      Physical Exam   Constitutional: She is oriented to person, place, and time. She appears well-developed and well-nourished.   HENT:   Head: Normocephalic and atraumatic.   Right Ear: Hearing,  tympanic membrane, external ear and ear canal normal.   Left Ear: Hearing, tympanic membrane, external ear and ear canal normal.   Nose: Nose normal. No mucosal edema, rhinorrhea or sinus tenderness. Right sinus exhibits no maxillary sinus tenderness and no frontal sinus tenderness. Left sinus exhibits no maxillary sinus tenderness and no frontal sinus tenderness.   Mouth/Throat: Uvula is midline, oropharynx is clear and moist and mucous membranes are normal. No oropharyngeal exudate or posterior oropharyngeal edema.   Eyes: Pupils are equal, round, and reactive to light. Conjunctivae and EOM are normal.   Neck: Normal range of motion. Neck supple.   Cardiovascular: Normal rate, regular rhythm, normal heart sounds and intact distal pulses. Exam reveals no friction rub.   No murmur heard.  Pulmonary/Chest: Effort normal and breath sounds normal. No stridor. No respiratory distress. She has no wheezes. She has no rales.   Abdominal: Soft. Bowel sounds are normal.   Musculoskeletal: Normal range of motion. She exhibits no edema or tenderness.   Neurological: She is alert and oriented to person, place, and time. No cranial nerve deficit. Coordination normal.   Skin: Skin is warm and dry.        Psychiatric: She has a normal mood and affect. Her behavior is normal. Judgment and thought content normal.   Nursing note and vitals reviewed.      Assessment:       1. Abscess of buttock    2. Herniated nucleus pulposus, L4-5 right        Plan:       Abscess of buttock  -     sulfamethoxazole-trimethoprim 800-160mg (BACTRIM DS) 800-160 mg Tab; Take 1 tablet by mouth 2 (two) times daily.  Dispense: 20 tablet; Refill: 0    Herniated nucleus pulposus, L4-5 right  Follow up with Pain mgt - can have JOE in 15 days from now     Other orders  -     fluconazole (DIFLUCAN) 100 MG tablet; Take 1 tablet (100 mg total) by mouth once daily.  Dispense: 5 tablet; Refill: 0            Fu with pain mgt   Call if not better

## 2019-06-17 NOTE — LETTER
June 17, 2019      Louise Velazquez, RAUDEL  [address] [facility] [address] [place] [address]           [facility]  [address] [facility] [address] [place] [address]  Phone: [phone]  Fax: [phone]          Patient: Dottie Galeana   MR Number: [id number]   YOB: 1950   Date of Visit: 6/17/2019       Dear Louise Velazquez:    Thank you for referring Dottie Galeana to me for evaluation. Attached you will find relevant portions of my assessment and plan of care.    If you have questions, please do not hesitate to call me. I look forward to following Dottie Galeana along with you.    Sincerely,    Russ Gramajo MD    Enclosure  CC:  No Recipients    If you would like to receive this communication electronically, please contact [email or web address] or [phone] to request more information on [facility] access.    For providers and/or their staff who would like to refer a patient to [facility], please contact us through our one-stop-shop provider referral line, [facility], at [phone].    If you feel you have received this communication in error or would no longer like to receive these types of communications, please e-mail [email or web address]

## 2019-06-17 NOTE — PROGRESS NOTES
[facility] Follow up Evaluation    Referred by: Louise Velazquez NP  Reason for referral: back pain    CC:   Chief Complaint   Patient presents with    Follow-up    Leg Pain      Last 3 PDI Scores 6/17/2019 11/16/2018 11/7/2018   Pain Disability Index (PDI) 0 0 8     Interval HPI 6/17/19: Mrs. Galeana returns to the office today for follow up.  Today she presents with lower back pain for the past 3-4 months, 6/10, constant, aching, radiating down the outside of her right leg into the top of her right foot.  No numbness, no weakness. Worse with standing.  She has completed PT and continues HEP.  She has tried aleve and ibuprofen without relief.     Interval HPI 11/16/18: patient with complete relief of spinal pain following kyphoplasty on 11/13/18.  Today c/o of axial lower back pain, dull and aching.    HPI:   Dottie Galeana is a 68 y.o. female who complains of back pain    Here today she is following up on Fall at home - She went to ortho walk in and they told her she had compression fracture with xray done - pain 8/10- fell 1 week ago     Onset: 1 week ago  Inciting Event: slipped and fell, landed on her rear end  Progression: since onset, pain is unchanged  Current Pain Score: 8/10  Typical Range: 4-10/10  Timing: constant  Quality: Aching and Dull  Radiation: no  Associated numbness or weakness: no numbness, no weakness  Exacerbated by: back flexion, activity  Allievated by: laying down, rest, ice  Is Pain Level Acceptable?: No    Previous Therapies:  PT/OT:   HEP: ice  Interventions:   Surgery:  Medications:   - NSAIDS: aleve  - MSK Relaxants:   - TCAs:   - SNRIs:   - Topicals:   - Anticonvulsants:  - Opioids: hydrocodone    Current Pain Medications:  1. hydrocodone    History:    Current Outpatient Medications:     alendronate (FOSAMAX) 70 MG tablet, , Disp: , Rfl: 11    ALPRAZolam (XANAX) 0.25 MG tablet, TAKE ONE TABLET THREE TIMES A DAY AS NEEDED., Disp: 60 tablet, Rfl: 5     amLODIPine (NORVASC) 10 MG tablet, Take 1 tablet (10 mg total) by mouth once daily., Disp: 90 tablet, Rfl: 1    aspirin 325 MG tablet, Take 325 mg by mouth once daily. , Disp: , Rfl:     BYSTOLIC 10 mg Tab, TAKE ONE TABLET DAILY FOR BLOOD PRESSURE., Disp: 90 tablet, Rfl: 2    diclofenac sodium (VOLTAREN) 1 % Gel, Apply 2 g topically 4 (four) times daily., Disp: 100 g, Rfl: 2    fluticasone propionate (FLONASE) 50 mcg/actuation nasal spray, USE 1 SPRAY IN EACH NOSTRIL DAILY FOR ALLERGIES, Disp: 16 g, Rfl: 6    gabapentin (NEURONTIN) 300 MG capsule, Take 1 capsule (300 mg total) by mouth 2 (two) times daily., Disp: 60 capsule, Rfl: 1    losartan (COZAAR) 100 MG tablet, TAKE 1 TABLET (100 MG TOTAL) BY MOUTH ONCE DAILY., Disp: 90 tablet, Rfl: 1    multivitamin (THERAGRAN) per tablet, Take 1 tablet by mouth once daily. , Disp: , Rfl:     NITROSTAT 0.4 mg SL tablet, Place 0.4 mg under the tongue every 5 (five) minutes as needed. , Disp: , Rfl:     pantoprazole (PROTONIX) 40 MG tablet, TAKE 1 TABLET EVERY DAY, Disp: 90 tablet, Rfl: 3    ranitidine (ZANTAC) 300 MG tablet, TAKE 1 TABLET (300 MG TOTAL) BY MOUTH EVERY EVENING., Disp: 30 tablet, Rfl: 5    rosuvastatin (CRESTOR) 10 MG tablet, TAKE ONE TABLET BY MOUTH ONCE DAILY IN THE EVENING, Disp: 30 tablet, Rfl: 11    venlafaxine (EFFEXOR-XR) 150 MG Cp24, TAKE 1 CAPSULE EVERY DAY, Disp: 90 capsule, Rfl: 3    zolpidem (AMBIEN) 10 mg Tab, Take 1 tablet (10 mg total) by mouth nightly as needed., Disp: 30 tablet, Rfl: 5    traMADol (ULTRAM) 50 mg tablet, Take 1 tablet (50 mg total) by mouth every 12 (twelve) hours as needed for Pain., Disp: 14 tablet, Rfl: 0    Past Medical History:   Diagnosis Date    Arthritis     Closed compression fracture of L1 lumbar vertebra 11/7/2018    Depression     General anesthetics causing adverse effect in therapeutic use     Generalized anxiety disorder     GERD (gastroesophageal reflux disease)     History of seasonal  allergies     HTN (hypertension)     Hyperlipidemia     Hyponatremia     Insomnia     Osteoporosis     Stress incontinence     Stroke     ocular behind left eye       Past Surgical History:   Procedure Laterality Date    BREAST BIOPSY Left 1998    benign    EGD (ESOPHAGOGASTRODUODENOSCOPY) N/A 8/17/2018    [facility]    EGD (ESOPHAGOGASTRODUODENOSCOPY) N/A 4/25/2014    [facility]    EGD (ESOPHAGOGASTRODUODENOSCOPY) N/A 4/30/2013    [facility]    Kyphoplasty L1 N/A 11/13/2018    [facility]    ORIF WRIST FRACTURE Right        Family History   Problem Relation Age of Onset    Ovarian cancer Mother 42       Social History     Socioeconomic History    Marital status:      Spouse name: Not on file    Number of children: Not on file    Years of education: Not on file    Highest education level: Not on file   Occupational History    Occupation: realtor   Social Needs    Financial resource strain: Not on file    Food insecurity:     Worry: Not on file     Inability: Not on file    Transportation needs:     Medical: Not on file     Non-medical: Not on file   Tobacco Use    Smoking status: Never Smoker    Smokeless tobacco: Never Used   Substance and Sexual Activity    Alcohol use: Yes     Alcohol/week: 8.4 oz     Types: 14 Glasses of wine per week     Comment: 2 glasses wine per day    Drug use: Yes     Types: Hydrocodone, Benzodiazepines    Sexual activity: Not on file   Lifestyle    Physical activity:     Days per week: Not on file     Minutes per session: Not on file    Stress: Not on file   Relationships    Social connections:     Talks on phone: Not on file     Gets together: Not on file     Attends Muslim service: Not on file     Active member of club or organization: Not on file     Attends meetings of clubs or organizations: Not on  file     Relationship status: Not on file   Other Topics Concern    Not on file   Social History Narrative    Not on file       Review of patient's allergies indicates:   Allergen Reactions    No known drug allergies        Review of Systems:  General ROS: negative for - fever, night sweats or weight loss  Psychological ROS: negative for - hallucinations, hostility or irritability  Hematological and Lymphatic ROS: negative for - bleeding problems  Endocrine ROS: negative for - temperature intolerance or unexpected weight changes  Respiratory ROS: no cough, shortness of breath, or wheezing  Cardiovascular ROS: no chest pain or dyspnea on exertion  Gastrointestinal ROS: positive for - heartburn  Musculoskeletal ROS: negative for - gait disturbance or muscular weakness  Neurological ROS: negative for - bowel and bladder control changes, numbness/tingling or weakness  Dermatological ROS: negative for rash    Physical Exam:  Vitals:    06/17/19 1048   BP: 138/76   Pulse: 75   Resp: 18   Temp: 96.6 °F (35.9 °C)   TempSrc: Oral   SpO2: 98%   Weight: 78.5 kg (173 lb 2.7 oz)   PainSc:   6   PainLoc: Leg     Body mass index is 27.12 kg/m².    Gen: NAD  Gait: gait intact  Psych:  Mood appropriate for given condition  HEENT: eyes anicteric   GI: Abd soft  CV: RRR  Lungs: breathing unlabored   ROM: limited AROM of the L spine in all planes, full ROM at ankles, knees and hips  Lumbar flexion 90 degrees, extension 50 degrees, side bending 30 degrees.    Sensation: intact to light touch in all dermatomes tested from L2-S1 bilaterally  Reflexes: 2+ b/l patella and 0/0 b/l Achilles  Palpation: Diffusely tender over lumbar paraspinals  -TTP over the b/l greater trochanters and bilateral SI joint  Tone: normal in the b/l knees and hips   Skin: intact  Extremities: No edema in b/l ankles or hands  Provacative tests: - SLR       Right Left   L2/3 Iliacus Hip flexion  5  5   L3/4 Qudratus Femoris Knee Extension  5  5   L4/5 Tib  Anterior Ankle Dorsiflexion   5  5   L5/S1 Extensor Hallicus Longus Great toe extension  5  5   L4/5 Tib Anterior/Posterior Inversion  5  5   L5/S1 Extensor Digitorum Longus, Peronues Eversion  5  5   S1/S2 Gastroc/Soleus Plantar Flexion  5  5       Imaging:  MRI lumbar spine 11/6/18  FINDINGS:  There is a proximally 40% anterior wedging of the L1 vertebral body. There is mixed edema and sclerosis involving much of the vertebral body suggesting a subacute fracture. Edema extends into the anterior pedicles. I do not see evidence of significant retropulsion.  The conus terminates at L2 and has an unremarkable appearance. There is mild anterior osteophyte formation throughout the lumbar region.    There is diffuse disc desiccation with loss of disc space height seen at L3/L4 and L4/L5.  Mixed but primarily fatty degenerative endplate is changes are noted at multiple levels the most prominently L3/L4 through L5/S1.  The lumbar vertebrae otherwise display normal signal, morphology and alignment.  The individual disc levels appears follows:    T12/L1: There is mild annular disc bulging with mild effacement of the anterior thecal sac.  The foramina are intact.  L1/L2: There is annular disc bulging with mild effacement of the anterior thecal sac and there is mild degenerative facet disease.  L2/L3: Annular disc bulging is evident with a mild superimposed right posterolateral disc protrusion with extension into the inferior right foramen.  There is mild narrowing of the canal and there is mild ligamentous and facet hypertrophy.  L3/L4: There is annular disc bulging with a superimposed left posterolateral small disc extrusion distorting the left anterolateral canal and left foramen.  The exiting left L3 nerve root is contacted.  Degenerative facet changes are noted bilaterally.  L4/L5: There is annular disc bulging which combines with degenerative facet disease and a right posterolateral disc extrusion to produce moderate  right and mild left-sided foraminal narrowing.  The central canal is mildly distorted.  Bilateral facet effusions are noted.  L5/S1: Moderate degenerative facet changes are present in the central canal is only mildly distorted.  There is mild inferior foraminal narrowing.    Lumbar xray 11/6/18  FINDINGS:  There is a mild-to-moderate superior endplate compression deformity of the L1 vertebral body which is age indeterminate.  There is, however, a provided history of L1 compression fracture.  Comparison with any prior studies would be of interest.  There is no significant retropulsion into the spinal canal at this level.  There is mild kyphosis at the T12-L1 level.  There is moderate disc space narrowing at the L4-5 level.  There is facet joint arthropathy at the lower 2 lumbar levels.  There is also moderate disc space narrowing at the L3-4 level with mild disc space narrowing at the L2-3 level.  There is multilevel marginal osteophyte formation.    Labs:  BMP  Lab Results   Component Value Date     04/23/2019    K 3.7 04/23/2019     04/23/2019    CO2 28 04/23/2019    BUN 10 04/23/2019    CREATININE 0.7 04/23/2019    CALCIUM 9.8 04/23/2019    ANIONGAP 11 04/23/2019    ESTGFRAFRICA >60.0 04/23/2019    EGFRNONAA >60.0 04/23/2019     Lab Results   Component Value Date    ALT 36 04/23/2019    AST 37 04/23/2019    ALKPHOS 104 04/23/2019    BILITOT 0.7 04/23/2019       Assessment:  Problem List Items Addressed This Visit        Neuro    Lumbar radiculitis - Primary       Orthopedic    Chronic bilateral low back pain with right-sided sciatica        Treatment Plan:   68 y.o. year old female with acute lower back pain secondary to L1 vertebral compression fracture.  Her pain started about a week ago after she slipped and fell in her bedroom.  No radicular pain, LE weakness, or LE numbness.  MRI consistent with subacute L1 vertebral body compression fracture with about 40% height loss without significant  retropulsion.  Her pain is limiting her mobility and interfering with her ADL's.  Will schedule for L1 kyphoplasty. Will fit her for TLSO brace for back stability in the office today.  Certainly reasonable for patient to continue short term hydrocodone 7.5/325mg prn for pain related to compression fracture.  She takes ASA 325mg daily.  She has not taken it since 11/2/18 and will continue to hold until after procedure.  Follow up in the office 2 weeks post procedure.     11/16/18 - patient with complete relief of spinal pain following kyphoplasty on 11/13/18.  Today she c/o some residual axial lower back pain.  MRI consistent with bilateral lumbar facet arthropathy.  Will continue ibuprofen prn, start physical therapy.  Referral given for physical therapy to improve function and strength, and to receive training towards establishing a safe and effective home exercise program (HEP).  Follow up in the office in 2 months if pain doesn't improve or worsens with conservative treatment.     6/17/19 - Mrs. Galeana returns to the office today for follow up.  Today she presents with lower back pain for the past 3-4 months, 6/10, constant, aching, radiating down the outside of her right leg into the top of her right foot.  No numbness, no weakness. Worse with standing.  She has completed PT and continues HEP.  She has tried aleve and ibuprofen without relief.  On exam 5/5 strength, 2+ b/l patellar, 0/0 b/l achilles, sensation intact to light touch L2-S1.  MRI lumbar spine from 11/2018 c/w L4-5 annular disc bulging and right posterolateral disc extrusion to produce moderate right foraminal narrowing and mild L5-S1 NFS.  Her pain is limiting her mobility and interfering with her ADL's.  She has what looks like a superficial skin lesion that may be infected over her right gluteal region.  No fevers/chills.  She will f/u with PCP to see if the lesion requires abx treatment.  If so, will wait for her to complete abx.  If not, then  will proceed with right L4/5 and L5/S1 TFESI.  I have given her short course of tramadol 50mg po bid prn for severe pain #14 tabs since PT and NSAIDs have not controlled her pain.  I discussed the side effect profiles and interactions of tramadol and alprazolam include sedation, drowsiness, respiratory depression and even death and she understands not to take tramadol and alprazolam together.  She says she only takes xanex once every couple weeks and will not take it with tramadol.  Follow up 2 weeks post injection     : Reviewed and consistent with medication use as prescribed.

## 2019-06-25 ENCOUNTER — TELEPHONE (OUTPATIENT)
Dept: FAMILY MEDICINE | Facility: CLINIC | Age: 69
End: 2019-06-25

## 2019-06-25 DIAGNOSIS — B95.8 STAPH INFECTION: ICD-10-CM

## 2019-06-25 DIAGNOSIS — L02.91 ABSCESS: Primary | ICD-10-CM

## 2019-06-25 DIAGNOSIS — T14.8XXA WOUND INFECTION: ICD-10-CM

## 2019-06-25 DIAGNOSIS — L08.9 WOUND INFECTION: ICD-10-CM

## 2019-06-25 RX ORDER — CLINDAMYCIN HYDROCHLORIDE 300 MG/1
600 CAPSULE ORAL 2 TIMES DAILY
Qty: 40 CAPSULE | Refills: 0 | Status: SHIPPED | OUTPATIENT
Start: 2019-06-25 | End: 2019-10-22

## 2019-06-25 NOTE — TELEPHONE ENCOUNTER
Treated with bactrim for staph infection    6-17-19 - now with new areas some decreased induration to buttock abscess   Changed to clindamycin 10 days

## 2019-06-26 ENCOUNTER — PATIENT MESSAGE (OUTPATIENT)
Dept: FAMILY MEDICINE | Facility: CLINIC | Age: 69
End: 2019-06-26

## 2019-06-27 ENCOUNTER — PATIENT MESSAGE (OUTPATIENT)
Dept: FAMILY MEDICINE | Facility: CLINIC | Age: 69
End: 2019-06-27

## 2019-06-27 RX ORDER — MUPIROCIN 20 MG/G
OINTMENT TOPICAL 3 TIMES DAILY
Qty: 30 G | Refills: 1 | Status: SHIPPED | OUTPATIENT
Start: 2019-06-27 | End: 2019-10-22

## 2019-07-13 DIAGNOSIS — M79.604 PAIN OF RIGHT LOWER EXTREMITY: ICD-10-CM

## 2019-07-13 DIAGNOSIS — M51.26 HERNIATED NUCLEUS PULPOSUS, L4-5 RIGHT: ICD-10-CM

## 2019-07-13 DIAGNOSIS — M51.36 DDD (DEGENERATIVE DISC DISEASE), LUMBAR: ICD-10-CM

## 2019-07-15 RX ORDER — GABAPENTIN 300 MG/1
300 CAPSULE ORAL 2 TIMES DAILY
Qty: 60 CAPSULE | Refills: 1 | Status: SHIPPED | OUTPATIENT
Start: 2019-07-15 | End: 2021-04-08

## 2019-07-21 DIAGNOSIS — I10 ESSENTIAL HYPERTENSION: ICD-10-CM

## 2019-07-22 ENCOUNTER — OFFICE VISIT (OUTPATIENT)
Dept: FAMILY MEDICINE | Facility: CLINIC | Age: 69
End: 2019-07-22
Payer: MEDICARE

## 2019-07-22 ENCOUNTER — HOSPITAL ENCOUNTER (OUTPATIENT)
Dept: RADIOLOGY | Facility: HOSPITAL | Age: 69
Discharge: HOME | End: 2019-07-22
Attending: NURSE PRACTITIONER
Payer: MEDICARE

## 2019-07-22 VITALS
WEIGHT: 169.31 LBS | TEMPERATURE: 98 F | OXYGEN SATURATION: 96 % | HEIGHT: 67 IN | BODY MASS INDEX: 26.57 KG/M2 | SYSTOLIC BLOOD PRESSURE: 104 MMHG | RESPIRATION RATE: 16 BRPM | HEART RATE: 84 BPM | DIASTOLIC BLOOD PRESSURE: 76 MMHG

## 2019-07-22 DIAGNOSIS — M25.561 ACUTE PAIN OF RIGHT KNEE: ICD-10-CM

## 2019-07-22 DIAGNOSIS — N39.0 URINARY TRACT INFECTION WITH HEMATURIA, SITE UNSPECIFIED: Primary | ICD-10-CM

## 2019-07-22 DIAGNOSIS — R31.9 URINARY TRACT INFECTION WITH HEMATURIA, SITE UNSPECIFIED: Primary | ICD-10-CM

## 2019-07-22 LAB
BILIRUB SERPL-MCNC: ABNORMAL MG/DL
BLOOD URINE, POC: ABNORMAL
COLOR, POC UA: ABNORMAL
GLUCOSE UR QL STRIP: NORMAL
KETONES UR QL STRIP: NEGATIVE
LEUKOCYTE ESTERASE URINE, POC: ABNORMAL
NITRITE, POC UA: POSITIVE
PH, POC UA: 8
PROTEIN, POC: ABNORMAL
SPECIFIC GRAVITY, POC UA: 1
UROBILINOGEN, POC UA: 1

## 2019-07-22 PROCEDURE — 73560 X-RAY EXAM OF KNEE 1 OR 2: CPT | Mod: 59,TC,HCNC,FY,PO,LT

## 2019-07-22 PROCEDURE — 99999 PR PBB SHADOW E&M-EST. PATIENT-LVL IV: ICD-10-PCS | Mod: PBBFAC,HCNC,, | Performed by: NURSE PRACTITIONER

## 2019-07-22 PROCEDURE — 73562 X-RAY EXAM OF KNEE 3: CPT | Mod: TC,HCNC,FY,PO,RT

## 2019-07-22 PROCEDURE — 99999 PR PBB SHADOW E&M-EST. PATIENT-LVL IV: CPT | Mod: PBBFAC,HCNC,, | Performed by: NURSE PRACTITIONER

## 2019-07-22 PROCEDURE — 3074F SYST BP LT 130 MM HG: CPT | Mod: HCNC,CPTII,S$GLB, | Performed by: NURSE PRACTITIONER

## 2019-07-22 PROCEDURE — 73560 XR KNEE ORTHO RIGHT: ICD-10-PCS | Mod: 26,HCNC,RT, | Performed by: RADIOLOGY

## 2019-07-22 PROCEDURE — 87186 SC STD MICRODIL/AGAR DIL: CPT | Mod: HCNC

## 2019-07-22 PROCEDURE — 99214 PR OFFICE/OUTPT VISIT, EST, LEVL IV, 30-39 MIN: ICD-10-PCS | Mod: HCNC,25,S$GLB, | Performed by: NURSE PRACTITIONER

## 2019-07-22 PROCEDURE — 87086 URINE CULTURE/COLONY COUNT: CPT | Mod: HCNC

## 2019-07-22 PROCEDURE — 81002 POCT URINE DIPSTICK WITHOUT MICROSCOPE: ICD-10-PCS | Mod: HCNC,S$GLB,, | Performed by: NURSE PRACTITIONER

## 2019-07-22 PROCEDURE — 73560 X-RAY EXAM OF KNEE 1 OR 2: CPT | Mod: 26,HCNC,RT, | Performed by: RADIOLOGY

## 2019-07-22 PROCEDURE — 1101F PR PT FALLS ASSESS DOC 0-1 FALLS W/OUT INJ PAST YR: ICD-10-PCS | Mod: HCNC,CPTII,S$GLB, | Performed by: NURSE PRACTITIONER

## 2019-07-22 PROCEDURE — 3078F DIAST BP <80 MM HG: CPT | Mod: HCNC,CPTII,S$GLB, | Performed by: NURSE PRACTITIONER

## 2019-07-22 PROCEDURE — 81002 URINALYSIS NONAUTO W/O SCOPE: CPT | Mod: HCNC,S$GLB,, | Performed by: NURSE PRACTITIONER

## 2019-07-22 PROCEDURE — 87088 URINE BACTERIA CULTURE: CPT | Mod: HCNC

## 2019-07-22 PROCEDURE — 3078F PR MOST RECENT DIASTOLIC BLOOD PRESSURE < 80 MM HG: ICD-10-PCS | Mod: HCNC,CPTII,S$GLB, | Performed by: NURSE PRACTITIONER

## 2019-07-22 PROCEDURE — 87077 CULTURE AEROBIC IDENTIFY: CPT | Mod: HCNC

## 2019-07-22 PROCEDURE — 1101F PT FALLS ASSESS-DOCD LE1/YR: CPT | Mod: HCNC,CPTII,S$GLB, | Performed by: NURSE PRACTITIONER

## 2019-07-22 PROCEDURE — 99214 OFFICE O/P EST MOD 30 MIN: CPT | Mod: HCNC,25,S$GLB, | Performed by: NURSE PRACTITIONER

## 2019-07-22 PROCEDURE — 3074F PR MOST RECENT SYSTOLIC BLOOD PRESSURE < 130 MM HG: ICD-10-PCS | Mod: HCNC,CPTII,S$GLB, | Performed by: NURSE PRACTITIONER

## 2019-07-22 RX ORDER — LOSARTAN POTASSIUM 100 MG/1
100 TABLET ORAL DAILY
Qty: 90 TABLET | Refills: 1 | Status: SHIPPED | OUTPATIENT
Start: 2019-07-22 | End: 2020-01-13

## 2019-07-22 RX ORDER — MELOXICAM 15 MG/1
15 TABLET ORAL DAILY
Refills: 1 | COMMUNITY
Start: 2019-07-02 | End: 2019-10-22

## 2019-07-22 RX ORDER — DOXYCYCLINE HYCLATE 100 MG
100 TABLET ORAL EVERY 12 HOURS
Qty: 14 TABLET | Refills: 0 | Status: SHIPPED | OUTPATIENT
Start: 2019-07-22 | End: 2019-07-29

## 2019-07-22 NOTE — PROGRESS NOTES
Subjective:       Patient ID: Dottie Galeana is a 68 y.o. female.    Ms. Galeana is a known patient to me.     Chief Complaint: Urinary Tract Infection    Urinary Tract Infection    This is a new problem. The current episode started in the past 7 days. The problem occurs every urination. The problem has been gradually worsening. The quality of the pain is described as burning. Associated symptoms include flank pain, frequency and hematuria. Pertinent negatives include no rash. She has tried increased fluids for the symptoms. The treatment provided no relief.   Knee Pain    There was no injury mechanism. The pain is present in the right knee. The quality of the pain is described as aching. The symptoms are aggravated by movement and palpation.     Last month treated to persistent abscess with bactrim and clindamycin; resolved   Vitals:    07/22/19 1325   BP: 104/76   Pulse: 84   Resp: 16   Temp: 98.3 °F (36.8 °C)     Review of Systems   Constitutional: Negative for diaphoresis and fever.   HENT: Negative for facial swelling and trouble swallowing.    Eyes: Negative for discharge and redness.   Respiratory: Negative for cough and shortness of breath.    Cardiovascular: Negative for chest pain and palpitations.   Gastrointestinal: Negative for diarrhea.   Genitourinary: Positive for flank pain, frequency and hematuria. Negative for difficulty urinating.   Musculoskeletal: Positive for arthralgias and joint swelling. Negative for gait problem.   Skin: Negative for pallor and rash.   Neurological: Negative for facial asymmetry and speech difficulty.   Psychiatric/Behavioral: Negative for confusion. The patient is not nervous/anxious.        Past Medical History:   Diagnosis Date    Arthritis     Closed compression fracture of L1 lumbar vertebra 11/7/2018    Depression     General anesthetics causing adverse effect in therapeutic use     Generalized anxiety disorder     GERD (gastroesophageal reflux disease)      History of seasonal allergies     HTN (hypertension)     Hyperlipidemia     Hyponatremia     Insomnia     Osteoporosis     Stress incontinence     Stroke     ocular behind left eye     Objective:      Physical Exam   Constitutional: She is oriented to person, place, and time. She does not have a sickly appearance. No distress.   HENT:   Head: Normocephalic.   Right Ear: Hearing normal.   Left Ear: Hearing normal.   Nose: Nose normal.   Eyes: Conjunctivae and lids are normal.   Neck: No JVD present. No tracheal deviation present.   Cardiovascular: Normal rate and normal heart sounds.   Pulmonary/Chest: Effort normal and breath sounds normal.   Abdominal: Normal appearance. She exhibits no distension.   Musculoskeletal: She exhibits no deformity.        Right knee: She exhibits swelling. Tenderness found.   Neurological: She is alert and oriented to person, place, and time.   Skin: She is not diaphoretic. No pallor.   Psychiatric: She has a normal mood and affect. Her speech is normal and behavior is normal. Judgment and thought content normal. Cognition and memory are normal.   Nursing note and vitals reviewed.      Assessment:       1. Urinary tract infection with hematuria, site unspecified    2. Acute pain of right knee        Plan:       Urinary tract infection with hematuria, site unspecified  -     POCT URINE DIPSTICK WITHOUT MICROSCOPE  -     Urine culture; Future; Expected date: 07/22/2019  -     doxycycline (VIBRA-TABS) 100 MG tablet; Take 1 tablet (100 mg total) by mouth every 12 (twelve) hours. for 7 days  Dispense: 14 tablet; Refill: 0    Acute pain of right knee  -     X-ray Knee Ortho Right; Future; Expected date: 07/22/2019        advised to take probiotic     Follow up for further evaluation if s/s worsen, fail to improve, or new symptoms arise.    Medication List with Changes/Refills   New Medications    DOXYCYCLINE (VIBRA-TABS) 100 MG TABLET    Take 1 tablet (100 mg total) by mouth every  12 (twelve) hours. for 7 days   Current Medications    ALENDRONATE (FOSAMAX) 70 MG TABLET        ALPRAZOLAM (XANAX) 0.25 MG TABLET    TAKE ONE TABLET THREE TIMES A DAY AS NEEDED.    AMLODIPINE (NORVASC) 10 MG TABLET    Take 1 tablet (10 mg total) by mouth once daily.    ASPIRIN 325 MG TABLET    Take 325 mg by mouth once daily.     BYSTOLIC 10 MG TAB    TAKE ONE TABLET DAILY FOR BLOOD PRESSURE.    CLINDAMYCIN (CLEOCIN) 300 MG CAPSULE    Take 2 capsules (600 mg total) by mouth 2 (two) times daily.    DICLOFENAC SODIUM (VOLTAREN) 1 % GEL    Apply 2 g topically 4 (four) times daily.    FLUTICASONE PROPIONATE (FLONASE) 50 MCG/ACTUATION NASAL SPRAY    USE 1 SPRAY IN EACH NOSTRIL DAILY FOR ALLERGIES    GABAPENTIN (NEURONTIN) 300 MG CAPSULE    TAKE 1 CAPSULE (300 MG TOTAL) BY MOUTH 2 (TWO) TIMES DAILY.    LOSARTAN (COZAAR) 100 MG TABLET    TAKE 1 TABLET (100 MG TOTAL) BY MOUTH ONCE DAILY.    MELOXICAM (MOBIC) 15 MG TABLET    Take 15 mg by mouth once daily.    MULTIVITAMIN (THERAGRAN) PER TABLET    Take 1 tablet by mouth once daily.     MUPIROCIN (BACTROBAN) 2 % OINTMENT    Apply topically 3 (three) times daily.    NITROSTAT 0.4 MG SL TABLET    Place 0.4 mg under the tongue every 5 (five) minutes as needed.     PANTOPRAZOLE (PROTONIX) 40 MG TABLET    TAKE 1 TABLET EVERY DAY    RANITIDINE (ZANTAC) 300 MG TABLET    TAKE 1 TABLET (300 MG TOTAL) BY MOUTH EVERY EVENING.    ROSUVASTATIN (CRESTOR) 10 MG TABLET    TAKE ONE TABLET BY MOUTH ONCE DAILY IN THE EVENING    SULFAMETHOXAZOLE-TRIMETHOPRIM 800-160MG (BACTRIM DS) 800-160 MG TAB    Take 1 tablet by mouth 2 (two) times daily.    TRAMADOL (ULTRAM) 50 MG TABLET    Take 1 tablet (50 mg total) by mouth every 12 (twelve) hours as needed for Pain.    VENLAFAXINE (EFFEXOR-XR) 150 MG CP24    TAKE 1 CAPSULE EVERY DAY    ZOLPIDEM (AMBIEN) 10 MG TAB    Take 1 tablet (10 mg total) by mouth nightly as needed.

## 2019-07-23 ENCOUNTER — PATIENT MESSAGE (OUTPATIENT)
Dept: FAMILY MEDICINE | Facility: CLINIC | Age: 69
End: 2019-07-23

## 2019-07-24 LAB — BACTERIA UR CULT: ABNORMAL

## 2019-08-03 ENCOUNTER — PATIENT MESSAGE (OUTPATIENT)
Dept: FAMILY MEDICINE | Facility: CLINIC | Age: 69
End: 2019-08-03

## 2019-08-03 DIAGNOSIS — Z12.31 ENCOUNTER FOR SCREENING MAMMOGRAM FOR MALIGNANT NEOPLASM OF BREAST: ICD-10-CM

## 2019-08-03 DIAGNOSIS — Z12.39 ENCOUNTER FOR OTHER SCREENING FOR MALIGNANT NEOPLASM OF BREAST: Primary | ICD-10-CM

## 2019-08-04 ENCOUNTER — PATIENT MESSAGE (OUTPATIENT)
Dept: FAMILY MEDICINE | Facility: CLINIC | Age: 69
End: 2019-08-04

## 2019-08-05 NOTE — TELEPHONE ENCOUNTER
Requesting a Marissa I have it pend if so. Please advise thank you     lov 07/22/19    Last marissa 05/30/18

## 2019-08-09 ENCOUNTER — PES CALL (OUTPATIENT)
Dept: ADMINISTRATIVE | Facility: CLINIC | Age: 69
End: 2019-08-09

## 2019-08-26 DIAGNOSIS — F41.1 GENERALIZED ANXIETY DISORDER: ICD-10-CM

## 2019-08-26 DIAGNOSIS — G47.00 INSOMNIA, UNSPECIFIED TYPE: ICD-10-CM

## 2019-08-26 RX ORDER — ZOLPIDEM TARTRATE 10 MG/1
10 TABLET ORAL NIGHTLY PRN
Qty: 30 TABLET | Refills: 2 | Status: SHIPPED | OUTPATIENT
Start: 2019-08-26 | End: 2019-11-25 | Stop reason: SDUPTHER

## 2019-08-26 RX ORDER — ALPRAZOLAM 0.25 MG/1
TABLET ORAL
Qty: 60 TABLET | Refills: 2 | Status: SHIPPED | OUTPATIENT
Start: 2019-08-26 | End: 2019-12-16 | Stop reason: SDUPTHER

## 2019-08-26 NOTE — PROGRESS NOTES
Refill Authorization Note     is requesting a refill authorization.    Brief assessment and rationale for refill: ROUTE; op / Needs Appt   Name and strength of medication: zolpidem (AMBIEN) 10 mg Tab / ALPRAZolam (XANAX) 0.25 MG tablet  Medication-related problems identified: Requires appointment    Medication Therapy Plan: Needs Appt (ANNUAL): Outside of protocol, Route to you     Medication reconciliation completed: No              How patient will take medication: t1t po pm prn / t1t po tid           Comments:   APPOINTMENTS past 12m or future 3m    Date Provider   LAST VISIT  10/17/2018 Josh Li MD   NEXT VISIT  Visit date not found Josh Li MD

## 2019-08-27 NOTE — TELEPHONE ENCOUNTER
Please schedule patient for appointment: Annual   Thanks!        APPOINTMENTS(past 12m or future 3m authorizing provider)    Date Provider   LAST VISIT  10/17/2018 Josh Li MD   NEXT VISIT  No visit date found Josh Li MD

## 2019-08-27 NOTE — PROGRESS NOTES
Refill Authorization Note     is requesting a refill authorization.    Brief assessment and rationale for refill: ROUTE: OP/Due for Annual  Name and strength of medication: VENLAFAXINE HCL  MG Capsule Extended Release 24 Hour  Medication-related problems identified: Requires appointment         Medication reconciliation completed: No              How patient will take medication: t1t qd          Comments:   Last Creatinine   Lab Results   Component Value Date    CREATININE 0.7 04/23/2019    CREATININE 0.7 12/21/2018    CREATININE 0.8 08/06/2018         Blood Pressure Readings: <139/89mmHg (12 months)   BP Readings from Last 3 Encounters:   07/22/19 104/76   06/17/19 120/80   06/17/19 138/76        APPOINTMENTS(past 12m or future 3m authorizing provider)    Date Provider   LAST VISIT  10/17/2018 Josh Li MD   NEXT VISIT  Visit date not found Josh Li MD

## 2019-08-28 RX ORDER — VENLAFAXINE HYDROCHLORIDE 150 MG/1
CAPSULE, EXTENDED RELEASE ORAL
Qty: 90 CAPSULE | Refills: 3 | Status: SHIPPED | OUTPATIENT
Start: 2019-08-28 | End: 2020-03-31 | Stop reason: SDUPTHER

## 2019-09-16 ENCOUNTER — PATIENT MESSAGE (OUTPATIENT)
Dept: FAMILY MEDICINE | Facility: CLINIC | Age: 69
End: 2019-09-16

## 2019-09-16 DIAGNOSIS — E04.1 THYROID NODULE: ICD-10-CM

## 2019-09-16 DIAGNOSIS — Z13.6 SCREENING FOR ISCHEMIC HEART DISEASE: ICD-10-CM

## 2019-09-16 DIAGNOSIS — Z00.00 ROUTINE GENERAL MEDICAL EXAMINATION AT A HEALTH CARE FACILITY: Primary | ICD-10-CM

## 2019-09-16 DIAGNOSIS — I10 ESSENTIAL HYPERTENSION: ICD-10-CM

## 2019-09-16 DIAGNOSIS — E11.9 DIABETES MELLITUS WITHOUT COMPLICATION: ICD-10-CM

## 2019-09-17 NOTE — TELEPHONE ENCOUNTER
Pt would like to know if she needs labs done before next ov?(October 1)  Labs pended, would you like to add anything else? Please advise.

## 2019-09-23 ENCOUNTER — HOSPITAL ENCOUNTER (OUTPATIENT)
Dept: RADIOLOGY | Facility: HOSPITAL | Age: 69
Discharge: HOME | End: 2019-09-23
Attending: NURSE PRACTITIONER
Payer: MEDICARE

## 2019-09-23 VITALS — HEIGHT: 67 IN | BODY MASS INDEX: 26.57 KG/M2 | WEIGHT: 169.31 LBS

## 2019-09-23 DIAGNOSIS — Z12.39 ENCOUNTER FOR OTHER SCREENING FOR MALIGNANT NEOPLASM OF BREAST: ICD-10-CM

## 2019-09-23 DIAGNOSIS — Z12.31 ENCOUNTER FOR SCREENING MAMMOGRAM FOR MALIGNANT NEOPLASM OF BREAST: ICD-10-CM

## 2019-09-23 PROCEDURE — 77067 MAMMO DIGITAL SCREENING BILAT WITH TOMOSYNTHESIS_CAD: ICD-10-PCS | Mod: 26,HCNC,, | Performed by: RADIOLOGY

## 2019-09-23 PROCEDURE — 77063 BREAST TOMOSYNTHESIS BI: CPT | Mod: 26,HCNC,, | Performed by: RADIOLOGY

## 2019-09-23 PROCEDURE — 77067 SCR MAMMO BI INCL CAD: CPT | Mod: 26,HCNC,, | Performed by: RADIOLOGY

## 2019-09-23 PROCEDURE — 77063 MAMMO DIGITAL SCREENING BILAT WITH TOMOSYNTHESIS_CAD: ICD-10-PCS | Mod: 26,HCNC,, | Performed by: RADIOLOGY

## 2019-09-23 PROCEDURE — 77067 SCR MAMMO BI INCL CAD: CPT | Mod: TC,HCNC,PN

## 2019-10-02 DIAGNOSIS — I10 ESSENTIAL HYPERTENSION: ICD-10-CM

## 2019-10-02 RX ORDER — NEBIVOLOL HYDROCHLORIDE 10 MG/1
TABLET ORAL
Qty: 90 TABLET | Refills: 0 | Status: SHIPPED | OUTPATIENT
Start: 2019-10-02 | End: 2020-01-03 | Stop reason: SDUPTHER

## 2019-10-02 NOTE — PROGRESS NOTES
Refill Authorization Note     is requesting a refill authorization.    Brief assessment and rationale for refill: APPROVE: prr  Name and strength of medication: BYSTOLIC 10 MG TAB 10 TAB       Medication Therapy Plan: LOV(10/18); HTN LCO(5/19) by KWASI Marcus; FOVS(10/19); Approve 3 more                   How patient will take medication: t1t po qd          Comments:   BP Readings from Last 3 Encounters:   07/22/19 104/76   06/17/19 120/80   06/17/19 138/76     Pulse Readings from Last 3 Encounters:   07/22/19 84   06/17/19 74   06/17/19 75       Appointments past 12m or future 3m    Date Provider   Last Visit   10/17/2018 Josh Li MD   Next Visit   10/22/2019 Josh Li MD

## 2019-10-08 ENCOUNTER — PES CALL (OUTPATIENT)
Dept: ADMINISTRATIVE | Facility: CLINIC | Age: 69
End: 2019-10-08

## 2019-10-22 ENCOUNTER — OFFICE VISIT (OUTPATIENT)
Dept: FAMILY MEDICINE | Facility: CLINIC | Age: 69
End: 2019-10-22
Payer: MEDICARE

## 2019-10-22 VITALS
HEART RATE: 86 BPM | OXYGEN SATURATION: 97 % | TEMPERATURE: 98 F | BODY MASS INDEX: 26.47 KG/M2 | WEIGHT: 168.63 LBS | HEIGHT: 67 IN | DIASTOLIC BLOOD PRESSURE: 78 MMHG | SYSTOLIC BLOOD PRESSURE: 114 MMHG

## 2019-10-22 DIAGNOSIS — G47.00 INSOMNIA, UNSPECIFIED TYPE: ICD-10-CM

## 2019-10-22 DIAGNOSIS — I10 ESSENTIAL HYPERTENSION: ICD-10-CM

## 2019-10-22 DIAGNOSIS — Z00.00 PREVENTATIVE HEALTH CARE: Primary | ICD-10-CM

## 2019-10-22 DIAGNOSIS — F41.1 GENERALIZED ANXIETY DISORDER: ICD-10-CM

## 2019-10-22 PROCEDURE — 3074F PR MOST RECENT SYSTOLIC BLOOD PRESSURE < 130 MM HG: ICD-10-PCS | Mod: HCNC,CPTII,S$GLB, | Performed by: FAMILY MEDICINE

## 2019-10-22 PROCEDURE — 99499 RISK ADDL DX/OHS AUDIT: ICD-10-PCS | Mod: HCNC,S$GLB,, | Performed by: FAMILY MEDICINE

## 2019-10-22 PROCEDURE — 99999 PR PBB SHADOW E&M-EST. PATIENT-LVL IV: CPT | Mod: PBBFAC,HCNC,, | Performed by: FAMILY MEDICINE

## 2019-10-22 PROCEDURE — 3078F DIAST BP <80 MM HG: CPT | Mod: HCNC,CPTII,S$GLB, | Performed by: FAMILY MEDICINE

## 2019-10-22 PROCEDURE — 3074F SYST BP LT 130 MM HG: CPT | Mod: HCNC,CPTII,S$GLB, | Performed by: FAMILY MEDICINE

## 2019-10-22 PROCEDURE — 99397 PER PM REEVAL EST PAT 65+ YR: CPT | Mod: HCNC,S$GLB,, | Performed by: FAMILY MEDICINE

## 2019-10-22 PROCEDURE — 99397 PR PREVENTIVE VISIT,EST,65 & OVER: ICD-10-PCS | Mod: HCNC,S$GLB,, | Performed by: FAMILY MEDICINE

## 2019-10-22 PROCEDURE — 99999 PR PBB SHADOW E&M-EST. PATIENT-LVL IV: ICD-10-PCS | Mod: PBBFAC,HCNC,, | Performed by: FAMILY MEDICINE

## 2019-10-22 PROCEDURE — 99499 UNLISTED E&M SERVICE: CPT | Mod: HCNC,S$GLB,, | Performed by: FAMILY MEDICINE

## 2019-10-22 PROCEDURE — 3078F PR MOST RECENT DIASTOLIC BLOOD PRESSURE < 80 MM HG: ICD-10-PCS | Mod: HCNC,CPTII,S$GLB, | Performed by: FAMILY MEDICINE

## 2019-10-22 RX ORDER — ASPIRIN 325 MG
TABLET ORAL
COMMUNITY
Start: 2019-09-30 | End: 2019-10-22

## 2019-10-22 NOTE — PROGRESS NOTES
Subjective:       Patient ID: Dottie Galeana is a 69 y.o. female.    Chief Complaint: Annual Exam      Here for 12 months for f/u.    She is tolerating her present meds.  She is following with cardiology.      HTN - controlled on amlodipine 10mg, losartan 100mg, Bystolic 10mg; BP has been well-controlled  HLD - controlled on Crestor 10mg  Anxiety - Mood stable on Effexor Xr 150. Using xanax 0.25mg 3 times a week.  Sleep is good on Ambien 10mg QHS  Osteoporosis - taking fosamax weekly  GERD - taking Protonix daily; Zantac PRN    She is acitvely exercising about weekly    The 10-year ASCVD risk score (Carkhadar PETTY Jr., et al., 2013) is: 8%    Values used to calculate the score:      Age: 69 years      Sex: Female      Is Non- : No      Diabetic: No      Tobacco smoker: No      Systolic Blood Pressure: 114 mmHg      Is BP treated: Yes      HDL Cholesterol: 72 mg/dL      Total Cholesterol: 232 mg/dL              Past Medical History:   Diagnosis Date    Arthritis     Closed compression fracture of L1 lumbar vertebra 11/7/2018    Depression     General anesthetics causing adverse effect in therapeutic use     Generalized anxiety disorder     GERD (gastroesophageal reflux disease)     History of seasonal allergies     HTN (hypertension)     Hyperlipidemia     Hyponatremia     Insomnia     Osteoporosis     Stress incontinence     Stroke     ocular behind left eye       Past Surgical History:   Procedure Laterality Date    BREAST BIOPSY Left 1998    benign    ESOPHAGOGASTRODUODENOSCOPY N/A 8/17/2018    Procedure: EGD (ESOPHAGOGASTRODUODENOSCOPY);  Surgeon: Al Santiago MD;  Location: [facility] ENDO;  Service: Endoscopy;  Laterality: N/A;    FIXATION KYPHOPLASTY N/A 11/13/2018    Procedure: Kyphoplasty L1;  Surgeon: Russ Gramajo MD;  Location: [facility] OR;  Service: Pain Management;  Laterality: N/A;    ORIF WRIST FRACTURE Right        Review of patient's allergies indicates:    Allergen Reactions    No known drug allergies        Social History     Socioeconomic History    Marital status:      Spouse name: Not on file    Number of children: Not on file    Years of education: Not on file    Highest education level: Not on file   Occupational History    Occupation: realtor   Social Needs    Financial resource strain: Not on file    Food insecurity:     Worry: Not on file     Inability: Not on file    Transportation needs:     Medical: Not on file     Non-medical: Not on file   Tobacco Use    Smoking status: Never Smoker    Smokeless tobacco: Never Used   Substance and Sexual Activity    Alcohol use: Yes     Alcohol/week: 14.0 standard drinks     Types: 14 Glasses of wine per week     Comment: 2 glasses wine per day    Drug use: Yes     Types: Hydrocodone, Benzodiazepines    Sexual activity: Not on file   Lifestyle    Physical activity:     Days per week: Not on file     Minutes per session: Not on file    Stress: Not on file   Relationships    Social connections:     Talks on phone: Not on file     Gets together: Not on file     Attends Hindu service: Not on file     Active member of club or organization: Not on file     Attends meetings of clubs or organizations: Not on file     Relationship status: Not on file   Other Topics Concern    Not on file   Social History Narrative    Not on file       Current Outpatient Medications on File Prior to Visit   Medication Sig Dispense Refill    alendronate (FOSAMAX) 70 MG tablet   11    ALPRAZolam (XANAX) 0.25 MG tablet TAKE ONE TABLET THREE TIMES A DAY AS NEEDED. 60 tablet 2    amLODIPine (NORVASC) 10 MG tablet Take 1 tablet (10 mg total) by mouth once daily. 90 tablet 1    aspirin 325 MG tablet Take 325 mg by mouth once daily.       BYSTOLIC 10 mg Tab TAKE ONE TABLET BY MOUTH DAILY FOR BLOOD PRESSURE. 90 tablet 0    diclofenac sodium (VOLTAREN) 1 % Gel Apply 2 g topically 4 (four) times daily. 100 g 2     fluticasone propionate (FLONASE) 50 mcg/actuation nasal spray USE 1 SPRAY IN EACH NOSTRIL DAILY FOR ALLERGIES 16 g 6    losartan (COZAAR) 100 MG tablet TAKE 1 TABLET (100 MG TOTAL) BY MOUTH ONCE DAILY. 90 tablet 1    multivitamin (THERAGRAN) per tablet Take 1 tablet by mouth once daily.       NITROSTAT 0.4 mg SL tablet Place 0.4 mg under the tongue every 5 (five) minutes as needed.       pantoprazole (PROTONIX) 40 MG tablet TAKE 1 TABLET EVERY DAY 90 tablet 3    ranitidine (ZANTAC) 300 MG tablet TAKE 1 TABLET (300 MG TOTAL) BY MOUTH EVERY EVENING. 30 tablet 5    rosuvastatin (CRESTOR) 10 MG tablet TAKE ONE TABLET BY MOUTH ONCE DAILY IN THE EVENING 30 tablet 11    venlafaxine (EFFEXOR-XR) 150 MG Cp24 TAKE 1 CAPSULE EVERY DAY 90 capsule 3    zolpidem (AMBIEN) 10 mg Tab TAKE 1 TABLET (10 MG TOTAL) BY MOUTH NIGHTLY AS NEEDED. 30 tablet 2    gabapentin (NEURONTIN) 300 MG capsule TAKE 1 CAPSULE (300 MG TOTAL) BY MOUTH 2 (TWO) TIMES DAILY. 60 capsule 1    [DISCONTINUED] aspirin 325 MG tablet       [DISCONTINUED] clindamycin (CLEOCIN) 300 MG capsule Take 2 capsules (600 mg total) by mouth 2 (two) times daily. 40 capsule 0    [DISCONTINUED] meloxicam (MOBIC) 15 MG tablet Take 15 mg by mouth once daily.  1    [DISCONTINUED] mupirocin (BACTROBAN) 2 % ointment Apply topically 3 (three) times daily. 30 g 1    [DISCONTINUED] sulfamethoxazole-trimethoprim 800-160mg (BACTRIM DS) 800-160 mg Tab Take 1 tablet by mouth 2 (two) times daily. 20 tablet 0    [DISCONTINUED] traMADol (ULTRAM) 50 mg tablet Take 1 tablet (50 mg total) by mouth every 12 (twelve) hours as needed for Pain. 14 tablet 0     No current facility-administered medications on file prior to visit.        Family History   Problem Relation Age of Onset    Ovarian cancer Mother 42       Review of Systems   Constitutional: Positive for fatigue. Negative for appetite change, chills, fever and unexpected weight change.   HENT: Negative for sore throat and  "trouble swallowing.    Eyes: Negative for pain and visual disturbance.   Respiratory: Negative for cough, shortness of breath and wheezing.    Cardiovascular: Negative for chest pain and palpitations.   Gastrointestinal: Negative for abdominal pain, blood in stool, diarrhea, nausea and vomiting.   Genitourinary: Negative for difficulty urinating, dysuria and hematuria.   Musculoskeletal: Positive for arthralgias. Negative for gait problem and neck pain.   Skin: Negative for rash and wound.   Neurological: Negative for dizziness, weakness, numbness and headaches.   Hematological: Negative for adenopathy.   Psychiatric/Behavioral: Negative for dysphoric mood.       Objective:      /78   Pulse 86   Temp 98.1 °F (36.7 °C) (Oral)   Ht 5' 7" (1.702 m)   Wt 76.5 kg (168 lb 10.4 oz)   LMP 04/21/1994   SpO2 97%   BMI 26.41 kg/m²   Physical Exam   Constitutional: She is oriented to person, place, and time. She appears well-developed and well-nourished.   HENT:   Head: Normocephalic.   Mouth/Throat: Oropharynx is clear and moist. No oropharyngeal exudate or posterior oropharyngeal erythema.   Eyes: Pupils are equal, round, and reactive to light. Conjunctivae and EOM are normal.   Neck: Normal range of motion. Neck supple. No thyromegaly present.   Cardiovascular: Normal rate, regular rhythm, S1 normal, S2 normal, normal heart sounds and intact distal pulses. Exam reveals no gallop and no friction rub.   No murmur heard.  Pulmonary/Chest: Effort normal and breath sounds normal. She has no wheezes. She has no rales.   Abdominal: Soft. Normal appearance and bowel sounds are normal.   Musculoskeletal:        Right lower leg: She exhibits no edema.        Left lower leg: She exhibits no edema.   Lymphadenopathy:     She has no cervical adenopathy.   Neurological: She is alert and oriented to person, place, and time. No cranial nerve deficit. Gait normal.   Skin: Skin is warm and intact. No rash noted.   Psychiatric: " She has a normal mood and affect.       Results for orders placed or performed in visit on 09/23/19   Lipid panel   Result Value Ref Range    Cholesterol 232 (H) 120 - 199 mg/dL    Triglycerides 196 (H) 30 - 150 mg/dL    HDL 72 40 - 75 mg/dL    LDL Cholesterol 120.8 63.0 - 159.0 mg/dL    Hdl/Cholesterol Ratio 31.0 20.0 - 50.0 %    Total Cholesterol/HDL Ratio 3.2 2.0 - 5.0    Non-HDL Cholesterol 160 mg/dL   Comprehensive metabolic panel   Result Value Ref Range    Sodium 140 136 - 145 mmol/L    Potassium 5.4 (H) 3.5 - 5.1 mmol/L    Chloride 103 95 - 110 mmol/L    CO2 28 23 - 29 mmol/L    Glucose 133 (H) 70 - 110 mg/dL    BUN, Bld 13 8 - 23 mg/dL    Creatinine 0.7 0.5 - 1.4 mg/dL    Calcium 9.9 8.7 - 10.5 mg/dL    Total Protein 7.7 6.0 - 8.4 g/dL    Albumin 4.3 3.5 - 5.2 g/dL    Total Bilirubin 0.5 0.1 - 1.0 mg/dL    Alkaline Phosphatase 82 55 - 135 U/L    AST 26 10 - 40 U/L    ALT 27 10 - 44 U/L    Anion Gap 9 8 - 16 mmol/L    eGFR if African American >60.0 >60 mL/min/1.73 m^2    eGFR if non African American >60.0 >60 mL/min/1.73 m^2   CBC auto differential   Result Value Ref Range    WBC 10.15 3.90 - 12.70 K/uL    RBC 4.61 4.00 - 5.40 M/uL    Hemoglobin 14.8 12.0 - 16.0 g/dL    Hematocrit 45.4 37.0 - 48.5 %    Mean Corpuscular Volume 99 (H) 82 - 98 fL    Mean Corpuscular Hemoglobin 32.1 (H) 27.0 - 31.0 pg    Mean Corpuscular Hemoglobin Conc 32.6 32.0 - 36.0 g/dL    RDW 12.6 11.5 - 14.5 %    Platelets 328 150 - 350 K/uL    MPV 9.8 9.2 - 12.9 fL    Immature Granulocytes 0.3 0.0 - 0.5 %    Gran # (ANC) 5.8 1.8 - 7.7 K/uL    Immature Grans (Abs) 0.03 0.00 - 0.04 K/uL    Lymph # 3.0 1.0 - 4.8 K/uL    Mono # 0.9 0.3 - 1.0 K/uL    Eos # 0.3 0.0 - 0.5 K/uL    Baso # 0.10 0.00 - 0.20 K/uL    nRBC 0 0 /100 WBC    Gran% 57.2 38.0 - 73.0 %    Lymph% 29.6 18.0 - 48.0 %    Mono% 9.0 4.0 - 15.0 %    Eosinophil% 2.9 0.0 - 8.0 %    Basophil% 1.0 0.0 - 1.9 %    Differential Method Automated    TSH   Result Value Ref Range    TSH  5.197 (H) 0.400 - 4.000 uIU/mL   T4, free   Result Value Ref Range    Free T4 0.83 0.71 - 1.51 ng/dL       Assessment:       1. Preventative health care    2. Essential hypertension    3. Insomnia, unspecified type    4. Generalized anxiety disorder        Plan:       Preventative health care    Essential hypertension    Insomnia, unspecified type    Generalized anxiety disorder      overall doing well  Continue present BP medications  continue Ambien and xanax and other present meds  Counseled on regular exercise, maintenance of a healthy weight, balanced diet rich in fruits/vegetables and lean protein, and avoidance of unhealthy habits like smoking and excessive alcohol intake.

## 2019-10-23 DIAGNOSIS — E78.5 HYPERLIPIDEMIA, UNSPECIFIED HYPERLIPIDEMIA TYPE: Primary | ICD-10-CM

## 2019-10-23 RX ORDER — ROSUVASTATIN CALCIUM 10 MG/1
TABLET, COATED ORAL
Qty: 90 TABLET | Refills: 3 | Status: SHIPPED | OUTPATIENT
Start: 2019-10-23 | End: 2019-10-24 | Stop reason: SDUPTHER

## 2019-10-23 NOTE — PROGRESS NOTES
Refill Authorization Note     is requesting a refill authorization.    Brief assessment and rationale for refill: APPROVE:  prr  Name and strength of medication: rosuvastatin (CRESTOR) 10 MG tablet       Medication Therapy Plan: HLD controlled on Crestor lco(10/19); labs wnl; approve 12 more    Medication reconciliation completed: No              How patient will take medication: t1t po qpm          Comments:   Last Lipids 12 months  Lab Results   Component Value Date    CHOL 232 (H) 09/23/2019    CHOL 207 (H) 04/23/2019    CHOL 210 (H) 05/09/2017    Lab Results   Component Value Date    HDL 72 09/23/2019    HDL 71 04/23/2019    HDL 73 05/09/2017      Lab Results   Component Value Date    TRIG 196 (H) 09/23/2019    TRIG 156 (H) 04/23/2019    TRIG 167 (H) 05/09/2017    Lab Results   Component Value Date    LDLCALC 120.8 09/23/2019    LDLCALC 104.8 04/23/2019    LDLCALC 103.6 05/09/2017       Lab Results   Component Value Date    CHOLHDL 31.0 09/23/2019    CHOLHDL 34.3 04/23/2019    CHOLHDL 34.8 05/09/2017        Appointments past 12m or future 3m    Date Provider   Last Visit   10/22/2019 Josh Li MD   Next Visit   4/20/2020 Josh Li MD

## 2019-10-24 DIAGNOSIS — E78.5 HYPERLIPIDEMIA, UNSPECIFIED HYPERLIPIDEMIA TYPE: ICD-10-CM

## 2019-10-24 RX ORDER — ROSUVASTATIN CALCIUM 10 MG/1
10 TABLET, COATED ORAL NIGHTLY
Qty: 90 TABLET | Refills: 3 | Status: SHIPPED | OUTPATIENT
Start: 2019-10-24 | End: 2020-01-24 | Stop reason: SDUPTHER

## 2019-11-01 RX ORDER — PANTOPRAZOLE SODIUM 40 MG/1
TABLET, DELAYED RELEASE ORAL
Qty: 90 TABLET | Refills: 1 | Status: SHIPPED | OUTPATIENT
Start: 2019-11-01 | End: 2020-03-31 | Stop reason: SDUPTHER

## 2019-11-01 NOTE — PROGRESS NOTES
Refill Authorization Note     is requesting a refill authorization.    Brief assessment and rationale for refill: APPROVE: prr  Name and strength of medication: pantoprazole (PROTONIX) 40 MG tablet       Medication Therapy Plan: GERD - taking Protonix lco(10/19)                              Comments.  Requested Prescriptions   Pending Prescriptions Disp Refills    pantoprazole (PROTONIX) 40 MG tablet [Pharmacy Med Name: PANTOPRAZOLE SODIUM 40 MG Tablet Delayed Release] 90 tablet 1     Sig: TAKE 1 TABLET EVERY DAY       Gastroenterology: Proton Pump Inhibitors Failed - 11/1/2019  4:03 PM        Failed - An appropriate indication is on the problem list        Passed - Patient is at least 18 years old        Passed - Osteoporosis is not on problem list        Passed - Plavix is not on active medication list        Passed - Valid encounter within last 12 months     Recent Outpatient Visits            1 week ago Preventative health care    [facility] Josh Li MD    3 months ago Urinary tract infection with hematuria, site unspecified    [facility] Meghan Pisano NP    4 months ago Abscess of buttock    [facility] Louise Velazquez NP    4 months ago Lumbar radiculitis    [place] [facility] Russ Gramajo MD    5 months ago DDD (degenerative disc disease), lumbar    [facility] Louise Velazquez NP          Future Appointments              In 3 months [facility]

## 2019-11-02 RX ORDER — AMLODIPINE BESYLATE 10 MG/1
TABLET ORAL
Qty: 90 TABLET | Refills: 1 | Status: SHIPPED | OUTPATIENT
Start: 2019-11-02 | End: 2020-03-31 | Stop reason: SDUPTHER

## 2019-11-11 RX ORDER — ALENDRONATE SODIUM 70 MG/1
70 TABLET ORAL
Qty: 4 TABLET | Refills: 11 | Status: SHIPPED | OUTPATIENT
Start: 2019-11-11 | End: 2020-06-11 | Stop reason: SDUPTHER

## 2019-11-11 NOTE — PROGRESS NOTES
Refill Routing Note    Medication(s) are appropriate for refill Outside of protocol      Requested Prescriptions   Pending Prescriptions Disp Refills    alendronate (FOSAMAX) 70 MG tablet [Pharmacy Med Name: ALENDRONATE NA 70 MG TAB 70 TAB] 4 tablet 11     Sig: TAKE 1 TABLET (70 MG TOTAL) BY MOUTH EVERY 7 DAYS.       Endocrinology: Bisphosphonates - alendronate Failed - 11/8/2019  6:19 PM        Failed - Vit D in normal range and within 360 days     No results found for: HYDROXYVITD, [id number]           Passed - Patient is at least 18 years old        Passed - Office visit in past 12 months or future 90 days     Recent Outpatient Visits            2 weeks ago Preventative health care    [facility] Josh Li MD    3 months ago Urinary tract infection with hematuria, site unspecified    [facility] Meghan Pisano NP    4 months ago Abscess of buttock    [facility] Louise Velazquez NP    4 months ago Lumbar radiculitis    [place] [facility] Russ Gramajo MD    5 months ago DDD (degenerative disc disease), lumbar    [facility] Louise Velazquez, NP          Future Appointments              In 3 months [facility]    In 5 months Josh Li MD [facility]                Passed - DEXA completed in last 720 days        Passed - Cr is 1.4 or below and within 360 days     Creatinine   Date Value Ref Range Status   09/23/2019 0.7 0.5 - 1.4 mg/dL Final   04/23/2019 0.7 0.5 - 1.4 mg/dL Final   12/21/2018 0.7 0.5 - 1.4 mg/dL Final              Passed - Ca in normal range and within 360 days     Calcium   Date Value Ref Range Status   09/23/2019 9.9 8.7 - 10.5 mg/dL Final   04/23/2019 9.8 8.7 - 10.5 mg/dL Final   12/21/2018 9.6 8.7 - 10.5 mg/dL Final              Passed - eGFR is 35 or above and within 360  days     eGFR if non    Date Value Ref Range Status   09/23/2019 >60.0 >60 mL/min/1.73 m^2 Final     Comment:     Calculation used to obtain the estimated glomerular filtration  rate (eGFR) is the CKD-EPI equation.      04/23/2019 >60.0 >60 mL/min/1.73 m^2 Final     Comment:     Calculation used to obtain the estimated glomerular filtration  rate (eGFR) is the CKD-EPI equation.      12/21/2018 >60.0 >60 mL/min/1.73 m^2 Final     Comment:     Calculation used to obtain the estimated glomerular filtration  rate (eGFR) is the CKD-EPI equation.

## 2019-11-23 DIAGNOSIS — G47.00 INSOMNIA, UNSPECIFIED TYPE: ICD-10-CM

## 2019-11-25 DIAGNOSIS — G47.00 INSOMNIA, UNSPECIFIED TYPE: ICD-10-CM

## 2019-11-25 RX ORDER — ZOLPIDEM TARTRATE 10 MG/1
10 TABLET ORAL NIGHTLY PRN
Qty: 30 TABLET | Refills: 2 | OUTPATIENT
Start: 2019-11-25

## 2019-11-25 RX ORDER — ZOLPIDEM TARTRATE 10 MG/1
10 TABLET ORAL NIGHTLY PRN
Qty: 30 TABLET | Refills: 2 | Status: SHIPPED | OUTPATIENT
Start: 2019-11-25 | End: 2020-01-24 | Stop reason: SDUPTHER

## 2019-11-25 NOTE — TELEPHONE ENCOUNTER
Refill Routing Note    Medication(s) are appropriate for refill Outside of protocol      Requested Prescriptions   Pending Prescriptions Disp Refills    zolpidem (AMBIEN) 10 mg Tab [Pharmacy Med Name: ZOLPIDEM TARTRATE 10 MG TAB 10 TAB] 30 tablet 2     Sig: TAKE 1 TABLET (10 MG TOTAL) BY MOUTH NIGHTLY AS NEEDED.       There is no refill protocol information for this order

## 2019-12-16 DIAGNOSIS — F41.1 GENERALIZED ANXIETY DISORDER: ICD-10-CM

## 2019-12-17 RX ORDER — ALPRAZOLAM 0.25 MG/1
TABLET ORAL
Qty: 60 TABLET | Refills: 2 | Status: SHIPPED | OUTPATIENT
Start: 2019-12-17 | End: 2020-02-24

## 2020-01-03 DIAGNOSIS — I10 ESSENTIAL HYPERTENSION: ICD-10-CM

## 2020-01-03 RX ORDER — NEBIVOLOL 10 MG/1
10 TABLET ORAL DAILY
Qty: 90 TABLET | Refills: 3 | Status: SHIPPED | OUTPATIENT
Start: 2020-01-03 | End: 2020-12-26 | Stop reason: SDUPTHER

## 2020-01-03 NOTE — PROGRESS NOTES
Refill Authorization Note     is requesting a refill authorization.    Brief assessment and rationale for refill: APPROVE: prr      Medication-related problems identified: Therapeutic duplication         Medication reconciliation completed: No                         Comments:   Requested Prescriptions   Pending Prescriptions Disp Refills    nebivolol (BYSTOLIC) 10 MG Tab 90 tablet 3     Sig: Take 1 tablet (10 mg total) by mouth once daily.       Cardiovascular:  Beta Blockers Passed - 1/3/2020  6:03 AM        Passed - Patient is at least 18 years old        Passed - Last BP in normal range within 360 days     BP Readings from Last 3 Encounters:   10/22/19 114/78   07/22/19 104/76   06/17/19 120/80              Passed - Last Heart Rate in normal range within 360 days     Pulse Readings from Last 3 Encounters:   10/22/19 86   07/22/19 84   06/17/19 74             Passed - Office visit in past 12 months or future 90 days     Recent Outpatient Visits            2 months ago Preventative health care    [facility] Josh Li MD    5 months ago Urinary tract infection with hematuria, site unspecified    [facility] Meghan Pisano NP    6 months ago Abscess of buttock    [facility] Neo Velazquez NP    6 months ago Lumbar radiculitis    [place] [facility] Russ Graamjo MD    7 months ago DDD (degenerative disc disease), lumbar    [facility] Louise Velazquez NP          Future Appointments              In 1 month [facility]    In 3 months Josh Li MD [facility]

## 2020-01-10 DIAGNOSIS — I10 ESSENTIAL HYPERTENSION: ICD-10-CM

## 2020-01-13 RX ORDER — LOSARTAN POTASSIUM 100 MG/1
100 TABLET ORAL DAILY
Qty: 90 TABLET | Refills: 3 | Status: SHIPPED | OUTPATIENT
Start: 2020-01-13 | End: 2020-01-20 | Stop reason: SDUPTHER

## 2020-01-20 DIAGNOSIS — I10 ESSENTIAL HYPERTENSION: ICD-10-CM

## 2020-01-20 RX ORDER — LOSARTAN POTASSIUM 100 MG/1
100 TABLET ORAL DAILY
Qty: 90 TABLET | Refills: 2 | Status: SHIPPED | OUTPATIENT
Start: 2020-01-20 | End: 2021-01-26 | Stop reason: SDUPTHER

## 2020-01-20 NOTE — PROGRESS NOTES
"Refill Authorization Note     is requesting a refill authorization.    Brief assessment and rationale for refill: REVIEW: abnormal labs           Medication Therapy Plan: K is elevated (5.4)-"No Visible Hemolysis"; will pend 3 more; REVIEW     Medication reconciliation completed: No                         Comments:   Requested Prescriptions   Pending Prescriptions Disp Refills    losartan (COZAAR) 100 MG tablet 90 tablet 2     Sig: Take 1 tablet (100 mg total) by mouth once daily.       Cardiovascular:  Angiotensin Receptor Blockers Failed - 1/20/2020  7:54 AM        Failed - K in normal range and within 360 days     Potassium   Date Value Ref Range Status   09/23/2019 5.4 (H) 3.5 - 5.1 mmol/L Final     Comment:     *No Visible Hemolysis   04/23/2019 3.7 3.5 - 5.1 mmol/L Final   12/21/2018 4.9 3.5 - 5.1 mmol/L Final              Passed - Patient is at least 18 years old        Passed - Last BP in normal range within 360 days     BP Readings from Last 3 Encounters:   10/22/19 114/78   07/22/19 104/76   06/17/19 120/80              Passed - Office visit in past 12 months or future 90 days     Recent Outpatient Visits            3 months ago Preventative health care    [facility] Josh Li MD    6 months ago Urinary tract infection with hematuria, site unspecified    [facility] Meghan Pisano NP    7 months ago Abscess of buttock    [facility] Neo Velazquez NP    7 months ago Lumbar radiculitis    [place] [facility] Russ Gramajo MD    7 months ago DDD (degenerative disc disease), lumbar    [facility] Neo Velazquez NP          Future Appointments              In 1 month [facility]    [facility]                Passed - Cr is 1.4 or below and within 360 days     Creatinine   Date Value Ref Range " Status   09/23/2019 0.7 0.5 - 1.4 mg/dL Final   04/23/2019 0.7 0.5 - 1.4 mg/dL Final   12/21/2018 0.7 0.5 - 1.4 mg/dL Final              Passed - eGFR within 360 days     eGFR if non    Date Value Ref Range Status   09/23/2019 >60.0 >60 mL/min/1.73 m^2 Final     Comment:     Calculation used to obtain the estimated glomerular filtration  rate (eGFR) is the CKD-EPI equation.      04/23/2019 >60.0 >60 mL/min/1.73 m^2 Final     Comment:     Calculation used to obtain the estimated glomerular filtration  rate (eGFR) is the CKD-EPI equation.      12/21/2018 >60.0 >60 mL/min/1.73 m^2 Final     Comment:     Calculation used to obtain the estimated glomerular filtration  rate (eGFR) is the CKD-EPI equation.        eGFR if    Date Value Ref Range Status   09/23/2019 >60.0 >60 mL/min/1.73 m^2 Final   04/23/2019 >60.0 >60 mL/min/1.73 m^2 Final   12/21/2018 >60.0 >60 mL/min/1.73 m^2 Final

## 2020-01-20 NOTE — TELEPHONE ENCOUNTER
I have reviewed and agree with the assessment below.  Recently seen and reports to be tolerating BP medications fine despite K of 5.4; will queue for 9 more months for your review.  Thanks

## 2020-01-24 DIAGNOSIS — G47.00 INSOMNIA, UNSPECIFIED TYPE: ICD-10-CM

## 2020-01-24 DIAGNOSIS — E78.5 HYPERLIPIDEMIA, UNSPECIFIED HYPERLIPIDEMIA TYPE: ICD-10-CM

## 2020-01-26 RX ORDER — ROSUVASTATIN CALCIUM 10 MG/1
10 TABLET, COATED ORAL NIGHTLY
Qty: 90 TABLET | Refills: 3 | Status: SHIPPED | OUTPATIENT
Start: 2020-01-26 | End: 2021-04-22

## 2020-01-26 RX ORDER — ZOLPIDEM TARTRATE 10 MG/1
10 TABLET ORAL NIGHTLY PRN
Qty: 30 TABLET | Refills: 2 | Status: SHIPPED | OUTPATIENT
Start: 2020-01-26 | End: 2020-03-15 | Stop reason: SDUPTHER

## 2020-02-21 DIAGNOSIS — F41.1 GENERALIZED ANXIETY DISORDER: ICD-10-CM

## 2020-02-24 DIAGNOSIS — G47.00 INSOMNIA, UNSPECIFIED TYPE: ICD-10-CM

## 2020-02-24 RX ORDER — ALPRAZOLAM 0.25 MG/1
TABLET ORAL
Qty: 60 TABLET | Refills: 2 | Status: SHIPPED | OUTPATIENT
Start: 2020-02-24 | End: 2020-03-15 | Stop reason: SDUPTHER

## 2020-02-24 RX ORDER — ZOLPIDEM TARTRATE 10 MG/1
10 TABLET ORAL NIGHTLY PRN
Qty: 30 TABLET | Refills: 2 | Status: CANCELLED | OUTPATIENT
Start: 2020-02-24

## 2020-02-26 ENCOUNTER — HOSPITAL ENCOUNTER (OUTPATIENT)
Dept: RADIOLOGY | Facility: HOSPITAL | Age: 70
Discharge: HOME | End: 2020-02-26
Attending: NURSE PRACTITIONER
Payer: MEDICARE

## 2020-02-26 DIAGNOSIS — E04.1 THYROID NODULE: ICD-10-CM

## 2020-02-26 PROCEDURE — 76536 US SOFT TISSUE HEAD NECK THYROID: ICD-10-PCS | Mod: 26,HCNC,, | Performed by: RADIOLOGY

## 2020-02-26 PROCEDURE — 76536 US EXAM OF HEAD AND NECK: CPT | Mod: TC,HCNC,PO

## 2020-02-26 PROCEDURE — 76536 US EXAM OF HEAD AND NECK: CPT | Mod: 26,HCNC,, | Performed by: RADIOLOGY

## 2020-03-02 ENCOUNTER — PATIENT OUTREACH (OUTPATIENT)
Dept: ADMINISTRATIVE | Facility: HOSPITAL | Age: 70
End: 2020-03-02

## 2020-03-02 NOTE — PROGRESS NOTES
Chart review completed 03/02/2020.  Care Everywhere updates requested and reviewed.  Immunizations reconciled. Media reviewed. Portal message sent.      Health Maintenance Due   Topic Date Due    Colonoscopy  04/03/2014    Shingles Vaccine (2 of 3) 10/19/2015

## 2020-03-10 ENCOUNTER — PATIENT MESSAGE (OUTPATIENT)
Dept: FAMILY MEDICINE | Facility: CLINIC | Age: 70
End: 2020-03-10

## 2020-03-11 NOTE — TELEPHONE ENCOUNTER
Dr Li. Patient would like to schedule Mychart video visit. Next available mychart slot 4/24. Please advise

## 2020-03-12 ENCOUNTER — PATIENT MESSAGE (OUTPATIENT)
Dept: FAMILY MEDICINE | Facility: CLINIC | Age: 70
End: 2020-03-12

## 2020-03-13 NOTE — TELEPHONE ENCOUNTER
Yes. She will be my first virtual visit.  Schedule her in an 11:20 slot so we have the lunch time to figure it out.

## 2020-03-15 DIAGNOSIS — F41.1 GENERALIZED ANXIETY DISORDER: ICD-10-CM

## 2020-03-15 DIAGNOSIS — G47.00 INSOMNIA, UNSPECIFIED TYPE: ICD-10-CM

## 2020-03-16 RX ORDER — ZOLPIDEM TARTRATE 10 MG/1
10 TABLET ORAL NIGHTLY PRN
Qty: 30 TABLET | Refills: 2 | Status: SHIPPED | OUTPATIENT
Start: 2020-03-16 | End: 2020-05-25 | Stop reason: SDUPTHER

## 2020-03-16 RX ORDER — ALPRAZOLAM 0.25 MG/1
0.25 TABLET ORAL 3 TIMES DAILY PRN
Qty: 60 TABLET | Refills: 2 | Status: SHIPPED | OUTPATIENT
Start: 2020-03-16 | End: 2020-06-11 | Stop reason: SDUPTHER

## 2020-03-22 ENCOUNTER — PATIENT MESSAGE (OUTPATIENT)
Dept: FAMILY MEDICINE | Facility: CLINIC | Age: 70
End: 2020-03-22

## 2020-03-24 ENCOUNTER — OFFICE VISIT (OUTPATIENT)
Dept: FAMILY MEDICINE | Facility: CLINIC | Age: 70
End: 2020-03-24
Payer: MEDICARE

## 2020-03-24 ENCOUNTER — LAB VISIT (OUTPATIENT)
Dept: URGENT CARE | Facility: CLINIC | Age: 70
End: 2020-03-24
Payer: MEDICARE

## 2020-03-24 ENCOUNTER — OFFICE VISIT (OUTPATIENT)
Dept: URGENT CARE | Facility: CLINIC | Age: 70
End: 2020-03-24
Payer: MEDICARE

## 2020-03-24 DIAGNOSIS — Z20.822 SUSPECTED COVID-19 VIRUS INFECTION: ICD-10-CM

## 2020-03-24 DIAGNOSIS — J22 VIRAL INFECTION OF LOWER RESPIRATORY SYSTEM: ICD-10-CM

## 2020-03-24 DIAGNOSIS — Z03.818 ENCOUNTER FOR OBSERVATION FOR SUSPECTED EXPOSURE TO OTHER BIOLOGICAL AGENTS RULED OUT: Primary | ICD-10-CM

## 2020-03-24 DIAGNOSIS — B97.89 VIRAL INFECTION OF LOWER RESPIRATORY SYSTEM: ICD-10-CM

## 2020-03-24 DIAGNOSIS — Z03.818 ENCOUNTER FOR OBSERVATION FOR SUSPECTED EXPOSURE TO OTHER BIOLOGICAL AGENTS RULED OUT: ICD-10-CM

## 2020-03-24 PROCEDURE — 99214 OFFICE O/P EST MOD 30 MIN: CPT | Mod: 95,,, | Performed by: INTERNAL MEDICINE

## 2020-03-24 PROCEDURE — 99214 PR OFFICE/OUTPT VISIT, EST, LEVL IV, 30-39 MIN: ICD-10-PCS | Mod: 95,,, | Performed by: INTERNAL MEDICINE

## 2020-03-24 PROCEDURE — U0002 COVID-19 LAB TEST NON-CDC: HCPCS | Mod: HCNC

## 2020-03-24 PROCEDURE — 1159F MED LIST DOCD IN RCRD: CPT | Mod: 95,,, | Performed by: INTERNAL MEDICINE

## 2020-03-24 PROCEDURE — 1101F PT FALLS ASSESS-DOCD LE1/YR: CPT | Mod: CPTII,95,, | Performed by: INTERNAL MEDICINE

## 2020-03-24 PROCEDURE — 1159F PR MEDICATION LIST DOCUMENTED IN MEDICAL RECORD: ICD-10-PCS | Mod: 95,,, | Performed by: INTERNAL MEDICINE

## 2020-03-24 PROCEDURE — 1101F PR PT FALLS ASSESS DOC 0-1 FALLS W/OUT INJ PAST YR: ICD-10-PCS | Mod: CPTII,95,, | Performed by: INTERNAL MEDICINE

## 2020-03-24 NOTE — PROGRESS NOTES
The patient location is: home  The chief complaint leading to consultation is: cough  Visit type: Virtual visit with synchronous audio and video  Total time spent with patient: 15 minutes   Each patient to whom he or she provides medical services by telemedicine is:  (1) informed of the relationship between the physician and patient and the respective role of any other health care provider with respect to management of the patient; and (2) notified that he or she may decline to receive medical services by telemedicine and may withdraw from such care at any time.    Notes:   Complains of 2 days of cough and fever up to 100.1 but the temperature was taken while Tylenol was active in her system (1.5 hr after taking).  + mild sore throat.  + chest tightness - mild.  + headache, + fatigue, + episode of diarrhea yesterday.  Mild SOB with cough.      Review of Systems   Constitutional: Positive for fatigue and fever.   HENT: Positive for sore throat.    Respiratory: Positive for cough, chest tightness and shortness of breath.    Gastrointestinal: Positive for diarrhea.   Neurological: Positive for headaches.         Physical Exam   Constitutional: She appears well-developed and well-nourished. She is cooperative. She has a sickly appearance (mild). No distress.   Neurological: She is alert.   Psychiatric: She has a normal mood and affect. Her speech is normal and behavior is normal. Thought content normal.         Assessment:       1. Encounter for observation for suspected exposure to other biological agents ruled out    2. Suspected Covid-19 Virus Infection    3. Viral infection of lower respiratory system        Plan:       Encounter for observation for suspected exposure to other biological agents ruled out  -     SARS- CoV-2 (COVID-19) QUALITATIVE PCR; Future; Expected date: 03/24/2020  -     COVID-19 Home Symptom Monitoring  - Duration (days): 14    Suspected Covid-19 Virus Infection  -     SARS- CoV-2 (COVID-19)  QUALITATIVE PCR; Future; Expected date: 03/24/2020  -     COVID-19 Home Symptom Monitoring  - Duration (days): 14    Viral infection of lower respiratory system            Medication List with Changes/Refills   Current Medications    ALENDRONATE (FOSAMAX) 70 MG TABLET    TAKE 1 TABLET (70 MG TOTAL) BY MOUTH EVERY 7 DAYS.    ALPRAZOLAM (XANAX) 0.25 MG TABLET    Take 1 tablet (0.25 mg total) by mouth 3 (three) times daily as needed.    AMLODIPINE (NORVASC) 10 MG TABLET    TAKE 1 TABLET EVERY DAY    ASPIRIN 325 MG TABLET    Take 325 mg by mouth once daily.     DICLOFENAC SODIUM (VOLTAREN) 1 % GEL    Apply 2 g topically 4 (four) times daily.    FLUTICASONE PROPIONATE (FLONASE) 50 MCG/ACTUATION NASAL SPRAY    USE 1 SPRAY IN EACH NOSTRIL DAILY FOR ALLERGIES    GABAPENTIN (NEURONTIN) 300 MG CAPSULE    TAKE 1 CAPSULE (300 MG TOTAL) BY MOUTH 2 (TWO) TIMES DAILY.    LOSARTAN (COZAAR) 100 MG TABLET    Take 1 tablet (100 mg total) by mouth once daily.    MULTIVITAMIN (THERAGRAN) PER TABLET    Take 1 tablet by mouth once daily.     NEBIVOLOL (BYSTOLIC) 10 MG TAB    Take 1 tablet (10 mg total) by mouth once daily.    NITROSTAT 0.4 MG SL TABLET    Place 0.4 mg under the tongue every 5 (five) minutes as needed.     PANTOPRAZOLE (PROTONIX) 40 MG TABLET    TAKE 1 TABLET EVERY DAY    RANITIDINE (ZANTAC) 300 MG TABLET    TAKE 1 TABLET (300 MG TOTAL) BY MOUTH EVERY EVENING.    ROSUVASTATIN (CRESTOR) 10 MG TABLET    Take 1 tablet (10 mg total) by mouth every evening.    VENLAFAXINE (EFFEXOR-XR) 150 MG CP24    TAKE 1 CAPSULE EVERY DAY    ZOLPIDEM (AMBIEN) 10 MG TAB    Take 1 tablet (10 mg total) by mouth nightly as needed.       Sent to [facility] for testing.  If SOB or chest tightness worsens/ becomes moderate - severe = go to ER  Self isolation in house from  precautions reviewed

## 2020-03-25 ENCOUNTER — PATIENT MESSAGE (OUTPATIENT)
Dept: FAMILY MEDICINE | Facility: CLINIC | Age: 70
End: 2020-03-25

## 2020-03-25 LAB — SARS-COV-2 RNA RESP QL NAA+PROBE: NOT DETECTED

## 2020-03-30 ENCOUNTER — NURSE TRIAGE (OUTPATIENT)
Dept: ADMINISTRATIVE | Facility: CLINIC | Age: 70
End: 2020-03-30

## 2020-03-30 DIAGNOSIS — R09.82 PND (POST-NASAL DRIP): ICD-10-CM

## 2020-03-30 RX ORDER — FLUTICASONE PROPIONATE 50 MCG
1 SPRAY, SUSPENSION (ML) NASAL DAILY
Qty: 16 G | Refills: 6 | Status: SHIPPED | OUTPATIENT
Start: 2020-03-30 | End: 2021-04-19 | Stop reason: SDUPTHER

## 2020-03-30 NOTE — PROGRESS NOTES
Your test was NEGATIVE for COVID-19 (coronavirus).  If you still have symptoms, treat with rest, fluids, and over-the-counter medications.  Continue to stay home, avoid large crowds, and practice proper handwashing.     If your symptoms worsen or if you have any other concerns, please contact [facility] at [phone].     Sincerely,    Luh Rose NP

## 2020-03-30 NOTE — TELEPHONE ENCOUNTER
Attempted to call patient for follow-up call with COVID-19 Home Symptom Monitoring Program. No answer for x 3 attempts, LMTCB with OOC#. Will send message to PCP office for FYI notice.      Reason for Disposition   Message left on identified voicemail    Protocols used: NO CONTACT OR DUPLICATE CONTACT CALL-A-[place]

## 2020-03-31 DIAGNOSIS — F41.1 GENERALIZED ANXIETY DISORDER: ICD-10-CM

## 2020-04-03 RX ORDER — VENLAFAXINE HYDROCHLORIDE 150 MG/1
150 CAPSULE, EXTENDED RELEASE ORAL DAILY
Qty: 90 CAPSULE | Refills: 0 | Status: SHIPPED | OUTPATIENT
Start: 2020-04-03 | End: 2020-07-07 | Stop reason: SDUPTHER

## 2020-04-03 NOTE — PROGRESS NOTES
Refill Authorization Note     is requesting a refill authorization.    Brief assessment and rationale for refill: REVIEW: not previously prescribed by provider(norvasc)/DDI     Medication-related problems identified: Drug-drug interaction    Medication Therapy Plan: FOVS(4/20); DDI(zantac and protonix); Not previously prescribed by provider(norvasc); GERD - taking Protonix daily; Zantac PRN(10/22/19-Guillermo); Approve effexor 3 more months    Medication reconciliation completed: No   Pharmacist Review Requested: Yes                     Comments:   Refill Center Care Gap Closure protocols temporarily suspended.   Requested Prescriptions   Pending Prescriptions Disp Refills    venlafaxine (EFFEXOR-XR) 150 MG Cp24 90 capsule 0     Sig: Take 1 capsule (150 mg total) by mouth once daily.       Psychiatry: Antidepressants - SNRI - desvenlafaxine & venlafaxine Passed - 4/3/2020  8:36 AM        Passed - Patient is at least 18 years old        Passed - Last BP in normal range within 360 days.     BP Readings from Last 3 Encounters:   10/22/19 114/78   07/22/19 104/76   06/17/19 120/80              Passed - Office visit in past 6 months or future 90 days.     Recent Outpatient Visits            1 week ago ERRONEOUS ENCOUNTER--DISREGARD    [facility]    1 week ago Encounter for observation for suspected exposure to other biological agents ruled out    [facility] Mahin Westbrook MD    5 months ago Preventative health care    [facility] Josh Li MD    8 months ago Urinary tract infection with hematuria, site unspecified    [facility] Meghan Pisano NP    9 months ago Abscess of buttock    [facility] Louise Velazquez NP          Future Appointments              In 2 weeks Josh Li MD [facility]                Passed - Cr is 1.3 or below and within 360 days      Creatinine   Date Value Ref Range Status   09/23/2019 0.7 0.5 - 1.4 mg/dL Final   04/23/2019 0.7 0.5 - 1.4 mg/dL Final   12/21/2018 0.7 0.5 - 1.4 mg/dL Final              Passed - eGFR within 360 days     eGFR if non    Date Value Ref Range Status   09/23/2019 >60.0 >60 mL/min/1.73 m^2 Final     Comment:     Calculation used to obtain the estimated glomerular filtration  rate (eGFR) is the CKD-EPI equation.      04/23/2019 >60.0 >60 mL/min/1.73 m^2 Final     Comment:     Calculation used to obtain the estimated glomerular filtration  rate (eGFR) is the CKD-EPI equation.      12/21/2018 >60.0 >60 mL/min/1.73 m^2 Final     Comment:     Calculation used to obtain the estimated glomerular filtration  rate (eGFR) is the CKD-EPI equation.        eGFR if    Date Value Ref Range Status   09/23/2019 >60.0 >60 mL/min/1.73 m^2 Final   04/23/2019 >60.0 >60 mL/min/1.73 m^2 Final   12/21/2018 >60.0 >60 mL/min/1.73 m^2 Final             pantoprazole (PROTONIX) 40 MG tablet 90 tablet 0     Sig: Take 1 tablet (40 mg total) by mouth once daily.       Gastroenterology: Proton Pump Inhibitors 2 Failed - 4/3/2020  8:37 AM        Failed - An appropriate indication is on the problem list        Passed - Patient is at least 18 years old        Passed - Osteoporosis is not on problem list        Passed - Office visit in past 6 months or future 90 days.     Recent Outpatient Visits            1 week ago ERRONEOUS ENCOUNTER--DISREGARD    [facility]    1 week ago Encounter for observation for suspected exposure to other biological agents ruled out    [facility] Mahin Westbrook MD    5 months ago Preventative health care    [facility] Josh Li MD    8 months ago Urinary tract infection with hematuria, site unspecified    [facility] Meghan Pisano NP    9 months ago Abscess of buttock    [facility] Louise MURPHY  RAUDEL Velazquez          Future Appointments              In 2 weeks Josh Li MD [facility]               amLODIPine (NORVASC) 10 MG tablet 90 tablet 0     Sig: Take 1 tablet (10 mg total) by mouth once daily.       Cardiovascular:  Calcium Channel Blockers Passed - 4/3/2020  8:36 AM        Passed - Patient is at least 18 years old        Passed - Last BP in normal range within 360 days.     BP Readings from Last 3 Encounters:   10/22/19 114/78   07/22/19 104/76   06/17/19 120/80              Passed - Office visit in past 12 months or future 90 days.     Recent Outpatient Visits            1 week ago ERRONEOUS ENCOUNTER--DISREGARD    [facility]    1 week ago Encounter for observation for suspected exposure to other biological agents ruled out    [facility] Mahin Westbrook MD    5 months ago Preventative health care    [facility] Josh Li MD    8 months ago Urinary tract infection with hematuria, site unspecified    [facility] Meghan Pisano NP    9 months ago Abscess of buttock    [facility] Louise Velazquez NP          Future Appointments              In 2 weeks Josh Li MD [facility]                 Appointments  past 12m or future 3m with PCP    Date Provider   Last Visit   10/22/2019 Josh Li MD   Next Visit   4/20/2020 Josh Li MD   .  ED visits in past 90 days: 0       Note composed:9:28 AM 04/03/2020

## 2020-04-06 RX ORDER — AMLODIPINE BESYLATE 10 MG/1
10 TABLET ORAL DAILY
Qty: 90 TABLET | Refills: 0 | Status: SHIPPED | OUTPATIENT
Start: 2020-04-06 | End: 2020-07-07 | Stop reason: SDUPTHER

## 2020-04-06 RX ORDER — PANTOPRAZOLE SODIUM 40 MG/1
40 TABLET, DELAYED RELEASE ORAL DAILY
Qty: 90 TABLET | Refills: 0 | Status: SHIPPED | OUTPATIENT
Start: 2020-04-06 | End: 2020-07-07 | Stop reason: SDUPTHER

## 2020-04-06 NOTE — TELEPHONE ENCOUNTER
Please see the following assessment. Drug interactions pertaining to Protonix reviewed. No change in therapy recommended at this time. This refill request still requires some action on your part. Amlodipine 10 mg has not been previously prescribed by you. Last ordered by KWASI Marcus 11/19. Pended 90 day supply.  Defer to you. Thank you.

## 2020-04-14 ENCOUNTER — TELEPHONE (OUTPATIENT)
Dept: FAMILY MEDICINE | Facility: CLINIC | Age: 70
End: 2020-04-14

## 2020-04-20 ENCOUNTER — PATIENT OUTREACH (OUTPATIENT)
Dept: ADMINISTRATIVE | Facility: HOSPITAL | Age: 70
End: 2020-04-20

## 2020-04-20 ENCOUNTER — OFFICE VISIT (OUTPATIENT)
Dept: FAMILY MEDICINE | Facility: CLINIC | Age: 70
End: 2020-04-20
Payer: MEDICARE

## 2020-04-20 DIAGNOSIS — G47.00 INSOMNIA, UNSPECIFIED TYPE: Primary | ICD-10-CM

## 2020-04-20 DIAGNOSIS — Z12.11 SCREEN FOR COLON CANCER: Primary | ICD-10-CM

## 2020-04-20 DIAGNOSIS — F41.1 GENERALIZED ANXIETY DISORDER: ICD-10-CM

## 2020-04-20 DIAGNOSIS — I10 ESSENTIAL HYPERTENSION: ICD-10-CM

## 2020-04-20 PROCEDURE — 99442 PR PHYSICIAN TELEPHONE EVALUATION 11-20 MIN: CPT | Mod: HCNC,95,, | Performed by: FAMILY MEDICINE

## 2020-04-20 PROCEDURE — 99442 PR PHYSICIAN TELEPHONE EVALUATION 11-20 MIN: ICD-10-PCS | Mod: HCNC,95,, | Performed by: FAMILY MEDICINE

## 2020-04-20 NOTE — PROGRESS NOTES
Subjective:       Patient ID: Dottie Galeana is a 69 y.o. female.    Chief Complaint: Insomnia      Here for 6 months  F/u.    The patient location is: home  The chief complaint leading to consultation is: insomnia  Visit type: audio only  Total time spent with patient: 15 minutes  Each patient to whom he or she provides medical services by telemedicine is:  (1) informed of the relationship between the physician and patient and the respective role of any other health care provider with respect to management of the patient; and (2) notified that he or she may decline to receive medical services by telemedicine and may withdraw from such care at any time.    Recently recovered from recent possible Covid infection with no residual symptoms.    She is tolerating her present meds.  HTN - controlled on amlodipine 10mg, losartan 100mg, Bystolic 10mg; BP has been well-controlled; /80  HLD - controlled on Crestor 10mg  Anxiety - Mood stable on Effexor Xr 150. Using xanax 0.25mg 3 times a week.  Insomnia - Sleep is good on Ambien 10mg QHS  Osteoporosis - taking fosamax weekly  GERD - taking Protonix daily; Zantac PRN    She is acitvely exercising about weekly  weight stable                  Past Medical History:   Diagnosis Date    Arthritis     Closed compression fracture of L1 lumbar vertebra 11/7/2018    Depression     General anesthetics causing adverse effect in therapeutic use     Generalized anxiety disorder     GERD (gastroesophageal reflux disease)     History of seasonal allergies     HTN (hypertension)     Hyperlipidemia     Hyponatremia     Insomnia     Osteoporosis     Stress incontinence     Stroke     ocular behind left eye       Past Surgical History:   Procedure Laterality Date    BREAST BIOPSY Left 1998    benign    ESOPHAGOGASTRODUODENOSCOPY N/A 8/17/2018    Procedure: EGD (ESOPHAGOGASTRODUODENOSCOPY);  Surgeon: Al Santiago MD;  Location: [facility];  Service: Endoscopy;   Laterality: N/A;    FIXATION KYPHOPLASTY N/A 11/13/2018    Procedure: Kyphoplasty L1;  Surgeon: Russ Gramajo MD;  Location: [facility];  Service: Pain Management;  Laterality: N/A;    ORIF WRIST FRACTURE Right        Review of patient's allergies indicates:   Allergen Reactions    No known drug allergies        Social History     Socioeconomic History    Marital status:      Spouse name: Not on file    Number of children: Not on file    Years of education: Not on file    Highest education level: Not on file   Occupational History    Occupation: realtor   Social Needs    Financial resource strain: Not on file    Food insecurity:     Worry: Not on file     Inability: Not on file    Transportation needs:     Medical: Not on file     Non-medical: Not on file   Tobacco Use    Smoking status: Never Smoker    Smokeless tobacco: Never Used   Substance and Sexual Activity    Alcohol use: Yes     Alcohol/week: 14.0 standard drinks     Types: 14 Glasses of wine per week     Comment: 2 glasses wine per day    Drug use: Yes     Types: Hydrocodone, Benzodiazepines    Sexual activity: Not on file   Lifestyle    Physical activity:     Days per week: Not on file     Minutes per session: Not on file    Stress: Not on file   Relationships    Social connections:     Talks on phone: Not on file     Gets together: Not on file     Attends Faith service: Not on file     Active member of club or organization: Not on file     Attends meetings of clubs or organizations: Not on file     Relationship status: Not on file   Other Topics Concern    Not on file   Social History Narrative    Not on file       Current Outpatient Medications on File Prior to Visit   Medication Sig Dispense Refill    alendronate (FOSAMAX) 70 MG tablet TAKE 1 TABLET (70 MG TOTAL) BY MOUTH EVERY 7 DAYS. 4 tablet 11    ALPRAZolam (XANAX) 0.25 MG tablet Take 1 tablet (0.25 mg total) by mouth 3 (three) times daily as needed. 60 tablet 2     amLODIPine (NORVASC) 10 MG tablet Take 1 tablet (10 mg total) by mouth once daily. 90 tablet 0    aspirin 325 MG tablet Take 325 mg by mouth once daily.       diclofenac sodium (VOLTAREN) 1 % Gel Apply 2 g topically 4 (four) times daily. 100 g 2    fluticasone propionate (FLONASE) 50 mcg/actuation nasal spray 1 spray (50 mcg total) by Each Nostril route once daily. 16 g 6    gabapentin (NEURONTIN) 300 MG capsule TAKE 1 CAPSULE (300 MG TOTAL) BY MOUTH 2 (TWO) TIMES DAILY. 60 capsule 1    losartan (COZAAR) 100 MG tablet Take 1 tablet (100 mg total) by mouth once daily. 90 tablet 2    multivitamin (THERAGRAN) per tablet Take 1 tablet by mouth once daily.       nebivolol (BYSTOLIC) 10 MG Tab Take 1 tablet (10 mg total) by mouth once daily. 90 tablet 3    NITROSTAT 0.4 mg SL tablet Place 0.4 mg under the tongue every 5 (five) minutes as needed.       pantoprazole (PROTONIX) 40 MG tablet Take 1 tablet (40 mg total) by mouth once daily. 90 tablet 0    ranitidine (ZANTAC) 300 MG tablet TAKE 1 TABLET (300 MG TOTAL) BY MOUTH EVERY EVENING. 30 tablet 5    rosuvastatin (CRESTOR) 10 MG tablet Take 1 tablet (10 mg total) by mouth every evening. 90 tablet 3    venlafaxine (EFFEXOR-XR) 150 MG Cp24 Take 1 capsule (150 mg total) by mouth once daily. 90 capsule 0    zolpidem (AMBIEN) 10 mg Tab Take 1 tablet (10 mg total) by mouth nightly as needed. 30 tablet 2     No current facility-administered medications on file prior to visit.        Family History   Problem Relation Age of Onset    Ovarian cancer Mother 42       Review of Systems   Constitutional: Negative for appetite change, chills, fatigue, fever and unexpected weight change.   HENT: Negative for sore throat and trouble swallowing.    Eyes: Negative for pain and visual disturbance.   Respiratory: Negative for cough, shortness of breath and wheezing.    Cardiovascular: Negative for chest pain and palpitations.   Gastrointestinal: Negative for abdominal pain,  blood in stool, diarrhea, nausea and vomiting.   Genitourinary: Negative for difficulty urinating, dysuria and hematuria.   Musculoskeletal: Positive for arthralgias. Negative for gait problem and neck pain.   Skin: Negative for rash and wound.   Neurological: Negative for dizziness, weakness, numbness and headaches.   Hematological: Negative for adenopathy.   Psychiatric/Behavioral: Positive for sleep disturbance. Negative for dysphoric mood.       Objective:      Physical Exam   Constitutional: She is oriented to person, place, and time.   Pulmonary/Chest: Effort normal.   Musculoskeletal:        Right lower leg: She exhibits no edema.        Left lower leg: She exhibits no edema.   Lymphadenopathy:     She has no cervical adenopathy.   Neurological: She is alert and oriented to person, place, and time. Gait normal.   Skin: Skin is intact.   Psychiatric: She has a normal mood and affect. Her behavior is normal. Judgment normal.       Results for orders placed or performed in visit on 03/24/20   SARS- CoV-2 (COVID-19) QUALITATIVE PCR   Result Value Ref Range    SARS-CoV2 (COVID-19) Qualitative PCR Not Detected Not Detected       Assessment:       1. Insomnia, unspecified type    2. Essential hypertension    3. Generalized anxiety disorder        Plan:       Insomnia, unspecified type    Essential hypertension    Generalized anxiety disorder      overall doing well  Continue present BP medications  continue Ambien and xanax and other present meds  Counseled on regular exercise, maintenance of a healthy weight, balanced diet rich in fruits/vegetables and lean protein, and avoidance of unhealthy habits like smoking and excessive alcohol intake.

## 2020-04-20 NOTE — PROGRESS NOTES
Chart review completed 04/20/2020.  Care Everywhere updates requested and reviewed.  Immunizations reconciled. Media reviewed.     DIS, [facility], and quest reviewed    WOG orders placed. FITKIT

## 2020-04-21 ENCOUNTER — PATIENT MESSAGE (OUTPATIENT)
Dept: ADMINISTRATIVE | Facility: OTHER | Age: 70
End: 2020-04-21

## 2020-05-05 ENCOUNTER — PATIENT MESSAGE (OUTPATIENT)
Dept: ADMINISTRATIVE | Facility: HOSPITAL | Age: 70
End: 2020-05-05

## 2020-05-07 ENCOUNTER — PATIENT MESSAGE (OUTPATIENT)
Dept: FAMILY MEDICINE | Facility: CLINIC | Age: 70
End: 2020-05-07

## 2020-05-08 ENCOUNTER — CLINICAL SUPPORT (OUTPATIENT)
Dept: URGENT CARE | Facility: CLINIC | Age: 70
End: 2020-05-08
Payer: MEDICARE

## 2020-05-08 ENCOUNTER — OFFICE VISIT (OUTPATIENT)
Dept: FAMILY MEDICINE | Facility: CLINIC | Age: 70
End: 2020-05-08
Payer: MEDICARE

## 2020-05-08 DIAGNOSIS — Z20.822 SUSPECTED COVID-19 VIRUS INFECTION: Primary | ICD-10-CM

## 2020-05-08 DIAGNOSIS — Z20.822 SUSPECTED COVID-19 VIRUS INFECTION: ICD-10-CM

## 2020-05-08 LAB — SARS-COV-2 RNA RESP QL NAA+PROBE: NOT DETECTED

## 2020-05-08 PROCEDURE — 1101F PR PT FALLS ASSESS DOC 0-1 FALLS W/OUT INJ PAST YR: ICD-10-PCS | Mod: HCNC,CPTII,95, | Performed by: FAMILY MEDICINE

## 2020-05-08 PROCEDURE — 1101F PT FALLS ASSESS-DOCD LE1/YR: CPT | Mod: HCNC,CPTII,95, | Performed by: FAMILY MEDICINE

## 2020-05-08 PROCEDURE — U0002 COVID-19 LAB TEST NON-CDC: HCPCS | Mod: HCNC

## 2020-05-08 PROCEDURE — 1159F PR MEDICATION LIST DOCUMENTED IN MEDICAL RECORD: ICD-10-PCS | Mod: HCNC,95,, | Performed by: FAMILY MEDICINE

## 2020-05-08 PROCEDURE — 99213 PR OFFICE/OUTPT VISIT, EST, LEVL III, 20-29 MIN: ICD-10-PCS | Mod: HCNC,95,, | Performed by: FAMILY MEDICINE

## 2020-05-08 PROCEDURE — 99213 OFFICE O/P EST LOW 20 MIN: CPT | Mod: HCNC,95,, | Performed by: FAMILY MEDICINE

## 2020-05-08 PROCEDURE — 1159F MED LIST DOCD IN RCRD: CPT | Mod: HCNC,95,, | Performed by: FAMILY MEDICINE

## 2020-05-08 NOTE — PROGRESS NOTES
The patient location is: home  The chief complaint leading to consultation is: covid 19  Visit type: Virtual visit with synchronous audio and video  Total time spent with patient: 15 minutes  Each patient to whom he or she provides medical services by telemedicine is:  (1) informed of the relationship between the physician and patient and the respective role of any other health care provider with respect to management of the patient; and (2) notified that he or she may decline to receive medical services by telemedicine and may withdraw from such care at any time.    HPI:  C/o fatigue, fever at bedtime, sweats, PND, cough  for 3 days  Temp 99.7 in evening  No SOB    Past Medical History:   Diagnosis Date    Arthritis     Closed compression fracture of L1 lumbar vertebra 11/7/2018    Depression     General anesthetics causing adverse effect in therapeutic use     Generalized anxiety disorder     GERD (gastroesophageal reflux disease)     History of seasonal allergies     HTN (hypertension)     Hyperlipidemia     Hyponatremia     Insomnia     Osteoporosis     Stress incontinence     Stroke     ocular behind left eye       Past Surgical History:   Procedure Laterality Date    BREAST BIOPSY Left 1998    benign    ESOPHAGOGASTRODUODENOSCOPY N/A 8/17/2018    Procedure: EGD (ESOPHAGOGASTRODUODENOSCOPY);  Surgeon: Al Santiago MD;  Location: [facility] ENDO;  Service: Endoscopy;  Laterality: N/A;    FIXATION KYPHOPLASTY N/A 11/13/2018    Procedure: Kyphoplasty L1;  Surgeon: Russ Gramajo MD;  Location: [facility] OR;  Service: Pain Management;  Laterality: N/A;    ORIF WRIST FRACTURE Right        Review of patient's allergies indicates:   Allergen Reactions    No known drug allergies        Social History     Socioeconomic History    Marital status:      Spouse name: Not on file    Number of children: Not on file    Years of education: Not on file    Highest education level: Not on file    Occupational History    Occupation: realtor   Social Needs    Financial resource strain: Not on file    Food insecurity:     Worry: Not on file     Inability: Not on file    Transportation needs:     Medical: Not on file     Non-medical: Not on file   Tobacco Use    Smoking status: Never Smoker    Smokeless tobacco: Never Used   Substance and Sexual Activity    Alcohol use: Yes     Alcohol/week: 14.0 standard drinks     Types: 14 Glasses of wine per week     Comment: 2 glasses wine per day    Drug use: Yes     Types: Hydrocodone, Benzodiazepines    Sexual activity: Not on file   Lifestyle    Physical activity:     Days per week: Not on file     Minutes per session: Not on file    Stress: Not on file   Relationships    Social connections:     Talks on phone: Not on file     Gets together: Not on file     Attends Jain service: Not on file     Active member of club or organization: Not on file     Attends meetings of clubs or organizations: Not on file     Relationship status: Not on file   Other Topics Concern    Not on file   Social History Narrative    Not on file       Current Outpatient Medications on File Prior to Visit   Medication Sig Dispense Refill    alendronate (FOSAMAX) 70 MG tablet TAKE 1 TABLET (70 MG TOTAL) BY MOUTH EVERY 7 DAYS. 4 tablet 11    ALPRAZolam (XANAX) 0.25 MG tablet Take 1 tablet (0.25 mg total) by mouth 3 (three) times daily as needed. 60 tablet 2    amLODIPine (NORVASC) 10 MG tablet Take 1 tablet (10 mg total) by mouth once daily. 90 tablet 0    aspirin 325 MG tablet Take 325 mg by mouth once daily.       diclofenac sodium (VOLTAREN) 1 % Gel Apply 2 g topically 4 (four) times daily. 100 g 2    fluticasone propionate (FLONASE) 50 mcg/actuation nasal spray 1 spray (50 mcg total) by Each Nostril route once daily. 16 g 6    gabapentin (NEURONTIN) 300 MG capsule TAKE 1 CAPSULE (300 MG TOTAL) BY MOUTH 2 (TWO) TIMES DAILY. 60 capsule 1    losartan (COZAAR) 100 MG  tablet Take 1 tablet (100 mg total) by mouth once daily. 90 tablet 2    multivitamin (THERAGRAN) per tablet Take 1 tablet by mouth once daily.       nebivolol (BYSTOLIC) 10 MG Tab Take 1 tablet (10 mg total) by mouth once daily. 90 tablet 3    NITROSTAT 0.4 mg SL tablet Place 0.4 mg under the tongue every 5 (five) minutes as needed.       pantoprazole (PROTONIX) 40 MG tablet Take 1 tablet (40 mg total) by mouth once daily. 90 tablet 0    ranitidine (ZANTAC) 300 MG tablet TAKE 1 TABLET (300 MG TOTAL) BY MOUTH EVERY EVENING. 30 tablet 5    rosuvastatin (CRESTOR) 10 MG tablet Take 1 tablet (10 mg total) by mouth every evening. 90 tablet 3    venlafaxine (EFFEXOR-XR) 150 MG Cp24 Take 1 capsule (150 mg total) by mouth once daily. 90 capsule 0    zolpidem (AMBIEN) 10 mg Tab Take 1 tablet (10 mg total) by mouth nightly as needed. 30 tablet 2     No current facility-administered medications on file prior to visit.        Family History   Problem Relation Age of Onset    Ovarian cancer Mother 42        Review of Systems   Constitutional: Positive for fever. Negative for chills and weight loss.   HENT: Negative for ear pain and sore throat.    Respiratory: Positive for cough. Negative for hemoptysis, shortness of breath and wheezing.    Cardiovascular: Negative for chest pain.   Skin: Negative for rash.   Neurological: Negative for headaches.   Endo/Heme/Allergies: Negative for environmental allergies.       Physical Exam   Constitutional: She is oriented to person, place, and time. She appears well-developed and well-nourished.   HENT:   Head: Normocephalic and atraumatic.   Eyes: Pupils are equal, round, and reactive to light. EOM are normal.   Neck: Normal range of motion. Neck supple.   Pulmonary/Chest: Effort normal.   Neurological: She is alert and oriented to person, place, and time.   Psychiatric: She has a normal mood and affect. Her behavior is normal. Judgment and thought content normal.        Suspected  Covid-19 Virus Infection  -     COVID-19 Routine Screening; Future; Expected date: 05/08/2020     will send to urgent care for nasal screen'  Patient advised to pursue rest, increase fluids, take OTC multi-symptom cold relief medication of choice, and exercise contact precautions.    Instructions for Patients with Confirmed or Suspected COVID-19    If you are awaiting your test result, you will either be called or it will be released to the patient portal.  If you have any questions about your test, please visit [email or web address] or call our COVID-19 information line at [phone].       Stay home and stay away from family members and friends. The CDC says, you can leave home after these three things have happened: 1) You have had no fever for at least 72 hours (that is three full days of no fever without the use of medicine that reduces fevers) 2) AND other symptoms have improved (for example, when your cough or shortness of breath have improved) 3) AND at least 7 days have passed since your symptoms first appeared.   Separate yourself from other people and animals in your home.   Call ahead before visiting your doctor.   Wear a facemask.   Cover your coughs and sneezes.   Wash your hands often with soap and water; hand  can be used, too.   Avoid sharing personal household items.   Wipe down surfaces used daily.   Monitor your symptoms. Seek prompt medical attention if your illness is worsening (e.g., difficulty breathing).    Before seeking care, call your healthcare provider.   If you have a medical emergency and need to call 911, notify the dispatch personnel that you have, or are being evaluated for COVID-19. If possible, put on a facemask before emergency medical services arrive.        Recommended precautions for household members, intimate partners, and caregivers in a home setting of a patient with symptomatic laboratory-confirmed COVID-19 or a patient under  investigation.  Household members, intimate partners, and caregivers in the home setting awaiting tests results have close contact with a person with symptomatic, laboratory-confirmed COVID-19 or a person under investigation. Close contacts should monitor their health; they should call their provider right away if they develop symptoms suggestive of COVID-19 (e.g., fever, cough, shortness of breath).    Close contacts should also follow these recommendations:   Make sure that you understand and can help the patient follow their provider's instructions for medication(s) and care. You should help the patient with basic needs in the home and provide support for getting groceries, prescriptions, and other personal needs.   Monitor the patient's symptoms. If the patient is getting sicker, call his or her healthcare provider and tell them that the patient has laboratory-confirmed COVID-19. If the patient has a medical emergency and you need to call 911, notify the dispatch personnel that the patient has, or is being evaluated for COVID-19.   Household members should stay in another room or be  from the patient. Household members should use a separate bedroom and bathroom, if available.   Prohibit visitors.   Household members should care for any pets in the home.   Make sure that shared spaces in the home have good air flow, such as by an air conditioner or an opened window, weather permitting.   Perform hand hygiene frequently. Wash your hands often with soap and water for at least 20 seconds or use an alcohol-based hand  (that contains > 60% alcohol) covering all surfaces of your hands and rubbing them together until they feel dry. Soap and water should be used preferentially.   Avoid touching your eyes, nose, and mouth.   The patient should wear a facemask. If the patient is not able to wear a facemask (for example, because it causes trouble breathing), caregivers should wear a mask when they  are in the same room as the patient.   Wear a disposable facemask and gloves when you touch or have contact with the patient's blood, stool, or body fluids, such as saliva, sputum, nasal mucus, vomit, urine.  o Throw out disposable facemasks and gloves after using them. Do not reuse.  o When removing personal protective equipment, first remove and dispose of gloves. Then, immediately clean your hands with soap and water or alcohol-based hand . Next, remove and dispose of facemask, and immediately clean your hands again with soap and water or alcohol-based hand .   You should not share dishes, drinking glasses, cups, eating utensils, towels, bedding, or other items with the patient. After the patient uses these items, you should wash them thoroughly (see below Wash laundry thoroughly).   Clean all high-touch surfaces, such as counters, tabletops, doorknobs, bathroom fixtures, toilets, phones, keyboards, tablets, and bedside tables, every day. Also, clean any surfaces that may have blood, stool, or body fluids on them.   Use a household cleaning spray or wipe, according to the label instructions. Labels contain instructions for safe and effective use of the cleaning product including precautions you should take when applying the product, such as wearing gloves and making sure you have good ventilation during use of the product.   Wash laundry thoroughly.  o Immediately remove and wash clothes or bedding that have blood, stool, or body fluids on them.  o Wear disposable gloves while handling soiled items and keep soiled items away from your body. Clean your hands (with soap and water or an alcohol-based hand ) immediately after removing your gloves.  o Read and follow directions on labels of laundry or clothing items and detergent. In general, using a normal laundry detergent according to washing machine instructions and dry thoroughly using the warmest temperatures recommended on  the clothing label.   Place all used disposable gloves, facemasks, and other contaminated items in a lined container before disposing of them with other household waste. Clean your hands (with soap and water or an alcohol-based hand ) immediately after handling these items. Soap and water should be used preferentially if hands are visibly dirty.   Discuss any additional questions with your state or local health department or healthcare provider. Check available hours when contacting your local health department.    For more information see CDC link below.      https://www.cdc.gov/coronavirus/2019-ncov/hcp/guidance-prevent-spread.html#precautions        Sources:  [facility], [facility]      Answers for HPI/ROS submitted by the patient on 5/8/2020   Cough  Onset: in the past 7 days  Progression since onset: waxing and waning  Frequency: hourly  Cough characteristics: non-productive  ear congestion: No  nasal congestion: No  postnasal drip: Yes  rhinorrhea: Yes  sweats: Yes  Aggravated by: lying down  asthma: No  bronchiectasis: No  bronchitis: No  COPD: No  emphysema: No  pneumonia: No  Treatments tried: OTC cough suppressant, cool air  Improvement on treatment: mild

## 2020-05-25 DIAGNOSIS — G47.00 INSOMNIA, UNSPECIFIED TYPE: ICD-10-CM

## 2020-05-26 RX ORDER — ZOLPIDEM TARTRATE 10 MG/1
10 TABLET ORAL NIGHTLY PRN
Qty: 30 TABLET | Refills: 2 | Status: SHIPPED | OUTPATIENT
Start: 2020-05-26 | End: 2020-08-26 | Stop reason: SDUPTHER

## 2020-05-28 DIAGNOSIS — G47.00 INSOMNIA, UNSPECIFIED TYPE: ICD-10-CM

## 2020-05-28 RX ORDER — ZOLPIDEM TARTRATE 10 MG/1
10 TABLET ORAL NIGHTLY PRN
Qty: 30 TABLET | Refills: 2 | Status: CANCELLED | OUTPATIENT
Start: 2020-05-28

## 2020-06-11 DIAGNOSIS — F41.1 GENERALIZED ANXIETY DISORDER: ICD-10-CM

## 2020-06-12 RX ORDER — ALPRAZOLAM 0.25 MG/1
0.25 TABLET ORAL 3 TIMES DAILY PRN
Qty: 60 TABLET | Refills: 2 | Status: SHIPPED | OUTPATIENT
Start: 2020-06-12 | End: 2020-11-30

## 2020-06-12 RX ORDER — ALENDRONATE SODIUM 70 MG/1
70 TABLET ORAL
Qty: 4 TABLET | Refills: 11 | Status: SHIPPED | OUTPATIENT
Start: 2020-06-12 | End: 2021-01-04

## 2020-06-18 ENCOUNTER — OFFICE VISIT (OUTPATIENT)
Dept: FAMILY MEDICINE | Facility: CLINIC | Age: 70
End: 2020-06-18
Payer: MEDICARE

## 2020-06-18 ENCOUNTER — TELEPHONE (OUTPATIENT)
Dept: FAMILY MEDICINE | Facility: CLINIC | Age: 70
End: 2020-06-18

## 2020-06-18 DIAGNOSIS — R68.89 OTHER GENERAL SYMPTOMS AND SIGNS: ICD-10-CM

## 2020-06-18 DIAGNOSIS — R23.2 HOT FLASHES: Primary | ICD-10-CM

## 2020-06-18 DIAGNOSIS — R79.89 ELEVATED TSH: ICD-10-CM

## 2020-06-18 PROCEDURE — 99213 PR OFFICE/OUTPT VISIT, EST, LEVL III, 20-29 MIN: ICD-10-PCS | Mod: 95,,, | Performed by: NURSE PRACTITIONER

## 2020-06-18 PROCEDURE — 99213 OFFICE O/P EST LOW 20 MIN: CPT | Mod: 95,,, | Performed by: NURSE PRACTITIONER

## 2020-06-18 PROCEDURE — 1101F PR PT FALLS ASSESS DOC 0-1 FALLS W/OUT INJ PAST YR: ICD-10-PCS | Mod: CPTII,95,, | Performed by: NURSE PRACTITIONER

## 2020-06-18 PROCEDURE — 1159F PR MEDICATION LIST DOCUMENTED IN MEDICAL RECORD: ICD-10-PCS | Mod: 95,,, | Performed by: NURSE PRACTITIONER

## 2020-06-18 PROCEDURE — 1101F PT FALLS ASSESS-DOCD LE1/YR: CPT | Mod: CPTII,95,, | Performed by: NURSE PRACTITIONER

## 2020-06-18 PROCEDURE — 1159F MED LIST DOCD IN RCRD: CPT | Mod: 95,,, | Performed by: NURSE PRACTITIONER

## 2020-06-18 NOTE — PROGRESS NOTES
Subjective:       Patient ID: Dottie Galeana is a 69 y.o. female.    The patient location is: [place]  The chief complaint leading to consultation is: sweating     Visit type: audiovisual    Face to Face time with patient: 10min  10 minutes of total time spent on the encounter, which includes face to face time and non-face to face time preparing to see the patient (eg, review of tests), Obtaining and/or reviewing separately obtained history, Documenting clinical information in the electronic or other health record, Independently interpreting results (not separately reported) and communicating results to the patient/family/caregiver, or Care coordination (not separately reported).     Each patient to whom he or she provides medical services by telemedicine is:  (1) informed of the relationship between the physician and patient and the respective role of any other health care provider with respect to management of the patient; and (2) notified that he or she may decline to receive medical services by telemedicine and may withdraw from such care at any time.    HPI   Patient reports severe night sweats and hot flashes --significantly worsening over the past few months. She is also losing her hair. She feels she is going through menopause again--menopause in early 50s. She has been on Effexor for years. She was evaluated by outside dermatologist who recommend thyroid function testing. Other than the sweating she feels better than she has in years. HTN controlled. Denies any other associating symptoms   There were no vitals filed for this visit.  Review of Systems   Constitutional: Positive for diaphoresis. Negative for activity change, fatigue, fever and unexpected weight change.   HENT: Negative for hearing loss, rhinorrhea and trouble swallowing.    Eyes: Negative for discharge and visual disturbance.   Respiratory: Negative for chest tightness and wheezing.    Cardiovascular: Negative for chest pain and  palpitations.   Gastrointestinal: Negative for blood in stool, constipation, diarrhea and vomiting.   Endocrine: Negative for polydipsia and polyuria.   Genitourinary: Negative for difficulty urinating, dysuria, hematuria and menstrual problem.   Musculoskeletal: Negative for arthralgias, joint swelling and neck pain.   Skin: Negative for color change and rash.   Neurological: Negative for weakness and headaches.   Psychiatric/Behavioral: Negative for confusion and dysphoric mood.       Past Medical History:   Diagnosis Date    Arthritis     Closed compression fracture of L1 lumbar vertebra 11/7/2018    Depression     General anesthetics causing adverse effect in therapeutic use     Generalized anxiety disorder     GERD (gastroesophageal reflux disease)     History of seasonal allergies     HTN (hypertension)     Hyperlipidemia     Hyponatremia     Insomnia     Osteoporosis     Stress incontinence     Stroke     ocular behind left eye     Objective:      Physical Exam  Constitutional:       General: She is not in acute distress.     Appearance: She is well-developed. She is not ill-appearing, toxic-appearing or diaphoretic.   HENT:      Right Ear: Hearing normal.      Left Ear: Hearing normal.   Pulmonary:      Effort: No tachypnea or respiratory distress.   Skin:     Coloration: Skin is not pale.   Neurological:      Mental Status: She is alert and oriented to person, place, and time.   Psychiatric:         Speech: Speech normal.         Behavior: Behavior normal.         Thought Content: Thought content normal.         Judgment: Judgment normal.         Assessment:       1. Hot flashes    2. Elevated TSH    3. Other general symptoms and signs         Plan:       Hot flashes  -     TSH; Future; Expected date: 06/18/2020  -     T4, free; Future; Expected date: 06/18/2020  -     CBC auto differential; Future; Expected date: 06/18/2020  -     Comprehensive metabolic panel; Future; Expected date:  06/18/2020    Elevated TSH  -     TSH; Future; Expected date: 06/18/2020  -     T4, free; Future; Expected date: 06/18/2020    Other general symptoms and signs   -     TSH; Future; Expected date: 06/18/2020  -     T4, free; Future; Expected date: 06/18/2020        start with above workup and proceed further if negative  education provided on supportive care, return precautions       Follow up for further evaluation if s/s worsen, fail to improve, or new symptoms arise.    Medication List with Changes/Refills   Current Medications    ALENDRONATE (FOSAMAX) 70 MG TABLET    Take 1 tablet (70 mg total) by mouth every 7 days.    ALPRAZOLAM (XANAX) 0.25 MG TABLET    Take 1 tablet (0.25 mg total) by mouth 3 (three) times daily as needed.    AMLODIPINE (NORVASC) 10 MG TABLET    Take 1 tablet (10 mg total) by mouth once daily.    ASPIRIN 325 MG TABLET    Take 325 mg by mouth once daily.     DICLOFENAC SODIUM (VOLTAREN) 1 % GEL    Apply 2 g topically 4 (four) times daily.    FLUTICASONE PROPIONATE (FLONASE) 50 MCG/ACTUATION NASAL SPRAY    1 spray (50 mcg total) by Each Nostril route once daily.    GABAPENTIN (NEURONTIN) 300 MG CAPSULE    TAKE 1 CAPSULE (300 MG TOTAL) BY MOUTH 2 (TWO) TIMES DAILY.    LOSARTAN (COZAAR) 100 MG TABLET    Take 1 tablet (100 mg total) by mouth once daily.    MULTIVITAMIN (THERAGRAN) PER TABLET    Take 1 tablet by mouth once daily.     NEBIVOLOL (BYSTOLIC) 10 MG TAB    Take 1 tablet (10 mg total) by mouth once daily.    NITROSTAT 0.4 MG SL TABLET    Place 0.4 mg under the tongue every 5 (five) minutes as needed.     PANTOPRAZOLE (PROTONIX) 40 MG TABLET    Take 1 tablet (40 mg total) by mouth once daily.    RANITIDINE (ZANTAC) 300 MG TABLET    TAKE 1 TABLET (300 MG TOTAL) BY MOUTH EVERY EVENING.    ROSUVASTATIN (CRESTOR) 10 MG TABLET    Take 1 tablet (10 mg total) by mouth every evening.    VENLAFAXINE (EFFEXOR-XR) 150 MG CP24    Take 1 capsule (150 mg total) by mouth once daily.    ZOLPIDEM (AMBIEN)  10 MG TAB    Take 1 tablet (10 mg total) by mouth nightly as needed.

## 2020-06-22 ENCOUNTER — LAB VISIT (OUTPATIENT)
Dept: LAB | Facility: HOSPITAL | Age: 70
End: 2020-06-22
Attending: FAMILY MEDICINE
Payer: MEDICARE

## 2020-06-22 DIAGNOSIS — R23.2 HOT FLASHES: ICD-10-CM

## 2020-06-22 DIAGNOSIS — R68.89 OTHER GENERAL SYMPTOMS AND SIGNS: ICD-10-CM

## 2020-06-22 DIAGNOSIS — R79.89 ELEVATED TSH: ICD-10-CM

## 2020-06-22 LAB
ALBUMIN SERPL BCP-MCNC: 3.9 G/DL (ref 3.5–5.2)
ALP SERPL-CCNC: 84 U/L (ref 55–135)
ALT SERPL W/O P-5'-P-CCNC: 28 U/L (ref 10–44)
ANION GAP SERPL CALC-SCNC: 9 MMOL/L (ref 8–16)
AST SERPL-CCNC: 29 U/L (ref 10–40)
BASOPHILS # BLD AUTO: 0.08 K/UL (ref 0–0.2)
BASOPHILS NFR BLD: 0.9 % (ref 0–1.9)
BILIRUB SERPL-MCNC: 0.5 MG/DL (ref 0.1–1)
BUN SERPL-MCNC: 10 MG/DL (ref 8–23)
CALCIUM SERPL-MCNC: 9.3 MG/DL (ref 8.7–10.5)
CHLORIDE SERPL-SCNC: 102 MMOL/L (ref 95–110)
CO2 SERPL-SCNC: 27 MMOL/L (ref 23–29)
CREAT SERPL-MCNC: 0.7 MG/DL (ref 0.5–1.4)
DIFFERENTIAL METHOD: ABNORMAL
EOSINOPHIL # BLD AUTO: 0.2 K/UL (ref 0–0.5)
EOSINOPHIL NFR BLD: 2.3 % (ref 0–8)
ERYTHROCYTE [DISTWIDTH] IN BLOOD BY AUTOMATED COUNT: 12.3 % (ref 11.5–14.5)
EST. GFR  (AFRICAN AMERICAN): >60 ML/MIN/1.73 M^2
EST. GFR  (NON AFRICAN AMERICAN): >60 ML/MIN/1.73 M^2
GLUCOSE SERPL-MCNC: 138 MG/DL (ref 70–110)
HCT VFR BLD AUTO: 44.9 % (ref 37–48.5)
HGB BLD-MCNC: 14.4 G/DL (ref 12–16)
IMM GRANULOCYTES # BLD AUTO: 0.02 K/UL (ref 0–0.04)
IMM GRANULOCYTES NFR BLD AUTO: 0.2 % (ref 0–0.5)
LYMPHOCYTES # BLD AUTO: 2.6 K/UL (ref 1–4.8)
LYMPHOCYTES NFR BLD: 28.5 % (ref 18–48)
MCH RBC QN AUTO: 32.4 PG (ref 27–31)
MCHC RBC AUTO-ENTMCNC: 32.1 G/DL (ref 32–36)
MCV RBC AUTO: 101 FL (ref 82–98)
MONOCYTES # BLD AUTO: 0.7 K/UL (ref 0.3–1)
MONOCYTES NFR BLD: 8.2 % (ref 4–15)
NEUTROPHILS # BLD AUTO: 5.4 K/UL (ref 1.8–7.7)
NEUTROPHILS NFR BLD: 59.9 % (ref 38–73)
NRBC BLD-RTO: 0 /100 WBC
PLATELET # BLD AUTO: 338 K/UL (ref 150–350)
PMV BLD AUTO: 9.2 FL (ref 9.2–12.9)
POTASSIUM SERPL-SCNC: 4 MMOL/L (ref 3.5–5.1)
PROT SERPL-MCNC: 7.2 G/DL (ref 6–8.4)
RBC # BLD AUTO: 4.44 M/UL (ref 4–5.4)
SODIUM SERPL-SCNC: 138 MMOL/L (ref 136–145)
T4 FREE SERPL-MCNC: 0.82 NG/DL (ref 0.71–1.51)
TSH SERPL DL<=0.005 MIU/L-ACNC: 1.17 UIU/ML (ref 0.4–4)
WBC # BLD AUTO: 9.04 K/UL (ref 3.9–12.7)

## 2020-06-22 PROCEDURE — 80053 COMPREHEN METABOLIC PANEL: CPT | Mod: HCNC

## 2020-06-22 PROCEDURE — 83036 HEMOGLOBIN GLYCOSYLATED A1C: CPT | Mod: HCNC

## 2020-06-22 PROCEDURE — 84439 ASSAY OF FREE THYROXINE: CPT | Mod: HCNC

## 2020-06-22 PROCEDURE — 85025 COMPLETE CBC W/AUTO DIFF WBC: CPT | Mod: HCNC

## 2020-06-22 PROCEDURE — 84443 ASSAY THYROID STIM HORMONE: CPT | Mod: HCNC

## 2020-06-22 PROCEDURE — 36415 COLL VENOUS BLD VENIPUNCTURE: CPT | Mod: HCNC,PO

## 2020-06-23 ENCOUNTER — PATIENT MESSAGE (OUTPATIENT)
Dept: FAMILY MEDICINE | Facility: CLINIC | Age: 70
End: 2020-06-23

## 2020-06-23 DIAGNOSIS — R23.2 HOT FLASHES: Primary | ICD-10-CM

## 2020-06-26 DIAGNOSIS — Z79.899 OTHER LONG TERM (CURRENT) DRUG THERAPY: ICD-10-CM

## 2020-06-26 DIAGNOSIS — D75.89 MACROCYTIC: Primary | ICD-10-CM

## 2020-06-26 LAB
ESTIMATED AVG GLUCOSE: 120 MG/DL (ref 68–131)
HBA1C MFR BLD HPLC: 5.8 % (ref 4–5.6)

## 2020-06-29 ENCOUNTER — TELEPHONE (OUTPATIENT)
Dept: FAMILY MEDICINE | Facility: CLINIC | Age: 70
End: 2020-06-29

## 2020-06-29 NOTE — TELEPHONE ENCOUNTER
----- Message from Meghan Pisano NP sent at 6/26/2020 11:36 AM CDT -----  Schedule lab--B12 and folate

## 2020-06-30 ENCOUNTER — LAB VISIT (OUTPATIENT)
Dept: LAB | Facility: HOSPITAL | Age: 70
End: 2020-06-30
Attending: FAMILY MEDICINE
Payer: MEDICARE

## 2020-06-30 DIAGNOSIS — Z79.899 OTHER LONG TERM (CURRENT) DRUG THERAPY: ICD-10-CM

## 2020-06-30 DIAGNOSIS — D75.89 MACROCYTIC: ICD-10-CM

## 2020-06-30 PROCEDURE — 36415 COLL VENOUS BLD VENIPUNCTURE: CPT | Mod: HCNC,PO

## 2020-06-30 PROCEDURE — 82607 VITAMIN B-12: CPT | Mod: HCNC

## 2020-06-30 PROCEDURE — 82746 ASSAY OF FOLIC ACID SERUM: CPT | Mod: HCNC

## 2020-07-01 ENCOUNTER — PATIENT OUTREACH (OUTPATIENT)
Dept: ADMINISTRATIVE | Facility: OTHER | Age: 70
End: 2020-07-01

## 2020-07-01 LAB
FOLATE SERPL-MCNC: 8.1 NG/ML (ref 4–24)
VIT B12 SERPL-MCNC: 264 PG/ML (ref 210–950)

## 2020-07-07 DIAGNOSIS — F41.1 GENERALIZED ANXIETY DISORDER: ICD-10-CM

## 2020-07-08 RX ORDER — VENLAFAXINE HYDROCHLORIDE 150 MG/1
150 CAPSULE, EXTENDED RELEASE ORAL DAILY
Qty: 90 CAPSULE | Refills: 0 | Status: SHIPPED | OUTPATIENT
Start: 2020-07-08 | End: 2020-10-19 | Stop reason: SDUPTHER

## 2020-07-08 RX ORDER — AMLODIPINE BESYLATE 10 MG/1
10 TABLET ORAL DAILY
Qty: 90 TABLET | Refills: 0 | Status: SHIPPED | OUTPATIENT
Start: 2020-07-08 | End: 2020-10-19 | Stop reason: SDUPTHER

## 2020-07-08 RX ORDER — PANTOPRAZOLE SODIUM 40 MG/1
40 TABLET, DELAYED RELEASE ORAL DAILY
Qty: 90 TABLET | Refills: 3 | Status: SHIPPED | OUTPATIENT
Start: 2020-07-08 | End: 2021-04-23 | Stop reason: SDUPTHER

## 2020-07-08 NOTE — PROGRESS NOTES
Refill Authorization Note    is requesting a refill authorization.    Brief assessment and rationale for refill: APPROVE: prr               Medication reconciliation completed: No                         Comments:      Requested Prescriptions   Signed Prescriptions Disp Refills    venlafaxine (EFFEXOR-XR) 150 MG Cp24 90 capsule 0     Sig: Take 1 capsule (150 mg total) by mouth once daily.       Psychiatry: Antidepressants - SNRI - desvenlafaxine & venlafaxine Passed - 7/7/2020  1:01 PM        Passed - Patient is at least 18 years old        Passed - Last BP in normal range within 360 days.     BP Readings from Last 3 Encounters:   10/22/19 114/78   07/22/19 104/76   06/17/19 120/80              Passed - Office visit in past 6 months or future 90 days.     Recent Outpatient Visits            2 weeks ago Hot flashes    [facility] Meghan Pisano NP    2 months ago Suspected Covid-19 Virus Infection    [facility] Josh Li MD    2 months ago Insomnia, unspecified type    [facility] Josh Li MD    3 months ago ERRONEOUS ENCOUNTER--DISREGARD    [facility] [place]    3 months ago Encounter for observation for suspected exposure to other biological agents ruled out    [facility] Mahin Westbrook MD                    Passed - Cr is 1.3 or below and within 360 days     Creatinine   Date Value Ref Range Status   06/22/2020 0.7 0.5 - 1.4 mg/dL Final   09/23/2019 0.7 0.5 - 1.4 mg/dL Final   04/23/2019 0.7 0.5 - 1.4 mg/dL Final              Passed - eGFR within 360 days     eGFR if non    Date Value Ref Range Status   06/22/2020 >60.0 >60 mL/min/1.73 m^2 Final     Comment:     Calculation used to obtain the estimated glomerular filtration  rate (eGFR) is the CKD-EPI equation.      09/23/2019 >60.0 >60 mL/min/1.73 m^2 Final     Comment:     Calculation used to obtain the estimated glomerular  filtration  rate (eGFR) is the CKD-EPI equation.      04/23/2019 >60.0 >60 mL/min/1.73 m^2 Final     Comment:     Calculation used to obtain the estimated glomerular filtration  rate (eGFR) is the CKD-EPI equation.        eGFR if    Date Value Ref Range Status   06/22/2020 >60.0 >60 mL/min/1.73 m^2 Final   09/23/2019 >60.0 >60 mL/min/1.73 m^2 Final   04/23/2019 >60.0 >60 mL/min/1.73 m^2 Final                pantoprazole (PROTONIX) 40 MG tablet 90 tablet 3     Sig: Take 1 tablet (40 mg total) by mouth once daily.       Gastroenterology: Proton Pump Inhibitors 2 Failed - 7/7/2020  1:01 PM        Failed - An appropriate indication is on the problem list        Passed - Patient is at least 18 years old        Passed - Osteoporosis is not on problem list        Passed - Office visit in past 6 months or future 90 days.     Recent Outpatient Visits            2 weeks ago Hot flashes    [facility] Meghan Pisano NP    2 months ago Suspected Covid-19 Virus Infection    [facility] Josh Li MD    2 months ago Insomnia, unspecified type    [facility] Josh Li MD    3 months ago ERRONEOUS ENCOUNTER--DISREGARD    [facility] [place]    3 months ago Encounter for observation for suspected exposure to other biological agents ruled out    [facility] Mahin Westbrook MD                      amLODIPine (NORVASC) 10 MG tablet 90 tablet 0     Sig: Take 1 tablet (10 mg total) by mouth once daily.       Cardiovascular:  Calcium Channel Blockers Passed - 7/7/2020  1:01 PM        Passed - Patient is at least 18 years old        Passed - Last BP in normal range within 360 days.     BP Readings from Last 3 Encounters:   10/22/19 114/78   07/22/19 104/76   06/17/19 120/80              Passed - Office visit in past 12 months or future 90 days.     Recent Outpatient Visits            2 weeks ago Hot flashes    [facility] Meghan Pisano, RAUDEL    2 months ago Suspected Covid-19 Virus Infection    [facility] Josh Li MD    2 months ago Insomnia, unspecified type    [facility] Josh Li MD    3 months ago ERRONEOUS ENCOUNTER--DISREGARD    [facility] [place]    3 months ago Encounter for observation for suspected exposure to other biological agents ruled out    [facility] Mahin Westbrook MD                        Appointments  past 12m or future 3m with PCP    Date Provider   Last Visit   5/8/2020 Josh Li MD   Next Visit   Visit date not found Josh Li MD   ED visits in past 90 days: 0     Note composed:10:58 AM 07/08/2020

## 2020-07-20 ENCOUNTER — PATIENT OUTREACH (OUTPATIENT)
Dept: ADMINISTRATIVE | Facility: HOSPITAL | Age: 70
End: 2020-07-20

## 2020-07-20 NOTE — PROGRESS NOTES
Non-compliant report chart audits. Chart review completed for HM test overdue (mammograms, Colonoscopies, pap smears, DM labs, and/or EYE EXAMs)  06/18/2020    Care Everywhere and media, updates requested and reviewed.      No documentation of colonoscopy found.    Message sent to patient's portal.

## 2020-08-26 DIAGNOSIS — G47.00 INSOMNIA, UNSPECIFIED TYPE: ICD-10-CM

## 2020-08-26 RX ORDER — ZOLPIDEM TARTRATE 10 MG/1
10 TABLET ORAL NIGHTLY PRN
Qty: 30 TABLET | Refills: 2 | Status: SHIPPED | OUTPATIENT
Start: 2020-08-26 | End: 2020-12-29 | Stop reason: SDUPTHER

## 2020-10-01 LAB — HEMOCCULT STL QL IA: NEGATIVE

## 2020-10-16 ENCOUNTER — PATIENT OUTREACH (OUTPATIENT)
Dept: ADMINISTRATIVE | Facility: HOSPITAL | Age: 70
End: 2020-10-16

## 2020-10-19 DIAGNOSIS — F41.1 GENERALIZED ANXIETY DISORDER: ICD-10-CM

## 2020-10-19 NOTE — TELEPHONE ENCOUNTER
Care Due:                  Date            Visit Type   Department     Provider  --------------------------------------------------------------------------------                                BEAN CHAUHAN      [facility] FAMILY  Last Visit: 05-      VISIT        MEDICINE       LISETH STRICKLAND  Next Visit: None Scheduled  None         None Found                                                            Last  Test          Frequency    Reason                     Performed    Due Date  --------------------------------------------------------------------------------    HDL.........  12 months..  rosuvastatin.............  09- 09-    LDL.........  12 months..  rosuvastatin.............  09- 09-    Total         12 months..  rosuvastatin.............  09- 09-  Cholesterol.    Triglyceride  12 months..  rosuvastatin.............  09- 09-  s...........    Powered by enEvolv. Reference number: [id number]. 10/19/2020 1:59:50 PM   CDT

## 2020-10-20 RX ORDER — VENLAFAXINE HYDROCHLORIDE 150 MG/1
150 CAPSULE, EXTENDED RELEASE ORAL DAILY
Qty: 90 CAPSULE | Refills: 1 | Status: SHIPPED | OUTPATIENT
Start: 2020-10-20 | End: 2021-04-23 | Stop reason: SDUPTHER

## 2020-10-20 RX ORDER — AMLODIPINE BESYLATE 10 MG/1
10 TABLET ORAL DAILY
Qty: 90 TABLET | Refills: 1 | Status: SHIPPED | OUTPATIENT
Start: 2020-10-20 | End: 2021-03-16 | Stop reason: SDUPTHER

## 2020-10-20 RX ORDER — VENLAFAXINE HYDROCHLORIDE 150 MG/1
150 CAPSULE, EXTENDED RELEASE ORAL DAILY
Qty: 90 CAPSULE | Refills: 2 | Status: SHIPPED | OUTPATIENT
Start: 2020-10-20 | End: 2020-10-20

## 2020-10-20 NOTE — PROGRESS NOTES
Refill Authorization Note   Dottie Galeana is requesting a refill authorization.  Brief assessment and rationale for refill: Approve          Medication reconciliation completed: No   Comments:       Requested Prescriptions   Pending Prescriptions Disp Refills    venlafaxine (EFFEXOR-XR) 150 MG Cp24 90 capsule 2     Sig: Take 1 capsule (150 mg total) by mouth once daily.       Psychiatry: Antidepressants - SNRI - desvenlafaxine & venlafaxine Passed - 10/19/2020  3:42 PM        Passed - Patient is at least 18 years old        Passed - Last BP in normal range within 360 days     BP Readings from Last 3 Encounters:   10/22/19 114/78   07/22/19 104/76   06/17/19 120/80              Passed - Office Visit within last 12 months or future 90 days.     Recent Outpatient Visits            4 months ago Hot flashes    [facility] Meghan Pisano NP    5 months ago Suspected Covid-19 Virus Infection    [facility] Josh Li MD    6 months ago Insomnia, unspecified type    [facility] Josh Li MD    7 months ago ERRONEOUS ENCOUNTER--DISREGARD    [facility] [place]    7 months ago Encounter for observation for suspected exposure to other biological agents ruled out    [facility] Mahin Westbrook MD                    Passed - Cr is 1.3 or below and within 360 days     Creatinine   Date Value Ref Range Status   06/22/2020 0.7 0.5 - 1.4 mg/dL Final   09/23/2019 0.7 0.5 - 1.4 mg/dL Final   04/23/2019 0.7 0.5 - 1.4 mg/dL Final              Passed - eGFR within 360 days     eGFR if non    Date Value Ref Range Status   06/22/2020 >60.0 >60 mL/min/1.73 m^2 Final     Comment:     Calculation used to obtain the estimated glomerular filtration  rate (eGFR) is the CKD-EPI equation.      09/23/2019 >60.0 >60 mL/min/1.73 m^2 Final     Comment:     Calculation used to obtain the estimated glomerular filtration  rate (eGFR) is  the CKD-EPI equation.      04/23/2019 >60.0 >60 mL/min/1.73 m^2 Final     Comment:     Calculation used to obtain the estimated glomerular filtration  rate (eGFR) is the CKD-EPI equation.        eGFR if    Date Value Ref Range Status   06/22/2020 >60.0 >60 mL/min/1.73 m^2 Final   09/23/2019 >60.0 >60 mL/min/1.73 m^2 Final   04/23/2019 >60.0 >60 mL/min/1.73 m^2 Final                amLODIPine (NORVASC) 10 MG tablet 90 tablet 1     Sig: Take 1 tablet (10 mg total) by mouth once daily.       Cardiovascular:  Calcium Channel Blockers Passed - 10/19/2020  3:42 PM        Passed - Patient is at least 18 years old        Passed - Last BP in normal range within 360 days     BP Readings from Last 3 Encounters:   10/22/19 114/78   07/22/19 104/76   06/17/19 120/80              Passed - Office Visit within last 12 months or future 90 days.     Recent Outpatient Visits            4 months ago Hot flashes    [facility] Meghan Pisano, NP    5 months ago Suspected Covid-19 Virus Infection    [facility] Josh Li MD    6 months ago Insomnia, unspecified type    [facility] Josh Li MD    7 months ago ERRONEOUS ENCOUNTER--DISREGARD    [facility] [place]    7 months ago Encounter for observation for suspected exposure to other biological agents ruled out    [facility] Mahin Westbrook MD                        Appointments  past 12m or future 3m with PCP    Date Provider   Last Visit   5/8/2020 Josh Li MD   Next Visit   Visit date not found Josh Li MD   ED visits in past 90 days: 0     Note composed:10:14 AM 10/20/2020

## 2020-10-26 ENCOUNTER — PATIENT OUTREACH (OUTPATIENT)
Dept: ADMINISTRATIVE | Facility: HOSPITAL | Age: 70
End: 2020-10-26

## 2020-11-12 ENCOUNTER — PATIENT OUTREACH (OUTPATIENT)
Dept: ADMINISTRATIVE | Facility: HOSPITAL | Age: 70
End: 2020-11-12

## 2020-11-12 NOTE — PROGRESS NOTES
[facility] HTN Gap Report.      Last Office visit was a virtual appointment.  No Blood pressure reading was documented.  Requesting a home blood pressure remote reading or schedule a blood pressure follow up.

## 2020-11-17 ENCOUNTER — PATIENT OUTREACH (OUTPATIENT)
Dept: ADMINISTRATIVE | Facility: HOSPITAL | Age: 70
End: 2020-11-17

## 2020-11-17 NOTE — PROGRESS NOTES
HTN GAP REPORT  BP NOT DOCUMENTED DURING PREVIOUS OFFICE VISIT.  REQUESTING A REMOTE HOME BLOOD PRESSURE READING TO DOCUMENT IN DISCRETE FIELD OR PATIENT NEEDS TO SCHEDULE AN OFFICE VISIT OR NURSE VISIT FOR A BP CHECK    Recently reviewed/outreached

## 2020-11-29 DIAGNOSIS — F41.1 GENERALIZED ANXIETY DISORDER: ICD-10-CM

## 2020-11-30 RX ORDER — ALPRAZOLAM 0.25 MG/1
0.25 TABLET ORAL 3 TIMES DAILY PRN
Qty: 60 TABLET | Refills: 2 | Status: SHIPPED | OUTPATIENT
Start: 2020-11-30 | End: 2020-12-29 | Stop reason: SDUPTHER

## 2020-12-15 ENCOUNTER — OFFICE VISIT (OUTPATIENT)
Dept: FAMILY MEDICINE | Facility: CLINIC | Age: 70
End: 2020-12-15
Payer: MEDICARE

## 2020-12-15 VITALS
WEIGHT: 168.44 LBS | BODY MASS INDEX: 26.38 KG/M2 | SYSTOLIC BLOOD PRESSURE: 118 MMHG | OXYGEN SATURATION: 98 % | TEMPERATURE: 98 F | HEART RATE: 72 BPM | DIASTOLIC BLOOD PRESSURE: 72 MMHG

## 2020-12-15 DIAGNOSIS — I10 ESSENTIAL HYPERTENSION: ICD-10-CM

## 2020-12-15 DIAGNOSIS — H26.9 CATARACT, UNSPECIFIED CATARACT TYPE, UNSPECIFIED LATERALITY: ICD-10-CM

## 2020-12-15 DIAGNOSIS — Z01.818 PREOP EXAMINATION: Primary | ICD-10-CM

## 2020-12-15 PROCEDURE — 1126F PR PAIN SEVERITY QUANTIFIED, NO PAIN PRESENT: ICD-10-PCS | Mod: S$GLB,,, | Performed by: FAMILY MEDICINE

## 2020-12-15 PROCEDURE — 3008F PR BODY MASS INDEX (BMI) DOCUMENTED: ICD-10-PCS | Mod: CPTII,S$GLB,, | Performed by: FAMILY MEDICINE

## 2020-12-15 PROCEDURE — 3288F FALL RISK ASSESSMENT DOCD: CPT | Mod: CPTII,S$GLB,, | Performed by: FAMILY MEDICINE

## 2020-12-15 PROCEDURE — 3078F PR MOST RECENT DIASTOLIC BLOOD PRESSURE < 80 MM HG: ICD-10-PCS | Mod: CPTII,S$GLB,, | Performed by: FAMILY MEDICINE

## 2020-12-15 PROCEDURE — 3074F SYST BP LT 130 MM HG: CPT | Mod: CPTII,S$GLB,, | Performed by: FAMILY MEDICINE

## 2020-12-15 PROCEDURE — 1101F PT FALLS ASSESS-DOCD LE1/YR: CPT | Mod: CPTII,S$GLB,, | Performed by: FAMILY MEDICINE

## 2020-12-15 PROCEDURE — 99213 OFFICE O/P EST LOW 20 MIN: CPT | Mod: S$GLB,,, | Performed by: FAMILY MEDICINE

## 2020-12-15 PROCEDURE — 99213 PR OFFICE/OUTPT VISIT, EST, LEVL III, 20-29 MIN: ICD-10-PCS | Mod: S$GLB,,, | Performed by: FAMILY MEDICINE

## 2020-12-15 PROCEDURE — 1159F PR MEDICATION LIST DOCUMENTED IN MEDICAL RECORD: ICD-10-PCS | Mod: S$GLB,,, | Performed by: FAMILY MEDICINE

## 2020-12-15 PROCEDURE — 1101F PR PT FALLS ASSESS DOC 0-1 FALLS W/OUT INJ PAST YR: ICD-10-PCS | Mod: CPTII,S$GLB,, | Performed by: FAMILY MEDICINE

## 2020-12-15 PROCEDURE — 99999 PR PBB SHADOW E&M-EST. PATIENT-LVL IV: ICD-10-PCS | Mod: PBBFAC,,, | Performed by: FAMILY MEDICINE

## 2020-12-15 PROCEDURE — 1126F AMNT PAIN NOTED NONE PRSNT: CPT | Mod: S$GLB,,, | Performed by: FAMILY MEDICINE

## 2020-12-15 PROCEDURE — 1159F MED LIST DOCD IN RCRD: CPT | Mod: S$GLB,,, | Performed by: FAMILY MEDICINE

## 2020-12-15 PROCEDURE — 3078F DIAST BP <80 MM HG: CPT | Mod: CPTII,S$GLB,, | Performed by: FAMILY MEDICINE

## 2020-12-15 PROCEDURE — 3288F PR FALLS RISK ASSESSMENT DOCUMENTED: ICD-10-PCS | Mod: CPTII,S$GLB,, | Performed by: FAMILY MEDICINE

## 2020-12-15 PROCEDURE — 3008F BODY MASS INDEX DOCD: CPT | Mod: CPTII,S$GLB,, | Performed by: FAMILY MEDICINE

## 2020-12-15 PROCEDURE — 3074F PR MOST RECENT SYSTOLIC BLOOD PRESSURE < 130 MM HG: ICD-10-PCS | Mod: CPTII,S$GLB,, | Performed by: FAMILY MEDICINE

## 2020-12-15 PROCEDURE — 99999 PR PBB SHADOW E&M-EST. PATIENT-LVL IV: CPT | Mod: PBBFAC,,, | Performed by: FAMILY MEDICINE

## 2020-12-15 NOTE — PROGRESS NOTES
Subjective:       Patient ID: Dottie Galeana is a 70 y.o. female.    Chief Complaint: Pre-op Exam      Here for preop clearance for bilateral cataract surgery with Dr. Renee    She is tolerating her present meds.  She is following with cardiology.    Overall feels well.      HTN - controlled on amlodipine 10mg, losartan 100mg, Bystolic 10mg; BP has been well-controlled  HLD - controlled on Crestor 10mg  Anxiety - Mood stable on Effexor Xr 150. Using xanax 0.25mg 3 times a week.  Sleep is good on Ambien 10mg QHS  Osteoporosis - taking fosamax weekly  GERD - taking Protonix daily; Zantac PRN    She is actively exercising about weekly                Past Medical History:   Diagnosis Date    Arthritis     Closed compression fracture of L1 lumbar vertebra 11/7/2018    Depression     General anesthetics causing adverse effect in therapeutic use     Generalized anxiety disorder     GERD (gastroesophageal reflux disease)     History of seasonal allergies     HTN (hypertension)     Hyperlipidemia     Hyponatremia     Insomnia     Osteoporosis     Stress incontinence     Stroke     ocular behind left eye       Past Surgical History:   Procedure Laterality Date    BREAST BIOPSY Left 1998    benign    ESOPHAGOGASTRODUODENOSCOPY N/A 8/17/2018    Procedure: EGD (ESOPHAGOGASTRODUODENOSCOPY);  Surgeon: Al Santiago MD;  Location: [facility] ENDO;  Service: Endoscopy;  Laterality: N/A;    FIXATION KYPHOPLASTY N/A 11/13/2018    Procedure: Kyphoplasty L1;  Surgeon: Russ Gramajo MD;  Location: [facility] OR;  Service: Pain Management;  Laterality: N/A;    ORIF WRIST FRACTURE Right        Review of patient's allergies indicates:   Allergen Reactions    No known drug allergies        Social History     Socioeconomic History    Marital status:      Spouse name: Not on file    Number of children: Not on file    Years of education: Not on file    Highest education level: Not on file   Occupational  History    Occupation: realtor   Social Needs    Financial resource strain: Not hard at all    Food insecurity     Worry: Never true     Inability: Never true    Transportation needs     Medical: No     Non-medical: No   Tobacco Use    Smoking status: Never Smoker    Smokeless tobacco: Never Used   Substance and Sexual Activity    Alcohol use: Yes     Alcohol/week: 14.0 standard drinks     Types: 14 Glasses of wine per week     Frequency: 4 or more times a week     Drinks per session: 1 or 2     Binge frequency: Less than monthly     Comment: 2 glasses wine per day    Drug use: Yes     Types: Hydrocodone, Benzodiazepines    Sexual activity: Not on file   Lifestyle    Physical activity     Days per week: 0 days     Minutes per session: Not on file    Stress: To some extent   Relationships    Social connections     Talks on phone: More than three times a week     Gets together: Three times a week     Attends Jehovah's witness service: Not on file     Active member of club or organization: Yes     Attends meetings of clubs or organizations: Never     Relationship status:    Other Topics Concern    Not on file   Social History Narrative    Not on file       Current Outpatient Medications on File Prior to Visit   Medication Sig Dispense Refill    alendronate (FOSAMAX) 70 MG tablet Take 1 tablet (70 mg total) by mouth every 7 days. 4 tablet 11    ALPRAZolam (XANAX) 0.25 MG tablet TAKE 1 TABLET (0.25 MG TOTAL) BY MOUTH 3 (THREE) TIMES DAILY AS NEEDED. 60 tablet 2    amLODIPine (NORVASC) 10 MG tablet Take 1 tablet (10 mg total) by mouth once daily. 90 tablet 1    aspirin 325 MG tablet Take 325 mg by mouth once daily.       diclofenac sodium (VOLTAREN) 1 % Gel Apply 2 g topically 4 (four) times daily. 100 g 2    fluticasone propionate (FLONASE) 50 mcg/actuation nasal spray 1 spray (50 mcg total) by Each Nostril route once daily. 16 g 6    losartan (COZAAR) 100 MG tablet Take 1 tablet (100 mg total) by  mouth once daily. 90 tablet 2    multivitamin (THERAGRAN) per tablet Take 1 tablet by mouth once daily.       nebivolol (BYSTOLIC) 10 MG Tab Take 1 tablet (10 mg total) by mouth once daily. 90 tablet 3    NITROSTAT 0.4 mg SL tablet Place 0.4 mg under the tongue every 5 (five) minutes as needed.       rosuvastatin (CRESTOR) 10 MG tablet Take 1 tablet (10 mg total) by mouth every evening. 90 tablet 3    venlafaxine (EFFEXOR-XR) 150 MG Cp24 Take 1 capsule (150 mg total) by mouth once daily. 90 capsule 1    zolpidem (AMBIEN) 10 mg Tab Take 1 tablet (10 mg total) by mouth nightly as needed. 30 tablet 2    gabapentin (NEURONTIN) 300 MG capsule TAKE 1 CAPSULE (300 MG TOTAL) BY MOUTH 2 (TWO) TIMES DAILY. 60 capsule 1    pantoprazole (PROTONIX) 40 MG tablet Take 1 tablet (40 mg total) by mouth once daily. 90 tablet 3    ranitidine (ZANTAC) 300 MG tablet TAKE 1 TABLET (300 MG TOTAL) BY MOUTH EVERY EVENING. 30 tablet 5     No current facility-administered medications on file prior to visit.        Family History   Problem Relation Age of Onset    Ovarian cancer Mother 42       Review of Systems   Constitutional: Positive for fatigue. Negative for activity change, appetite change, chills, fever and unexpected weight change.   HENT: Negative for hearing loss, rhinorrhea, sore throat and trouble swallowing.    Eyes: Negative for pain, discharge and visual disturbance.   Respiratory: Negative for cough, chest tightness, shortness of breath and wheezing.    Cardiovascular: Negative for chest pain and palpitations.   Gastrointestinal: Negative for abdominal pain, blood in stool, constipation, diarrhea, nausea and vomiting.   Endocrine: Negative for polydipsia and polyuria.   Genitourinary: Negative for difficulty urinating, dysuria, hematuria and menstrual problem.   Musculoskeletal: Positive for arthralgias. Negative for gait problem, joint swelling and neck pain.   Skin: Negative for rash and wound.   Neurological:  Negative for dizziness, weakness, numbness and headaches.   Hematological: Negative for adenopathy.   Psychiatric/Behavioral: Negative for confusion and dysphoric mood.       Objective:      /72 (BP Location: Left arm, Patient Position: Sitting)   Pulse 72   Temp 98.1 °F (36.7 °C) (Oral)   Wt 76.4 kg (168 lb 6.9 oz)   LMP 04/21/1994   SpO2 98%   BMI 26.38 kg/m²   Physical Exam  Constitutional:       Appearance: Normal appearance. She is well-developed.   HENT:      Head: Normocephalic.      Mouth/Throat:      Pharynx: No oropharyngeal exudate or posterior oropharyngeal erythema.   Eyes:      Conjunctiva/sclera: Conjunctivae normal.      Pupils: Pupils are equal, round, and reactive to light.   Neck:      Musculoskeletal: Normal range of motion and neck supple.      Thyroid: No thyromegaly.   Cardiovascular:      Rate and Rhythm: Normal rate and regular rhythm.      Heart sounds: Normal heart sounds, S1 normal and S2 normal. No murmur. No friction rub. No gallop.    Pulmonary:      Effort: Pulmonary effort is normal.      Breath sounds: Normal breath sounds. No wheezing or rales.   Abdominal:      General: Bowel sounds are normal.      Palpations: Abdomen is soft.   Musculoskeletal:      Right lower leg: No edema.      Left lower leg: No edema.   Lymphadenopathy:      Cervical: No cervical adenopathy.   Skin:     General: Skin is warm.      Findings: No rash.   Neurological:      Mental Status: She is alert and oriented to person, place, and time.      Cranial Nerves: No cranial nerve deficit.      Gait: Gait normal.         Results for orders placed or performed in visit on 10/26/20   Fecal Immunochemical Test (iFOBT)   Result Value Ref Range    Fecal Immunochemical Test (iFOBT) Negative        Assessment:       1. Preop examination    2. Cataract, unspecified cataract type, unspecified laterality    3. Essential hypertension        Plan:       Preop examination    Cataract, unspecified cataract type,  unspecified laterality    Essential hypertension        Cleared to proceed with surgery as planned  Hold aspirin for 5 days prior  Continue present BP medications  continue Ambien and xanax and other present meds  Counseled on regular exercise, maintenance of a healthy weight, balanced diet rich in fruits/vegetables and lean protein, and avoidance of unhealthy habits like smoking and excessive alcohol intake.

## 2020-12-17 NOTE — TELEPHONE ENCOUNTER
Faxed pre op to [facility] eye [facility]

## 2020-12-26 DIAGNOSIS — I10 ESSENTIAL HYPERTENSION: ICD-10-CM

## 2020-12-28 RX ORDER — NEBIVOLOL 10 MG/1
10 TABLET ORAL DAILY
Qty: 90 TABLET | Refills: 3 | Status: SHIPPED | OUTPATIENT
Start: 2020-12-28 | End: 2021-03-26 | Stop reason: SDUPTHER

## 2020-12-28 RX ORDER — NEBIVOLOL HYDROCHLORIDE 10 MG/1
TABLET ORAL
Qty: 90 TABLET | Refills: 3 | OUTPATIENT
Start: 2020-12-28

## 2020-12-29 DIAGNOSIS — F41.1 GENERALIZED ANXIETY DISORDER: ICD-10-CM

## 2020-12-29 DIAGNOSIS — G47.00 INSOMNIA, UNSPECIFIED TYPE: ICD-10-CM

## 2020-12-29 NOTE — PROGRESS NOTES
Diana PETTY. Duplicate Request    Refill Authorization Note   Dottie Galeana  is requesting a refill authorization.  Brief Assessment and Rationale for Refill:  Quick Discontinue  Medication Therapy Plan:       Medication Reconciliation Completed:  No      Comments:   Pended Medication(s)       Requested Prescriptions     Pending Prescriptions Disp Refills    BYSTOLIC 10 mg Tab [Pharmacy Med Name: BYSTOLIC 10 MG TAB 10 Tablet] 90 tablet 3     Sig: TAKE 1 TABLET (10 MG TOTAL) BY MOUTH ONCE DAILY.        Duplicate Pended Encounter(s)/ Last Prescribed Details:    Authorizing Provider: Josh Li MD LARRY #:  [id number] NPI:  [id number]    Ordering User:  BLAS PERKINS            Diagnosis Association: Essential hypertension (I10)      Original Order:  nebivolol (BYSTOLIC) 10 MG Tab [[id number]]      Pharmacy:  [place] [id number] [address]   LARRY #:  --   Pharmacy Comments: --          Fill quantity remaining: -- Fill quantity used: -- Next fill due: --       Outpatient Medication Detail     Disp Refills Start End SHANI   nebivoloL (BYSTOLIC) 10 MG Tab 90 tablet 3 12/28/2020  No   Sig - Route: Take 1 tablet (10 mg total) by mouth once daily. - Oral   Sent to pharmacy as: nebivoloL (BYSTOLIC) 10 MG Tab   Class: Normal   Notes to Pharmacy: Please inactivate all prior scripts with same name and strength including on holds.   Order: [id number]   Date/Time Signed: 12/28/2020 19:51       E-Prescribing Status: Receipt confirmed by pharmacy (12/28/2020  7:51 PM CST)   Ordering Encounter Report    Associated Reports   View Encounter                Note composed:7:53 PM 12/28/2020

## 2020-12-29 NOTE — PROGRESS NOTES
Refill Authorization Note   Dottie Galeana  is requesting a refill authorization.  Brief Assessment and Rationale for Refill:  Approve     Medication Therapy Plan:       Medication Reconciliation Completed: No   Comments:   Orders Placed This Encounter    nebivoloL (BYSTOLIC) 10 MG Tab      Requested Prescriptions   Signed Prescriptions Disp Refills    nebivoloL (BYSTOLIC) 10 MG Tab 90 tablet 3     Sig: Take 1 tablet (10 mg total) by mouth once daily.       Cardiovascular:  Beta Blockers Passed - 12/28/2020  6:51 AM        Passed - Patient is at least 18 years old        Passed - Last BP in normal range within 360 days.     BP Readings from Last 3 Encounters:   12/15/20 118/72   10/22/19 114/78   07/22/19 104/76              Passed - Last Heart Rate in normal range within 360 days.     Pulse Readings from Last 3 Encounters:   12/15/20 72   10/22/19 86   07/22/19 84             Passed - Office visit in past 12 months or future 90 days     Recent Outpatient Visits            1 week ago Preop examination    [facility] Josh Li MD    6 months ago Hot flashes    [facility] Meghan Pisano, RAUDEL    7 months ago Suspected Covid-19 Virus Infection    [facility] Josh Li MD    8 months ago Insomnia, unspecified type    [facility] Johs Li MD    9 months ago ERRONEOUS ENCOUNTER--DISREGARD    [facility]                        Appointments  past 12m or future 3m with PCP    Date Provider   Last Visit   12/15/2020 Josh Li MD   Next Visit   Visit date not found Josh Li MD   ED visits in past 90 days: 0     Note composed:7:51 PM 12/28/2020

## 2020-12-30 DIAGNOSIS — G47.00 INSOMNIA, UNSPECIFIED TYPE: ICD-10-CM

## 2020-12-30 RX ORDER — ZOLPIDEM TARTRATE 10 MG/1
10 TABLET ORAL NIGHTLY PRN
Qty: 30 TABLET | Refills: 2 | Status: SHIPPED | OUTPATIENT
Start: 2020-12-30 | End: 2021-04-01 | Stop reason: SDUPTHER

## 2020-12-30 RX ORDER — ZOLPIDEM TARTRATE 10 MG/1
10 TABLET ORAL NIGHTLY PRN
Qty: 30 TABLET | Refills: 2 | Status: CANCELLED | OUTPATIENT
Start: 2020-12-30

## 2020-12-30 RX ORDER — ALPRAZOLAM 0.25 MG/1
0.25 TABLET ORAL 3 TIMES DAILY PRN
Qty: 60 TABLET | Refills: 2 | Status: SHIPPED | OUTPATIENT
Start: 2020-12-30 | End: 2021-03-29

## 2021-01-23 ENCOUNTER — PATIENT MESSAGE (OUTPATIENT)
Dept: FAMILY MEDICINE | Facility: CLINIC | Age: 71
End: 2021-01-23

## 2021-01-23 DIAGNOSIS — Z12.31 ENCOUNTER FOR SCREENING MAMMOGRAM FOR BREAST CANCER: Primary | ICD-10-CM

## 2021-01-26 DIAGNOSIS — I10 ESSENTIAL HYPERTENSION: ICD-10-CM

## 2021-01-27 RX ORDER — LOSARTAN POTASSIUM 100 MG/1
100 TABLET ORAL DAILY
Qty: 90 TABLET | Refills: 1 | Status: SHIPPED | OUTPATIENT
Start: 2021-01-27 | End: 2021-01-27 | Stop reason: SDUPTHER

## 2021-01-27 RX ORDER — LOSARTAN POTASSIUM 100 MG/1
100 TABLET ORAL DAILY
Qty: 90 TABLET | Refills: 1 | Status: SHIPPED | OUTPATIENT
Start: 2021-01-27 | End: 2021-05-03

## 2021-02-03 ENCOUNTER — HOSPITAL ENCOUNTER (OUTPATIENT)
Dept: RADIOLOGY | Facility: HOSPITAL | Age: 71
Discharge: HOME | End: 2021-02-03
Attending: FAMILY MEDICINE
Payer: MEDICARE

## 2021-02-03 DIAGNOSIS — Z12.31 ENCOUNTER FOR SCREENING MAMMOGRAM FOR BREAST CANCER: ICD-10-CM

## 2021-02-03 PROCEDURE — 77067 MAMMO DIGITAL SCREENING BILAT WITH TOMO: ICD-10-PCS | Mod: 26,,, | Performed by: RADIOLOGY

## 2021-02-03 PROCEDURE — 77063 MAMMO DIGITAL SCREENING BILAT WITH TOMO: ICD-10-PCS | Mod: 26,,, | Performed by: RADIOLOGY

## 2021-02-03 PROCEDURE — 77063 BREAST TOMOSYNTHESIS BI: CPT | Mod: 26,,, | Performed by: RADIOLOGY

## 2021-02-03 PROCEDURE — 77067 SCR MAMMO BI INCL CAD: CPT | Mod: 26,,, | Performed by: RADIOLOGY

## 2021-02-03 PROCEDURE — 77067 SCR MAMMO BI INCL CAD: CPT | Mod: TC,PO

## 2021-02-18 ENCOUNTER — PES CALL (OUTPATIENT)
Dept: ADMINISTRATIVE | Facility: CLINIC | Age: 71
End: 2021-02-18

## 2021-03-01 ENCOUNTER — PES CALL (OUTPATIENT)
Dept: ADMINISTRATIVE | Facility: CLINIC | Age: 71
End: 2021-03-01

## 2021-03-16 DIAGNOSIS — E78.5 HYPERLIPIDEMIA, UNSPECIFIED HYPERLIPIDEMIA TYPE: Primary | ICD-10-CM

## 2021-03-18 RX ORDER — AMLODIPINE BESYLATE 10 MG/1
10 TABLET ORAL DAILY
Qty: 90 TABLET | Refills: 2 | Status: SHIPPED | OUTPATIENT
Start: 2021-03-18 | End: 2021-07-28 | Stop reason: SDUPTHER

## 2021-03-26 ENCOUNTER — TELEPHONE (OUTPATIENT)
Dept: FAMILY MEDICINE | Facility: CLINIC | Age: 71
End: 2021-03-26

## 2021-03-26 DIAGNOSIS — I10 ESSENTIAL HYPERTENSION: ICD-10-CM

## 2021-03-28 DIAGNOSIS — F41.1 GENERALIZED ANXIETY DISORDER: ICD-10-CM

## 2021-03-29 RX ORDER — NEBIVOLOL 10 MG/1
10 TABLET ORAL DAILY
Qty: 90 TABLET | Refills: 2 | Status: SHIPPED | OUTPATIENT
Start: 2021-03-29 | End: 2022-01-24 | Stop reason: SDUPTHER

## 2021-03-29 RX ORDER — ALPRAZOLAM 0.25 MG/1
0.25 TABLET ORAL 3 TIMES DAILY PRN
Qty: 60 TABLET | Refills: 2 | Status: SHIPPED | OUTPATIENT
Start: 2021-03-29 | End: 2021-05-17 | Stop reason: SDUPTHER

## 2021-04-01 DIAGNOSIS — G47.00 INSOMNIA, UNSPECIFIED TYPE: ICD-10-CM

## 2021-04-01 RX ORDER — ZOLPIDEM TARTRATE 10 MG/1
10 TABLET ORAL NIGHTLY PRN
Qty: 30 TABLET | Refills: 2 | Status: SHIPPED | OUTPATIENT
Start: 2021-04-01 | End: 2021-04-01

## 2021-04-03 ENCOUNTER — NURSE TRIAGE (OUTPATIENT)
Dept: ADMINISTRATIVE | Facility: CLINIC | Age: 71
End: 2021-04-03

## 2021-04-03 ENCOUNTER — OFFICE VISIT (OUTPATIENT)
Dept: URGENT CARE | Facility: CLINIC | Age: 71
End: 2021-04-03
Payer: MEDICARE

## 2021-04-03 VITALS
HEIGHT: 67 IN | OXYGEN SATURATION: 100 % | SYSTOLIC BLOOD PRESSURE: 132 MMHG | WEIGHT: 168 LBS | HEART RATE: 97 BPM | BODY MASS INDEX: 26.37 KG/M2 | TEMPERATURE: 99 F | DIASTOLIC BLOOD PRESSURE: 80 MMHG | RESPIRATION RATE: 16 BRPM

## 2021-04-03 DIAGNOSIS — R25.2 SPASM: Primary | ICD-10-CM

## 2021-04-03 PROCEDURE — 3008F PR BODY MASS INDEX (BMI) DOCUMENTED: ICD-10-PCS | Mod: CPTII,S$GLB,, | Performed by: PHYSICIAN ASSISTANT

## 2021-04-03 PROCEDURE — 99214 PR OFFICE/OUTPT VISIT, EST, LEVL IV, 30-39 MIN: ICD-10-PCS | Mod: S$GLB,,, | Performed by: PHYSICIAN ASSISTANT

## 2021-04-03 PROCEDURE — 99214 OFFICE O/P EST MOD 30 MIN: CPT | Mod: S$GLB,,, | Performed by: PHYSICIAN ASSISTANT

## 2021-04-03 PROCEDURE — 1125F PR PAIN SEVERITY QUANTIFIED, PAIN PRESENT: ICD-10-PCS | Mod: S$GLB,,, | Performed by: PHYSICIAN ASSISTANT

## 2021-04-03 PROCEDURE — 3008F BODY MASS INDEX DOCD: CPT | Mod: CPTII,S$GLB,, | Performed by: PHYSICIAN ASSISTANT

## 2021-04-03 PROCEDURE — 1125F AMNT PAIN NOTED PAIN PRSNT: CPT | Mod: S$GLB,,, | Performed by: PHYSICIAN ASSISTANT

## 2021-04-03 RX ORDER — CYCLOBENZAPRINE HCL 5 MG
5 TABLET ORAL 3 TIMES DAILY PRN
Qty: 21 TABLET | Refills: 0 | Status: SHIPPED | OUTPATIENT
Start: 2021-04-03 | End: 2021-04-10

## 2021-04-08 ENCOUNTER — LAB VISIT (OUTPATIENT)
Dept: LAB | Facility: HOSPITAL | Age: 71
End: 2021-04-08
Attending: PHYSICIAN ASSISTANT
Payer: MEDICARE

## 2021-04-08 ENCOUNTER — OFFICE VISIT (OUTPATIENT)
Dept: FAMILY MEDICINE | Facility: CLINIC | Age: 71
End: 2021-04-08
Payer: MEDICARE

## 2021-04-08 VITALS
TEMPERATURE: 98 F | SYSTOLIC BLOOD PRESSURE: 128 MMHG | WEIGHT: 170.63 LBS | HEART RATE: 84 BPM | HEIGHT: 67 IN | DIASTOLIC BLOOD PRESSURE: 74 MMHG | RESPIRATION RATE: 18 BRPM | BODY MASS INDEX: 26.78 KG/M2

## 2021-04-08 DIAGNOSIS — M62.838 MUSCLE SPASM: ICD-10-CM

## 2021-04-08 DIAGNOSIS — R10.2 PELVIC PAIN: ICD-10-CM

## 2021-04-08 DIAGNOSIS — I10 ESSENTIAL HYPERTENSION: ICD-10-CM

## 2021-04-08 DIAGNOSIS — M62.838 MUSCLE SPASM: Primary | ICD-10-CM

## 2021-04-08 PROCEDURE — 83735 ASSAY OF MAGNESIUM: CPT | Performed by: PHYSICIAN ASSISTANT

## 2021-04-08 PROCEDURE — 36415 COLL VENOUS BLD VENIPUNCTURE: CPT | Mod: PO | Performed by: PHYSICIAN ASSISTANT

## 2021-04-08 PROCEDURE — 1101F PT FALLS ASSESS-DOCD LE1/YR: CPT | Mod: CPTII,S$GLB,, | Performed by: PHYSICIAN ASSISTANT

## 2021-04-08 PROCEDURE — 1126F AMNT PAIN NOTED NONE PRSNT: CPT | Mod: S$GLB,,, | Performed by: PHYSICIAN ASSISTANT

## 2021-04-08 PROCEDURE — 80048 BASIC METABOLIC PNL TOTAL CA: CPT | Performed by: PHYSICIAN ASSISTANT

## 2021-04-08 PROCEDURE — 1159F PR MEDICATION LIST DOCUMENTED IN MEDICAL RECORD: ICD-10-PCS | Mod: S$GLB,,, | Performed by: PHYSICIAN ASSISTANT

## 2021-04-08 PROCEDURE — 3008F BODY MASS INDEX DOCD: CPT | Mod: CPTII,S$GLB,, | Performed by: PHYSICIAN ASSISTANT

## 2021-04-08 PROCEDURE — 3008F PR BODY MASS INDEX (BMI) DOCUMENTED: ICD-10-PCS | Mod: CPTII,S$GLB,, | Performed by: PHYSICIAN ASSISTANT

## 2021-04-08 PROCEDURE — 1159F MED LIST DOCD IN RCRD: CPT | Mod: S$GLB,,, | Performed by: PHYSICIAN ASSISTANT

## 2021-04-08 PROCEDURE — 3288F PR FALLS RISK ASSESSMENT DOCUMENTED: ICD-10-PCS | Mod: CPTII,S$GLB,, | Performed by: PHYSICIAN ASSISTANT

## 2021-04-08 PROCEDURE — 99214 PR OFFICE/OUTPT VISIT, EST, LEVL IV, 30-39 MIN: ICD-10-PCS | Mod: S$GLB,,, | Performed by: PHYSICIAN ASSISTANT

## 2021-04-08 PROCEDURE — 99214 OFFICE O/P EST MOD 30 MIN: CPT | Mod: S$GLB,,, | Performed by: PHYSICIAN ASSISTANT

## 2021-04-08 PROCEDURE — 1101F PR PT FALLS ASSESS DOC 0-1 FALLS W/OUT INJ PAST YR: ICD-10-PCS | Mod: CPTII,S$GLB,, | Performed by: PHYSICIAN ASSISTANT

## 2021-04-08 PROCEDURE — 3288F FALL RISK ASSESSMENT DOCD: CPT | Mod: CPTII,S$GLB,, | Performed by: PHYSICIAN ASSISTANT

## 2021-04-08 PROCEDURE — 99999 PR PBB SHADOW E&M-EST. PATIENT-LVL III: ICD-10-PCS | Mod: PBBFAC,,, | Performed by: PHYSICIAN ASSISTANT

## 2021-04-08 PROCEDURE — 1126F PR PAIN SEVERITY QUANTIFIED, NO PAIN PRESENT: ICD-10-PCS | Mod: S$GLB,,, | Performed by: PHYSICIAN ASSISTANT

## 2021-04-08 PROCEDURE — 99999 PR PBB SHADOW E&M-EST. PATIENT-LVL III: CPT | Mod: PBBFAC,,, | Performed by: PHYSICIAN ASSISTANT

## 2021-04-08 RX ORDER — LANOLIN ALCOHOL/MO/W.PET/CERES
1 CREAM (GRAM) TOPICAL 2 TIMES DAILY
COMMUNITY
End: 2022-07-12

## 2021-04-09 LAB
ANION GAP SERPL CALC-SCNC: 11 MMOL/L (ref 8–16)
BUN SERPL-MCNC: 15 MG/DL (ref 8–23)
CALCIUM SERPL-MCNC: 9.6 MG/DL (ref 8.7–10.5)
CHLORIDE SERPL-SCNC: 103 MMOL/L (ref 95–110)
CO2 SERPL-SCNC: 27 MMOL/L (ref 23–29)
CREAT SERPL-MCNC: 0.7 MG/DL (ref 0.5–1.4)
EST. GFR  (AFRICAN AMERICAN): >60 ML/MIN/1.73 M^2
EST. GFR  (NON AFRICAN AMERICAN): >60 ML/MIN/1.73 M^2
GLUCOSE SERPL-MCNC: 117 MG/DL (ref 70–110)
MAGNESIUM SERPL-MCNC: 2 MG/DL (ref 1.6–2.6)
POTASSIUM SERPL-SCNC: 4.7 MMOL/L (ref 3.5–5.1)
SODIUM SERPL-SCNC: 141 MMOL/L (ref 136–145)

## 2021-04-13 ENCOUNTER — PATIENT MESSAGE (OUTPATIENT)
Dept: FAMILY MEDICINE | Facility: CLINIC | Age: 71
End: 2021-04-13

## 2021-04-19 DIAGNOSIS — E78.5 HYPERLIPIDEMIA, UNSPECIFIED HYPERLIPIDEMIA TYPE: ICD-10-CM

## 2021-04-22 RX ORDER — ROSUVASTATIN CALCIUM 10 MG/1
10 TABLET, COATED ORAL NIGHTLY
Qty: 90 TABLET | Refills: 2 | Status: SHIPPED | OUTPATIENT
Start: 2021-04-22 | End: 2022-01-24 | Stop reason: SDUPTHER

## 2021-04-23 DIAGNOSIS — E78.5 HYPERLIPIDEMIA, UNSPECIFIED HYPERLIPIDEMIA TYPE: ICD-10-CM

## 2021-04-23 DIAGNOSIS — F41.1 GENERALIZED ANXIETY DISORDER: ICD-10-CM

## 2021-04-23 RX ORDER — ROSUVASTATIN CALCIUM 10 MG/1
10 TABLET, COATED ORAL NIGHTLY
Qty: 90 TABLET | Refills: 2 | Status: CANCELLED | OUTPATIENT
Start: 2021-04-23

## 2021-04-27 RX ORDER — VENLAFAXINE HYDROCHLORIDE 150 MG/1
150 CAPSULE, EXTENDED RELEASE ORAL DAILY
Qty: 90 CAPSULE | Refills: 2 | Status: SHIPPED | OUTPATIENT
Start: 2021-04-27 | End: 2021-12-19 | Stop reason: SDUPTHER

## 2021-04-27 RX ORDER — PANTOPRAZOLE SODIUM 40 MG/1
40 TABLET, DELAYED RELEASE ORAL DAILY
Qty: 90 TABLET | Refills: 2 | Status: SHIPPED | OUTPATIENT
Start: 2021-04-27 | End: 2021-12-21

## 2021-04-30 DIAGNOSIS — I10 ESSENTIAL HYPERTENSION: ICD-10-CM

## 2021-05-03 RX ORDER — LOSARTAN POTASSIUM 100 MG/1
100 TABLET ORAL DAILY
Qty: 90 TABLET | Refills: 2 | Status: SHIPPED | OUTPATIENT
Start: 2021-05-03 | End: 2022-01-24 | Stop reason: SDUPTHER

## 2021-05-11 DIAGNOSIS — R09.82 PND (POST-NASAL DRIP): ICD-10-CM

## 2021-05-13 RX ORDER — FLUTICASONE PROPIONATE 50 MCG
1 SPRAY, SUSPENSION (ML) NASAL DAILY
Qty: 48 G | Refills: 1 | Status: SHIPPED | OUTPATIENT
Start: 2021-05-13 | End: 2022-01-24 | Stop reason: SDUPTHER

## 2021-05-17 ENCOUNTER — PATIENT MESSAGE (OUTPATIENT)
Dept: FAMILY MEDICINE | Facility: CLINIC | Age: 71
End: 2021-05-17

## 2021-05-18 ENCOUNTER — PATIENT MESSAGE (OUTPATIENT)
Dept: FAMILY MEDICINE | Facility: CLINIC | Age: 71
End: 2021-05-18

## 2021-05-19 ENCOUNTER — HOSPITAL ENCOUNTER (OUTPATIENT)
Dept: RADIOLOGY | Facility: HOSPITAL | Age: 71
Discharge: HOME | End: 2021-05-19
Attending: FAMILY MEDICINE
Payer: MEDICARE

## 2021-05-19 ENCOUNTER — TELEPHONE (OUTPATIENT)
Dept: FAMILY MEDICINE | Facility: CLINIC | Age: 71
End: 2021-05-19

## 2021-05-19 ENCOUNTER — OFFICE VISIT (OUTPATIENT)
Dept: FAMILY MEDICINE | Facility: CLINIC | Age: 71
End: 2021-05-19
Payer: MEDICARE

## 2021-05-19 VITALS
TEMPERATURE: 98 F | HEART RATE: 88 BPM | DIASTOLIC BLOOD PRESSURE: 70 MMHG | WEIGHT: 171.75 LBS | OXYGEN SATURATION: 99 % | BODY MASS INDEX: 26.96 KG/M2 | SYSTOLIC BLOOD PRESSURE: 126 MMHG | HEIGHT: 67 IN | RESPIRATION RATE: 20 BRPM

## 2021-05-19 DIAGNOSIS — R06.09 DOE (DYSPNEA ON EXERTION): Primary | ICD-10-CM

## 2021-05-19 DIAGNOSIS — R06.09 DOE (DYSPNEA ON EXERTION): ICD-10-CM

## 2021-05-19 DIAGNOSIS — I10 ESSENTIAL HYPERTENSION: ICD-10-CM

## 2021-05-19 PROCEDURE — 93005 ELECTROCARDIOGRAM TRACING: CPT | Mod: S$GLB,,, | Performed by: FAMILY MEDICINE

## 2021-05-19 PROCEDURE — 3008F PR BODY MASS INDEX (BMI) DOCUMENTED: ICD-10-PCS | Mod: CPTII,S$GLB,, | Performed by: FAMILY MEDICINE

## 2021-05-19 PROCEDURE — 1101F PT FALLS ASSESS-DOCD LE1/YR: CPT | Mod: CPTII,S$GLB,, | Performed by: FAMILY MEDICINE

## 2021-05-19 PROCEDURE — 3074F PR MOST RECENT SYSTOLIC BLOOD PRESSURE < 130 MM HG: ICD-10-PCS | Mod: CPTII,S$GLB,, | Performed by: FAMILY MEDICINE

## 2021-05-19 PROCEDURE — 1126F PR PAIN SEVERITY QUANTIFIED, NO PAIN PRESENT: ICD-10-PCS | Mod: S$GLB,,, | Performed by: FAMILY MEDICINE

## 2021-05-19 PROCEDURE — 99213 OFFICE O/P EST LOW 20 MIN: CPT | Mod: S$GLB,,, | Performed by: FAMILY MEDICINE

## 2021-05-19 PROCEDURE — 99999 PR PBB SHADOW E&M-EST. PATIENT-LVL III: CPT | Mod: PBBFAC,,, | Performed by: FAMILY MEDICINE

## 2021-05-19 PROCEDURE — 71046 X-RAY EXAM CHEST 2 VIEWS: CPT | Mod: 26,,, | Performed by: RADIOLOGY

## 2021-05-19 PROCEDURE — 1159F MED LIST DOCD IN RCRD: CPT | Mod: S$GLB,,, | Performed by: FAMILY MEDICINE

## 2021-05-19 PROCEDURE — 3074F SYST BP LT 130 MM HG: CPT | Mod: CPTII,S$GLB,, | Performed by: FAMILY MEDICINE

## 2021-05-19 PROCEDURE — 3008F BODY MASS INDEX DOCD: CPT | Mod: CPTII,S$GLB,, | Performed by: FAMILY MEDICINE

## 2021-05-19 PROCEDURE — 3078F PR MOST RECENT DIASTOLIC BLOOD PRESSURE < 80 MM HG: ICD-10-PCS | Mod: CPTII,S$GLB,, | Performed by: FAMILY MEDICINE

## 2021-05-19 PROCEDURE — 99999 PR PBB SHADOW E&M-EST. PATIENT-LVL III: ICD-10-PCS | Mod: PBBFAC,,, | Performed by: FAMILY MEDICINE

## 2021-05-19 PROCEDURE — 93005 EKG 12-LEAD: ICD-10-PCS | Mod: S$GLB,,, | Performed by: FAMILY MEDICINE

## 2021-05-19 PROCEDURE — 99213 PR OFFICE/OUTPT VISIT, EST, LEVL III, 20-29 MIN: ICD-10-PCS | Mod: S$GLB,,, | Performed by: FAMILY MEDICINE

## 2021-05-19 PROCEDURE — 71046 XR CHEST PA AND LATERAL: ICD-10-PCS | Mod: 26,,, | Performed by: RADIOLOGY

## 2021-05-19 PROCEDURE — 3078F DIAST BP <80 MM HG: CPT | Mod: CPTII,S$GLB,, | Performed by: FAMILY MEDICINE

## 2021-05-19 PROCEDURE — 93010 EKG 12-LEAD: ICD-10-PCS | Mod: S$GLB,,, | Performed by: INTERNAL MEDICINE

## 2021-05-19 PROCEDURE — 3288F FALL RISK ASSESSMENT DOCD: CPT | Mod: CPTII,S$GLB,, | Performed by: FAMILY MEDICINE

## 2021-05-19 PROCEDURE — 93010 ELECTROCARDIOGRAM REPORT: CPT | Mod: S$GLB,,, | Performed by: INTERNAL MEDICINE

## 2021-05-19 PROCEDURE — 1126F AMNT PAIN NOTED NONE PRSNT: CPT | Mod: S$GLB,,, | Performed by: FAMILY MEDICINE

## 2021-05-19 PROCEDURE — 1159F PR MEDICATION LIST DOCUMENTED IN MEDICAL RECORD: ICD-10-PCS | Mod: S$GLB,,, | Performed by: FAMILY MEDICINE

## 2021-05-19 PROCEDURE — 3288F PR FALLS RISK ASSESSMENT DOCUMENTED: ICD-10-PCS | Mod: CPTII,S$GLB,, | Performed by: FAMILY MEDICINE

## 2021-05-19 PROCEDURE — 71046 X-RAY EXAM CHEST 2 VIEWS: CPT | Mod: TC,FY,PO

## 2021-05-19 PROCEDURE — 1101F PR PT FALLS ASSESS DOC 0-1 FALLS W/OUT INJ PAST YR: ICD-10-PCS | Mod: CPTII,S$GLB,, | Performed by: FAMILY MEDICINE

## 2021-05-20 ENCOUNTER — OFFICE VISIT (OUTPATIENT)
Dept: FAMILY MEDICINE | Facility: CLINIC | Age: 71
End: 2021-05-20
Payer: MEDICARE

## 2021-05-20 DIAGNOSIS — J84.10 CALCIFIED GRANULOMA OF LUNG: Primary | ICD-10-CM

## 2021-05-20 PROCEDURE — 1159F PR MEDICATION LIST DOCUMENTED IN MEDICAL RECORD: ICD-10-PCS | Mod: 95,,, | Performed by: FAMILY MEDICINE

## 2021-05-20 PROCEDURE — 1159F MED LIST DOCD IN RCRD: CPT | Mod: 95,,, | Performed by: FAMILY MEDICINE

## 2021-05-20 PROCEDURE — 99499 UNLISTED E&M SERVICE: CPT | Mod: HCNC,95,, | Performed by: FAMILY MEDICINE

## 2021-05-20 PROCEDURE — 99499 RISK ADDL DX/OHS AUDIT: ICD-10-PCS | Mod: HCNC,95,, | Performed by: FAMILY MEDICINE

## 2021-05-20 PROCEDURE — 99213 PR OFFICE/OUTPT VISIT, EST, LEVL III, 20-29 MIN: ICD-10-PCS | Mod: 95,,, | Performed by: FAMILY MEDICINE

## 2021-05-20 PROCEDURE — 99213 OFFICE O/P EST LOW 20 MIN: CPT | Mod: 95,,, | Performed by: FAMILY MEDICINE

## 2021-05-24 ENCOUNTER — PATIENT MESSAGE (OUTPATIENT)
Dept: FAMILY MEDICINE | Facility: CLINIC | Age: 71
End: 2021-05-24

## 2021-07-15 ENCOUNTER — OFFICE VISIT (OUTPATIENT)
Dept: CARDIOLOGY | Facility: CLINIC | Age: 71
End: 2021-07-15
Payer: MEDICARE

## 2021-07-15 VITALS
HEIGHT: 67 IN | SYSTOLIC BLOOD PRESSURE: 122 MMHG | DIASTOLIC BLOOD PRESSURE: 88 MMHG | BODY MASS INDEX: 27.12 KG/M2 | WEIGHT: 172.81 LBS

## 2021-07-15 DIAGNOSIS — Z82.49 FAMILY HISTORY OF ISCHEMIC HEART DISEASE: ICD-10-CM

## 2021-07-15 DIAGNOSIS — I10 ESSENTIAL HYPERTENSION: ICD-10-CM

## 2021-07-15 DIAGNOSIS — R06.09 DOE (DYSPNEA ON EXERTION): ICD-10-CM

## 2021-07-15 DIAGNOSIS — R94.31 NONSPECIFIC ABNORMAL ELECTROCARDIOGRAM (ECG) (EKG): ICD-10-CM

## 2021-07-15 DIAGNOSIS — E78.00 PURE HYPERCHOLESTEROLEMIA: ICD-10-CM

## 2021-07-15 PROCEDURE — 1126F AMNT PAIN NOTED NONE PRSNT: CPT | Mod: S$GLB,,, | Performed by: INTERNAL MEDICINE

## 2021-07-15 PROCEDURE — 93005 ELECTROCARDIOGRAM TRACING: CPT | Mod: PO

## 2021-07-15 PROCEDURE — 3288F PR FALLS RISK ASSESSMENT DOCUMENTED: ICD-10-PCS | Mod: CPTII,S$GLB,, | Performed by: INTERNAL MEDICINE

## 2021-07-15 PROCEDURE — 1159F MED LIST DOCD IN RCRD: CPT | Mod: S$GLB,,, | Performed by: INTERNAL MEDICINE

## 2021-07-15 PROCEDURE — 99204 PR OFFICE/OUTPT VISIT, NEW, LEVL IV, 45-59 MIN: ICD-10-PCS | Mod: S$GLB,,, | Performed by: INTERNAL MEDICINE

## 2021-07-15 PROCEDURE — 1101F PT FALLS ASSESS-DOCD LE1/YR: CPT | Mod: CPTII,S$GLB,, | Performed by: INTERNAL MEDICINE

## 2021-07-15 PROCEDURE — 93010 EKG 12-LEAD: ICD-10-PCS | Mod: S$GLB,,, | Performed by: INTERNAL MEDICINE

## 2021-07-15 PROCEDURE — 99204 OFFICE O/P NEW MOD 45 MIN: CPT | Mod: S$GLB,,, | Performed by: INTERNAL MEDICINE

## 2021-07-15 PROCEDURE — 1159F PR MEDICATION LIST DOCUMENTED IN MEDICAL RECORD: ICD-10-PCS | Mod: S$GLB,,, | Performed by: INTERNAL MEDICINE

## 2021-07-15 PROCEDURE — 3008F BODY MASS INDEX DOCD: CPT | Mod: CPTII,S$GLB,, | Performed by: INTERNAL MEDICINE

## 2021-07-15 PROCEDURE — 1101F PR PT FALLS ASSESS DOC 0-1 FALLS W/OUT INJ PAST YR: ICD-10-PCS | Mod: CPTII,S$GLB,, | Performed by: INTERNAL MEDICINE

## 2021-07-15 PROCEDURE — 99999 PR PBB SHADOW E&M-EST. PATIENT-LVL III: CPT | Mod: PBBFAC,,, | Performed by: INTERNAL MEDICINE

## 2021-07-15 PROCEDURE — 1126F PR PAIN SEVERITY QUANTIFIED, NO PAIN PRESENT: ICD-10-PCS | Mod: S$GLB,,, | Performed by: INTERNAL MEDICINE

## 2021-07-15 PROCEDURE — 3079F DIAST BP 80-89 MM HG: CPT | Mod: CPTII,S$GLB,, | Performed by: INTERNAL MEDICINE

## 2021-07-15 PROCEDURE — 93010 ELECTROCARDIOGRAM REPORT: CPT | Mod: S$GLB,,, | Performed by: INTERNAL MEDICINE

## 2021-07-15 PROCEDURE — 3074F PR MOST RECENT SYSTOLIC BLOOD PRESSURE < 130 MM HG: ICD-10-PCS | Mod: CPTII,S$GLB,, | Performed by: INTERNAL MEDICINE

## 2021-07-15 PROCEDURE — 99999 PR PBB SHADOW E&M-EST. PATIENT-LVL III: ICD-10-PCS | Mod: PBBFAC,,, | Performed by: INTERNAL MEDICINE

## 2021-07-15 PROCEDURE — 99499 UNLISTED E&M SERVICE: CPT | Mod: HCNC,S$GLB,, | Performed by: INTERNAL MEDICINE

## 2021-07-15 PROCEDURE — 3008F PR BODY MASS INDEX (BMI) DOCUMENTED: ICD-10-PCS | Mod: CPTII,S$GLB,, | Performed by: INTERNAL MEDICINE

## 2021-07-15 PROCEDURE — 3288F FALL RISK ASSESSMENT DOCD: CPT | Mod: CPTII,S$GLB,, | Performed by: INTERNAL MEDICINE

## 2021-07-15 PROCEDURE — 3079F PR MOST RECENT DIASTOLIC BLOOD PRESSURE 80-89 MM HG: ICD-10-PCS | Mod: CPTII,S$GLB,, | Performed by: INTERNAL MEDICINE

## 2021-07-15 PROCEDURE — 99499 RISK ADDL DX/OHS AUDIT: ICD-10-PCS | Mod: HCNC,S$GLB,, | Performed by: INTERNAL MEDICINE

## 2021-07-15 PROCEDURE — 3074F SYST BP LT 130 MM HG: CPT | Mod: CPTII,S$GLB,, | Performed by: INTERNAL MEDICINE

## 2021-07-26 ENCOUNTER — HOSPITAL ENCOUNTER (OUTPATIENT)
Dept: RADIOLOGY | Facility: HOSPITAL | Age: 71
Discharge: HOME | End: 2021-07-26
Attending: INTERNAL MEDICINE
Payer: MEDICARE

## 2021-07-26 ENCOUNTER — CLINICAL SUPPORT (OUTPATIENT)
Dept: CARDIOLOGY | Facility: HOSPITAL | Age: 71
End: 2021-07-26
Attending: INTERNAL MEDICINE
Payer: MEDICARE

## 2021-07-26 VITALS — BODY MASS INDEX: 27 KG/M2 | WEIGHT: 172 LBS | HEIGHT: 67 IN

## 2021-07-26 VITALS — WEIGHT: 172 LBS | HEIGHT: 67 IN | BODY MASS INDEX: 27 KG/M2

## 2021-07-26 DIAGNOSIS — R94.31 NONSPECIFIC ABNORMAL ELECTROCARDIOGRAM (ECG) (EKG): ICD-10-CM

## 2021-07-26 DIAGNOSIS — I10 ESSENTIAL HYPERTENSION: ICD-10-CM

## 2021-07-26 DIAGNOSIS — R06.09 DOE (DYSPNEA ON EXERTION): ICD-10-CM

## 2021-07-26 DIAGNOSIS — Z82.49 FAMILY HISTORY OF ISCHEMIC HEART DISEASE: ICD-10-CM

## 2021-07-26 DIAGNOSIS — E78.00 PURE HYPERCHOLESTEROLEMIA: ICD-10-CM

## 2021-07-26 LAB
CV PHARM DOSE: 0.4 MG
CV STRESS BASE HR: 73 BPM
DIASTOLIC BLOOD PRESSURE: 75 MMHG
NUC REST EJECTION FRACTION: 72
NUC STRESS EJECTION FRACTION: 65 %
OHS CV CPX 1 MINUTE RECOVERY HEART RATE: 98 BPM
OHS CV CPX 85 PERCENT MAX PREDICTED HEART RATE MALE: 123
OHS CV CPX MAX PREDICTED HEART RATE: 145
OHS CV CPX PATIENT IS FEMALE: 1
OHS CV CPX PATIENT IS MALE: 0
OHS CV CPX PEAK DIASTOLIC BLOOD PRESSURE: 78 MMHG
OHS CV CPX PEAK HEAR RATE: 100 BPM
OHS CV CPX PEAK RATE PRESSURE PRODUCT: NORMAL
OHS CV CPX PEAK SYSTOLIC BLOOD PRESSURE: 125 MMHG
OHS CV CPX PERCENT MAX PREDICTED HEART RATE ACHIEVED: 69
OHS CV CPX RATE PRESSURE PRODUCT PRESENTING: 8906
OHS CV PHARM TIME: 1430 MIN
SYSTOLIC BLOOD PRESSURE: 122 MMHG

## 2021-07-26 PROCEDURE — 78452 HT MUSCLE IMAGE SPECT MULT: CPT | Mod: PO

## 2021-07-26 PROCEDURE — 93306 TTE W/DOPPLER COMPLETE: CPT | Mod: 26,,, | Performed by: INTERNAL MEDICINE

## 2021-07-26 PROCEDURE — 78452 HT MUSCLE IMAGE SPECT MULT: CPT | Mod: 26,,, | Performed by: INTERNAL MEDICINE

## 2021-07-26 PROCEDURE — A9502 TC99M TETROFOSMIN: HCPCS | Mod: PO

## 2021-07-26 PROCEDURE — 63600175 PHARM REV CODE 636 W HCPCS: Mod: PO | Performed by: INTERNAL MEDICINE

## 2021-07-26 PROCEDURE — 78452 STRESS TEST WITH MYOCARDIAL PERFUSION (CUPID ONLY): ICD-10-PCS | Mod: 26,,, | Performed by: INTERNAL MEDICINE

## 2021-07-26 PROCEDURE — 93017 CV STRESS TEST TRACING ONLY: CPT | Mod: PO

## 2021-07-26 PROCEDURE — 93018 PR CARDIAC STRESS TST,INTERP/REPT ONLY: ICD-10-PCS | Mod: ,,, | Performed by: INTERNAL MEDICINE

## 2021-07-26 PROCEDURE — 93018 CV STRESS TEST I&R ONLY: CPT | Mod: ,,, | Performed by: INTERNAL MEDICINE

## 2021-07-26 PROCEDURE — 93306 TTE W/DOPPLER COMPLETE: CPT | Mod: PO

## 2021-07-26 PROCEDURE — 93016 CV STRESS TEST SUPVJ ONLY: CPT | Mod: ,,, | Performed by: INTERNAL MEDICINE

## 2021-07-26 PROCEDURE — 93016 STRESS TEST WITH MYOCARDIAL PERFUSION (CUPID ONLY): ICD-10-PCS | Mod: ,,, | Performed by: INTERNAL MEDICINE

## 2021-07-26 PROCEDURE — 93306 ECHO (CUPID ONLY): ICD-10-PCS | Mod: 26,,, | Performed by: INTERNAL MEDICINE

## 2021-07-26 RX ORDER — REGADENOSON 0.08 MG/ML
0.4 INJECTION, SOLUTION INTRAVENOUS ONCE
Status: COMPLETED | OUTPATIENT
Start: 2021-07-26 | End: 2021-07-26

## 2021-07-26 RX ADMIN — REGADENOSON 0.4 MG: 0.08 INJECTION, SOLUTION INTRAVENOUS at 02:07

## 2021-07-28 ENCOUNTER — PATIENT MESSAGE (OUTPATIENT)
Dept: CARDIOLOGY | Facility: CLINIC | Age: 71
End: 2021-07-28

## 2021-07-28 ENCOUNTER — TELEPHONE (OUTPATIENT)
Dept: CARDIOLOGY | Facility: CLINIC | Age: 71
End: 2021-07-28

## 2021-07-28 LAB
ASCENDING AORTA: 3.31 CM
AV INDEX (PROSTH): 0.7
AV MEAN GRADIENT: 4 MMHG
AV PEAK GRADIENT: 7 MMHG
AV VALVE AREA: 2.4 CM2
AV VELOCITY RATIO: 0.74
BSA FOR ECHO PROCEDURE: 1.92 M2
CV ECHO LV RWT: 0.35 CM
DOP CALC AO PEAK VEL: 1.35 M/S
DOP CALC AO VTI: 27.83 CM
DOP CALC LVOT AREA: 3.4 CM2
DOP CALC LVOT DIAMETER: 2.09 CM
DOP CALC LVOT PEAK VEL: 1 M/S
DOP CALC LVOT STROKE VOLUME: 66.69 CM3
DOP CALCLVOT PEAK VEL VTI: 19.45 CM
E WAVE DECELERATION TIME: 152.94 MSEC
E/A RATIO: 0.61
E/E' RATIO: 12.6 M/S
ECHO LV POSTERIOR WALL: 0.88 CM (ref 0.6–1.1)
EJECTION FRACTION: 60 %
FRACTIONAL SHORTENING: 36 % (ref 28–44)
INTERVENTRICULAR SEPTUM: 0.95 CM (ref 0.6–1.1)
LA MAJOR: 4.65 CM
LA MINOR: 4.28 CM
LA WIDTH: 3.86 CM
LEFT ATRIUM SIZE: 3.61 CM
LEFT ATRIUM VOLUME INDEX: 27.8 ML/M2
LEFT ATRIUM VOLUME: 52.79 CM3
LEFT INTERNAL DIMENSION IN SYSTOLE: 3.24 CM (ref 2.1–4)
LEFT VENTRICLE DIASTOLIC VOLUME INDEX: 64.44 ML/M2
LEFT VENTRICLE DIASTOLIC VOLUME: 122.43 ML
LEFT VENTRICLE MASS INDEX: 87 G/M2
LEFT VENTRICLE SYSTOLIC VOLUME INDEX: 22.2 ML/M2
LEFT VENTRICLE SYSTOLIC VOLUME: 42.27 ML
LEFT VENTRICULAR INTERNAL DIMENSION IN DIASTOLE: 5.08 CM (ref 3.5–6)
LEFT VENTRICULAR MASS: 166.04 G
LV LATERAL E/E' RATIO: 12.6 M/S
LV SEPTAL E/E' RATIO: 12.6 M/S
MV A" WAVE DURATION": 10.28 MSEC
MV PEAK A VEL: 1.03 M/S
MV PEAK E VEL: 0.63 M/S
MV STENOSIS PRESSURE HALF TIME: 44.35 MS
MV VALVE AREA P 1/2 METHOD: 4.96 CM2
PULM VEIN S/D RATIO: 1.45
PV PEAK D VEL: 0.31 M/S
PV PEAK S VEL: 0.45 M/S
RA MAJOR: 3.95 CM
RA PRESSURE: 3 MMHG
RA WIDTH: 2.9 CM
RIGHT VENTRICULAR END-DIASTOLIC DIMENSION: 3.32 CM
RV TISSUE DOPPLER FREE WALL SYSTOLIC VELOCITY 1 (APICAL 4 CHAMBER VIEW): 9.56 CM/S
SINUS: 3.23 CM
STJ: 2.85 CM
TDI LATERAL: 0.05 M/S
TDI SEPTAL: 0.05 M/S
TDI: 0.05 M/S
TRICUSPID ANNULAR PLANE SYSTOLIC EXCURSION: 1.95 CM

## 2021-07-28 RX ORDER — AMLODIPINE BESYLATE 10 MG/1
10 TABLET ORAL DAILY
Qty: 90 TABLET | Refills: 2 | Status: SHIPPED | OUTPATIENT
Start: 2021-07-28 | End: 2022-01-24 | Stop reason: SDUPTHER

## 2021-08-04 ENCOUNTER — PES CALL (OUTPATIENT)
Dept: ADMINISTRATIVE | Facility: CLINIC | Age: 71
End: 2021-08-04

## 2021-08-15 DIAGNOSIS — G47.00 INSOMNIA, UNSPECIFIED TYPE: ICD-10-CM

## 2021-08-16 RX ORDER — ZOLPIDEM TARTRATE 10 MG/1
TABLET ORAL
Qty: 30 TABLET | Refills: 2 | OUTPATIENT
Start: 2021-08-16

## 2021-08-24 DIAGNOSIS — G47.00 INSOMNIA, UNSPECIFIED TYPE: ICD-10-CM

## 2021-08-24 RX ORDER — ZOLPIDEM TARTRATE 10 MG/1
10 TABLET ORAL NIGHTLY PRN
Qty: 30 TABLET | Refills: 2 | Status: CANCELLED | OUTPATIENT
Start: 2021-08-24

## 2021-09-06 DIAGNOSIS — F41.1 GENERALIZED ANXIETY DISORDER: ICD-10-CM

## 2021-09-07 ENCOUNTER — OFFICE VISIT (OUTPATIENT)
Dept: FAMILY MEDICINE | Facility: CLINIC | Age: 71
End: 2021-09-07
Payer: MEDICARE

## 2021-09-07 VITALS
WEIGHT: 170.88 LBS | DIASTOLIC BLOOD PRESSURE: 70 MMHG | RESPIRATION RATE: 18 BRPM | HEIGHT: 67 IN | TEMPERATURE: 98 F | OXYGEN SATURATION: 97 % | HEART RATE: 84 BPM | BODY MASS INDEX: 26.82 KG/M2 | SYSTOLIC BLOOD PRESSURE: 112 MMHG

## 2021-09-07 DIAGNOSIS — J40 BRONCHITIS: Primary | ICD-10-CM

## 2021-09-07 PROCEDURE — 1125F PR PAIN SEVERITY QUANTIFIED, PAIN PRESENT: ICD-10-PCS | Mod: CPTII,S$GLB,, | Performed by: NURSE PRACTITIONER

## 2021-09-07 PROCEDURE — U0003 INFECTIOUS AGENT DETECTION BY NUCLEIC ACID (DNA OR RNA); SEVERE ACUTE RESPIRATORY SYNDROME CORONAVIRUS 2 (SARS-COV-2) (CORONAVIRUS DISEASE [COVID-19]), AMPLIFIED PROBE TECHNIQUE, MAKING USE OF HIGH THROUGHPUT TECHNOLOGIES AS DESCRIBED BY CMS-2020-01-R: HCPCS | Performed by: NURSE PRACTITIONER

## 2021-09-07 PROCEDURE — 4010F PR ACE/ARB THEARPY RXD/TAKEN: ICD-10-PCS | Mod: CPTII,S$GLB,, | Performed by: NURSE PRACTITIONER

## 2021-09-07 PROCEDURE — 1160F PR REVIEW ALL MEDS BY PRESCRIBER/CLIN PHARMACIST DOCUMENTED: ICD-10-PCS | Mod: CPTII,S$GLB,, | Performed by: NURSE PRACTITIONER

## 2021-09-07 PROCEDURE — 3078F PR MOST RECENT DIASTOLIC BLOOD PRESSURE < 80 MM HG: ICD-10-PCS | Mod: CPTII,S$GLB,, | Performed by: NURSE PRACTITIONER

## 2021-09-07 PROCEDURE — 3008F BODY MASS INDEX DOCD: CPT | Mod: CPTII,S$GLB,, | Performed by: NURSE PRACTITIONER

## 2021-09-07 PROCEDURE — 1101F PR PT FALLS ASSESS DOC 0-1 FALLS W/OUT INJ PAST YR: ICD-10-PCS | Mod: CPTII,S$GLB,, | Performed by: NURSE PRACTITIONER

## 2021-09-07 PROCEDURE — 3078F DIAST BP <80 MM HG: CPT | Mod: CPTII,S$GLB,, | Performed by: NURSE PRACTITIONER

## 2021-09-07 PROCEDURE — 1159F PR MEDICATION LIST DOCUMENTED IN MEDICAL RECORD: ICD-10-PCS | Mod: CPTII,S$GLB,, | Performed by: NURSE PRACTITIONER

## 2021-09-07 PROCEDURE — 99999 PR PBB SHADOW E&M-EST. PATIENT-LVL V: ICD-10-PCS | Mod: PBBFAC,,, | Performed by: NURSE PRACTITIONER

## 2021-09-07 PROCEDURE — 87081 CULTURE SCREEN ONLY: CPT | Performed by: NURSE PRACTITIONER

## 2021-09-07 PROCEDURE — 99213 OFFICE O/P EST LOW 20 MIN: CPT | Mod: S$GLB,,, | Performed by: NURSE PRACTITIONER

## 2021-09-07 PROCEDURE — 3288F FALL RISK ASSESSMENT DOCD: CPT | Mod: CPTII,S$GLB,, | Performed by: NURSE PRACTITIONER

## 2021-09-07 PROCEDURE — 4010F ACE/ARB THERAPY RXD/TAKEN: CPT | Mod: CPTII,S$GLB,, | Performed by: NURSE PRACTITIONER

## 2021-09-07 PROCEDURE — 3074F PR MOST RECENT SYSTOLIC BLOOD PRESSURE < 130 MM HG: ICD-10-PCS | Mod: CPTII,S$GLB,, | Performed by: NURSE PRACTITIONER

## 2021-09-07 PROCEDURE — 1159F MED LIST DOCD IN RCRD: CPT | Mod: CPTII,S$GLB,, | Performed by: NURSE PRACTITIONER

## 2021-09-07 PROCEDURE — U0005 INFEC AGEN DETEC AMPLI PROBE: HCPCS | Performed by: NURSE PRACTITIONER

## 2021-09-07 PROCEDURE — 3074F SYST BP LT 130 MM HG: CPT | Mod: CPTII,S$GLB,, | Performed by: NURSE PRACTITIONER

## 2021-09-07 PROCEDURE — 1125F AMNT PAIN NOTED PAIN PRSNT: CPT | Mod: CPTII,S$GLB,, | Performed by: NURSE PRACTITIONER

## 2021-09-07 PROCEDURE — 3008F PR BODY MASS INDEX (BMI) DOCUMENTED: ICD-10-PCS | Mod: CPTII,S$GLB,, | Performed by: NURSE PRACTITIONER

## 2021-09-07 PROCEDURE — 99213 PR OFFICE/OUTPT VISIT, EST, LEVL III, 20-29 MIN: ICD-10-PCS | Mod: S$GLB,,, | Performed by: NURSE PRACTITIONER

## 2021-09-07 PROCEDURE — 1160F RVW MEDS BY RX/DR IN RCRD: CPT | Mod: CPTII,S$GLB,, | Performed by: NURSE PRACTITIONER

## 2021-09-07 PROCEDURE — 1101F PT FALLS ASSESS-DOCD LE1/YR: CPT | Mod: CPTII,S$GLB,, | Performed by: NURSE PRACTITIONER

## 2021-09-07 PROCEDURE — 3288F PR FALLS RISK ASSESSMENT DOCUMENTED: ICD-10-PCS | Mod: CPTII,S$GLB,, | Performed by: NURSE PRACTITIONER

## 2021-09-07 PROCEDURE — 99999 PR PBB SHADOW E&M-EST. PATIENT-LVL V: CPT | Mod: PBBFAC,,, | Performed by: NURSE PRACTITIONER

## 2021-09-07 RX ORDER — FAMOTIDINE 20 MG/1
20 TABLET, FILM COATED ORAL 2 TIMES DAILY
Qty: 20 TABLET | Refills: 0 | Status: SHIPPED | OUTPATIENT
Start: 2021-09-07 | End: 2021-10-26

## 2021-09-07 RX ORDER — AZELASTINE 1 MG/ML
1 SPRAY, METERED NASAL 2 TIMES DAILY
Qty: 30 ML | Refills: 0 | Status: SHIPPED | OUTPATIENT
Start: 2021-09-07 | End: 2022-01-25 | Stop reason: SDUPTHER

## 2021-09-07 RX ORDER — ALBUTEROL SULFATE 90 UG/1
2 AEROSOL, METERED RESPIRATORY (INHALATION) EVERY 6 HOURS PRN
Qty: 8 G | Refills: 0 | Status: SHIPPED | OUTPATIENT
Start: 2021-09-07 | End: 2022-07-12

## 2021-09-07 RX ORDER — PROMETHAZINE HYDROCHLORIDE AND DEXTROMETHORPHAN HYDROBROMIDE 6.25; 15 MG/5ML; MG/5ML
5 SYRUP ORAL 3 TIMES DAILY PRN
Qty: 240 ML | Refills: 0 | Status: SHIPPED | OUTPATIENT
Start: 2021-09-07 | End: 2021-09-17

## 2021-09-07 RX ORDER — DOXYCYCLINE 100 MG/1
100 CAPSULE ORAL 2 TIMES DAILY
Qty: 20 CAPSULE | Refills: 0 | Status: SHIPPED | OUTPATIENT
Start: 2021-09-07 | End: 2021-10-26

## 2021-09-08 LAB
SARS-COV-2 RNA RESP QL NAA+PROBE: NOT DETECTED
SARS-COV-2- CYCLE NUMBER: NORMAL

## 2021-09-08 RX ORDER — ALPRAZOLAM 0.25 MG/1
0.25 TABLET ORAL 3 TIMES DAILY PRN
Qty: 60 TABLET | Refills: 2 | Status: SHIPPED | OUTPATIENT
Start: 2021-09-08 | End: 2021-11-19 | Stop reason: SDUPTHER

## 2021-09-10 LAB — BACTERIA THROAT CULT: NORMAL

## 2021-09-18 ENCOUNTER — PATIENT MESSAGE (OUTPATIENT)
Dept: FAMILY MEDICINE | Facility: CLINIC | Age: 71
End: 2021-09-18

## 2021-09-20 ENCOUNTER — PATIENT MESSAGE (OUTPATIENT)
Dept: FAMILY MEDICINE | Facility: CLINIC | Age: 71
End: 2021-09-20

## 2021-09-24 ENCOUNTER — PATIENT OUTREACH (OUTPATIENT)
Dept: ADMINISTRATIVE | Facility: HOSPITAL | Age: 71
End: 2021-09-24

## 2021-09-24 ENCOUNTER — PATIENT MESSAGE (OUTPATIENT)
Dept: ADMINISTRATIVE | Facility: HOSPITAL | Age: 71
End: 2021-09-24

## 2021-09-28 NOTE — TELEPHONE ENCOUNTER
Please review [t in clinic today seeing Ervin and needs these medications reordered. CLC   Isotretinoin Counseling: Patient should get monthly blood tests, not donate blood, not drive at night if vision affected, not share medication, and not undergo elective surgery for 6 months after tx completed. Side effects reviewed, pt to contact office should one occur.

## 2021-10-06 ENCOUNTER — PATIENT MESSAGE (OUTPATIENT)
Dept: FAMILY MEDICINE | Facility: CLINIC | Age: 71
End: 2021-10-06

## 2021-10-07 ENCOUNTER — PATIENT MESSAGE (OUTPATIENT)
Dept: FAMILY MEDICINE | Facility: CLINIC | Age: 71
End: 2021-10-07

## 2021-10-11 DIAGNOSIS — F41.1 GENERALIZED ANXIETY DISORDER: ICD-10-CM

## 2021-10-11 DIAGNOSIS — G47.00 INSOMNIA, UNSPECIFIED TYPE: ICD-10-CM

## 2021-10-11 RX ORDER — ALPRAZOLAM 0.25 MG/1
0.25 TABLET ORAL 3 TIMES DAILY PRN
Qty: 60 TABLET | Refills: 2 | Status: CANCELLED | OUTPATIENT
Start: 2021-10-11

## 2021-10-12 RX ORDER — ZOLPIDEM TARTRATE 10 MG/1
10 TABLET ORAL NIGHTLY PRN
Qty: 30 TABLET | Refills: 2 | Status: SHIPPED | OUTPATIENT
Start: 2021-10-12 | End: 2021-10-27 | Stop reason: SDUPTHER

## 2021-10-16 ENCOUNTER — NURSE TRIAGE (OUTPATIENT)
Dept: ADMINISTRATIVE | Facility: CLINIC | Age: 71
End: 2021-10-16

## 2021-10-17 ENCOUNTER — OFFICE VISIT (OUTPATIENT)
Dept: URGENT CARE | Facility: CLINIC | Age: 71
End: 2021-10-17
Payer: MEDICARE

## 2021-10-17 ENCOUNTER — PATIENT MESSAGE (OUTPATIENT)
Dept: FAMILY MEDICINE | Facility: CLINIC | Age: 71
End: 2021-10-17
Payer: MEDICARE

## 2021-10-17 VITALS
WEIGHT: 170 LBS | BODY MASS INDEX: 26.68 KG/M2 | OXYGEN SATURATION: 96 % | TEMPERATURE: 98 F | HEIGHT: 67 IN | HEART RATE: 100 BPM | SYSTOLIC BLOOD PRESSURE: 120 MMHG | DIASTOLIC BLOOD PRESSURE: 85 MMHG

## 2021-10-17 DIAGNOSIS — Z11.52 ENCOUNTER FOR SCREENING LABORATORY TESTING FOR COVID-19 VIRUS: ICD-10-CM

## 2021-10-17 DIAGNOSIS — J06.9 VIRAL URI WITH COUGH: Primary | ICD-10-CM

## 2021-10-17 LAB
CTP QC/QA: YES
SARS-COV-2 RDRP RESP QL NAA+PROBE: NEGATIVE

## 2021-10-17 PROCEDURE — 3008F PR BODY MASS INDEX (BMI) DOCUMENTED: ICD-10-PCS | Mod: CPTII,S$GLB,, | Performed by: PHYSICIAN ASSISTANT

## 2021-10-17 PROCEDURE — U0002: ICD-10-PCS | Mod: QW,S$GLB,, | Performed by: PHYSICIAN ASSISTANT

## 2021-10-17 PROCEDURE — 1159F MED LIST DOCD IN RCRD: CPT | Mod: CPTII,S$GLB,, | Performed by: PHYSICIAN ASSISTANT

## 2021-10-17 PROCEDURE — 4010F ACE/ARB THERAPY RXD/TAKEN: CPT | Mod: CPTII,S$GLB,, | Performed by: PHYSICIAN ASSISTANT

## 2021-10-17 PROCEDURE — U0002 COVID-19 LAB TEST NON-CDC: HCPCS | Mod: QW,S$GLB,, | Performed by: PHYSICIAN ASSISTANT

## 2021-10-17 PROCEDURE — 3079F DIAST BP 80-89 MM HG: CPT | Mod: CPTII,S$GLB,, | Performed by: PHYSICIAN ASSISTANT

## 2021-10-17 PROCEDURE — 99214 PR OFFICE/OUTPT VISIT, EST, LEVL IV, 30-39 MIN: ICD-10-PCS | Mod: S$GLB,,, | Performed by: PHYSICIAN ASSISTANT

## 2021-10-17 PROCEDURE — 3074F PR MOST RECENT SYSTOLIC BLOOD PRESSURE < 130 MM HG: ICD-10-PCS | Mod: CPTII,S$GLB,, | Performed by: PHYSICIAN ASSISTANT

## 2021-10-17 PROCEDURE — 3079F PR MOST RECENT DIASTOLIC BLOOD PRESSURE 80-89 MM HG: ICD-10-PCS | Mod: CPTII,S$GLB,, | Performed by: PHYSICIAN ASSISTANT

## 2021-10-17 PROCEDURE — 1160F PR REVIEW ALL MEDS BY PRESCRIBER/CLIN PHARMACIST DOCUMENTED: ICD-10-PCS | Mod: CPTII,S$GLB,, | Performed by: PHYSICIAN ASSISTANT

## 2021-10-17 PROCEDURE — 4010F PR ACE/ARB THEARPY RXD/TAKEN: ICD-10-PCS | Mod: CPTII,S$GLB,, | Performed by: PHYSICIAN ASSISTANT

## 2021-10-17 PROCEDURE — 3074F SYST BP LT 130 MM HG: CPT | Mod: CPTII,S$GLB,, | Performed by: PHYSICIAN ASSISTANT

## 2021-10-17 PROCEDURE — 1160F RVW MEDS BY RX/DR IN RCRD: CPT | Mod: CPTII,S$GLB,, | Performed by: PHYSICIAN ASSISTANT

## 2021-10-17 PROCEDURE — 99214 OFFICE O/P EST MOD 30 MIN: CPT | Mod: S$GLB,,, | Performed by: PHYSICIAN ASSISTANT

## 2021-10-17 PROCEDURE — 3008F BODY MASS INDEX DOCD: CPT | Mod: CPTII,S$GLB,, | Performed by: PHYSICIAN ASSISTANT

## 2021-10-17 PROCEDURE — 1159F PR MEDICATION LIST DOCUMENTED IN MEDICAL RECORD: ICD-10-PCS | Mod: CPTII,S$GLB,, | Performed by: PHYSICIAN ASSISTANT

## 2021-10-17 RX ORDER — AZELASTINE 1 MG/ML
1 SPRAY, METERED NASAL 2 TIMES DAILY
Qty: 30 ML | Refills: 0 | Status: SHIPPED | OUTPATIENT
Start: 2021-10-17 | End: 2022-01-25 | Stop reason: SDUPTHER

## 2021-10-17 RX ORDER — ALBUTEROL SULFATE 90 UG/1
2 AEROSOL, METERED RESPIRATORY (INHALATION) EVERY 6 HOURS PRN
Qty: 18 G | Refills: 0 | Status: SHIPPED | OUTPATIENT
Start: 2021-10-17 | End: 2022-07-12

## 2021-10-17 RX ORDER — BENZONATATE 200 MG/1
200 CAPSULE ORAL 3 TIMES DAILY PRN
Qty: 30 CAPSULE | Refills: 0 | Status: SHIPPED | OUTPATIENT
Start: 2021-10-17 | End: 2021-10-27

## 2021-10-26 ENCOUNTER — OFFICE VISIT (OUTPATIENT)
Dept: FAMILY MEDICINE | Facility: CLINIC | Age: 71
End: 2021-10-26
Payer: MEDICARE

## 2021-10-26 DIAGNOSIS — B96.89 ACUTE BACTERIAL BRONCHITIS: Primary | ICD-10-CM

## 2021-10-26 DIAGNOSIS — F32.5 MAJOR DEPRESSION IN COMPLETE REMISSION: ICD-10-CM

## 2021-10-26 DIAGNOSIS — J20.8 ACUTE BACTERIAL BRONCHITIS: Primary | ICD-10-CM

## 2021-10-26 PROCEDURE — 99499 RISK ADDL DX/OHS AUDIT: ICD-10-PCS | Mod: 95,,, | Performed by: FAMILY MEDICINE

## 2021-10-26 PROCEDURE — 99499 UNLISTED E&M SERVICE: CPT | Mod: 95,,, | Performed by: FAMILY MEDICINE

## 2021-10-26 PROCEDURE — 4010F PR ACE/ARB THEARPY RXD/TAKEN: ICD-10-PCS | Mod: HCNC,CPTII,95, | Performed by: FAMILY MEDICINE

## 2021-10-26 PROCEDURE — 4010F ACE/ARB THERAPY RXD/TAKEN: CPT | Mod: HCNC,CPTII,95, | Performed by: FAMILY MEDICINE

## 2021-10-26 PROCEDURE — 99213 OFFICE O/P EST LOW 20 MIN: CPT | Mod: HCNC,95,, | Performed by: FAMILY MEDICINE

## 2021-10-26 PROCEDURE — 99213 PR OFFICE/OUTPT VISIT, EST, LEVL III, 20-29 MIN: ICD-10-PCS | Mod: HCNC,95,, | Performed by: FAMILY MEDICINE

## 2021-10-26 RX ORDER — AZITHROMYCIN 250 MG/1
TABLET, FILM COATED ORAL
Qty: 6 TABLET | Refills: 0 | Status: SHIPPED | OUTPATIENT
Start: 2021-10-26 | End: 2021-10-31

## 2021-10-26 RX ORDER — METHYLPREDNISOLONE 4 MG/1
TABLET ORAL
Qty: 21 EACH | Refills: 0 | Status: SHIPPED | OUTPATIENT
Start: 2021-10-26 | End: 2021-11-16

## 2021-10-27 DIAGNOSIS — G47.00 INSOMNIA, UNSPECIFIED TYPE: ICD-10-CM

## 2021-10-28 RX ORDER — ZOLPIDEM TARTRATE 10 MG/1
10 TABLET ORAL NIGHTLY PRN
Qty: 30 TABLET | Refills: 5 | Status: SHIPPED | OUTPATIENT
Start: 2021-10-28 | End: 2022-01-24 | Stop reason: SDUPTHER

## 2021-11-17 ENCOUNTER — TELEPHONE (OUTPATIENT)
Dept: FAMILY MEDICINE | Facility: CLINIC | Age: 71
End: 2021-11-17
Payer: MEDICARE

## 2021-11-19 ENCOUNTER — OFFICE VISIT (OUTPATIENT)
Dept: FAMILY MEDICINE | Facility: CLINIC | Age: 71
End: 2021-11-19
Payer: MEDICARE

## 2021-11-19 DIAGNOSIS — K21.9 GASTROESOPHAGEAL REFLUX DISEASE, UNSPECIFIED WHETHER ESOPHAGITIS PRESENT: Primary | ICD-10-CM

## 2021-11-19 DIAGNOSIS — F41.1 GENERALIZED ANXIETY DISORDER: ICD-10-CM

## 2021-11-19 DIAGNOSIS — R09.A2 GLOBUS SENSATION: ICD-10-CM

## 2021-11-19 PROCEDURE — 99213 OFFICE O/P EST LOW 20 MIN: CPT | Mod: HCNC,95,, | Performed by: NURSE PRACTITIONER

## 2021-11-19 PROCEDURE — 99213 PR OFFICE/OUTPT VISIT, EST, LEVL III, 20-29 MIN: ICD-10-PCS | Mod: HCNC,95,, | Performed by: NURSE PRACTITIONER

## 2021-11-19 PROCEDURE — 4010F PR ACE/ARB THEARPY RXD/TAKEN: ICD-10-PCS | Mod: HCNC,CPTII,95, | Performed by: NURSE PRACTITIONER

## 2021-11-19 PROCEDURE — 1159F PR MEDICATION LIST DOCUMENTED IN MEDICAL RECORD: ICD-10-PCS | Mod: HCNC,CPTII,95, | Performed by: NURSE PRACTITIONER

## 2021-11-19 PROCEDURE — 1160F RVW MEDS BY RX/DR IN RCRD: CPT | Mod: HCNC,CPTII,95, | Performed by: NURSE PRACTITIONER

## 2021-11-19 PROCEDURE — 1160F PR REVIEW ALL MEDS BY PRESCRIBER/CLIN PHARMACIST DOCUMENTED: ICD-10-PCS | Mod: HCNC,CPTII,95, | Performed by: NURSE PRACTITIONER

## 2021-11-19 PROCEDURE — 1159F MED LIST DOCD IN RCRD: CPT | Mod: HCNC,CPTII,95, | Performed by: NURSE PRACTITIONER

## 2021-11-19 PROCEDURE — 4010F ACE/ARB THERAPY RXD/TAKEN: CPT | Mod: HCNC,CPTII,95, | Performed by: NURSE PRACTITIONER

## 2021-11-19 RX ORDER — ALPRAZOLAM 0.25 MG/1
0.25 TABLET ORAL 3 TIMES DAILY PRN
Qty: 60 TABLET | Refills: 2 | Status: SHIPPED | OUTPATIENT
Start: 2021-11-19 | End: 2021-11-30

## 2021-11-22 ENCOUNTER — TELEPHONE (OUTPATIENT)
Dept: GASTROENTEROLOGY | Facility: CLINIC | Age: 71
End: 2021-11-22
Payer: MEDICARE

## 2021-11-28 ENCOUNTER — PATIENT MESSAGE (OUTPATIENT)
Dept: FAMILY MEDICINE | Facility: CLINIC | Age: 71
End: 2021-11-28
Payer: MEDICARE

## 2021-11-28 DIAGNOSIS — F41.1 GENERALIZED ANXIETY DISORDER: ICD-10-CM

## 2021-11-30 RX ORDER — ALPRAZOLAM 0.25 MG/1
0.25 TABLET ORAL 3 TIMES DAILY PRN
Qty: 90 TABLET | Refills: 5 | Status: SHIPPED | OUTPATIENT
Start: 2021-11-30 | End: 2022-01-24 | Stop reason: SDUPTHER

## 2021-12-04 ENCOUNTER — OFFICE VISIT (OUTPATIENT)
Dept: URGENT CARE | Facility: CLINIC | Age: 71
End: 2021-12-04
Payer: MEDICARE

## 2021-12-04 VITALS
DIASTOLIC BLOOD PRESSURE: 86 MMHG | WEIGHT: 170 LBS | RESPIRATION RATE: 19 BRPM | OXYGEN SATURATION: 98 % | TEMPERATURE: 98 F | HEART RATE: 89 BPM | SYSTOLIC BLOOD PRESSURE: 134 MMHG | BODY MASS INDEX: 26.68 KG/M2 | HEIGHT: 67 IN

## 2021-12-04 DIAGNOSIS — S91.312A LACERATION OF LEFT FOOT, INITIAL ENCOUNTER: Primary | ICD-10-CM

## 2021-12-04 PROCEDURE — 4010F PR ACE/ARB THEARPY RXD/TAKEN: ICD-10-PCS | Mod: CPTII,S$GLB,, | Performed by: PHYSICIAN ASSISTANT

## 2021-12-04 PROCEDURE — 4010F ACE/ARB THERAPY RXD/TAKEN: CPT | Mod: CPTII,S$GLB,, | Performed by: PHYSICIAN ASSISTANT

## 2021-12-04 PROCEDURE — 99212 PR OFFICE/OUTPT VISIT, EST, LEVL II, 10-19 MIN: ICD-10-PCS | Mod: S$GLB,,, | Performed by: PHYSICIAN ASSISTANT

## 2021-12-04 PROCEDURE — 99212 OFFICE O/P EST SF 10 MIN: CPT | Mod: S$GLB,,, | Performed by: PHYSICIAN ASSISTANT

## 2021-12-04 RX ORDER — MUPIROCIN 20 MG/G
OINTMENT TOPICAL 3 TIMES DAILY
Qty: 22 G | Refills: 0 | Status: SHIPPED | OUTPATIENT
Start: 2021-12-04 | End: 2021-12-14

## 2021-12-07 ENCOUNTER — TELEPHONE (OUTPATIENT)
Dept: URGENT CARE | Facility: CLINIC | Age: 71
End: 2021-12-07
Payer: MEDICARE

## 2021-12-16 DIAGNOSIS — F41.1 GENERALIZED ANXIETY DISORDER: ICD-10-CM

## 2021-12-19 DIAGNOSIS — F41.1 GENERALIZED ANXIETY DISORDER: ICD-10-CM

## 2021-12-20 RX ORDER — VENLAFAXINE HYDROCHLORIDE 150 MG/1
150 CAPSULE, EXTENDED RELEASE ORAL DAILY
Qty: 90 CAPSULE | Refills: 2 | Status: SHIPPED | OUTPATIENT
Start: 2021-12-20 | End: 2022-01-24 | Stop reason: SDUPTHER

## 2021-12-21 RX ORDER — VENLAFAXINE HYDROCHLORIDE 150 MG/1
CAPSULE, EXTENDED RELEASE ORAL
Qty: 90 CAPSULE | Refills: 2 | OUTPATIENT
Start: 2021-12-21

## 2021-12-22 RX ORDER — PANTOPRAZOLE SODIUM 40 MG/1
40 TABLET, DELAYED RELEASE ORAL DAILY
Qty: 90 TABLET | Refills: 3 | Status: SHIPPED | OUTPATIENT
Start: 2021-12-22 | End: 2022-01-24 | Stop reason: SDUPTHER

## 2022-01-10 ENCOUNTER — TELEPHONE (OUTPATIENT)
Dept: GASTROENTEROLOGY | Facility: CLINIC | Age: 72
End: 2022-01-10
Payer: MEDICARE

## 2022-01-10 NOTE — TELEPHONE ENCOUNTER
----- Message from Trena Shaw sent at 1/10/2022 11:29 AM CST -----  Type: Needs Medical Advice  Who Called:  Pt  Symptoms (please be specific):  Pt needs to reschedule her procedure due to not having a . Please call    Best Call Back Number: [phone]  Additional Information: Please call

## 2022-01-10 NOTE — TELEPHONE ENCOUNTER
----- Message from Alley Laguerre sent at 1/10/2022 10:27 AM CST -----  Contact: pt  Type: Needs Medical Advice  Who Called:  pt   Best Call Back Number: [phone]    Additional Information: please call pt regarding procedure on 1/12, what time? Pt has to schedule a ride to bring her to appt

## 2022-01-10 NOTE — TELEPHONE ENCOUNTER
Left message for pt to return call to clinic if she has questions & that the ASC will call her with arrival time. Number provided.

## 2022-01-11 ENCOUNTER — TELEPHONE (OUTPATIENT)
Dept: GASTROENTEROLOGY | Facility: CLINIC | Age: 72
End: 2022-01-11
Payer: MEDICARE

## 2022-01-11 ENCOUNTER — TELEPHONE (OUTPATIENT)
Dept: ENDOSCOPY | Facility: HOSPITAL | Age: 72
End: 2022-01-11
Payer: MEDICARE

## 2022-01-11 NOTE — TELEPHONE ENCOUNTER
----- Message from Lalita Rodriguez, Patient Care Assistant sent at 1/11/2022  8:17 AM CST -----  Regarding: advice  Contact: pt  Type: Needs Medical Advice  Who Called:  pt   Best Call Back Number: [phone] (home)     Additional Information: pt states she would like a callback regarding rescheduling her appointment on 03/2022 to tomorrow. Please call pt to advise. Thanks!

## 2022-01-11 NOTE — OR NURSING
During our pre-op call Ms. Galeana informed me that she has had a runny nose with an occasional NP cough for approximately 2 weeks. She has been taking cough medicine for the last week. Her granddaughter did test positive for COVID this past Sunday, 1/9. Ms. Galeana was exposed to her granddaughter prior to this time. She is currently scheduled for a rapid COVID test prior to her procedure tomorrow. I spoke to Dr. Conde with anesthesia concerning her symptoms and she was cleared to proceed as long as her COVID test is negative.

## 2022-01-11 NOTE — TELEPHONE ENCOUNTER
Hello,     During our pre-op call Ms. Galeana informed me that she has had a runny nose with an occasional NP cough for approximately 2 weeks. She has been taking cough medicine for the last week. Her granddaughter did test positive for COVID this past Sunday, 1/9. Ms. Galeana was exposed to her granddaughter prior to this time. She is currently scheduled for a rapid COVID test prior to her procedure tomorrow. I spoke to Dr. Conde with anesthesia concerning her symptoms and she was cleared to proceed as long as her COVID test is negative.     Thank you!

## 2022-01-11 NOTE — TELEPHONE ENCOUNTER
Spoke with pt. Rescheduled procedure as pt found a ride for tomorrow. Pt verbalized understanding .

## 2022-01-12 ENCOUNTER — ANESTHESIA EVENT (OUTPATIENT)
Dept: ENDOSCOPY | Facility: HOSPITAL | Age: 72
End: 2022-01-12
Payer: MEDICARE

## 2022-01-12 ENCOUNTER — ANESTHESIA (OUTPATIENT)
Dept: ENDOSCOPY | Facility: HOSPITAL | Age: 72
End: 2022-01-12
Payer: MEDICARE

## 2022-01-12 ENCOUNTER — HOSPITAL ENCOUNTER (OUTPATIENT)
Facility: HOSPITAL | Age: 72
Discharge: HOME | End: 2022-01-12
Attending: INTERNAL MEDICINE | Admitting: INTERNAL MEDICINE
Payer: MEDICARE

## 2022-01-12 VITALS
HEART RATE: 72 BPM | SYSTOLIC BLOOD PRESSURE: 103 MMHG | TEMPERATURE: 98 F | HEIGHT: 67 IN | DIASTOLIC BLOOD PRESSURE: 58 MMHG | WEIGHT: 170 LBS | RESPIRATION RATE: 15 BRPM | BODY MASS INDEX: 26.68 KG/M2 | OXYGEN SATURATION: 98 %

## 2022-01-12 DIAGNOSIS — K21.9 GERD (GASTROESOPHAGEAL REFLUX DISEASE): ICD-10-CM

## 2022-01-12 DIAGNOSIS — Z01.818 PREOP TESTING: Primary | ICD-10-CM

## 2022-01-12 LAB
CTP QC/QA: YES
SARS-COV-2 AG RESP QL IA.RAPID: NEGATIVE

## 2022-01-12 PROCEDURE — D9220A PRA ANESTHESIA: ICD-10-PCS | Mod: HCNC,CRNA,, | Performed by: NURSE ANESTHETIST, CERTIFIED REGISTERED

## 2022-01-12 PROCEDURE — 63600175 PHARM REV CODE 636 W HCPCS: Mod: HCNC,PO | Performed by: NURSE ANESTHETIST, CERTIFIED REGISTERED

## 2022-01-12 PROCEDURE — 43248 PR EGD, FLEX, W/DILATION OVER GUIDEWIRE: ICD-10-PCS | Mod: HCNC,,, | Performed by: INTERNAL MEDICINE

## 2022-01-12 PROCEDURE — 37000008 HC ANESTHESIA 1ST 15 MINUTES: Mod: HCNC,PO | Performed by: INTERNAL MEDICINE

## 2022-01-12 PROCEDURE — 87811 SARS-COV-2 COVID19 W/OPTIC: CPT | Mod: HCNC,PO | Performed by: INTERNAL MEDICINE

## 2022-01-12 PROCEDURE — 37000009 HC ANESTHESIA EA ADD 15 MINS: Mod: HCNC,PO | Performed by: INTERNAL MEDICINE

## 2022-01-12 PROCEDURE — 63600175 PHARM REV CODE 636 W HCPCS: Mod: HCNC,PO | Performed by: INTERNAL MEDICINE

## 2022-01-12 PROCEDURE — 25000003 PHARM REV CODE 250: Mod: HCNC,PO | Performed by: NURSE ANESTHETIST, CERTIFIED REGISTERED

## 2022-01-12 PROCEDURE — 88305 TISSUE EXAM BY PATHOLOGIST: CPT | Mod: 26,HCNC,, | Performed by: STUDENT IN AN ORGANIZED HEALTH CARE EDUCATION/TRAINING PROGRAM

## 2022-01-12 PROCEDURE — 88305 TISSUE EXAM BY PATHOLOGIST: CPT | Mod: HCNC | Performed by: STUDENT IN AN ORGANIZED HEALTH CARE EDUCATION/TRAINING PROGRAM

## 2022-01-12 PROCEDURE — 43248 EGD GUIDE WIRE INSERTION: CPT | Mod: HCNC,PO | Performed by: INTERNAL MEDICINE

## 2022-01-12 PROCEDURE — D9220A PRA ANESTHESIA: ICD-10-PCS | Mod: HCNC,ANES,, | Performed by: ANESTHESIOLOGY

## 2022-01-12 PROCEDURE — 43248 EGD GUIDE WIRE INSERTION: CPT | Mod: HCNC,,, | Performed by: INTERNAL MEDICINE

## 2022-01-12 PROCEDURE — D9220A PRA ANESTHESIA: Mod: HCNC,CRNA,, | Performed by: NURSE ANESTHETIST, CERTIFIED REGISTERED

## 2022-01-12 PROCEDURE — D9220A PRA ANESTHESIA: Mod: HCNC,ANES,, | Performed by: ANESTHESIOLOGY

## 2022-01-12 PROCEDURE — 88305 TISSUE EXAM BY PATHOLOGIST: ICD-10-PCS | Mod: 26,HCNC,, | Performed by: STUDENT IN AN ORGANIZED HEALTH CARE EDUCATION/TRAINING PROGRAM

## 2022-01-12 RX ORDER — LIDOCAINE HCL/PF 100 MG/5ML
SYRINGE (ML) INTRAVENOUS
Status: DISCONTINUED | OUTPATIENT
Start: 2022-01-12 | End: 2022-01-12

## 2022-01-12 RX ORDER — SODIUM CHLORIDE 0.9 % (FLUSH) 0.9 %
10 SYRINGE (ML) INJECTION
Status: DISCONTINUED | OUTPATIENT
Start: 2022-01-12 | End: 2022-01-12 | Stop reason: HOSPADM

## 2022-01-12 RX ORDER — PROPOFOL 10 MG/ML
VIAL (ML) INTRAVENOUS
Status: DISCONTINUED | OUTPATIENT
Start: 2022-01-12 | End: 2022-01-12

## 2022-01-12 RX ORDER — SODIUM CHLORIDE, SODIUM LACTATE, POTASSIUM CHLORIDE, CALCIUM CHLORIDE 600; 310; 30; 20 MG/100ML; MG/100ML; MG/100ML; MG/100ML
INJECTION, SOLUTION INTRAVENOUS CONTINUOUS
Status: DISCONTINUED | OUTPATIENT
Start: 2022-01-12 | End: 2022-01-12 | Stop reason: HOSPADM

## 2022-01-12 RX ADMIN — PROPOFOL 50 MG: 10 INJECTION, EMULSION INTRAVENOUS at 10:01

## 2022-01-12 RX ADMIN — LIDOCAINE HYDROCHLORIDE 100 MG: 20 INJECTION, SOLUTION INTRAVENOUS at 09:01

## 2022-01-12 RX ADMIN — PROPOFOL 25 MG: 10 INJECTION, EMULSION INTRAVENOUS at 09:01

## 2022-01-12 RX ADMIN — PROPOFOL 150 MG: 10 INJECTION, EMULSION INTRAVENOUS at 09:01

## 2022-01-12 RX ADMIN — PROPOFOL 75 MG: 10 INJECTION, EMULSION INTRAVENOUS at 09:01

## 2022-01-12 RX ADMIN — SODIUM CHLORIDE, SODIUM LACTATE, POTASSIUM CHLORIDE, AND CALCIUM CHLORIDE: .6; .31; .03; .02 INJECTION, SOLUTION INTRAVENOUS at 09:01

## 2022-01-12 NOTE — DISCHARGE SUMMARY
[facility]  Discharge Note  Short Stay    Discharge Note  Short Stay      SUMMARY     Admit Date: 1/12/2022    Attending Physician: Al Santiago MD     Discharge Physician: Al Santiago MD    Discharge Date: 1/12/2022 10:09 AM    Final Diagnosis: Globus sensation [R19.8]  Gastroesophageal reflux disease, unspecified whether esophagitis present [K21.9]    Disposition: HOME OR SELF CARE    Patient Instructions:   Current Discharge Medication List      CONTINUE these medications which have NOT CHANGED    Details   !! albuterol (VENTOLIN HFA) 90 mcg/actuation inhaler Inhale 2 puffs into the lungs every 6 (six) hours as needed for Wheezing. Rescue  Qty: 8 g, Refills: 0    Associated Diagnoses: Bronchitis      !! albuterol (VENTOLIN HFA) 90 mcg/actuation inhaler Inhale 2 puffs into the lungs every 6 (six) hours as needed for Wheezing. Rescue  Qty: 18 g, Refills: 0    Comments: May use alternative covered by insurance      alendronate (FOSAMAX) 70 MG tablet TAKE 1 TABLET (70 MG TOTAL) BY MOUTH EVERY 7 DAYS.  Qty: 12 tablet, Refills: 11      ALPRAZolam (XANAX) 0.25 MG tablet Take 1 tablet (0.25 mg total) by mouth 3 (three) times daily as needed for Anxiety.  Qty: 90 tablet, Refills: 5    Associated Diagnoses: Generalized anxiety disorder      amLODIPine (NORVASC) 10 MG tablet Take 1 tablet (10 mg total) by mouth once daily.  Qty: 90 tablet, Refills: 2    Comments: Please delete all prior scripts with same name and strength including on holds.       aspirin 325 MG tablet Take 325 mg by mouth once daily.       !! azelastine (ASTELIN) 137 mcg (0.1 %) nasal spray 1 spray (137 mcg total) by Nasal route 2 (two) times daily.  Qty: 30 mL, Refills: 0    Associated Diagnoses: Bronchitis      !! azelastine (ASTELIN) 137 mcg (0.1 %) nasal spray 1 spray (137 mcg total) by Nasal route 2 (two) times daily.  Qty: 30 mL, Refills: 0      calcium citrate-vitamin D3 315-200 mg (CITRACAL+D) 315 mg-5 mcg (200 unit) per  tablet Take 1 tablet by mouth 2 (two) times daily.      fluticasone propionate (FLONASE) 50 mcg/actuation nasal spray 1 spray (50 mcg total) by Each Nostril route once daily.  Qty: 48 g, Refills: 1    Comments: Please inactivate all prior scripts with same name and strength including on holds.  Associated Diagnoses: PND (post-nasal drip)      losartan (COZAAR) 100 MG tablet TAKE 1 TABLET (100 MG TOTAL) BY MOUTH ONCE DAILY.  Qty: 90 tablet, Refills: 2    Associated Diagnoses: Essential hypertension      multivitamin (THERAGRAN) per tablet Take 1 tablet by mouth once daily.       nebivoloL (BYSTOLIC) 10 MG Tab Take 1 tablet (10 mg total) by mouth once daily.  Qty: 90 tablet, Refills: 2    Comments: Please inactivate all prior scripts with same name and strength including on holds.  Associated Diagnoses: Essential hypertension      rosuvastatin (CRESTOR) 10 MG tablet TAKE 1 TABLET (10 MG TOTAL) BY MOUTH EVERY EVENING.  Qty: 90 tablet, Refills: 2    Comments: Please delete all prior scripts with same name and strength including on holds.  Associated Diagnoses: Hyperlipidemia, unspecified hyperlipidemia type      UNABLE TO FIND Roger/Bro cough medication through [place]      venlafaxine (EFFEXOR-XR) 150 MG Cp24 Take 1 capsule (150 mg total) by mouth once daily.  Qty: 90 capsule, Refills: 2    Comments: Please delete all prior scripts with same name and strength including on holds.   Associated Diagnoses: Generalized anxiety disorder      zolpidem (AMBIEN) 10 mg Tab Take 1 tablet (10 mg total) by mouth nightly as needed (insomnia).  Qty: 30 tablet, Refills: 5    Associated Diagnoses: Insomnia, unspecified type      NITROSTAT 0.4 mg SL tablet Place 0.4 mg under the tongue every 5 (five) minutes as needed.       ondansetron (ZOFRAN-ODT) 4 MG TbDL Take 1 tablet (4 mg total) by mouth every 6 (six) hours as needed (nausea/vomiting).  Qty: 12 tablet, Refills: 0      pantoprazole (PROTONIX) 40 MG tablet TAKE 1 TABLET (40 MG  TOTAL) BY MOUTH ONCE DAILY.  Qty: 90 tablet, Refills: 3    Comments: Please inactivate all prior scripts with same name and strength including on holds.       !! - Potential duplicate medications found. Please discuss with provider.          Discharge Procedure Orders (must include Diet, Follow-up, Activity)    Follow Up:  Follow up with PCP as previously scheduled  Resume routine diet.  Activity as tolerated.    No driving day of procedure.

## 2022-01-12 NOTE — ANESTHESIA POSTPROCEDURE EVALUATION
Anesthesia Post Evaluation    Patient: Dottie Galeana    Procedure(s) Performed: Procedure(s) (LRB):  ESOPHAGOGASTRODUODENOSCOPY (EGD) (N/A)    Final Anesthesia Type: general      Patient location during evaluation: PACU  Patient participation: Yes- Able to Participate  Level of consciousness: sedated and awake  Post-procedure vital signs: reviewed and stable  Pain management: adequate  Airway patency: patent    PONV status at discharge: No PONV  Anesthetic complications: no      Cardiovascular status: blood pressure returned to baseline  Respiratory status: spontaneous ventilation  Hydration status: euvolemic  Follow-up not needed.          Vitals Value Taken Time   /58 01/12/22 1025   Temp  01/12/22 1424   Pulse 72 01/12/22 1025   Resp 15 01/12/22 1025   SpO2 98 % 01/12/22 1025         Event Time   Out of Recovery 10:47:00         Pain/Mercedes Score: Mercedes Score: 10 (1/12/2022 10:25 AM)

## 2022-01-12 NOTE — PROVATION PATIENT INSTRUCTIONS
Discharge Summary/Instructions after an Endoscopic Procedure  Patient Name: Dottie Galeana  Patient MRN: [id number]  Patient YOB: 1950  Wednesday, January 12, 2022  Al Santiago MD  Dear patient,  As a result of recent federal legislation (The Federal Cures Act), you may   receive lab or pathology results from your procedure in your [facility]   account before your physician is able to contact you. Your physician or   their representative will relay the results to you with their   recommendations at their soonest availability.  Thank you,  RESTRICTIONS:  During your procedure today, you received medications for sedation.  These   medications may affect your judgment, balance and coordination.  Therefore,   for 24 hours, you have the following restrictions:   - DO NOT drive a car, operate machinery, make legal/financial decisions,   sign important papers or drink alcohol.    ACTIVITY:  Today: no heavy lifting, straining or running due to procedural   sedation/anesthesia.  The following day: return to full activity including work.  DIET:  Eat and drink normally unless instructed otherwise.     TREATMENT FOR COMMON SIDE EFFECTS:  - Mild abdominal pain, nausea, belching, bloating or excessive gas:  rest,   eat lightly and use a heating pad.  - Sore Throat: treat with throat lozenges and/or gargle with warm salt   water.  - Because air was used during the procedure, expelling large amounts of air   from your rectum or belching is normal.  - If a bowel prep was taken, you may not have a bowel movement for 1-3 days.    This is normal.  SYMPTOMS TO WATCH FOR AND REPORT TO YOUR PHYSICIAN:  1. Abdominal pain or bloating, other than gas cramps.  2. Chest pain.  3. Back pain.  4. Signs of infection such as: chills or fever occurring within 24 hours   after the procedure.  5. Rectal bleeding, which would show as bright red, maroon, or black stools.   (A tablespoon of blood from the rectum is not serious,  especially if   hemorrhoids are present.)  6. Vomiting.  7. Weakness or dizziness.  GO DIRECTLY TO THE NEAREST EMERGENCY ROOM IF YOU HAVE ANY OF THE FOLLOWING:      Difficulty breathing              Chills and/or fever over 101 F   Persistent vomiting and/or vomiting blood   Severe abdominal pain   Severe chest pain   Black, tarry stools   Bleeding- more than one tablespoon   Any other symptom or condition that you feel may need urgent attention  Your doctor recommends these additional instructions:  If any biopsies were taken, your doctors clinic will contact you in 1 to 2   weeks with any results.  We are waiting for your pathology results.   Continue your present medications.   You are being discharged to home.  For questions, problems or results please call your physician - Al Santiago MD at Work:  [phone].  EMERGENCY PHONE NUMBER: [phone], LAB RESULTS: [phone]  IF A COMPLICATION OR EMERGENCY SITUATION ARISES AND YOU ARE UNABLE TO REACH   YOUR PHYSICIAN - GO DIRECTLY TO THE EMERGENCY ROOM.  ___________________________________________  Nurse Signature  ___________________________________________  Patient/Designated Responsible Party Signature  Al Santiago MD  1/12/2022 10:08:44 AM  This report has been verified and signed electronically.  Dear patient,  As a result of recent federal legislation (The Federal Cures Act), you may   receive lab or pathology results from your procedure in your [facility]   account before your physician is able to contact you. Your physician or   their representative will relay the results to you with their   recommendations at their soonest availability.  Thank you.  PROVATION

## 2022-01-12 NOTE — TRANSFER OF CARE
"Anesthesia Transfer of Care Note    Patient: Dottie Galeana    Procedure(s) Performed: Procedure(s) (LRB):  ESOPHAGOGASTRODUODENOSCOPY (EGD) (N/A)    Patient location: PACU    Anesthesia Type: general    Transport from OR: Transported from OR on room air with adequate spontaneous ventilation    Post pain: adequate analgesia    Post assessment: no apparent anesthetic complications and tolerated procedure well    Post vital signs: stable    Level of consciousness: sedated    Nausea/Vomiting: no nausea/vomiting    Complications: none    Transfer of care protocol was followed      Last vitals:   Visit Vitals  /69 (BP Location: Right arm, Patient Position: Lying)   Pulse 71   Temp 36.7 °C (98.1 °F)   Resp 16   Ht 5' 7" (1.702 m)   Wt 77.1 kg (170 lb)   LMP 04/21/1994   SpO2 98%   Breastfeeding No   BMI 26.63 kg/m²     "

## 2022-01-12 NOTE — DISCHARGE INSTRUCTIONS
Patient Education       Upper GI Endoscopy Discharge Instructions   About this topic   This procedure is done to view your upper gastrointestinal (GI) tract. This includes your throat and food pipe (esophagus). It also includes your stomach and the first part of the small bowel. Some people have this test for problems like coughing or throwing up blood. Other people may be having bad belly pain or blood in their stool. You may be having trouble swallowing or problems with acid reflux.  Doctors often use this test to look for problems like:  · Ulcers  · Cancer or tumor growths  · Internal bleeding  · Swelling  · Inflammation  · Infection  · Kern's esophagus  · Gastroesophageal reflux disease or GERD  · Swallowing problems     What care is needed at home?   · Ask your doctor what you need to do when you go home. Make sure you ask questions if you do not understand what the doctor says. This way you will know what you need to do.  · Your throat may feel sore. Your belly may also feel bloated. Your doctor may give you drugs to help with pain.  · Talk to your doctor about when it is safe for you to go back to eating your normal diet and taking your drugs. You can drink fluid once the numbing drugs in your throat wear off.  What follow-up care is needed?   · Your doctor may ask you to make visits to the office to check on your progress. Be sure to keep these visits.  · The results of this test may help your doctor understand what kind of problem you have with your upper GI tract. Together you can make a plan for more care.  What drugs may be needed?   The doctor may order drugs to:  · Help with pain  · Decrease the acid in your stomach  Will physical activity be limited?   You may need to limit your activity for the rest of the day. After that, you will likely be able to go back to your normal activities.  What changes to diet are needed?   Soft foods like pudding or soups may be easier to eat at first. Ask your doctor  if you need to make any changes to your diet.  What problems could happen?   · Painful swallowing  · Upset stomach  · Injury to food pipe   · Throwing up  · Tear in the esophagus  When do I need to call the doctor?   · Signs of infection. These include a fever of 100.4°F (38°C) or higher, chills.  · Very bad belly pain  · Throwing up blood  · Your belly is hard and swollen  · Trouble swallowing or breathing  · Upset stomach and throwing up  · Bloody or black tarry stools  · Cough, shortness of breath, or chest pain  · A crunching feeling under the skin in your neck  · You are not feeling better in 2 to 3 days or you are feeling worse  Teach Back: Helping You Understand   The Teach Back Method helps you understand the information we are giving you. After you talk with the staff, tell them in your own words what you learned. This helps to make sure the staff has described each thing clearly. It also helps to explain things that may have been confusing. Before going home, make sure you can do these:  · I can tell you about my procedure.  · I can tell you what changes I need to make with my diet or drugs.  · I can tell you what I will do if I have very bad belly pain, throwing up blood, or my belly is hard and swollen.  Where can I learn more?   American G[employer] Association  https://www.gastro.org/practice-guidance/gi-patient-center/topic/upper-gi-endoscopy   Last Reviewed Date   2021-10-05  Consumer Information Use and Disclaimer   This information is not specific medical advice and does not replace information you receive from your health care provider. This is only a brief summary of general information. It does NOT include all information about conditions, illnesses, injuries, tests, procedures, treatments, therapies, discharge instructions or life-style choices that may apply to you. You must talk with your health care provider for complete information about your health and treatment options. This  information should not be used to decide whether or not to accept your health care providers advice, instructions or recommendations. Only your health care provider has the knowledge and training to provide advice that is right for you.  Copyright   Copyright © 2021 UpToDate, Inc. and its affiliates and/or licensors. All rights reserved.

## 2022-01-12 NOTE — H&P
History & Physical - Short Stay  Gastroenterology      SUBJECTIVE:     Procedure: EGD    Chief Complaint/Indication for Procedure: Dysphagia and Reflux    PTA Medications   Medication Sig    albuterol (VENTOLIN HFA) 90 mcg/actuation inhaler Inhale 2 puffs into the lungs every 6 (six) hours as needed for Wheezing. Rescue    albuterol (VENTOLIN HFA) 90 mcg/actuation inhaler Inhale 2 puffs into the lungs every 6 (six) hours as needed for Wheezing. Rescue    alendronate (FOSAMAX) 70 MG tablet TAKE 1 TABLET (70 MG TOTAL) BY MOUTH EVERY 7 DAYS.    ALPRAZolam (XANAX) 0.25 MG tablet Take 1 tablet (0.25 mg total) by mouth 3 (three) times daily as needed for Anxiety.    amLODIPine (NORVASC) 10 MG tablet Take 1 tablet (10 mg total) by mouth once daily.    aspirin 325 MG tablet Take 325 mg by mouth once daily.     azelastine (ASTELIN) 137 mcg (0.1 %) nasal spray 1 spray (137 mcg total) by Nasal route 2 (two) times daily.    azelastine (ASTELIN) 137 mcg (0.1 %) nasal spray 1 spray (137 mcg total) by Nasal route 2 (two) times daily.    calcium citrate-vitamin D3 315-200 mg (CITRACAL+D) 315 mg-5 mcg (200 unit) per tablet Take 1 tablet by mouth 2 (two) times daily.    fluticasone propionate (FLONASE) 50 mcg/actuation nasal spray 1 spray (50 mcg total) by Each Nostril route once daily.    losartan (COZAAR) 100 MG tablet TAKE 1 TABLET (100 MG TOTAL) BY MOUTH ONCE DAILY.    multivitamin (THERAGRAN) per tablet Take 1 tablet by mouth once daily.     nebivoloL (BYSTOLIC) 10 MG Tab Take 1 tablet (10 mg total) by mouth once daily.    rosuvastatin (CRESTOR) 10 MG tablet TAKE 1 TABLET (10 MG TOTAL) BY MOUTH EVERY EVENING.    UNABLE TO FIND Roger/Bro cough medication through [facility]    venlafaxine (EFFEXOR-XR) 150 MG Cp24 Take 1 capsule (150 mg total) by mouth once daily.    zolpidem (AMBIEN) 10 mg Tab Take 1 tablet (10 mg total) by mouth nightly as needed (insomnia).    NITROSTAT 0.4 mg SL tablet Place 0.4 mg under the  tongue every 5 (five) minutes as needed.     ondansetron (ZOFRAN-ODT) 4 MG TbDL Take 1 tablet (4 mg total) by mouth every 6 (six) hours as needed (nausea/vomiting). (Patient not taking: Reported on 1/11/2022)    pantoprazole (PROTONIX) 40 MG tablet TAKE 1 TABLET (40 MG TOTAL) BY MOUTH ONCE DAILY. (Patient not taking: Reported on 1/11/2022)       Review of patient's allergies indicates:   Allergen Reactions    No known drug allergies         Past Medical History:   Diagnosis Date    Arthritis     Closed compression fracture of L1 lumbar vertebra 11/7/2018    Depression     General anesthetics causing adverse effect in therapeutic use     Generalized anxiety disorder     GERD (gastroesophageal reflux disease)     History of seasonal allergies     HTN (hypertension)     Hyperlipidemia     Hyponatremia     Insomnia     Osteoporosis     Stress incontinence     Stroke     ocular behind left eye     Past Surgical History:   Procedure Laterality Date    BREAST BIOPSY Left 1998    benign    ESOPHAGOGASTRODUODENOSCOPY N/A 8/17/2018    Procedure: EGD (ESOPHAGOGASTRODUODENOSCOPY);  Surgeon: Al Santiago MD;  Location: [facility] ENDO;  Service: Endoscopy;  Laterality: N/A;    FIXATION KYPHOPLASTY N/A 11/13/2018    Procedure: Kyphoplasty L1;  Surgeon: Russ Gramajo MD;  Location: [facility] OR;  Service: Pain Management;  Laterality: N/A;    ORIF WRIST FRACTURE Right      Family History   Problem Relation Age of Onset    Ovarian cancer Mother 42     Social History     Tobacco Use    Smoking status: Never Smoker    Smokeless tobacco: Never Used   Substance Use Topics    Alcohol use: Yes     Alcohol/week: 14.0 standard drinks     Types: 14 Glasses of wine per week     Comment: 2 glasses wine per day    Drug use: Yes     Types: Hydrocodone, Benzodiazepines         OBJECTIVE:     Vital Signs (Most Recent)  Temp: 98.1 °F (36.7 °C) (01/12/22 0922)  Pulse: 71 (01/12/22 0922)  Resp: 16 (01/12/22 0922)  BP: 127/69  (01/12/22 0922)  SpO2: 98 % (01/12/22 0922)    Physical Exam:                                                       GENERAL:  Comfortable, in no acute distress.                                 HEENT EXAM:  Nonicteric.  No adenopathy.  Oropharynx is clear.               NECK:  Supple.                                                               LUNGS:  Clear.                                                               CARDIAC:  Regular rate and rhythm.  S1, S2.  No murmur.                      ABDOMEN:  Soft, positive bowel sounds, nontender.  No hepatosplenomegaly or masses.  No rebound or guarding.                                             EXTREMITIES:  No edema.     MENTAL STATUS:  Normal, alert and oriented.      ASSESSMENT/PLAN:     Assessment: Dysphagia and Reflux    Plan: EGD    Anesthesia Plan: General    ASA Grade: ASA 2 - Patient with mild systemic disease with no functional limitations    MALLAMPATI SCORE:  I (soft palate, uvula, fauces, and tonsillar pillars visible)

## 2022-01-12 NOTE — ANESTHESIA PREPROCEDURE EVALUATION
01/12/2022  Dottie Galeana is a 71 y.o., female.    Pre-op Assessment    I have reviewed the Patient Summary Reports.     I have reviewed the Nursing Notes. I have reviewed the NPO Status.   I have reviewed the Medications.     Review of Systems  Anesthesia Hx:  Hx of Anesthetic complications  Denies Family Hx of Anesthesia complications.  Personal Hx of Anesthesia complications Slow To Awaken/Delayed Emergence and mild, somewhat sensitive to sedation / narcotics   Social:  Non-Smoker, No Alcohol Use    Hematology/Oncology:  Hematology Normal        Cardiovascular:   Hypertension, well controlled hyperlipidemia    Pulmonary:   Shortness of breath    Renal/:  Renal/ Normal     Hepatic/GI:   GERD, well controlled    Neurological:   CVA (ocular stroke), no residual symptoms    Endocrine:  Endocrine Normal Hx   Psych:   Psychiatric History anxiety          Physical Exam  General:  Well nourished    Airway/Jaw/Neck:  Airway Findings: Mouth Opening: Normal General Airway Assessment: Adult  Mallampati: II  Jaw/Neck Findings:  Neck ROM: Extension Decreased, Mild       Chest/Lungs:  Chest/Lungs Findings: Clear to auscultation, Normal Respiratory Rate     Heart/Vascular:  Heart Findings: Rate: Normal  Rhythm: Regular Rhythm  Sounds: Normal  Heart murmur: negative Vascular Findings: Normal (No carotid bruits.)       Mental Status:  Mental Status Findings:  Cooperative, Alert and Oriented         Anesthesia Plan  Type of Anesthesia, risks & benefits discussed:  Anesthesia Type:  general    Patient's Preference:   Plan Factors:          Intra-op Monitoring Plan: standard ASA monitors  Intra-op Monitoring Plan Comments:   Post Op Pain Control Plan:   Post Op Pain Control Plan Comments:     Induction:   IV  Beta Blocker:  Patient is on a Beta-Blocker and has received one dose within the past 24 hours (No further  documentation required).       Informed Consent: Patient understands risks and agrees with Anesthesia plan.  Questions answered. Anesthesia consent signed with patient.  ASA Score: 2     Day of Surgery Review of History & Physical: I have interviewed and examined the patient. I have reviewed the patient's H&P dated:  There are no significant changes.          Ready For Surgery From Anesthesia Perspective.

## 2022-01-12 NOTE — PLAN OF CARE
Patient is being discharged. Patient remained free from falls, injuries, or accidents during stay. Patient education provided by this nurse and has been given discharge paperwork containing follow up orders and education.

## 2022-01-19 LAB
FINAL PATHOLOGIC DIAGNOSIS: NORMAL
GROSS: NORMAL
Lab: NORMAL
MICROSCOPIC EXAM: NORMAL

## 2022-01-24 DIAGNOSIS — G47.00 INSOMNIA, UNSPECIFIED TYPE: ICD-10-CM

## 2022-01-24 DIAGNOSIS — I10 ESSENTIAL HYPERTENSION: ICD-10-CM

## 2022-01-24 DIAGNOSIS — F41.1 GENERALIZED ANXIETY DISORDER: ICD-10-CM

## 2022-01-24 DIAGNOSIS — R09.82 PND (POST-NASAL DRIP): ICD-10-CM

## 2022-01-24 DIAGNOSIS — Z78.0 MENOPAUSE: Primary | ICD-10-CM

## 2022-01-24 DIAGNOSIS — J40 BRONCHITIS: ICD-10-CM

## 2022-01-24 DIAGNOSIS — E78.5 HYPERLIPIDEMIA, UNSPECIFIED HYPERLIPIDEMIA TYPE: ICD-10-CM

## 2022-01-24 RX ORDER — AZELASTINE 1 MG/ML
1 SPRAY, METERED NASAL 2 TIMES DAILY
Qty: 30 ML | Refills: 0 | Status: CANCELLED | OUTPATIENT
Start: 2022-01-24 | End: 2023-01-24

## 2022-01-25 RX ORDER — ALPRAZOLAM 0.25 MG/1
0.25 TABLET ORAL 3 TIMES DAILY PRN
Qty: 90 TABLET | Refills: 5 | Status: SHIPPED | OUTPATIENT
Start: 2022-01-25 | End: 2022-09-26

## 2022-01-25 RX ORDER — FLUTICASONE PROPIONATE 50 MCG
1 SPRAY, SUSPENSION (ML) NASAL DAILY
Qty: 48 G | Refills: 1 | Status: SHIPPED | OUTPATIENT
Start: 2022-01-25 | End: 2022-04-29

## 2022-01-25 RX ORDER — PANTOPRAZOLE SODIUM 40 MG/1
40 TABLET, DELAYED RELEASE ORAL DAILY
Qty: 90 TABLET | Refills: 3 | Status: SHIPPED | OUTPATIENT
Start: 2022-01-25 | End: 2022-08-03 | Stop reason: SDUPTHER

## 2022-01-25 RX ORDER — ROSUVASTATIN CALCIUM 10 MG/1
10 TABLET, COATED ORAL NIGHTLY
Qty: 90 TABLET | Refills: 2 | Status: SHIPPED | OUTPATIENT
Start: 2022-01-25 | End: 2022-11-15 | Stop reason: SDUPTHER

## 2022-01-25 RX ORDER — AZELASTINE 1 MG/ML
1 SPRAY, METERED NASAL 2 TIMES DAILY
Qty: 30 ML | Refills: 0 | Status: SHIPPED | OUTPATIENT
Start: 2022-01-25 | End: 2022-08-21

## 2022-01-25 RX ORDER — LOSARTAN POTASSIUM 100 MG/1
100 TABLET ORAL DAILY
Qty: 90 TABLET | Refills: 2 | Status: SHIPPED | OUTPATIENT
Start: 2022-01-25 | End: 2022-12-04 | Stop reason: SDUPTHER

## 2022-01-25 RX ORDER — AMLODIPINE BESYLATE 10 MG/1
10 TABLET ORAL DAILY
Qty: 90 TABLET | Refills: 2 | Status: SHIPPED | OUTPATIENT
Start: 2022-01-25 | End: 2022-05-10 | Stop reason: SDUPTHER

## 2022-01-25 RX ORDER — ALENDRONATE SODIUM 70 MG/1
70 TABLET ORAL
Qty: 12 TABLET | Refills: 11 | Status: SHIPPED | OUTPATIENT
Start: 2022-01-25 | End: 2022-12-10 | Stop reason: SDUPTHER

## 2022-01-25 RX ORDER — NEBIVOLOL 10 MG/1
10 TABLET ORAL DAILY
Qty: 90 TABLET | Refills: 2 | Status: ON HOLD | OUTPATIENT
Start: 2022-01-25 | End: 2023-06-09 | Stop reason: SDUPTHER

## 2022-01-25 RX ORDER — VENLAFAXINE HYDROCHLORIDE 150 MG/1
150 CAPSULE, EXTENDED RELEASE ORAL DAILY
Qty: 90 CAPSULE | Refills: 2 | Status: SHIPPED | OUTPATIENT
Start: 2022-01-25 | End: 2022-08-10 | Stop reason: SDUPTHER

## 2022-01-25 RX ORDER — ZOLPIDEM TARTRATE 10 MG/1
10 TABLET ORAL NIGHTLY PRN
Qty: 30 TABLET | Refills: 5 | Status: SHIPPED | OUTPATIENT
Start: 2022-01-25 | End: 2022-08-14 | Stop reason: SDUPTHER

## 2022-01-25 NOTE — TELEPHONE ENCOUNTER
Dr. Li,  Pended labs and Dexa scan as well as meds.  She has been notified that an appointment with you is due as well as labs and her Dexa scan.

## 2022-01-25 NOTE — TELEPHONE ENCOUNTER
Care Due:                  Date            Visit Type   Department     Provider  --------------------------------------------------------------------------------                                BEAN CHAUHAN      [facility] FAMILY  Last Visit: 10-      VISIT        MEDICINE       LISETH STRICKLAND  Next Visit: None Scheduled  None         None Found                                                            Last  Test          Frequency    Reason                     Performed    Due Date  --------------------------------------------------------------------------------    Lipid Panel.  12 months..  rosuvastatin.............  Not Found    Overdue    Powered by [employer] by "[employer] Reference number: [id number].   1/24/2022 10:52:49 PM CST

## 2022-02-22 ENCOUNTER — PATIENT MESSAGE (OUTPATIENT)
Dept: FAMILY MEDICINE | Facility: CLINIC | Age: 72
End: 2022-02-22
Payer: MEDICARE

## 2022-02-22 DIAGNOSIS — R19.5 POSITIVE FIT (FECAL IMMUNOCHEMICAL TEST): Primary | ICD-10-CM

## 2022-02-22 DIAGNOSIS — Z12.31 ENCOUNTER FOR SCREENING MAMMOGRAM FOR BREAST CANCER: ICD-10-CM

## 2022-02-22 DIAGNOSIS — Z12.11 COLON CANCER SCREENING: Primary | ICD-10-CM

## 2022-02-23 ENCOUNTER — PATIENT OUTREACH (OUTPATIENT)
Dept: ADMINISTRATIVE | Facility: HOSPITAL | Age: 72
End: 2022-02-23
Payer: MEDICARE

## 2022-02-23 ENCOUNTER — TELEPHONE (OUTPATIENT)
Dept: GASTROENTEROLOGY | Facility: CLINIC | Age: 72
End: 2022-02-23
Payer: MEDICARE

## 2022-02-23 NOTE — PROGRESS NOTES
2022 Care Everywhere updates requested and reviewed.  Immunizations reconciled. Media reports reviewed.  Duplicate HM overrides and  orders removed.  Overdue HM topic chart audit and/or requested.  Overdue lab testing linked to upcoming lab appointments if applies.      COLONOSCOPY, MAMMOGRAM AND DEXA SCHEDULED  Health Maintenance Due   Topic Date Due    Shingles Vaccine (2 of 3) 10/19/2015    DEXA Scan  2021    COVID-19 Vaccine (3 - Booster for Pfizer series) 2021    Influenza Vaccine (1) 2021    Colorectal Cancer Screening  10/01/2021    Mammogram  2022

## 2022-02-23 NOTE — TELEPHONE ENCOUNTER
Pt scheduled for scope. Instructions mailed to home and sent via [facility]. Pt verbalized understanding of preop COVID test requirement.

## 2022-03-07 ENCOUNTER — HOSPITAL ENCOUNTER (OUTPATIENT)
Dept: RADIOLOGY | Facility: HOSPITAL | Age: 72
Discharge: HOME | End: 2022-03-07
Attending: FAMILY MEDICINE
Payer: MEDICARE

## 2022-03-07 DIAGNOSIS — Z12.31 ENCOUNTER FOR SCREENING MAMMOGRAM FOR BREAST CANCER: ICD-10-CM

## 2022-03-07 PROCEDURE — 77067 MAMMO DIGITAL SCREENING BILAT WITH TOMO: ICD-10-PCS | Mod: 26,,, | Performed by: RADIOLOGY

## 2022-03-07 PROCEDURE — 77063 BREAST TOMOSYNTHESIS BI: CPT | Mod: TC,PO

## 2022-03-07 PROCEDURE — 77063 MAMMO DIGITAL SCREENING BILAT WITH TOMO: ICD-10-PCS | Mod: 26,,, | Performed by: RADIOLOGY

## 2022-03-07 PROCEDURE — 77067 SCR MAMMO BI INCL CAD: CPT | Mod: 26,,, | Performed by: RADIOLOGY

## 2022-03-07 PROCEDURE — 77063 BREAST TOMOSYNTHESIS BI: CPT | Mod: 26,,, | Performed by: RADIOLOGY

## 2022-03-08 ENCOUNTER — TELEPHONE (OUTPATIENT)
Dept: RADIOLOGY | Facility: HOSPITAL | Age: 72
End: 2022-03-08
Payer: MEDICARE

## 2022-03-08 ENCOUNTER — PATIENT MESSAGE (OUTPATIENT)
Dept: FAMILY MEDICINE | Facility: CLINIC | Age: 72
End: 2022-03-08
Payer: MEDICARE

## 2022-03-08 NOTE — TELEPHONE ENCOUNTER
Called patient, discussed recent screening mammogram results, scheduled diag mammo and u/s of left breast on 3/24/22 at 11 am at [address] [facility] radiology. Will add patient to wait list and call her should an opening become available prior

## 2022-03-24 ENCOUNTER — HOSPITAL ENCOUNTER (OUTPATIENT)
Dept: RADIOLOGY | Facility: HOSPITAL | Age: 72
Discharge: HOME | End: 2022-03-24
Attending: FAMILY MEDICINE
Payer: MEDICARE

## 2022-03-24 DIAGNOSIS — R92.8 ABNORMAL MAMMOGRAM OF LEFT BREAST: ICD-10-CM

## 2022-03-24 PROCEDURE — 77061 BREAST TOMOSYNTHESIS UNI: CPT | Mod: 26,LT,, | Performed by: RADIOLOGY

## 2022-03-24 PROCEDURE — 77065 DX MAMMO INCL CAD UNI: CPT | Mod: TC,PO,LT

## 2022-03-24 PROCEDURE — 77061 MAMMO DIGITAL DIAGNOSTIC LEFT WITH TOMO: ICD-10-PCS | Mod: 26,LT,, | Performed by: RADIOLOGY

## 2022-03-24 PROCEDURE — 77065 MAMMO DIGITAL DIAGNOSTIC LEFT WITH TOMO: ICD-10-PCS | Mod: 26,LT,, | Performed by: RADIOLOGY

## 2022-03-24 PROCEDURE — 77065 DX MAMMO INCL CAD UNI: CPT | Mod: 26,LT,, | Performed by: RADIOLOGY

## 2022-04-14 ENCOUNTER — ANESTHESIA (OUTPATIENT)
Dept: ENDOSCOPY | Facility: HOSPITAL | Age: 72
End: 2022-04-14
Payer: MEDICARE

## 2022-04-14 ENCOUNTER — HOSPITAL ENCOUNTER (OUTPATIENT)
Facility: HOSPITAL | Age: 72
Discharge: HOME | End: 2022-04-14
Attending: INTERNAL MEDICINE | Admitting: INTERNAL MEDICINE
Payer: MEDICARE

## 2022-04-14 ENCOUNTER — ANESTHESIA EVENT (OUTPATIENT)
Dept: ENDOSCOPY | Facility: HOSPITAL | Age: 72
End: 2022-04-14
Payer: MEDICARE

## 2022-04-14 VITALS
SYSTOLIC BLOOD PRESSURE: 105 MMHG | HEART RATE: 76 BPM | WEIGHT: 165 LBS | DIASTOLIC BLOOD PRESSURE: 68 MMHG | OXYGEN SATURATION: 98 % | HEIGHT: 67 IN | RESPIRATION RATE: 15 BRPM | TEMPERATURE: 97 F | BODY MASS INDEX: 25.9 KG/M2

## 2022-04-14 DIAGNOSIS — R19.5 POSITIVE FIT (FECAL IMMUNOCHEMICAL TEST): ICD-10-CM

## 2022-04-14 PROCEDURE — 88305 TISSUE EXAM BY PATHOLOGIST: CPT | Mod: 26,,, | Performed by: PATHOLOGY

## 2022-04-14 PROCEDURE — 27201089 HC SNARE, DISP (ANY): Mod: PO | Performed by: INTERNAL MEDICINE

## 2022-04-14 PROCEDURE — D9220A PRA ANESTHESIA: Mod: ANES,,, | Performed by: ANESTHESIOLOGY

## 2022-04-14 PROCEDURE — 37000008 HC ANESTHESIA 1ST 15 MINUTES: Mod: PO | Performed by: INTERNAL MEDICINE

## 2022-04-14 PROCEDURE — 63600175 PHARM REV CODE 636 W HCPCS: Mod: PO | Performed by: INTERNAL MEDICINE

## 2022-04-14 PROCEDURE — 37000009 HC ANESTHESIA EA ADD 15 MINS: Mod: PO | Performed by: INTERNAL MEDICINE

## 2022-04-14 PROCEDURE — 25000003 PHARM REV CODE 250: Mod: PO | Performed by: NURSE ANESTHETIST, CERTIFIED REGISTERED

## 2022-04-14 PROCEDURE — 45385 PR COLONOSCOPY,REMV LESN,SNARE: ICD-10-PCS | Mod: ,,, | Performed by: INTERNAL MEDICINE

## 2022-04-14 PROCEDURE — 45385 COLONOSCOPY W/LESION REMOVAL: CPT | Mod: ,,, | Performed by: INTERNAL MEDICINE

## 2022-04-14 PROCEDURE — D9220A PRA ANESTHESIA: ICD-10-PCS | Mod: ANES,,, | Performed by: ANESTHESIOLOGY

## 2022-04-14 PROCEDURE — 45385 COLONOSCOPY W/LESION REMOVAL: CPT | Mod: PO | Performed by: INTERNAL MEDICINE

## 2022-04-14 PROCEDURE — 88305 TISSUE EXAM BY PATHOLOGIST: CPT | Mod: 59 | Performed by: PATHOLOGY

## 2022-04-14 PROCEDURE — D9220A PRA ANESTHESIA: Mod: CRNA,,, | Performed by: NURSE ANESTHETIST, CERTIFIED REGISTERED

## 2022-04-14 PROCEDURE — 88305 TISSUE EXAM BY PATHOLOGIST: ICD-10-PCS | Mod: 26,,, | Performed by: PATHOLOGY

## 2022-04-14 PROCEDURE — 63600175 PHARM REV CODE 636 W HCPCS: Mod: PO | Performed by: NURSE ANESTHETIST, CERTIFIED REGISTERED

## 2022-04-14 PROCEDURE — D9220A PRA ANESTHESIA: ICD-10-PCS | Mod: CRNA,,, | Performed by: NURSE ANESTHETIST, CERTIFIED REGISTERED

## 2022-04-14 RX ORDER — SODIUM CHLORIDE, SODIUM LACTATE, POTASSIUM CHLORIDE, CALCIUM CHLORIDE 600; 310; 30; 20 MG/100ML; MG/100ML; MG/100ML; MG/100ML
INJECTION, SOLUTION INTRAVENOUS CONTINUOUS
Status: DISCONTINUED | OUTPATIENT
Start: 2022-04-14 | End: 2022-04-14 | Stop reason: HOSPADM

## 2022-04-14 RX ORDER — PROPOFOL 10 MG/ML
VIAL (ML) INTRAVENOUS
Status: DISCONTINUED | OUTPATIENT
Start: 2022-04-14 | End: 2022-04-14

## 2022-04-14 RX ORDER — SODIUM CHLORIDE 0.9 % (FLUSH) 0.9 %
10 SYRINGE (ML) INJECTION
Status: DISCONTINUED | OUTPATIENT
Start: 2022-04-14 | End: 2022-04-14 | Stop reason: HOSPADM

## 2022-04-14 RX ORDER — LIDOCAINE HCL/PF 100 MG/5ML
SYRINGE (ML) INTRAVENOUS
Status: DISCONTINUED | OUTPATIENT
Start: 2022-04-14 | End: 2022-04-14

## 2022-04-14 RX ADMIN — PROPOFOL 50 MG: 10 INJECTION, EMULSION INTRAVENOUS at 12:04

## 2022-04-14 RX ADMIN — LIDOCAINE HYDROCHLORIDE 100 MG: 20 INJECTION, SOLUTION INTRAVENOUS at 12:04

## 2022-04-14 RX ADMIN — PROPOFOL 25 MG: 10 INJECTION, EMULSION INTRAVENOUS at 12:04

## 2022-04-14 RX ADMIN — SODIUM CHLORIDE, SODIUM LACTATE, POTASSIUM CHLORIDE, AND CALCIUM CHLORIDE: .6; .31; .03; .02 INJECTION, SOLUTION INTRAVENOUS at 12:04

## 2022-04-14 RX ADMIN — PROPOFOL 50 MG: 10 INJECTION, EMULSION INTRAVENOUS at 01:04

## 2022-04-14 RX ADMIN — PROPOFOL 150 MG: 10 INJECTION, EMULSION INTRAVENOUS at 12:04

## 2022-04-14 NOTE — TRANSFER OF CARE
"Anesthesia Transfer of Care Note    Patient: Dottie Galeana    Procedure(s) Performed: Procedure(s) (LRB):  COLONOSCOPY (N/A)    Patient location: PACU    Anesthesia Type: general    Transport from OR: Transported from OR on room air with adequate spontaneous ventilation    Post pain: adequate analgesia    Post assessment: no apparent anesthetic complications and tolerated procedure well    Post vital signs: stable    Level of consciousness: sedated and awake    Nausea/Vomiting: no nausea/vomiting    Complications: none    Transfer of care protocol was followed      Last vitals:   Visit Vitals  /65 (BP Location: Left arm, Patient Position: Lying)   Pulse 80   Temp 36.2 °C (97.1 °F) (Skin)   Resp 19   Ht 5' 7" (1.702 m)   Wt 74.8 kg (165 lb)   LMP 04/21/1994   SpO2 95%   Breastfeeding No   BMI 25.84 kg/m²     "

## 2022-04-14 NOTE — PROVATION PATIENT INSTRUCTIONS
Discharge Summary/Instructions after an Endoscopic Procedure  Patient Name: Dottie Galeana  Patient MRN: [id number]  Patient YOB: 1950  Thursday, April 14, 2022  Al Santiago MD  Dear patient,  As a result of recent federal legislation (The Federal Cures Act), you may   receive lab or pathology results from your procedure in your [facility]   account before your physician is able to contact you. Your physician or   their representative will relay the results to you with their   recommendations at their soonest availability.  Thank you,  RESTRICTIONS:  During your procedure today, you received medications for sedation.  These   medications may affect your judgment, balance and coordination.  Therefore,   for 24 hours, you have the following restrictions:   - DO NOT drive a car, operate machinery, make legal/financial decisions,   sign important papers or drink alcohol.    ACTIVITY:  Today: no heavy lifting, straining or running due to procedural   sedation/anesthesia.  The following day: return to full activity including work.  DIET:  Eat and drink normally unless instructed otherwise.     TREATMENT FOR COMMON SIDE EFFECTS:  - Mild abdominal pain, nausea, belching, bloating or excessive gas:  rest,   eat lightly and use a heating pad.  - Sore Throat: treat with throat lozenges and/or gargle with warm salt   water.  - Because air was used during the procedure, expelling large amounts of air   from your rectum or belching is normal.  - If a bowel prep was taken, you may not have a bowel movement for 1-3 days.    This is normal.  SYMPTOMS TO WATCH FOR AND REPORT TO YOUR PHYSICIAN:  1. Abdominal pain or bloating, other than gas cramps.  2. Chest pain.  3. Back pain.  4. Signs of infection such as: chills or fever occurring within 24 hours   after the procedure.  5. Rectal bleeding, which would show as bright red, maroon, or black stools.   (A tablespoon of blood from the rectum is not serious, especially  if   hemorrhoids are present.)  6. Vomiting.  7. Weakness or dizziness.  GO DIRECTLY TO THE NEAREST EMERGENCY ROOM IF YOU HAVE ANY OF THE FOLLOWING:      Difficulty breathing              Chills and/or fever over 101 F   Persistent vomiting and/or vomiting blood   Severe abdominal pain   Severe chest pain   Black, tarry stools   Bleeding- more than one tablespoon   Any other symptom or condition that you feel may need urgent attention  Your doctor recommends these additional instructions:  If any biopsies were taken, your doctors clinic will contact you in 1 to 2   weeks with any results.  We are waiting for your pathology results.   Your physician has recommended a repeat colonoscopy in three years for   surveillance based on pathology results.   You are being discharged to home.  For questions, problems or results please call your physician - Al Santiago MD at Work:  [phone].  EMERGENCY PHONE NUMBER: [phone], LAB RESULTS: [phone]  IF A COMPLICATION OR EMERGENCY SITUATION ARISES AND YOU ARE UNABLE TO REACH   YOUR PHYSICIAN - GO DIRECTLY TO THE EMERGENCY ROOM.  ___________________________________________  Nurse Signature  ___________________________________________  Patient/Designated Responsible Party Signature  Al Santiago MD  4/14/2022 1:09:01 PM  This report has been verified and signed electronically.  Dear patient,  As a result of recent federal legislation (The Federal Cures Act), you may   receive lab or pathology results from your procedure in your [facility]   account before your physician is able to contact you. Your physician or   their representative will relay the results to you with their   recommendations at their soonest availability.  Thank you.  PROVATION

## 2022-04-14 NOTE — DISCHARGE SUMMARY
[facility] - Endoscopy  Discharge Note  Short Stay    Discharge Note  Short Stay      SUMMARY     Admit Date: 4/14/2022    Attending Physician: Al Santiago MD     Discharge Physician: Al Santiago MD    Discharge Date: 4/14/2022 1:10 PM    Final Diagnosis: Screening [Z13.9]    Disposition: HOME OR SELF CARE    Patient Instructions:   Current Discharge Medication List      CONTINUE these medications which have NOT CHANGED    Details   alendronate (FOSAMAX) 70 MG tablet Take 1 tablet (70 mg total) by mouth every 7 days.  Qty: 12 tablet, Refills: 11      ALPRAZolam (XANAX) 0.25 MG tablet Take 1 tablet (0.25 mg total) by mouth 3 (three) times daily as needed for Anxiety.  Qty: 90 tablet, Refills: 5    Associated Diagnoses: Generalized anxiety disorder      amLODIPine (NORVASC) 10 MG tablet Take 1 tablet (10 mg total) by mouth once daily.  Qty: 90 tablet, Refills: 2    Comments: Please delete all prior scripts with same name and strength including on holds.       aspirin 325 MG tablet Take 325 mg by mouth once daily.       azelastine (ASTELIN) 137 mcg (0.1 %) nasal spray 1 spray (137 mcg total) by Nasal route 2 (two) times daily.  Qty: 30 mL, Refills: 0    Associated Diagnoses: Bronchitis      fluticasone propionate (FLONASE) 50 mcg/actuation nasal spray 1 spray (50 mcg total) by Each Nostril route once daily.  Qty: 48 g, Refills: 1    Comments: Please inactivate all prior scripts with same name and strength including on holds.  Associated Diagnoses: PND (post-nasal drip)      losartan (COZAAR) 100 MG tablet Take 1 tablet (100 mg total) by mouth once daily.  Qty: 90 tablet, Refills: 2    Comments: .  Associated Diagnoses: Essential hypertension      multivitamin (THERAGRAN) per tablet Take 1 tablet by mouth once daily.       nebivoloL (BYSTOLIC) 10 MG Tab Take 1 tablet (10 mg total) by mouth once daily.  Qty: 90 tablet, Refills: 2    Comments: Please inactivate all prior scripts with same name and strength  including on holds.  Associated Diagnoses: Essential hypertension      rosuvastatin (CRESTOR) 10 MG tablet Take 1 tablet (10 mg total) by mouth every evening.  Qty: 90 tablet, Refills: 2    Comments: Please delete all prior scripts with same name and strength including on holds.  Associated Diagnoses: Hyperlipidemia, unspecified hyperlipidemia type      venlafaxine (EFFEXOR-XR) 150 MG Cp24 Take 1 capsule (150 mg total) by mouth once daily.  Qty: 90 capsule, Refills: 2    Comments: Please delete all prior scripts with same name and strength including on holds.   Associated Diagnoses: Generalized anxiety disorder      zolpidem (AMBIEN) 10 mg Tab Take 1 tablet (10 mg total) by mouth nightly as needed (insomnia).  Qty: 30 tablet, Refills: 5    Associated Diagnoses: Insomnia, unspecified type      !! albuterol (VENTOLIN HFA) 90 mcg/actuation inhaler Inhale 2 puffs into the lungs every 6 (six) hours as needed for Wheezing. Rescue  Qty: 8 g, Refills: 0    Associated Diagnoses: Bronchitis      !! albuterol (VENTOLIN HFA) 90 mcg/actuation inhaler Inhale 2 puffs into the lungs every 6 (six) hours as needed for Wheezing. Rescue  Qty: 18 g, Refills: 0    Comments: May use alternative covered by insurance      calcium citrate-vitamin D3 315-200 mg (CITRACAL+D) 315 mg-5 mcg (200 unit) per tablet Take 1 tablet by mouth 2 (two) times daily.      NITROSTAT 0.4 mg SL tablet Place 0.4 mg under the tongue every 5 (five) minutes as needed.       ondansetron (ZOFRAN-ODT) 4 MG TbDL Take 1 tablet (4 mg total) by mouth every 6 (six) hours as needed (nausea/vomiting).  Qty: 12 tablet, Refills: 0      pantoprazole (PROTONIX) 40 MG tablet Take 1 tablet (40 mg total) by mouth once daily.  Qty: 90 tablet, Refills: 3    Comments: Please inactivate all prior scripts with same name and strength including on holds.      UNABLE TO FIND Roger/Bro cough medication through [facility]       !! - Potential duplicate medications found. Please discuss with  provider.          Discharge Procedure Orders (must include Diet, Follow-up, Activity)    Follow Up:  Follow up with PCP as previously scheduled  Resume routine diet.  Activity as tolerated.    No driving day of procedure.

## 2022-04-14 NOTE — ANESTHESIA PREPROCEDURE EVALUATION
04/14/2022  Dottie Galeana is a 71 y.o., female.    Pre-op Assessment    I have reviewed the Patient Summary Reports.    I have reviewed the Nursing Notes. I have reviewed the NPO Status.   I have reviewed the Medications.     Review of Systems  Anesthesia Hx:  Hx of Anesthetic complications  Denies Family Hx of Anesthesia complications.  Personal Hx of Anesthesia complications Slow To Awaken/Delayed Emergence and mild, somewhat sensitive to sedation / narcotics   Social:  Non-Smoker, No Alcohol Use    Hematology/Oncology:  Hematology Normal        Cardiovascular:   Hypertension, well controlled hyperlipidemia OLIVIA    Pulmonary:   Shortness of breath    Renal/:  Renal/ Normal     Hepatic/GI:   Bowel Prep. 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 Neuromuscular Disease,    Endocrine:  Endocrine Normal    Psych:   Psychiatric History anxiety          Physical Exam  General:  Well nourished      Airway/Jaw/Neck:  Airway Findings: Mouth Opening: Normal   General Airway Assessment: Adult Mallampati: II  Jaw/Neck Findings:  Neck ROM: Extension Decreased, Mild       Dental:  Intact     Chest/Lungs:  Chest/Lungs Findings: Clear to auscultation, Normal Respiratory Rate      Heart/Vascular:  Heart Findings: Rate: Normal  Rhythm: Regular Rhythm  Sounds: Normal  Heart murmur: negative Vascular Findings: Normal (No carotid bruits.)       Mental Status:  Mental Status Findings:  Cooperative, Alert and Oriented         Anesthesia Plan  Type of Anesthesia, risks & benefits discussed:  Anesthesia Type:  Gen Natural Airway    Patient's Preference:   Plan Factors:          Intra-op Monitoring Plan: standard ASA monitors  Intra-op Monitoring Plan Comments:   Post Op Pain Control Plan:   Post Op Pain Control Plan Comments:     Induction:    IV  Beta Blocker:  Patient is on a Beta-Blocker and has received one dose within the past 24 hours (No further documentation required).       Informed Consent: Informed consent signed with the Patient and all parties understand the risks and agree with anesthesia plan.  All questions answered.  Anesthesia consent signed with patient.  ASA Score: 2     Day of Surgery Review of History & Physical:  There are no significant changes.  H&P Update referred to the surgeon/provider.          Ready For Surgery From Anesthesia Perspective.           Physical Exam  General: Well nourished    Airway:  Mallampati: II   Mouth Opening: Normal  Neck ROM: Extension Decreased, Mild    Dental:  Intact    Chest/Lungs:  Clear to auscultation, Normal Respiratory Rate    Heart:  Rate: Normal  Rhythm: Regular Rhythm  Sounds: Normal          Anesthesia Plan  Type of Anesthesia, risks & benefits discussed:    Anesthesia Type: Gen Natural Airway  Intra-op Monitoring Plan: standard ASA monitors  Induction:  IV  Informed Consent: Informed consent signed with the Patient and all parties understand the risks and agree with anesthesia plan.  All questions answered.   ASA Score: 2  Day of Surgery Review of History & Physical: H&P Update referred to the surgeon/provider.    Ready For Surgery From Anesthesia Perspective.       .

## 2022-04-14 NOTE — H&P
History & Physical - Short Stay  Gastroenterology      SUBJECTIVE:     Procedure: Colonoscopy    Chief Complaint/Indication for Procedure: Rectal Bleeding    PTA Medications   Medication Sig    alendronate (FOSAMAX) 70 MG tablet Take 1 tablet (70 mg total) by mouth every 7 days.    ALPRAZolam (XANAX) 0.25 MG tablet Take 1 tablet (0.25 mg total) by mouth 3 (three) times daily as needed for Anxiety.    amLODIPine (NORVASC) 10 MG tablet Take 1 tablet (10 mg total) by mouth once daily.    aspirin 325 MG tablet Take 325 mg by mouth once daily.     azelastine (ASTELIN) 137 mcg (0.1 %) nasal spray 1 spray (137 mcg total) by Nasal route 2 (two) times daily.    fluticasone propionate (FLONASE) 50 mcg/actuation nasal spray 1 spray (50 mcg total) by Each Nostril route once daily.    losartan (COZAAR) 100 MG tablet Take 1 tablet (100 mg total) by mouth once daily.    multivitamin (THERAGRAN) per tablet Take 1 tablet by mouth once daily.     nebivoloL (BYSTOLIC) 10 MG Tab Take 1 tablet (10 mg total) by mouth once daily.    rosuvastatin (CRESTOR) 10 MG tablet Take 1 tablet (10 mg total) by mouth every evening.    venlafaxine (EFFEXOR-XR) 150 MG Cp24 Take 1 capsule (150 mg total) by mouth once daily.    zolpidem (AMBIEN) 10 mg Tab Take 1 tablet (10 mg total) by mouth nightly as needed (insomnia).    albuterol (VENTOLIN HFA) 90 mcg/actuation inhaler Inhale 2 puffs into the lungs every 6 (six) hours as needed for Wheezing. Rescue    albuterol (VENTOLIN HFA) 90 mcg/actuation inhaler Inhale 2 puffs into the lungs every 6 (six) hours as needed for Wheezing. Rescue    calcium citrate-vitamin D3 315-200 mg (CITRACAL+D) 315 mg-5 mcg (200 unit) per tablet Take 1 tablet by mouth 2 (two) times daily.    NITROSTAT 0.4 mg SL tablet Place 0.4 mg under the tongue every 5 (five) minutes as needed.     ondansetron (ZOFRAN-ODT) 4 MG TbDL Take 1 tablet (4 mg total) by mouth every 6 (six) hours as needed (nausea/vomiting). (Patient  not taking: No sig reported)    pantoprazole (PROTONIX) 40 MG tablet Take 1 tablet (40 mg total) by mouth once daily. (Patient not taking: Reported on 4/13/2022)    UNABLE TO FIND Roger/Bro cough medication through [facility]       Review of patient's allergies indicates:   Allergen Reactions    No known drug allergies         Past Medical History:   Diagnosis Date    Arthritis     Closed compression fracture of L1 lumbar vertebra 11/7/2018    Depression     General anesthetics causing adverse effect in therapeutic use     Generalized anxiety disorder     GERD (gastroesophageal reflux disease)     History of seasonal allergies     HTN (hypertension)     Hyperlipidemia     Hyponatremia     Insomnia     Osteoporosis     Stress incontinence     Stroke     ocular behind left eye     Past Surgical History:   Procedure Laterality Date    BREAST BIOPSY Left 1998    benign    ESOPHAGOGASTRODUODENOSCOPY N/A 8/17/2018    Procedure: EGD (ESOPHAGOGASTRODUODENOSCOPY);  Surgeon: Al Santiago MD;  Location: [facility] ENDO;  Service: Endoscopy;  Laterality: N/A;    ESOPHAGOGASTRODUODENOSCOPY N/A 1/12/2022    Procedure: ESOPHAGOGASTRODUODENOSCOPY (EGD);  Surgeon: Al Santiago MD;  Location: [facility] ENDO;  Service: Endoscopy;  Laterality: N/A;    FIXATION KYPHOPLASTY N/A 11/13/2018    Procedure: Kyphoplasty L1;  Surgeon: Russ Gramajo MD;  Location: [facility] OR;  Service: Pain Management;  Laterality: N/A;    ORIF WRIST FRACTURE Right      Family History   Problem Relation Age of Onset    Ovarian cancer Mother 42     Social History     Tobacco Use    Smoking status: Never Smoker    Smokeless tobacco: Never Used   Substance Use Topics    Alcohol use: Yes     Alcohol/week: 14.0 standard drinks     Types: 14 Glasses of wine per week     Comment: 2 glasses wine per day    Drug use: Not Currently         OBJECTIVE:     Vital Signs (Most Recent)  Temp: 97.1 °F (36.2 °C) (04/14/22 1213)  Pulse: 80 (04/14/22  1213)  Resp: 16 (04/14/22 1213)  BP: 134/77 (04/14/22 1213)  SpO2: 99 % (04/14/22 1213)    Physical Exam:                                                       GENERAL:  Comfortable, in no acute distress.                                 HEENT EXAM:  Nonicteric.  No adenopathy.  Oropharynx is clear.               NECK:  Supple.                                                               LUNGS:  Clear.                                                               CARDIAC:  Regular rate and rhythm.  S1, S2.  No murmur.                      ABDOMEN:  Soft, positive bowel sounds, nontender.  No hepatosplenomegaly or masses.  No rebound or guarding.                                             EXTREMITIES:  No edema.     MENTAL STATUS:  Normal, alert and oriented.      ASSESSMENT/PLAN:     Assessment: Rectal Bleeding    Plan: Colonoscopy    Anesthesia Plan: General    ASA Grade: ASA 2 - Patient with mild systemic disease with no functional limitations    MALLAMPATI SCORE:  I (soft palate, uvula, fauces, and tonsillar pillars visible)

## 2022-04-14 NOTE — ANESTHESIA POSTPROCEDURE EVALUATION
Anesthesia Post Evaluation    Patient: Dottie Galeana    Procedure(s) Performed: Procedure(s) (LRB):  COLONOSCOPY (N/A)    Final Anesthesia Type: general      Patient location during evaluation: PACU  Patient participation: Yes- Able to Participate  Level of consciousness: awake and alert  Post-procedure vital signs: reviewed and stable  Pain management: adequate  Airway patency: patent    PONV status at discharge: No PONV  Anesthetic complications: no      Cardiovascular status: hemodynamically stable  Respiratory status: unassisted and room air  Hydration status: euvolemic  Follow-up not needed.          Vitals Value Taken Time   /68 04/14/22 1323        Pulse 76 04/14/22 1323   Resp 15 04/14/22 1323   SpO2 98 % 04/14/22 1316         No case tracking events are documented in the log.      Pain/Mercedes Score: Mercedes Score: 10 (4/14/2022  1:24 PM)

## 2022-04-22 LAB
FINAL PATHOLOGIC DIAGNOSIS: NORMAL
Lab: NORMAL

## 2022-04-29 DIAGNOSIS — R09.82 PND (POST-NASAL DRIP): ICD-10-CM

## 2022-04-29 RX ORDER — FLUTICASONE PROPIONATE 50 MCG
SPRAY, SUSPENSION (ML) NASAL
Qty: 48 G | Refills: 1 | Status: SHIPPED | OUTPATIENT
Start: 2022-04-29 | End: 2022-08-21

## 2022-04-29 NOTE — TELEPHONE ENCOUNTER
Care Due:                  Date            Visit Type   Department     Provider  --------------------------------------------------------------------------------                                ESTABLISHED                              PATIENT -    [facility] FAMILY  Last Visit: 10-      VIRTUAL      MEDICINE       LISETH STRICKLAND  Next Visit: None Scheduled  None         None Found                                                            Last  Test          Frequency    Reason                     Performed    Due Date  --------------------------------------------------------------------------------    Lipid Panel.  12 months..  rosuvastatin.............  04- 04-    Powered by [employer]. Reference number: [id number].   4/29/2022 8:50:50 AM CDT

## 2022-04-29 NOTE — TELEPHONE ENCOUNTER
Refill Routing Note   Medication(s) are not appropriate for processing by [facility] for the following reason(s):      - Patient has been seen in the ED/Hospital since the last PCP visit    [facility] action(s):  Route Medication-related problems identified: Requires labs        Medication reconciliation completed: No     Appointments  past 12m or future 3m with PCP    Date Provider   Last Visit   10/26/2021 Josh Li MD   Next Visit   Visit date not found Josh Li MD   ED visits in past 90 days: 0        Note composed:9:49 AM 04/29/2022

## 2022-05-09 ENCOUNTER — PATIENT MESSAGE (OUTPATIENT)
Dept: SMOKING CESSATION | Facility: CLINIC | Age: 72
End: 2022-05-09
Payer: MEDICARE

## 2022-05-13 ENCOUNTER — TELEPHONE (OUTPATIENT)
Dept: FAMILY MEDICINE | Facility: CLINIC | Age: 72
End: 2022-05-13
Payer: MEDICARE

## 2022-07-09 ENCOUNTER — OFFICE VISIT (OUTPATIENT)
Dept: URGENT CARE | Facility: CLINIC | Age: 72
End: 2022-07-09
Payer: MEDICARE

## 2022-07-09 VITALS
BODY MASS INDEX: 25.9 KG/M2 | OXYGEN SATURATION: 99 % | SYSTOLIC BLOOD PRESSURE: 144 MMHG | HEIGHT: 67 IN | TEMPERATURE: 98 F | WEIGHT: 165 LBS | DIASTOLIC BLOOD PRESSURE: 72 MMHG | RESPIRATION RATE: 18 BRPM | HEART RATE: 92 BPM

## 2022-07-09 DIAGNOSIS — J02.9 SORE THROAT: ICD-10-CM

## 2022-07-09 DIAGNOSIS — B37.0 THRUSH, ORAL: Primary | ICD-10-CM

## 2022-07-09 LAB
CTP QC/QA: YES
MOLECULAR STREP A: NEGATIVE

## 2022-07-09 PROCEDURE — 3008F PR BODY MASS INDEX (BMI) DOCUMENTED: ICD-10-PCS | Mod: CPTII,S$GLB,, | Performed by: PHYSICIAN ASSISTANT

## 2022-07-09 PROCEDURE — 1160F PR REVIEW ALL MEDS BY PRESCRIBER/CLIN PHARMACIST DOCUMENTED: ICD-10-PCS | Mod: CPTII,S$GLB,, | Performed by: PHYSICIAN ASSISTANT

## 2022-07-09 PROCEDURE — 87651 POCT STREP A MOLECULAR: ICD-10-PCS | Mod: QW,S$GLB,, | Performed by: PHYSICIAN ASSISTANT

## 2022-07-09 PROCEDURE — 1159F PR MEDICATION LIST DOCUMENTED IN MEDICAL RECORD: ICD-10-PCS | Mod: CPTII,S$GLB,, | Performed by: PHYSICIAN ASSISTANT

## 2022-07-09 PROCEDURE — 1159F MED LIST DOCD IN RCRD: CPT | Mod: CPTII,S$GLB,, | Performed by: PHYSICIAN ASSISTANT

## 2022-07-09 PROCEDURE — 99213 PR OFFICE/OUTPT VISIT, EST, LEVL III, 20-29 MIN: ICD-10-PCS | Mod: S$GLB,,, | Performed by: PHYSICIAN ASSISTANT

## 2022-07-09 PROCEDURE — 3078F PR MOST RECENT DIASTOLIC BLOOD PRESSURE < 80 MM HG: ICD-10-PCS | Mod: CPTII,S$GLB,, | Performed by: PHYSICIAN ASSISTANT

## 2022-07-09 PROCEDURE — 3077F SYST BP >= 140 MM HG: CPT | Mod: CPTII,S$GLB,, | Performed by: PHYSICIAN ASSISTANT

## 2022-07-09 PROCEDURE — 4010F PR ACE/ARB THEARPY RXD/TAKEN: ICD-10-PCS | Mod: CPTII,S$GLB,, | Performed by: PHYSICIAN ASSISTANT

## 2022-07-09 PROCEDURE — 3008F BODY MASS INDEX DOCD: CPT | Mod: CPTII,S$GLB,, | Performed by: PHYSICIAN ASSISTANT

## 2022-07-09 PROCEDURE — 3077F PR MOST RECENT SYSTOLIC BLOOD PRESSURE >= 140 MM HG: ICD-10-PCS | Mod: CPTII,S$GLB,, | Performed by: PHYSICIAN ASSISTANT

## 2022-07-09 PROCEDURE — 87651 STREP A DNA AMP PROBE: CPT | Mod: QW,S$GLB,, | Performed by: PHYSICIAN ASSISTANT

## 2022-07-09 PROCEDURE — 1160F RVW MEDS BY RX/DR IN RCRD: CPT | Mod: CPTII,S$GLB,, | Performed by: PHYSICIAN ASSISTANT

## 2022-07-09 PROCEDURE — 4010F ACE/ARB THERAPY RXD/TAKEN: CPT | Mod: CPTII,S$GLB,, | Performed by: PHYSICIAN ASSISTANT

## 2022-07-09 PROCEDURE — 99213 OFFICE O/P EST LOW 20 MIN: CPT | Mod: S$GLB,,, | Performed by: PHYSICIAN ASSISTANT

## 2022-07-09 PROCEDURE — 3078F DIAST BP <80 MM HG: CPT | Mod: CPTII,S$GLB,, | Performed by: PHYSICIAN ASSISTANT

## 2022-07-09 RX ORDER — NYSTATIN 100000 [USP'U]/ML
4 SUSPENSION ORAL 4 TIMES DAILY
Qty: 160 ML | Refills: 0 | Status: SHIPPED | OUTPATIENT
Start: 2022-07-09 | End: 2022-07-19

## 2022-07-09 NOTE — PROGRESS NOTES
"Subjective:       Patient ID: Dottie Galeana is a 71 y.o. female.    Vitals:  height is 5' 7" (1.702 m) and weight is 74.8 kg (165 lb). Her temperature is 98.3 °F (36.8 °C). Her blood pressure is 144/72 (abnormal) and her pulse is 92. Her respiration is 18 and oxygen saturation is 99%.     Chief Complaint: Facial Swelling    Facial swelling began 6/25/22. Her lips, inside of her cheeks, her tongue, gums and her throat is swollen. Patient is also complaining of a sore throat and requesting a strep test. Spots on lips will come off with vaseline and sugar scrub. Has no difficulty swallowing liquids or pills. Denies diabetes or recent abx usage.     Other  This is a new problem. The current episode started 1 to 4 weeks ago. The problem occurs constantly. The problem has been gradually worsening. Associated symptoms include a sore throat. Pertinent negatives include no chills or fever. Nothing aggravates the symptoms. She has tried nothing for the symptoms.       Constitution: Negative for chills and fever.   HENT: Positive for tongue pain and sore throat. Negative for trouble swallowing.    Respiratory: Negative for chest tightness and shortness of breath.        Objective:      Physical Exam   Constitutional: She does not appear ill. No distress.   HENT:   Head: Normocephalic and atraumatic.   Ears:   Right Ear: External ear normal.   Left Ear: External ear normal.   Oral mucosa slightly erythematous with white spots buccal mucosa.  No appreciable tongue lesions.        Comments: Oral mucosa slightly erythematous with white spots buccal mucosa.  No appreciable tongue lesions.    Eyes: Conjunctivae are normal. Right eye exhibits no discharge. Left eye exhibits no discharge. Extraocular movement intact   Pulmonary/Chest: Effort normal. No respiratory distress.   Abdominal: Normal appearance.   Neurological: no focal deficit. She is alert.   Skin: Skin is warm, dry and not pale. jaundice  Psychiatric: Her behavior " is normal. Mood, judgment and thought content normal.   Nursing note and vitals reviewed.        Assessment:       1. Thrush, oral    2. Sore throat          Plan:         Thrush, oral    Sore throat  -     POCT Strep A, Molecular    Results for orders placed or performed in visit on 07/09/22   POCT Strep A, Molecular   Result Value Ref Range    Molecular Strep A, POC Negative Negative     Acceptable Yes        Other orders  -     nystatin (MYCOSTATIN) 100,000 unit/mL suspension; Take 4 mLs (400,000 Units total) by mouth 4 (four) times daily. for 10 days  Dispense: 160 mL; Refill: 0    Thrush Discharge Instructions   About this topic   Thrush is a type of yeast infection that you can get in your mouth. Germs, called fungi, cause yeast infections. These germs live almost everywhere on your body. Most often, your immune system can control the amount of yeast and you stay healthy. If you are sick, the yeast can multiply and cause an infection.  Thrush causes white patches inside your mouth. You may also have redness, bleeding, or soreness. In other parts of your body, yeast infections can cause itching, burning or cracking of the skin. People who are breastfeeding may develop thrush on their nipples. Babies who are  can also develop oral thrush.     What care is needed at home?   · Ask your doctor what you need to do when you go home. Make sure you ask questions if you do not understand what the doctor says. This way you will know what you need to do.  · For adults and children:  ? Brush your teeth and tongue at least two times each day with a soft toothbrush. Floss every night. Change your toothbrush as ordered.  ? If you have dentures or retainers, clean them well every night.  ? Do not use over-the-counter (OTC) mouthwashes. They can kill healthy bacteria, which you may need to help recover.  ? Rinse your mouth with warm salt water a few times a day. Mix 1/2 teaspoon (2.5 grams) salt with a  cup (240 mL) of warm water.  · For infants and toddlers:  ? Wash your baby's bottle, bottle nipples, and pacifiers each day in hot water.  ? Wash sippy cups and toys that children put in the mouth each day in hot water.  · For breastfeeding moms:  ? Wash bras, bra pads, nightgowns, or any items that come into contact with your nipples in hot water and dry in a hot dryer.  ? Rinse nipples after each breastfeeding.  ? Clean all parts of the breast pump which come into contact with the milk.  What follow-up care is needed?   Your doctor may ask you to make visits to the office to check on your progress. Be sure to keep these visits.  What drugs may be needed?   The doctor may order drugs to fight an infection. The drug may be a pill, mouthwash, or lozenge that you suck on.  Will physical activity be limited?   Physical activity will not be limited.  What changes to diet are needed?   · Ask your doctor if eating yogurt may help.  · If your mouth is sore, eat soft foods like soup and pureed fruits and vegetables.  What problems could happen?   · Thrush may spread to other parts of the body  · Treatment does not work  · Thrush comes back  What can be done to prevent this health problem?   · See your dentist regularly. You may need to go more often if you have diabetes or dentures.  · If you have diabetes, keep your blood sugar under control.  · If you use an inhaler, rinse your mouth after each use.  · If you smoke, try to quit.  · If you get thrush often, you may need to take drugs every day to keep from getting it again.  When do I need to call the doctor?   · Fever of 100.4°F (38°C) or higher  · Signs of not getting enough water. These include dry mouth, very thirsty, or little or no urine.  · Trouble swallowing or stiff neck or jaw  · You are not feeling better in 2 to 3 days or you are feeling worse  Teach Back: Helping You Understand   The Teach Back Method helps you understand the information we are giving you.  After you talk with the staff, tell them in your own words what you learned. This helps to make sure the staff has described each thing clearly. It also helps to explain things that may have been confusing. Before going home, make sure you can do these:  · I can tell you about my condition.  · I can tell you how to care for my babys bottles, nipples, and pacifiers if my baby has thrush.  · I can tell you what signs will make me think I am not getting enough water.  Where can I learn more?   American Academy [employer]  https://familydoctor.org/condition/thrush/   KidsHealth  http://kidshealth.org/parent/infections/skin/thrush.html   Last Reviewed Date   2021-08-30  Consumer Information Use and Disclaimer   This information is not specific medical advice and does not replace information you receive from your health care provider. This is only a brief summary of general information. It does NOT include all information about conditions, illnesses, injuries, tests, procedures, treatments, therapies, discharge instructions or life-style choices that may apply to you. You must talk with your health care provider for complete information about your health and treatment options. This information should not be used to decide whether or not to accept your health care providers advice, instructions or recommendations. Only your health care provider has the knowledge and training to provide advice that is right for you.  Copyright   Copyright © 2021 [employer], [employer] and its affiliates and/or licensors. All rights reserved.

## 2022-07-12 ENCOUNTER — OFFICE VISIT (OUTPATIENT)
Dept: FAMILY MEDICINE | Facility: CLINIC | Age: 72
End: 2022-07-12
Payer: MEDICARE

## 2022-07-12 VITALS
TEMPERATURE: 98 F | SYSTOLIC BLOOD PRESSURE: 108 MMHG | OXYGEN SATURATION: 98 % | DIASTOLIC BLOOD PRESSURE: 76 MMHG | WEIGHT: 159.38 LBS | BODY MASS INDEX: 24.96 KG/M2 | RESPIRATION RATE: 12 BRPM | HEART RATE: 88 BPM

## 2022-07-12 DIAGNOSIS — K13.79 RECURRENT ORAL ULCERS: Primary | ICD-10-CM

## 2022-07-12 DIAGNOSIS — K14.0 GLOSSITIS: ICD-10-CM

## 2022-07-12 DIAGNOSIS — L90.8 SKIN AGING: ICD-10-CM

## 2022-07-12 DIAGNOSIS — R63.8 OTHER SYMPTOMS AND SIGNS CONCERNING FOOD AND FLUID INTAKE: ICD-10-CM

## 2022-07-12 LAB
BASOPHILS # BLD AUTO: 0.07 K/UL (ref 0–0.2)
BASOPHILS NFR BLD: 0.7 % (ref 0–1.9)
DIFFERENTIAL METHOD: ABNORMAL
EOSINOPHIL # BLD AUTO: 0.3 K/UL (ref 0–0.5)
EOSINOPHIL NFR BLD: 3 % (ref 0–8)
ERYTHROCYTE [DISTWIDTH] IN BLOOD BY AUTOMATED COUNT: 12.3 % (ref 11.5–14.5)
HCT VFR BLD AUTO: 46.7 % (ref 37–48.5)
HGB BLD-MCNC: 15.2 G/DL (ref 12–16)
IMM GRANULOCYTES # BLD AUTO: 0.03 K/UL (ref 0–0.04)
IMM GRANULOCYTES NFR BLD AUTO: 0.3 % (ref 0–0.5)
IRON SERPL-MCNC: 105 UG/DL (ref 30–160)
LYMPHOCYTES # BLD AUTO: 1.8 K/UL (ref 1–4.8)
LYMPHOCYTES NFR BLD: 18.4 % (ref 18–48)
MCH RBC QN AUTO: 32.5 PG (ref 27–31)
MCHC RBC AUTO-ENTMCNC: 32.5 G/DL (ref 32–36)
MCV RBC AUTO: 100 FL (ref 82–98)
MONOCYTES # BLD AUTO: 0.5 K/UL (ref 0.3–1)
MONOCYTES NFR BLD: 5.3 % (ref 4–15)
NEUTROPHILS # BLD AUTO: 7.2 K/UL (ref 1.8–7.7)
NEUTROPHILS NFR BLD: 72.3 % (ref 38–73)
NRBC BLD-RTO: 0 /100 WBC
PLATELET # BLD AUTO: 403 K/UL (ref 150–450)
PMV BLD AUTO: 9.2 FL (ref 9.2–12.9)
RBC # BLD AUTO: 4.68 M/UL (ref 4–5.4)
SATURATED IRON: 26 % (ref 20–50)
TOTAL IRON BINDING CAPACITY: 397 UG/DL (ref 250–450)
TRANSFERRIN SERPL-MCNC: 268 MG/DL (ref 200–375)
VIT B12 SERPL-MCNC: 457 PG/ML (ref 210–950)
WBC # BLD AUTO: 9.97 K/UL (ref 3.9–12.7)

## 2022-07-12 PROCEDURE — 3078F PR MOST RECENT DIASTOLIC BLOOD PRESSURE < 80 MM HG: ICD-10-PCS | Mod: CPTII,S$GLB,, | Performed by: NURSE PRACTITIONER

## 2022-07-12 PROCEDURE — 85025 COMPLETE CBC W/AUTO DIFF WBC: CPT | Performed by: NURSE PRACTITIONER

## 2022-07-12 PROCEDURE — 99214 PR OFFICE/OUTPT VISIT, EST, LEVL IV, 30-39 MIN: ICD-10-PCS | Mod: S$GLB,,, | Performed by: NURSE PRACTITIONER

## 2022-07-12 PROCEDURE — 1101F PT FALLS ASSESS-DOCD LE1/YR: CPT | Mod: CPTII,S$GLB,, | Performed by: NURSE PRACTITIONER

## 2022-07-12 PROCEDURE — 1125F PR PAIN SEVERITY QUANTIFIED, PAIN PRESENT: ICD-10-PCS | Mod: CPTII,S$GLB,, | Performed by: NURSE PRACTITIONER

## 2022-07-12 PROCEDURE — 3074F PR MOST RECENT SYSTOLIC BLOOD PRESSURE < 130 MM HG: ICD-10-PCS | Mod: CPTII,S$GLB,, | Performed by: NURSE PRACTITIONER

## 2022-07-12 PROCEDURE — 1159F PR MEDICATION LIST DOCUMENTED IN MEDICAL RECORD: ICD-10-PCS | Mod: CPTII,S$GLB,, | Performed by: NURSE PRACTITIONER

## 2022-07-12 PROCEDURE — 99214 OFFICE O/P EST MOD 30 MIN: CPT | Mod: S$GLB,,, | Performed by: NURSE PRACTITIONER

## 2022-07-12 PROCEDURE — 1125F AMNT PAIN NOTED PAIN PRSNT: CPT | Mod: CPTII,S$GLB,, | Performed by: NURSE PRACTITIONER

## 2022-07-12 PROCEDURE — 3078F DIAST BP <80 MM HG: CPT | Mod: CPTII,S$GLB,, | Performed by: NURSE PRACTITIONER

## 2022-07-12 PROCEDURE — 4010F ACE/ARB THERAPY RXD/TAKEN: CPT | Mod: CPTII,S$GLB,, | Performed by: NURSE PRACTITIONER

## 2022-07-12 PROCEDURE — 1160F PR REVIEW ALL MEDS BY PRESCRIBER/CLIN PHARMACIST DOCUMENTED: ICD-10-PCS | Mod: CPTII,S$GLB,, | Performed by: NURSE PRACTITIONER

## 2022-07-12 PROCEDURE — 1160F RVW MEDS BY RX/DR IN RCRD: CPT | Mod: CPTII,S$GLB,, | Performed by: NURSE PRACTITIONER

## 2022-07-12 PROCEDURE — 86038 ANTINUCLEAR ANTIBODIES: CPT | Performed by: NURSE PRACTITIONER

## 2022-07-12 PROCEDURE — 3008F PR BODY MASS INDEX (BMI) DOCUMENTED: ICD-10-PCS | Mod: CPTII,S$GLB,, | Performed by: NURSE PRACTITIONER

## 2022-07-12 PROCEDURE — 82607 VITAMIN B-12: CPT | Performed by: NURSE PRACTITIONER

## 2022-07-12 PROCEDURE — 3288F PR FALLS RISK ASSESSMENT DOCUMENTED: ICD-10-PCS | Mod: CPTII,S$GLB,, | Performed by: NURSE PRACTITIONER

## 2022-07-12 PROCEDURE — 3008F BODY MASS INDEX DOCD: CPT | Mod: CPTII,S$GLB,, | Performed by: NURSE PRACTITIONER

## 2022-07-12 PROCEDURE — 86665 EPSTEIN-BARR CAPSID VCA: CPT | Performed by: NURSE PRACTITIONER

## 2022-07-12 PROCEDURE — 1159F MED LIST DOCD IN RCRD: CPT | Mod: CPTII,S$GLB,, | Performed by: NURSE PRACTITIONER

## 2022-07-12 PROCEDURE — 3288F FALL RISK ASSESSMENT DOCD: CPT | Mod: CPTII,S$GLB,, | Performed by: NURSE PRACTITIONER

## 2022-07-12 PROCEDURE — 3074F SYST BP LT 130 MM HG: CPT | Mod: CPTII,S$GLB,, | Performed by: NURSE PRACTITIONER

## 2022-07-12 PROCEDURE — 84466 ASSAY OF TRANSFERRIN: CPT | Performed by: NURSE PRACTITIONER

## 2022-07-12 PROCEDURE — 1101F PR PT FALLS ASSESS DOC 0-1 FALLS W/OUT INJ PAST YR: ICD-10-PCS | Mod: CPTII,S$GLB,, | Performed by: NURSE PRACTITIONER

## 2022-07-12 PROCEDURE — 36415 PR COLLECTION VENOUS BLOOD,VENIPUNCTURE: ICD-10-PCS | Mod: S$GLB,,, | Performed by: NURSE PRACTITIONER

## 2022-07-12 PROCEDURE — 36415 COLL VENOUS BLD VENIPUNCTURE: CPT | Mod: S$GLB,,, | Performed by: NURSE PRACTITIONER

## 2022-07-12 PROCEDURE — 4010F PR ACE/ARB THEARPY RXD/TAKEN: ICD-10-PCS | Mod: CPTII,S$GLB,, | Performed by: NURSE PRACTITIONER

## 2022-07-12 RX ORDER — DEXAMETHASONE 0.5 MG/5ML
6 SOLUTION ORAL EVERY 8 HOURS
Qty: 5400 ML | Refills: 0 | Status: SHIPPED | OUTPATIENT
Start: 2022-07-12 | End: 2022-07-12 | Stop reason: SDUPTHER

## 2022-07-12 RX ORDER — DEXAMETHASONE 0.5 MG/5ML
6 SOLUTION ORAL EVERY 8 HOURS
Qty: 5400 ML | Refills: 0 | Status: SHIPPED | OUTPATIENT
Start: 2022-07-12 | End: 2022-08-11

## 2022-07-12 RX ORDER — SUCRALFATE 1 G/10ML
1 SUSPENSION ORAL EVERY 6 HOURS
Qty: 240 ML | Refills: 0 | Status: SHIPPED | OUTPATIENT
Start: 2022-07-12 | End: 2022-08-21

## 2022-07-12 RX ORDER — SUCRALFATE 1 G/10ML
1 SUSPENSION ORAL EVERY 6 HOURS
Qty: 240 ML | Refills: 0 | Status: SHIPPED | OUTPATIENT
Start: 2022-07-12 | End: 2022-07-12 | Stop reason: SDUPTHER

## 2022-07-12 RX ORDER — TRETINOIN 0.5 MG/G
CREAM TOPICAL NIGHTLY
Qty: 45 G | Refills: 2 | Status: SHIPPED | OUTPATIENT
Start: 2022-07-12 | End: 2022-08-21

## 2022-07-12 NOTE — PATIENT INSTRUCTIONS
?Dexamethasone elixir 0.5 mg/5 cc - 5 mL swish and spit three to four times daily. It is important to keep the medication in the mouth for five minutes prior to spitting it out. Do not rinse afterward and avoid eating or drinking for 30 minutes.    ?Sucralfate suspension - Sucralfate is a sulfated polysaccharide complexed with aluminum hydroxide that can provide a protective barrier for mucosal ulceration. In three small, randomized trials, sucralfate suspension (eg, 1 g in 10 mL) rinses four times/day was more effective than placebo in decreasing pain and duration of lesions in patients with NYA and in patients with oral and genital ulcers associated with Behçet syndrome [57-59].    General measures  ?Oral hygiene - It is important to maintain good dental hygiene while at the same time avoiding trauma. A soft toothbrush, waxed tape-style dental floss, and a soft-tipped gum stimulator to gently remove plaque are generally well tolerated. A non-alcohol-containing mouthwash is often less irritating, but still effective, in decreasing microbial overgrowth. Toothpaste containing sodium lauryl sulfate (SLS) may exacerbate NYA in some patients. A trial of using SLS-free toothpaste could be considered. Less aggressive, more frequent professional dental cleaning is advised.      ?Avoidance of exacerbating factors - Where possible, reduce traumatic factors inside the mouth (eg, sharp/rough dental restorations, braces). Avoid habits that cause trauma (eg, biting cheeks or lips) and foods that seem to exacerbate the process.    Labs today.  We'll call with results.    Comfort measures.  Smoothies.    If you are not better in 1 days, if worse or you have concerns or questions, please do not hesitate to call.  If you have any questions, please do not hesitate to call.  You can reach us at [phone] Monday through Friday 7 a.m. to 6:30 p.m., Saturday 9 a.m. to 4:30 p.m.  Or call Dr. Li/RAUDEL Davalos.    Thank you  for using the [facility]!    YOSSI Jacobson, CNP, FNP-BC [facility]    To rate your experience with KENNY Jacobson, click on the link below:      [email or web address]

## 2022-07-12 NOTE — PROGRESS NOTES
Venipuncture performed with 23 gauge butterfly, x's 2 attempt,  to R Antecubital vein.  Specimens collected per orders.      Pressure dressing applied to site, instructed patient to remove dressing in 10-15 minutes, OK to re-adjust dressing if pressure causing any discomfort, to observe closely for numbness and/or discoloration to hand or fingers, and to notify provider if bleeding persists after applying constant pressure lasting 30 minutes.

## 2022-07-12 NOTE — PROGRESS NOTES
Dottie Galeana is a 71 y.o. female patient of Josh Li MD who presents to the clinic today for   Chief Complaint   Patient presents with    Facial Swelling   .    HPI    Pt, who is not known to me, reports a new problem to me: lips swollen, difficult to eat .  Can't eat r/t pain.  Lost 11 lbs.    Has had this in the past=20 years ago, when traveling.  Never this bad, only lips.     No other resp sxs.  Tired because she can't eat.   Was able to eat fried eggs and drinking smoothies finally in the past few days.  No resp symptoms.  Brushing teeth can cause bleeding.    These symptoms began 2 weeks  ago and status is continuing.  No known exposure.  Grandchildren are not ill (except minor cold).     Symptoms are + acute.    Pt denies the following symptoms:  Fever, n/v/d, no blood or mucus in stool    Aggravating factors include cold, warm, spicy, acidic .    Relieving factors include none .    OTC Medications tried are none.    Prescription medications taken for symptoms are Nystatin--helped the lips some..    Pertinent medical history: No h/o rheumatological disorder, illness associated with symptoms.  H/o this years ago but not as prolonged or severe..    Smoking status:  Never smoked    ROS    Constitutional:   No  fever, no unusual fatigue (except when the pain awakened her), decrease in appetite.    HEEN:  No sxs  Throat:  No ST pain, no difficulty swallowing.  One morning she felt like she couldn't swallow.  That passed.    Heart:  No palpitations, chest pain.    Lungs:  No difficulty breathing, no coughing.    GI/:  No sxs.  No vag discharge.    MS:  No new bone, joint or muscle problems.  For about 1 month she has had knee pain.      Skin:  No rashes, itching.    Past Medical History:   Diagnosis Date    Arthritis     Closed compression fracture of L1 lumbar vertebra 11/7/2018    Depression     General anesthetics causing adverse effect in therapeutic use     Generalized anxiety  disorder     GERD (gastroesophageal reflux disease)     History of seasonal allergies     HTN (hypertension)     Hyperlipidemia     Hyponatremia     Insomnia     Osteoporosis     Stress incontinence     Stroke     ocular behind left eye       Current Outpatient Medications:     albuterol (VENTOLIN HFA) 90 mcg/actuation inhaler, Inhale 2 puffs into the lungs every 6 (six) hours as needed for Wheezing. Rescue, Disp: 8 g, Rfl: 0    albuterol (VENTOLIN HFA) 90 mcg/actuation inhaler, Inhale 2 puffs into the lungs every 6 (six) hours as needed for Wheezing. Rescue, Disp: 18 g, Rfl: 0    alendronate (FOSAMAX) 70 MG tablet, Take 1 tablet (70 mg total) by mouth every 7 days., Disp: 12 tablet, Rfl: 11    ALPRAZolam (XANAX) 0.25 MG tablet, Take 1 tablet (0.25 mg total) by mouth 3 (three) times daily as needed for Anxiety., Disp: 90 tablet, Rfl: 5    amLODIPine (NORVASC) 10 MG tablet, Take 1 tablet (10 mg total) by mouth once daily., Disp: 90 tablet, Rfl: 2    aspirin 325 MG tablet, Take 325 mg by mouth once daily. , Disp: , Rfl:     azelastine (ASTELIN) 137 mcg (0.1 %) nasal spray, 1 spray (137 mcg total) by Nasal route 2 (two) times daily., Disp: 30 mL, Rfl: 0    calcium citrate-vitamin D3 315-200 mg (CITRACAL+D) 315 mg-5 mcg (200 unit) per tablet, Take 1 tablet by mouth 2 (two) times daily., Disp: , Rfl:     fluticasone propionate (FLONASE) 50 mcg/actuation nasal spray, USE 1 SPRAY (50 MCG TOTAL) BY EACH NOSTRIL ROUTE ONCE DAILY., Disp: 48 g, Rfl: 1    losartan (COZAAR) 100 MG tablet, Take 1 tablet (100 mg total) by mouth once daily., Disp: 90 tablet, Rfl: 2    multivitamin (THERAGRAN) per tablet, Take 1 tablet by mouth once daily. , Disp: , Rfl:     nebivoloL (BYSTOLIC) 10 MG Tab, Take 1 tablet (10 mg total) by mouth once daily., Disp: 90 tablet, Rfl: 2    NITROSTAT 0.4 mg SL tablet, Place 0.4 mg under the tongue every 5 (five) minutes as needed. , Disp: , Rfl:     nystatin (MYCOSTATIN) 100,000  unit/mL suspension, Take 4 mLs (400,000 Units total) by mouth 4 (four) times daily. for 10 days, Disp: 160 mL, Rfl: 0    ondansetron (ZOFRAN-ODT) 4 MG TbDL, Take 1 tablet (4 mg total) by mouth every 6 (six) hours as needed (nausea/vomiting). (Patient not taking: No sig reported), Disp: 12 tablet, Rfl: 0    pantoprazole (PROTONIX) 40 MG tablet, Take 1 tablet (40 mg total) by mouth once daily. (Patient not taking: No sig reported), Disp: 90 tablet, Rfl: 3    rosuvastatin (CRESTOR) 10 MG tablet, Take 1 tablet (10 mg total) by mouth every evening., Disp: 90 tablet, Rfl: 2    UNABLE TO FIND, Roger/Bro cough medication through [employer], Disp: , Rfl:     venlafaxine (EFFEXOR-XR) 150 MG Cp24, Take 1 capsule (150 mg total) by mouth once daily., Disp: 90 capsule, Rfl: 2    zolpidem (AMBIEN) 10 mg Tab, Take 1 tablet (10 mg total) by mouth nightly as needed (insomnia)., Disp: 30 tablet, Rfl: 5      PHYSICAL EXAM    Alert, coop 61 y.o. female patient in no acute distress no acute.    Vitals:    06/16/22 1740   BP: 120/69   Pulse: 90   Resp: 18   Temp: 98 °F (36.7 °C)   SpO2: 97%   Weight: 126.1 kg (277 lb 14.2 oz)     VS reviewed.  VS stable.  CC, nursing note, medications & PMH all reviewed today.    Head:  Normocephalic, atraumatic.    EENT:             ENT:  Ext ears normal in appearance.             Nose with no discharge.             Sinuses non-tender to palpation.             Inside mouth:  Buccal mucosa, soft palate and tongue erythematous but no lesions or ulcers noted and   with anterior cervical and with no submental, posterior cervical and supraclavicular  lymph nodes palp.    Resp:  Breathing unlabored.  Lungs CTA bilat.  Moves air to bases bilat.  Resp excursion symmetrical.      Heart:  Heart regular rate. and Regular rate and rhythm.  No MRG.       MS:  Ambulates 3. Normal: Walks 20', No Assistive device, no evidence for imbalance. Normal gait pattern.    NEURO:  Alert and oriented x 4.      Psych:  Responds  appropriately throughout the visit.               pleasant and appropriate , well-groomed, appropriate .    Skin:  Warm, dry, color good.      Skin:  Warm, dry, color good.    Recurrent oral ulcers  -     AAMIR Screen w/Reflex; Future; Expected date: 07/12/2022  -     CBC Auto Differential; Future; Expected date: 07/12/2022  -     Cancel: RAFA-BARR VIRUS ANTIBODY PANEL; Future; Expected date: 07/12/2022  -     Vitamin B12; Future; Expected date: 07/12/2022  -     IRON AND TIBC; Future; Expected date: 07/12/2022  -     Discontinue: dexAMETHasone 0.5 mg/5 mL Soln solution; Take 60 mLs (6 mg total) by mouth every 8 (eight) hours. Swish and spit.  Dispense: 5400 mL; Refill: 0  -     Discontinue: sucralfate (CARAFATE) 100 mg/mL suspension; Take 10 mLs (1 g total) by mouth every 6 (six) hours.  Dispense: 240 mL; Refill: 0  -     dexAMETHasone 0.5 mg/5 mL Soln solution; Take 60 mLs (6 mg total) by mouth every 8 (eight) hours. Swish and spit.  Dispense: 5400 mL; Refill: 0  -     sucralfate (CARAFATE) 100 mg/mL suspension; Take 10 mLs (1 g total) by mouth every 6 (six) hours.  Dispense: 240 mL; Refill: 0  -     Cancel: RAFA-GOTTI VIRUS DNA, QUANTITATIVE (PCR); Future; Expected date: 07/12/2022  -     RAFA-GOTTI VIRUS VCA, IGM; Future; Expected date: 07/12/2022    Glossitis   -     Vitamin B12; Future; Expected date: 07/12/2022    Other symptoms and signs concerning food and fluid intake   -     IRON AND TIBC; Future; Expected date: 07/12/2022    Skin aging  -     tretinoin (RETIN-A) 0.05 % cream; Apply topically every evening.  Dispense: 45 g; Refill: 2          Pt today presents with   Chief Complaint   Patient presents with    Mouth soreness   .  Additionally, this has been present for 2 weeks.  Finally able to eat and drink a little.  Not feeling otherwise ill.  Nystatin given at the URGENT CARE didn't help.    This is a new problem to me and the following work up is  planned.    Lab/Radiological/Pulmonary/CardiacTests Ordered per above.  The results are pending.    Pt advised to perform comfort measures recommended on patient instruction sheet, which were reviewed at the time of the visit..    If not better in 1 week, the patient is advised to call here, PCP office or go for an in-person/follow up evaluation.  If worse or concerns, the patient is advised to call for advise to this office or the PCP office or call [facility] ON CALL or go to the nearest URGENT CARE or ER.  Explained exam findings, diagnosis and treatment plan to patient.  Questions answered and patient states understanding.    Consulted Up To Date  STOP:  Nystatin suspension (400,000 to 600,000 units) swish and swallow four times daily, as it has not improved sxs at all.

## 2022-07-13 LAB — ANA SER QL IF: NORMAL

## 2022-07-16 LAB — EBV VCA IGM SER QL IA: NEGATIVE

## 2022-07-19 ENCOUNTER — TELEPHONE (OUTPATIENT)
Dept: FAMILY MEDICINE | Facility: CLINIC | Age: 72
End: 2022-07-19
Payer: MEDICARE

## 2022-07-19 NOTE — TELEPHONE ENCOUNTER
Attempted to call pt in regards to denial for Tretinoin 0.05% cream no answer left vm, provider aware

## 2022-07-30 ENCOUNTER — PATIENT MESSAGE (OUTPATIENT)
Dept: FAMILY MEDICINE | Facility: CLINIC | Age: 72
End: 2022-07-30
Payer: MEDICARE

## 2022-08-01 ENCOUNTER — PATIENT MESSAGE (OUTPATIENT)
Dept: FAMILY MEDICINE | Facility: CLINIC | Age: 72
End: 2022-08-01
Payer: MEDICARE

## 2022-08-01 ENCOUNTER — TELEPHONE (OUTPATIENT)
Dept: FAMILY MEDICINE | Facility: CLINIC | Age: 72
End: 2022-08-01
Payer: MEDICARE

## 2022-08-01 NOTE — TELEPHONE ENCOUNTER
----- Message from Yoly Paredes sent at 8/1/2022 10:40 AM CDT -----  Who Called: Patient    What is the reqeust in detail: requesting call back to know what to do next about her postive covid results. Patient has experienced fever and chills, and continues to experience congestion. Please advise.     Can the clinic reply by [facility]? No    Best Call Back Number: [phone]    Additional Information:

## 2022-08-03 RX ORDER — PANTOPRAZOLE SODIUM 40 MG/1
40 TABLET, DELAYED RELEASE ORAL DAILY
Qty: 90 TABLET | Refills: 3 | Status: SHIPPED | OUTPATIENT
Start: 2022-08-03 | End: 2022-12-11 | Stop reason: SDUPTHER

## 2022-08-03 NOTE — TELEPHONE ENCOUNTER
Care Due:                  Date            Visit Type   Department     Provider  --------------------------------------------------------------------------------                                ESTABLISHED                              PATIENT -    [facility]  Last Visit: 10-      [employer]      MEDICINE       LISETH STRICKLAND  Next Visit: None Scheduled  None         None Found                                                            Last  Test          Frequency    Reason                     Performed    Due Date  --------------------------------------------------------------------------------    Office Visit  12 months..  amLODIPine, azelastine,    10-   10-                             losartan, nebivoloL,                             pantoprazole,                             rosuvastatin, venlafaxine    Lipid Panel.  12 months..  rosuvastatin.............  04- 04-    [facility] Embedded Care Gaps. Reference number: [id number]. 8/03/2022   9:36:56 AM CDT

## 2022-08-04 ENCOUNTER — PATIENT MESSAGE (OUTPATIENT)
Dept: FAMILY MEDICINE | Facility: CLINIC | Age: 72
End: 2022-08-04
Payer: MEDICARE

## 2022-08-08 ENCOUNTER — OFFICE VISIT (OUTPATIENT)
Dept: URGENT CARE | Facility: CLINIC | Age: 72
End: 2022-08-08
Payer: MEDICARE

## 2022-08-08 VITALS
SYSTOLIC BLOOD PRESSURE: 115 MMHG | HEART RATE: 77 BPM | OXYGEN SATURATION: 99 % | DIASTOLIC BLOOD PRESSURE: 78 MMHG | TEMPERATURE: 99 F | RESPIRATION RATE: 16 BRPM

## 2022-08-08 DIAGNOSIS — R05.9 COUGH: Primary | ICD-10-CM

## 2022-08-08 PROCEDURE — 1160F RVW MEDS BY RX/DR IN RCRD: CPT | Mod: CPTII,S$GLB,, | Performed by: FAMILY MEDICINE

## 2022-08-08 PROCEDURE — 1160F PR REVIEW ALL MEDS BY PRESCRIBER/CLIN PHARMACIST DOCUMENTED: ICD-10-PCS | Mod: CPTII,S$GLB,, | Performed by: FAMILY MEDICINE

## 2022-08-08 PROCEDURE — 4010F PR ACE/ARB THEARPY RXD/TAKEN: ICD-10-PCS | Mod: CPTII,S$GLB,, | Performed by: FAMILY MEDICINE

## 2022-08-08 PROCEDURE — 99214 PR OFFICE/OUTPT VISIT, EST, LEVL IV, 30-39 MIN: ICD-10-PCS | Mod: S$GLB,,, | Performed by: FAMILY MEDICINE

## 2022-08-08 PROCEDURE — 1126F PR PAIN SEVERITY QUANTIFIED, NO PAIN PRESENT: ICD-10-PCS | Mod: CPTII,S$GLB,, | Performed by: FAMILY MEDICINE

## 2022-08-08 PROCEDURE — 1126F AMNT PAIN NOTED NONE PRSNT: CPT | Mod: CPTII,S$GLB,, | Performed by: FAMILY MEDICINE

## 2022-08-08 PROCEDURE — 1159F PR MEDICATION LIST DOCUMENTED IN MEDICAL RECORD: ICD-10-PCS | Mod: CPTII,S$GLB,, | Performed by: FAMILY MEDICINE

## 2022-08-08 PROCEDURE — 3074F SYST BP LT 130 MM HG: CPT | Mod: CPTII,S$GLB,, | Performed by: FAMILY MEDICINE

## 2022-08-08 PROCEDURE — 99214 OFFICE O/P EST MOD 30 MIN: CPT | Mod: S$GLB,,, | Performed by: FAMILY MEDICINE

## 2022-08-08 PROCEDURE — 3078F PR MOST RECENT DIASTOLIC BLOOD PRESSURE < 80 MM HG: ICD-10-PCS | Mod: CPTII,S$GLB,, | Performed by: FAMILY MEDICINE

## 2022-08-08 PROCEDURE — 3078F DIAST BP <80 MM HG: CPT | Mod: CPTII,S$GLB,, | Performed by: FAMILY MEDICINE

## 2022-08-08 PROCEDURE — 1159F MED LIST DOCD IN RCRD: CPT | Mod: CPTII,S$GLB,, | Performed by: FAMILY MEDICINE

## 2022-08-08 PROCEDURE — 4010F ACE/ARB THERAPY RXD/TAKEN: CPT | Mod: CPTII,S$GLB,, | Performed by: FAMILY MEDICINE

## 2022-08-08 PROCEDURE — 3074F PR MOST RECENT SYSTOLIC BLOOD PRESSURE < 130 MM HG: ICD-10-PCS | Mod: CPTII,S$GLB,, | Performed by: FAMILY MEDICINE

## 2022-08-08 RX ORDER — PROMETHAZINE HYDROCHLORIDE 6.25 MG/5ML
SYRUP ORAL
Qty: 120 ML | Refills: 1 | Status: SHIPPED | OUTPATIENT
Start: 2022-08-08 | End: 2022-09-13

## 2022-08-08 NOTE — PROGRESS NOTES
Subjective:       Patient ID: Dottie Galeana is a 71 y.o. female.    Vitals:  temperature is 98.6 °F (37 °C). Her blood pressure is 115/78 and her pulse is 77. Her respiration is 16 and oxygen saturation is 99%.     Chief Complaint: Cough    Pt presents with coughing x 2 weeks, grad worse., low grade fever, runny nose.     Pt tested pos for covid  2 weeks ago.  Cough is not going away    Cough  This is a new problem. The current episode started 1 to 4 weeks ago. The problem has been unchanged. The problem occurs every few minutes. The cough is productive of sputum. Associated symptoms include chills and a fever. Treatments tried: mucinex. The treatment provided mild relief.       Constitution: Positive for chills and fever.   Respiratory: Positive for cough.        Objective:      Physical Exam      Physical Exam  Vitals signs and nursing note reviewed.   Constitutional:       Appearance: Pt is well-developed. Alert, NAD.  Pt is cooperative.  Non-toxic appearance.  HENT:      Head: Normocephalic and atraumatic. .      Right Ear: External ear normal.      Left Ear: External ear normal.   Eyes:      General: Lids are normal.      Conjunctiva/sclera: Conjunctivae normal. Visual tracking is normal. Right eye exhibits no exudate. Left eye exhibits no exudate. No scleral icterus.     Pupils: Pupils are equal, round  Neck:      Musculoskeletal: range of motion without pain and neck supple.      Trachea: Trachea and phonation normal.   Cardiovascular:      Rate and Rhythm: Normal Rhythm. Extremities well perfused.   Pulmonary:      Effort: Pulmonary effort is normal. No respiratory distress.     Breath sounds: Normal breath sounds.   Abdomen: NO obvious distention.  Musculoskeletal: Normal range of motion. No ambulation issues  Skin:     General: Skin is warm and dry. No open wounds or abrasions. No petechiae No cyanosis  no jaundice not diaphoretic, not pale, not purpuric  Neurological:      Mental Status:Pt is  alert and oriented to person, place, and time.   Psychiatric:         Speech: Speech normal.         Behavior: Behavior normal.         Thought Content: Thought content normal.         Judgment: Judgment normal.         Assessment:       1. Cough          Plan:       covid 10+ days ago. Lungs clear- coughing at night.   Cough    Other orders  -     promethazine (PHENERGAN) 6.25 mg/5 mL syrup; 10mL at night as needed  Dispense: 120 mL; Refill: 1

## 2022-08-19 DIAGNOSIS — G47.00 INSOMNIA, UNSPECIFIED TYPE: ICD-10-CM

## 2022-08-19 RX ORDER — ZOLPIDEM TARTRATE 10 MG/1
10 TABLET ORAL NIGHTLY PRN
Qty: 30 TABLET | Refills: 0 | Status: CANCELLED | OUTPATIENT
Start: 2022-08-19

## 2022-08-19 NOTE — TELEPHONE ENCOUNTER
No new care gaps identified.  [facility] Embedded Care Gaps. Reference number: [id number]. 8/19/2022   10:14:11 AM CDT

## 2022-08-21 ENCOUNTER — OFFICE VISIT (OUTPATIENT)
Dept: URGENT CARE | Facility: CLINIC | Age: 72
End: 2022-08-21
Payer: MEDICARE

## 2022-08-21 VITALS
SYSTOLIC BLOOD PRESSURE: 120 MMHG | HEART RATE: 68 BPM | RESPIRATION RATE: 18 BRPM | OXYGEN SATURATION: 98 % | HEIGHT: 67 IN | TEMPERATURE: 99 F | WEIGHT: 159 LBS | DIASTOLIC BLOOD PRESSURE: 74 MMHG | BODY MASS INDEX: 24.96 KG/M2

## 2022-08-21 DIAGNOSIS — N30.01 ACUTE CYSTITIS WITH HEMATURIA: Primary | ICD-10-CM

## 2022-08-21 DIAGNOSIS — R30.0 DYSURIA: ICD-10-CM

## 2022-08-21 LAB
BILIRUB UR QL STRIP: NEGATIVE
GLUCOSE UR QL STRIP: NEGATIVE
KETONES UR QL STRIP: NEGATIVE
LEUKOCYTE ESTERASE UR QL STRIP: POSITIVE
PH, POC UA: 6.5 (ref 5–8)
POC BLOOD, URINE: POSITIVE
POC NITRATES, URINE: NEGATIVE
PROT UR QL STRIP: POSITIVE
SP GR UR STRIP: 1.01 (ref 1–1.03)
UROBILINOGEN UR STRIP-ACNC: NORMAL (ref 0.1–1.1)

## 2022-08-21 PROCEDURE — 1159F PR MEDICATION LIST DOCUMENTED IN MEDICAL RECORD: ICD-10-PCS | Mod: CPTII,S$GLB,, | Performed by: NURSE PRACTITIONER

## 2022-08-21 PROCEDURE — 1160F PR REVIEW ALL MEDS BY PRESCRIBER/CLIN PHARMACIST DOCUMENTED: ICD-10-PCS | Mod: CPTII,S$GLB,, | Performed by: NURSE PRACTITIONER

## 2022-08-21 PROCEDURE — 1160F RVW MEDS BY RX/DR IN RCRD: CPT | Mod: CPTII,S$GLB,, | Performed by: NURSE PRACTITIONER

## 2022-08-21 PROCEDURE — 87086 URINE CULTURE/COLONY COUNT: CPT | Performed by: NURSE PRACTITIONER

## 2022-08-21 PROCEDURE — 3078F PR MOST RECENT DIASTOLIC BLOOD PRESSURE < 80 MM HG: ICD-10-PCS | Mod: CPTII,S$GLB,, | Performed by: NURSE PRACTITIONER

## 2022-08-21 PROCEDURE — 3074F PR MOST RECENT SYSTOLIC BLOOD PRESSURE < 130 MM HG: ICD-10-PCS | Mod: CPTII,S$GLB,, | Performed by: NURSE PRACTITIONER

## 2022-08-21 PROCEDURE — 3008F PR BODY MASS INDEX (BMI) DOCUMENTED: ICD-10-PCS | Mod: CPTII,S$GLB,, | Performed by: NURSE PRACTITIONER

## 2022-08-21 PROCEDURE — 87077 CULTURE AEROBIC IDENTIFY: CPT | Performed by: NURSE PRACTITIONER

## 2022-08-21 PROCEDURE — 99213 OFFICE O/P EST LOW 20 MIN: CPT | Mod: S$GLB,,, | Performed by: NURSE PRACTITIONER

## 2022-08-21 PROCEDURE — 3074F SYST BP LT 130 MM HG: CPT | Mod: CPTII,S$GLB,, | Performed by: NURSE PRACTITIONER

## 2022-08-21 PROCEDURE — 99213 PR OFFICE/OUTPT VISIT, EST, LEVL III, 20-29 MIN: ICD-10-PCS | Mod: S$GLB,,, | Performed by: NURSE PRACTITIONER

## 2022-08-21 PROCEDURE — 3078F DIAST BP <80 MM HG: CPT | Mod: CPTII,S$GLB,, | Performed by: NURSE PRACTITIONER

## 2022-08-21 PROCEDURE — 1126F AMNT PAIN NOTED NONE PRSNT: CPT | Mod: CPTII,S$GLB,, | Performed by: NURSE PRACTITIONER

## 2022-08-21 PROCEDURE — 87088 URINE BACTERIA CULTURE: CPT | Performed by: NURSE PRACTITIONER

## 2022-08-21 PROCEDURE — 87186 SC STD MICRODIL/AGAR DIL: CPT | Performed by: NURSE PRACTITIONER

## 2022-08-21 PROCEDURE — 4010F PR ACE/ARB THEARPY RXD/TAKEN: ICD-10-PCS | Mod: CPTII,S$GLB,, | Performed by: NURSE PRACTITIONER

## 2022-08-21 PROCEDURE — 3008F BODY MASS INDEX DOCD: CPT | Mod: CPTII,S$GLB,, | Performed by: NURSE PRACTITIONER

## 2022-08-21 PROCEDURE — 4010F ACE/ARB THERAPY RXD/TAKEN: CPT | Mod: CPTII,S$GLB,, | Performed by: NURSE PRACTITIONER

## 2022-08-21 PROCEDURE — 81003 POCT URINALYSIS, DIPSTICK, AUTOMATED, W/O SCOPE: ICD-10-PCS | Mod: QW,S$GLB,, | Performed by: NURSE PRACTITIONER

## 2022-08-21 PROCEDURE — 81003 URINALYSIS AUTO W/O SCOPE: CPT | Mod: QW,S$GLB,, | Performed by: NURSE PRACTITIONER

## 2022-08-21 PROCEDURE — 1126F PR PAIN SEVERITY QUANTIFIED, NO PAIN PRESENT: ICD-10-PCS | Mod: CPTII,S$GLB,, | Performed by: NURSE PRACTITIONER

## 2022-08-21 PROCEDURE — 1159F MED LIST DOCD IN RCRD: CPT | Mod: CPTII,S$GLB,, | Performed by: NURSE PRACTITIONER

## 2022-08-21 RX ORDER — PHENAZOPYRIDINE HYDROCHLORIDE 200 MG/1
200 TABLET, FILM COATED ORAL 3 TIMES DAILY PRN
Qty: 30 TABLET | Refills: 0 | Status: SHIPPED | OUTPATIENT
Start: 2022-08-21 | End: 2022-09-13

## 2022-08-21 RX ORDER — NITROFURANTOIN 25; 75 MG/1; MG/1
100 CAPSULE ORAL 2 TIMES DAILY
Qty: 10 CAPSULE | Refills: 0 | Status: SHIPPED | OUTPATIENT
Start: 2022-08-21 | End: 2022-08-26

## 2022-08-21 NOTE — PATIENT INSTRUCTIONS
You will be called in 3-4 days with your urine culture results.      INSTRUCTIONS:  - Rest.  - Drink plenty of fluids.  - Take Tylenol and/or Ibuprofen as directed as needed for fever/pain.  Do not take more than the recommended dose.  - follow up with your PCP within the next 1-2 weeks as needed.  - You must understand that you have received an Urgent Care treatment only and that you may be released before all of your medical problems are known or treated.   - You, the patient, will arrange for follow up care as instructed.   - If your condition worsens or fails to improve we recommend that you receive another evaluation at the ER immediately or contact your PCP to discuss your concerns.   - You can call [phone] or [phone] to help schedule an appointment with the appropriate provider.

## 2022-08-21 NOTE — PROGRESS NOTES
"Subjective:       Patient ID: Dottie Galeana is a 71 y.o. female.    Vitals:  height is 5' 7" (1.702 m) and weight is 72.1 kg (159 lb). Her temperature is 98.8 °F (37.1 °C). Her blood pressure is 120/74 and her pulse is 68. Her respiration is 18 and oxygen saturation is 98%.     Chief Complaint: Urinary Tract Infection    UTI symptoms began 8/12/22. It is painful to urinate and she has noticed an increase in frequency and urgency. She is also experiencing a feeling of not being able to empty her bladder.    Provider note begins below:  Patient reports dysuria, incomplete bladder emptying and urinary frequency for the past week.  History of UTIs in younger years.  Patient denies any fever, chills, vomiting, hematuria, vaginal bleeding/discharge or CVA tenderness.  Patient does endorse some fatigue.    Urinary Tract Infection   This is a new problem. The current episode started acute onset. The problem occurs every urination. The problem has been unchanged. The quality of the pain is described as burning. The pain is at a severity of 0/10. The patient is experiencing no pain. There has been no fever. She is sexually active. There is no history of pyelonephritis. Associated symptoms include frequency, hesitancy and urgency. Pertinent negatives include no chills, hematuria, nausea, vomiting or rash. Treatments tried: [facility]. The treatment provided moderate relief.       Constitution: Negative. Negative for chills, sweating and fatigue.   HENT: Negative.  Negative for ear pain, facial swelling, congestion and sore throat.    Neck: Negative for painful lymph nodes.   Cardiovascular: Negative.  Negative for chest trauma, chest pain and sob on exertion.   Eyes: Negative.  Negative for eye itching and eye pain.   Respiratory: Negative.  Negative for chest tightness, cough and asthma.    Gastrointestinal: Negative.  Negative for nausea, vomiting and diarrhea.   Endocrine: negative. cold " intolerance and excessive thirst.   Genitourinary: Positive for frequency and urgency. Negative for dysuria and hematuria.   Musculoskeletal: Negative for pain, trauma and joint pain.   Skin: Negative.  Negative for rash, wound and hives.   Allergic/Immunologic: Negative.  Negative for eczema, asthma, hives and itching.   Neurological: Negative.  Negative for disorientation and altered mental status.   Hematologic/Lymphatic: Negative.  Negative for swollen lymph nodes.   Psychiatric/Behavioral: Negative.  Negative for altered mental status, disorientation and confusion.       Objective:      Physical Exam   Constitutional: She is oriented to person, place, and time. She appears well-developed.  Non-toxic appearance. She does not appear ill. No distress.   HENT:   Head: Normocephalic and atraumatic.   Ears:   Right Ear: External ear normal.   Left Ear: External ear normal.   Nose: Nose normal. No nasal deformity. No epistaxis.   Mouth/Throat: Oropharynx is clear and moist and mucous membranes are normal.   Eyes: Lids are normal.   Neck: Trachea normal and phonation normal. Neck supple.   Cardiovascular: Normal pulses.   Pulmonary/Chest: Effort normal and breath sounds normal. No stridor. No respiratory distress. She has no wheezes. She has no rhonchi. She has no rales. She exhibits no tenderness.   Abdominal: Normal appearance and bowel sounds are normal. She exhibits no distension. Soft. There is no abdominal tenderness. There is no left CVA tenderness and no right CVA tenderness.   Neurological: no focal deficit. She is alert and oriented to person, place, and time.   Skin: Skin is warm, dry, intact and not diaphoretic.   Psychiatric: Her speech is normal and behavior is normal. Mood normal.   Nursing note and vitals reviewed.    The following results have been reviewed with the patient:  LABS-  Results for orders placed or performed in visit on 08/21/22   POCT Urinalysis, Dipstick, Automated, W/O Scope   Result  Value Ref Range    POC Blood, Urine Positive (A) Negative    POC Bilirubin, Urine Negative Negative    POC Urobilinogen, Urine normal 0.1 - 1.1    POC Ketones, Urine Negative Negative    POC Protein, Urine Positive (A) Negative    POC Nitrates, Urine Negative Negative    POC Glucose, Urine Negative Negative    pH, UA 6.5 5 - 8    POC Specific Gravity, Urine 1.015 1.003 - 1.029    POC Leukocytes, Urine Positive (A) Negative        IMAGING-  No results found.      Assessment:       1. Acute cystitis with hematuria    2. Dysuria          Plan:       FOLLOWUP  Follow up if symptoms worsen or fail to improve, for PLEASE CONTACT PCP OR CONTACT THE EMERGENCY ROOM..     PATIENT INSTRUCTIONS  Patient Instructions   You will be called in 3-4 days with your urine culture results.      INSTRUCTIONS:  - Rest.  - Drink plenty of fluids.  - Take Tylenol and/or Ibuprofen as directed as needed for fever/pain.  Do not take more than the recommended dose.  - follow up with your PCP within the next 1-2 weeks as needed.  - You must understand that you have received an Urgent Care treatment only and that you may be released before all of your medical problems are known or treated.   - You, the patient, will arrange for follow up care as instructed.   - If your condition worsens or fails to improve we recommend that you receive another evaluation at the ER immediately or contact your PCP to discuss your concerns.   - You can call [phone] or [phone] to help schedule an appointment with the appropriate provider.              THANK YOU FOR ALLOWING ME TO PARTICIPATE IN YOUR HEALTHCARE,     Roman Santos NP   Acute cystitis with hematuria  -     nitrofurantoin, macrocrystal-monohydrate, (MACROBID) 100 MG capsule; Take 1 capsule (100 mg total) by mouth 2 (two) times daily. for 5 days  Dispense: 10 capsule; Refill: 0  -     Culture, Urine    Dysuria  -     POCT Urinalysis, Dipstick, Automated, W/O Scope  -     phenazopyridine  (PYRIDIUM) 200 MG tablet; Take 1 tablet (200 mg total) by mouth 3 (three) times daily as needed for Pain.  Dispense: 30 tablet; Refill: 0

## 2022-08-23 ENCOUNTER — PATIENT MESSAGE (OUTPATIENT)
Dept: FAMILY MEDICINE | Facility: CLINIC | Age: 72
End: 2022-08-23
Payer: MEDICARE

## 2022-08-23 ENCOUNTER — TELEPHONE (OUTPATIENT)
Dept: URGENT CARE | Facility: CLINIC | Age: 72
End: 2022-08-23
Payer: MEDICARE

## 2022-08-23 LAB — BACTERIA UR CULT: ABNORMAL

## 2022-08-24 ENCOUNTER — TELEPHONE (OUTPATIENT)
Dept: FAMILY MEDICINE | Facility: CLINIC | Age: 72
End: 2022-08-24
Payer: MEDICARE

## 2022-08-24 ENCOUNTER — PATIENT MESSAGE (OUTPATIENT)
Dept: FAMILY MEDICINE | Facility: CLINIC | Age: 72
End: 2022-08-24
Payer: MEDICARE

## 2022-08-24 ENCOUNTER — TELEPHONE (OUTPATIENT)
Dept: URGENT CARE | Facility: CLINIC | Age: 72
End: 2022-08-24
Payer: MEDICARE

## 2022-08-24 NOTE — TELEPHONE ENCOUNTER
Patient returned call and was able to give Urine Culture results. Patient reports that her symptoms have improved and she is feeling much better since her UC visit. Advised patient to continue taking RX as prescribed. Patient aware, verbalized understanding and agreed with plan of care.

## 2022-08-24 NOTE — TELEPHONE ENCOUNTER
Attempted to call patient for her urine culture results that was positive for E coli today.  Call went to voicemail, left message.  Patient was treated appropriately with Macrobid.

## 2022-08-25 ENCOUNTER — LAB VISIT (OUTPATIENT)
Dept: LAB | Facility: HOSPITAL | Age: 72
End: 2022-08-25
Attending: FAMILY MEDICINE
Payer: MEDICARE

## 2022-08-25 DIAGNOSIS — Z01.812 PRE-OPERATIVE LABORATORY EXAMINATION: ICD-10-CM

## 2022-08-25 DIAGNOSIS — R00.2 PALPITATIONS: ICD-10-CM

## 2022-08-25 DIAGNOSIS — F45.8 ANXIETY HYPERVENTILATION: ICD-10-CM

## 2022-08-25 DIAGNOSIS — Z82.49 FAMILY HISTORY OF ISCHEMIC HEART DISEASE: Primary | ICD-10-CM

## 2022-08-25 DIAGNOSIS — E78.2 MIXED HYPERLIPIDEMIA: ICD-10-CM

## 2022-08-25 DIAGNOSIS — Z01.810 PRE-OPERATIVE CARDIOVASCULAR EXAMINATION: ICD-10-CM

## 2022-08-25 DIAGNOSIS — Z79.899 ENCOUNTER FOR LONG-TERM (CURRENT) USE OF OTHER MEDICATIONS: ICD-10-CM

## 2022-08-25 DIAGNOSIS — F41.9 ANXIETY HYPERVENTILATION: ICD-10-CM

## 2022-08-25 DIAGNOSIS — I10 ESSENTIAL HYPERTENSION, MALIGNANT: ICD-10-CM

## 2022-08-25 LAB
ANION GAP SERPL CALC-SCNC: 11 MMOL/L (ref 8–16)
BASOPHILS # BLD AUTO: 0.08 K/UL (ref 0–0.2)
BASOPHILS NFR BLD: 0.7 % (ref 0–1.9)
BUN SERPL-MCNC: 11 MG/DL (ref 8–23)
CALCIUM SERPL-MCNC: 9.5 MG/DL (ref 8.7–10.5)
CHLORIDE SERPL-SCNC: 102 MMOL/L (ref 95–110)
CO2 SERPL-SCNC: 26 MMOL/L (ref 23–29)
CREAT SERPL-MCNC: 0.8 MG/DL (ref 0.5–1.4)
DIFFERENTIAL METHOD: ABNORMAL
EOSINOPHIL # BLD AUTO: 0.2 K/UL (ref 0–0.5)
EOSINOPHIL NFR BLD: 2.1 % (ref 0–8)
ERYTHROCYTE [DISTWIDTH] IN BLOOD BY AUTOMATED COUNT: 12.8 % (ref 11.5–14.5)
EST. GFR  (NO RACE VARIABLE): >60 ML/MIN/1.73 M^2
GLUCOSE SERPL-MCNC: 145 MG/DL (ref 70–110)
HCT VFR BLD AUTO: 42.3 % (ref 37–48.5)
HGB BLD-MCNC: 14.2 G/DL (ref 12–16)
IMM GRANULOCYTES # BLD AUTO: 0.03 K/UL (ref 0–0.04)
IMM GRANULOCYTES NFR BLD AUTO: 0.3 % (ref 0–0.5)
LYMPHOCYTES # BLD AUTO: 2.2 K/UL (ref 1–4.8)
LYMPHOCYTES NFR BLD: 20.1 % (ref 18–48)
MCH RBC QN AUTO: 31.8 PG (ref 27–31)
MCHC RBC AUTO-ENTMCNC: 33.6 G/DL (ref 32–36)
MCV RBC AUTO: 95 FL (ref 82–98)
MONOCYTES # BLD AUTO: 0.9 K/UL (ref 0.3–1)
MONOCYTES NFR BLD: 8.2 % (ref 4–15)
NEUTROPHILS # BLD AUTO: 7.5 K/UL (ref 1.8–7.7)
NEUTROPHILS NFR BLD: 68.6 % (ref 38–73)
NRBC BLD-RTO: 0 /100 WBC
PLATELET # BLD AUTO: 396 K/UL (ref 150–450)
PMV BLD AUTO: 8.8 FL (ref 9.2–12.9)
POTASSIUM SERPL-SCNC: 4.4 MMOL/L (ref 3.5–5.1)
RBC # BLD AUTO: 4.47 M/UL (ref 4–5.4)
SODIUM SERPL-SCNC: 139 MMOL/L (ref 136–145)
WBC # BLD AUTO: 10.87 K/UL (ref 3.9–12.7)

## 2022-08-25 PROCEDURE — 80048 BASIC METABOLIC PNL TOTAL CA: CPT | Performed by: INTERNAL MEDICINE

## 2022-08-25 PROCEDURE — 85025 COMPLETE CBC W/AUTO DIFF WBC: CPT | Performed by: INTERNAL MEDICINE

## 2022-08-25 PROCEDURE — 36415 COLL VENOUS BLD VENIPUNCTURE: CPT | Mod: PO | Performed by: INTERNAL MEDICINE

## 2022-09-08 ENCOUNTER — PATIENT MESSAGE (OUTPATIENT)
Dept: FAMILY MEDICINE | Facility: CLINIC | Age: 72
End: 2022-09-08
Payer: MEDICARE

## 2022-09-13 ENCOUNTER — OFFICE VISIT (OUTPATIENT)
Dept: PRIMARY CARE CLINIC | Facility: CLINIC | Age: 72
End: 2022-09-13
Payer: MEDICARE

## 2022-09-13 VITALS
OXYGEN SATURATION: 96 % | BODY MASS INDEX: 25.74 KG/M2 | HEIGHT: 67 IN | DIASTOLIC BLOOD PRESSURE: 72 MMHG | TEMPERATURE: 99 F | SYSTOLIC BLOOD PRESSURE: 110 MMHG | WEIGHT: 164 LBS | HEART RATE: 73 BPM

## 2022-09-13 DIAGNOSIS — I10 PRIMARY HYPERTENSION: ICD-10-CM

## 2022-09-13 DIAGNOSIS — G89.29 CHRONIC BILATERAL LOW BACK PAIN WITH RIGHT-SIDED SCIATICA: ICD-10-CM

## 2022-09-13 DIAGNOSIS — Z86.73 HISTORY OF CEREBRAL INFARCTION: ICD-10-CM

## 2022-09-13 DIAGNOSIS — E53.8 VITAMIN B12 DEFICIENCY: ICD-10-CM

## 2022-09-13 DIAGNOSIS — Z76.89 ENCOUNTER TO ESTABLISH CARE: Primary | ICD-10-CM

## 2022-09-13 DIAGNOSIS — M54.41 CHRONIC BILATERAL LOW BACK PAIN WITH RIGHT-SIDED SCIATICA: ICD-10-CM

## 2022-09-13 DIAGNOSIS — F32.5 MAJOR DEPRESSION IN COMPLETE REMISSION: ICD-10-CM

## 2022-09-13 DIAGNOSIS — G47.00 INSOMNIA, UNSPECIFIED TYPE: ICD-10-CM

## 2022-09-13 DIAGNOSIS — K14.6 BURNING MOUTH SYNDROME: ICD-10-CM

## 2022-09-13 DIAGNOSIS — E78.5 HYPERLIPIDEMIA, UNSPECIFIED HYPERLIPIDEMIA TYPE: ICD-10-CM

## 2022-09-13 DIAGNOSIS — F33.41 RECURRENT MAJOR DEPRESSIVE DISORDER, IN PARTIAL REMISSION: ICD-10-CM

## 2022-09-13 DIAGNOSIS — R13.10 DYSPHAGIA, UNSPECIFIED TYPE: ICD-10-CM

## 2022-09-13 DIAGNOSIS — R73.09 ELEVATED GLUCOSE LEVEL: ICD-10-CM

## 2022-09-13 DIAGNOSIS — B35.1 DERMATOPHYTOSIS, NAIL: ICD-10-CM

## 2022-09-13 DIAGNOSIS — E78.00 PURE HYPERCHOLESTEROLEMIA: ICD-10-CM

## 2022-09-13 DIAGNOSIS — F41.1 GENERALIZED ANXIETY DISORDER: ICD-10-CM

## 2022-09-13 DIAGNOSIS — F51.01 PRIMARY INSOMNIA: ICD-10-CM

## 2022-09-13 DIAGNOSIS — Z78.0 MENOPAUSE: ICD-10-CM

## 2022-09-13 PROCEDURE — 3078F PR MOST RECENT DIASTOLIC BLOOD PRESSURE < 80 MM HG: ICD-10-PCS | Mod: CPTII,S$GLB,, | Performed by: INTERNAL MEDICINE

## 2022-09-13 PROCEDURE — 99215 OFFICE O/P EST HI 40 MIN: CPT | Mod: S$GLB,,, | Performed by: INTERNAL MEDICINE

## 2022-09-13 PROCEDURE — 99499 RISK ADDL DX/OHS AUDIT: ICD-10-PCS | Mod: HCNC,S$GLB,, | Performed by: INTERNAL MEDICINE

## 2022-09-13 PROCEDURE — 1101F PT FALLS ASSESS-DOCD LE1/YR: CPT | Mod: CPTII,S$GLB,, | Performed by: INTERNAL MEDICINE

## 2022-09-13 PROCEDURE — 1125F PR PAIN SEVERITY QUANTIFIED, PAIN PRESENT: ICD-10-PCS | Mod: CPTII,S$GLB,, | Performed by: INTERNAL MEDICINE

## 2022-09-13 PROCEDURE — 1125F AMNT PAIN NOTED PAIN PRSNT: CPT | Mod: CPTII,S$GLB,, | Performed by: INTERNAL MEDICINE

## 2022-09-13 PROCEDURE — 3078F DIAST BP <80 MM HG: CPT | Mod: CPTII,S$GLB,, | Performed by: INTERNAL MEDICINE

## 2022-09-13 PROCEDURE — 1101F PR PT FALLS ASSESS DOC 0-1 FALLS W/OUT INJ PAST YR: ICD-10-PCS | Mod: CPTII,S$GLB,, | Performed by: INTERNAL MEDICINE

## 2022-09-13 PROCEDURE — 1160F RVW MEDS BY RX/DR IN RCRD: CPT | Mod: CPTII,S$GLB,, | Performed by: INTERNAL MEDICINE

## 2022-09-13 PROCEDURE — 3288F PR FALLS RISK ASSESSMENT DOCUMENTED: ICD-10-PCS | Mod: CPTII,S$GLB,, | Performed by: INTERNAL MEDICINE

## 2022-09-13 PROCEDURE — 3288F FALL RISK ASSESSMENT DOCD: CPT | Mod: CPTII,S$GLB,, | Performed by: INTERNAL MEDICINE

## 2022-09-13 PROCEDURE — 99999 PR PBB SHADOW E&M-EST. PATIENT-LVL V: ICD-10-PCS | Mod: PBBFAC,,, | Performed by: INTERNAL MEDICINE

## 2022-09-13 PROCEDURE — 3074F PR MOST RECENT SYSTOLIC BLOOD PRESSURE < 130 MM HG: ICD-10-PCS | Mod: CPTII,S$GLB,, | Performed by: INTERNAL MEDICINE

## 2022-09-13 PROCEDURE — 99499 UNLISTED E&M SERVICE: CPT | Mod: HCNC,S$GLB,, | Performed by: INTERNAL MEDICINE

## 2022-09-13 PROCEDURE — 3008F PR BODY MASS INDEX (BMI) DOCUMENTED: ICD-10-PCS | Mod: CPTII,S$GLB,, | Performed by: INTERNAL MEDICINE

## 2022-09-13 PROCEDURE — 1160F PR REVIEW ALL MEDS BY PRESCRIBER/CLIN PHARMACIST DOCUMENTED: ICD-10-PCS | Mod: CPTII,S$GLB,, | Performed by: INTERNAL MEDICINE

## 2022-09-13 PROCEDURE — 3074F SYST BP LT 130 MM HG: CPT | Mod: CPTII,S$GLB,, | Performed by: INTERNAL MEDICINE

## 2022-09-13 PROCEDURE — 99215 PR OFFICE/OUTPT VISIT, EST, LEVL V, 40-54 MIN: ICD-10-PCS | Mod: S$GLB,,, | Performed by: INTERNAL MEDICINE

## 2022-09-13 PROCEDURE — 4010F ACE/ARB THERAPY RXD/TAKEN: CPT | Mod: CPTII,S$GLB,, | Performed by: INTERNAL MEDICINE

## 2022-09-13 PROCEDURE — 4010F PR ACE/ARB THEARPY RXD/TAKEN: ICD-10-PCS | Mod: CPTII,S$GLB,, | Performed by: INTERNAL MEDICINE

## 2022-09-13 PROCEDURE — 1159F MED LIST DOCD IN RCRD: CPT | Mod: CPTII,S$GLB,, | Performed by: INTERNAL MEDICINE

## 2022-09-13 PROCEDURE — 99999 PR PBB SHADOW E&M-EST. PATIENT-LVL V: CPT | Mod: PBBFAC,,, | Performed by: INTERNAL MEDICINE

## 2022-09-13 PROCEDURE — 3008F BODY MASS INDEX DOCD: CPT | Mod: CPTII,S$GLB,, | Performed by: INTERNAL MEDICINE

## 2022-09-13 PROCEDURE — 1159F PR MEDICATION LIST DOCUMENTED IN MEDICAL RECORD: ICD-10-PCS | Mod: CPTII,S$GLB,, | Performed by: INTERNAL MEDICINE

## 2022-09-13 RX ORDER — TRAZODONE HYDROCHLORIDE 50 MG/1
50 TABLET ORAL NIGHTLY
Qty: 30 TABLET | Refills: 0 | Status: SHIPPED | OUTPATIENT
Start: 2022-09-13 | End: 2022-10-12 | Stop reason: SDUPTHER

## 2022-09-13 RX ORDER — ESCITALOPRAM OXALATE 10 MG/1
10 TABLET ORAL DAILY
Qty: 90 TABLET | Refills: 0 | Status: SHIPPED | OUTPATIENT
Start: 2022-09-13 | End: 2022-12-13 | Stop reason: SDUPTHER

## 2022-09-13 NOTE — PROGRESS NOTES
[facility] INITIAL VISIT NOTE      CHIEF COMPLAINT     Chief Complaint   Patient presents with    Establish Care    Mouth Lesions       HPI     Dottie Galeana is a 71 y.o.  female who presents with a PMHx of  depression and anxiety, HTN, HLD, osteoporosis, stress incontinence, GERD, history of CVA who presents today to establish care.  Oral ulcers has been seeing medical providers for the past 2 months and has had extensive work up done. Was told to see her dentist. History and exam consistent with burning mouth syndrome. Likely brought on by stress and anxiety which she admittedly has a lot of.     Great toe fungus:     Depression: effexor 150 daily. Takes xanax for anxiety. PHQ 9 today is a 7/27. ALFONSO 7 is 12/21 which is moderate anxiety. She does confirm that she currently has a lot of anxiety. Her  has visual issues and health concerns.has a very happy family and support system, 5 grandchildren that shes very close to but she has a lot of caregiver duties. Doesn't leave the house much as her  gets anxious when she does, so apart from her family, she does very little socializing with friends. Says she used to work so much that she has little to no friends that she socializes with.     HLD: takes crestor 10mg daily. Her most recent lipid panel shows adequate control.     History of CVA with no residual: on statin, ASA.     Osteoporosis: currently on fosamax. Takes calcium and vitamin D     Polypharmacy. Inappropriate and appropriate polypharmacy. Currently takes ambien 10mg nighly and xanax 0.25mg PRN at least 1-2 times per week. Counseled about these medications and the worrisome concerns about their use regarding falls etc. So we discussed trialling rtazodone. If patient unable to sleep without ambien she has agreed to see a sleep specialist or psychiatry.     Insomnia: has been on ambien for years. Says she is unable to sleep without it. For unclear reasons has never been on  5mg instead of the 10mg. Has not tried any alternative sleep medications but says she has tried to go without the medication and was not able to get any sleep. Has tried melatonin and says this does nothing for her. Not strict about her sleep hygiene as her husbands health and wake sleep cycle affects hers. She is aware that she should be off the ambien as her son also tells her this. Is wiling tor trial off this and try an alternative       CHRONIC HEALTH ISSUES:   Past Medical History:  Past Medical History:   Diagnosis Date    Arthritis     Closed compression fracture of L1 lumbar vertebra 11/7/2018    Depression     General anesthetics causing adverse effect in therapeutic use     Generalized anxiety disorder     GERD (gastroesophageal reflux disease)     History of seasonal allergies     HTN (hypertension)     Hyperlipidemia     Hyponatremia     Insomnia     Osteoporosis     Stress incontinence     Stroke     ocular behind left eye         Geriatric Assessment    ADLs : independent  iADLs: independent     Polypharmacy:yes see HPI     Visual impairments: no    Hearing impairment: no    Urinary incontinence: no    Malnutrition: no    Gait/balance/falls: no falls, gait  or balance issues    Depression: PHQ2. 2/2    Sleep: yes. But ok with ambien     Cognitive Problems: minicog.5/5.     Environmental/social problems: lives with usband. Very close to ehr family victor m her 5 grandchildren. Safe at home with no concerns for physical or emotional abuse    Functional status: functionally intact    Support system: family. Very little friends    Advanced care planning:  did not discuss today    Worry score 2    Past Surgical History:  Past Surgical History:   Procedure Laterality Date    BREAST BIOPSY Left 1998    benign    COLONOSCOPY N/A 4/14/2022    Procedure: COLONOSCOPY;  Surgeon: Al Santiago MD;  Location: [facility];  Service: Endoscopy;  Laterality: N/A;    ESOPHAGOGASTRODUODENOSCOPY N/A 8/17/2018     Procedure: EGD (ESOPHAGOGASTRODUODENOSCOPY);  Surgeon: Al Santiago MD;  Location: [facility] ENDO;  Service: Endoscopy;  Laterality: N/A;    ESOPHAGOGASTRODUODENOSCOPY N/A 1/12/2022    Procedure: ESOPHAGOGASTRODUODENOSCOPY (EGD);  Surgeon: Al Santiago MD;  Location: [facility] ENDO;  Service: Endoscopy;  Laterality: N/A;    FIXATION KYPHOPLASTY N/A 11/13/2018    Procedure: Kyphoplasty L1;  Surgeon: Russ Gramajo MD;  Location: [facility] OR;  Service: Pain Management;  Laterality: N/A;    ORIF WRIST FRACTURE Right        Allergies:  Review of patient's allergies indicates:   Allergen Reactions    No known drug allergies        Home Medications:  Prior to Admission medications    Medication Sig Start Date End Date Taking? Authorizing Provider   alendronate (FOSAMAX) 70 MG tablet Take 1 tablet (70 mg total) by mouth every 7 days. 1/25/22 1/25/23  Josh Li MD   ALPRAZolam (XANAX) 0.25 MG tablet Take 1 tablet (0.25 mg total) by mouth 3 (three) times daily as needed for Anxiety. 1/25/22   Josh Li MD   amLODIPine (NORVASC) 10 MG tablet Take 1 tablet (10 mg total) by mouth once daily. 5/10/22   Josh Li MD   aspirin 325 MG tablet Take 325 mg by mouth once daily.  11/23/11   Historical Provider   losartan (COZAAR) 100 MG tablet Take 1 tablet (100 mg total) by mouth once daily. 1/25/22 1/25/23  Josh Li MD   multivitamin (THERAGRAN) per tablet Take 1 tablet by mouth once daily.  11/23/11   Historical Provider   nebivoloL (BYSTOLIC) 10 MG Tab Take 1 tablet (10 mg total) by mouth once daily. 1/25/22   Josh Li MD   NITROSTAT 0.4 mg SL tablet Place 0.4 mg under the tongue every 5 (five) minutes as needed.  12/9/16   José Miguel Bynum MD   ondansetron (ZOFRAN-ODT) 4 MG TbDL Take 1 tablet (4 mg total) by mouth every 6 (six) hours as needed (nausea/vomiting). 11/15/21   CONNER Santamaria   pantoprazole (PROTONIX) 40 MG tablet Take 1 tablet (40 mg total) by mouth  "once daily. 8/3/22   Josh Li MD   phenazopyridine (PYRIDIUM) 200 MG tablet Take 1 tablet (200 mg total) by mouth 3 (three) times daily as needed for Pain. 8/21/22   Roman Santos NP   promethazine (PHENERGAN) 6.25 mg/5 mL syrup 10mL at night as needed 8/8/22   Scottie Mcintosh MD   rosuvastatin (CRESTOR) 10 MG tablet Take 1 tablet (10 mg total) by mouth every evening. 1/25/22   Josh Li MD   UNABLE TO FIND Roger/Bro cough medication through [facility] Provider   venlafaxine (EFFEXOR-XR) 150 MG Cp24 Take 1 capsule (150 mg total) by mouth once daily. 8/10/22   Josh Li MD   zolpidem (AMBIEN) 10 mg Tab TAKE 1 TABLET (10 MG TOTAL) BY MOUTH NIGHTLY AS NEEDED (INSOMNIA). 8/19/22   Josh Li MD       Family History:  Family History   Problem Relation Age of Onset    Ovarian cancer Mother 42       Social History:  Social History     Tobacco Use    Smoking status: Never    Smokeless tobacco: Never   Substance Use Topics    Alcohol use: Yes     Alcohol/week: 14.0 standard drinks     Types: 14 Glasses of wine per week     Comment: 2 glasses wine per day    Drug use: Not Currently       Review of Systems:  ROS    Health Maintainence:   TDap is up to date, Influenza  declines     Pneumovax is up to date.   Zostavax is not UTD.     Discussed getting this. Said in the past the copay was too high but she will try again to price it out  Covid not UTD  Cancer Screening:  PAP: is up to date.   Mammogram: is up to date.   Colonoscopy: is up to date. Had this done in 2022 April, one large polyp, repeat in 3 years  DEXA:  is up not to date. Ordered today     PHYSICAL EXAM     /72 (BP Location: Left arm, Patient Position: Sitting, BP Method: Medium (Manual))   Pulse 73   Temp 98.5 °F (36.9 °C)   Ht 5' 7" (1.702 m)   Wt 74.4 kg (164 lb 0.4 oz)   LMP 04/21/1994   SpO2 96%   BMI 25.69 kg/m²     GEN - A+OX4, NAD   YAJAIRA VITALE, EOMI, OP clear. MMM.   Neck - No " thyromegaly or cervical LAD. No thyroid masses felt.  CV - RRR, no m/r   Chest - CTAB, no wheezing or rhonchi  Abd - S/NT/ND/+BS.   Ext - 2+BDP and radial pulses. No LE edema.   Neuro - PERRL, EOMI, no nystagmus, eyebrow raise, facial sensation, hearing, m of mastication, smile, palatal raise, shoulder shrug, tongue protrusion symmetric and intact. 5/5 BUE and BLE strength. Sensation to light touch intact throughout. 2+ DTRs. Normal gait.   MSK - No spinal tenderness to palpation. Normal gait.   Skin - No rash.    LABS     Previous labs reviewed.    ASSESSMENT/PLAN     Dottie Galeana is a 71 y.o. female with  Dottie was seen today for establish care and mouth lesions.    Diagnoses and all orders for this visit:    Encounter to establish care  -medications reviewed and consolidated  -reviewed PMH, medications, HCM including vaccinations.   -reviewed most recent lab work. Reordered as needed. Discussed abnormalities with patient with time allowed for questions.   -reviewed clinic policy with patient including to arrive 15 mins before appointments, labs and results including expected turn around for results.   -outside records and consultants notes reviewed as time allowed. Updated treatment team with name of other providers.   -reviewed and confirmed patients contact information, preferred pharmacy etc.   -AVS provided after visit to summarized things discussed.   -The following assessments were completed:  Living Situation  CAGE  Depression Screening  Timed Get Up and Go  Cognitive Function Screening-Minicog   Nutrition Screening  ADL Screening    History of cerebral infarction  Had a occular stroke   On ASA and statin    Recurrent major depressive disorder, in partial remission  PHQ 9 7/27, admittedly with symptoms  Continue with effexor  Will start on lexapro    Generalized anxiety disorder  Advised trialling lexapro.   Discussed exercise, meditation, journal ing    Pure hypercholesterolemia  On statin,  continue     Hyperlipidemia, unspecified hyperlipidemia type  -     CBC Auto Differential; Future; Expected date: 03/13/2023  -     Lipid Panel; Future; Expected date: 03/13/2023    Primary hypertension  -     Comprehensive Metabolic Panel; Future; Expected date: 03/13/2023  -     TSH; Future; Expected date: 03/13/2023    Chronic bilateral low back pain with right-sided sciatica  Much better with excercise    Primary insomnia  Trial trazodone.  Wean off of ambien  Encouraged proper sleep hygiene.  Encouraged daily excercise    Elevated glucose level  -     Hemoglobin A1C; Future; Expected date: 03/13/2023    Vitamin B12 deficiency  -     Vitamin B12; Future; Expected date: 09/13/2022    Burning mouth syndrome  Encouraged a vitamin B complex  Treating all mood disorders such as depression and anxiety    Insomnia, unspecified type    Other orders  -     UNABLE TO FIND; Take by mouth 4 (four) times daily as needed. Magic mouth wash  -     EScitalopram oxalate (LEXAPRO) 10 MG tablet; Take 1 tablet (10 mg total) by mouth once daily.  -     Discontinue: diphenhydrAMINE-aluminum-magnesium hydroxide-simethicone-LIDOcaine HCl 2%; Swish and spit 15 mLs every 4 (four) hours as needed (mouth pain).  -     diphenhydrAMINE-aluminum-magnesium hydroxide-simethicone-LIDOcaine HCl 2%; Swish and spit 15 mLs every 4 (four) hours as needed (mouth pain).  -     traZODone (DESYREL) 50 MG tablet; Take 1 tablet (50 mg total) by mouth every evening.    My total time spent on this encounter was at least 60 minutes which included  the following activities: preparing to see the patient, performing a medically appropriate and/or evaluation, counseling and educating the patient and family/caregiver, ordering medications, tests, or procedures, referring and communicating with other healthcare providers, documenting clinical information in the electronic or other health record. This time is independent and non-overlapping.       RTC in 6 months,  sooner if needed and depending on labs.    Ml Shipley MD  Board Certified Internist/Geriatrician  [facility] [place] [address] ([place])

## 2022-09-22 ENCOUNTER — PATIENT MESSAGE (OUTPATIENT)
Dept: PRIMARY CARE CLINIC | Facility: CLINIC | Age: 72
End: 2022-09-22

## 2022-09-22 ENCOUNTER — TELEPHONE (OUTPATIENT)
Dept: PRIMARY CARE CLINIC | Facility: CLINIC | Age: 72
End: 2022-09-22
Payer: MEDICARE

## 2022-09-22 ENCOUNTER — OFFICE VISIT (OUTPATIENT)
Dept: PRIMARY CARE CLINIC | Facility: CLINIC | Age: 72
End: 2022-09-22
Payer: MEDICARE

## 2022-09-22 VITALS
OXYGEN SATURATION: 94 % | WEIGHT: 159.81 LBS | HEIGHT: 67 IN | HEART RATE: 94 BPM | BODY MASS INDEX: 25.08 KG/M2 | DIASTOLIC BLOOD PRESSURE: 70 MMHG | SYSTOLIC BLOOD PRESSURE: 110 MMHG

## 2022-09-22 DIAGNOSIS — R05.9 COUGH: Primary | ICD-10-CM

## 2022-09-22 DIAGNOSIS — J02.9 VIRAL PHARYNGITIS: ICD-10-CM

## 2022-09-22 DIAGNOSIS — J02.9 SORE THROAT: ICD-10-CM

## 2022-09-22 PROCEDURE — 3074F SYST BP LT 130 MM HG: CPT | Mod: CPTII,S$GLB,, | Performed by: INTERNAL MEDICINE

## 2022-09-22 PROCEDURE — 1160F RVW MEDS BY RX/DR IN RCRD: CPT | Mod: CPTII,S$GLB,, | Performed by: INTERNAL MEDICINE

## 2022-09-22 PROCEDURE — 99999 PR PBB SHADOW E&M-EST. PATIENT-LVL IV: CPT | Mod: PBBFAC,,, | Performed by: INTERNAL MEDICINE

## 2022-09-22 PROCEDURE — 4010F ACE/ARB THERAPY RXD/TAKEN: CPT | Mod: CPTII,S$GLB,, | Performed by: INTERNAL MEDICINE

## 2022-09-22 PROCEDURE — 3008F BODY MASS INDEX DOCD: CPT | Mod: CPTII,S$GLB,, | Performed by: INTERNAL MEDICINE

## 2022-09-22 PROCEDURE — 3078F DIAST BP <80 MM HG: CPT | Mod: CPTII,S$GLB,, | Performed by: INTERNAL MEDICINE

## 2022-09-22 PROCEDURE — 99213 PR OFFICE/OUTPT VISIT, EST, LEVL III, 20-29 MIN: ICD-10-PCS | Mod: S$GLB,,, | Performed by: INTERNAL MEDICINE

## 2022-09-22 PROCEDURE — 1159F PR MEDICATION LIST DOCUMENTED IN MEDICAL RECORD: ICD-10-PCS | Mod: CPTII,S$GLB,, | Performed by: INTERNAL MEDICINE

## 2022-09-22 PROCEDURE — 1101F PT FALLS ASSESS-DOCD LE1/YR: CPT | Mod: CPTII,S$GLB,, | Performed by: INTERNAL MEDICINE

## 2022-09-22 PROCEDURE — 1125F PR PAIN SEVERITY QUANTIFIED, PAIN PRESENT: ICD-10-PCS | Mod: CPTII,S$GLB,, | Performed by: INTERNAL MEDICINE

## 2022-09-22 PROCEDURE — 1125F AMNT PAIN NOTED PAIN PRSNT: CPT | Mod: CPTII,S$GLB,, | Performed by: INTERNAL MEDICINE

## 2022-09-22 PROCEDURE — 3078F PR MOST RECENT DIASTOLIC BLOOD PRESSURE < 80 MM HG: ICD-10-PCS | Mod: CPTII,S$GLB,, | Performed by: INTERNAL MEDICINE

## 2022-09-22 PROCEDURE — 1101F PR PT FALLS ASSESS DOC 0-1 FALLS W/OUT INJ PAST YR: ICD-10-PCS | Mod: CPTII,S$GLB,, | Performed by: INTERNAL MEDICINE

## 2022-09-22 PROCEDURE — 3008F PR BODY MASS INDEX (BMI) DOCUMENTED: ICD-10-PCS | Mod: CPTII,S$GLB,, | Performed by: INTERNAL MEDICINE

## 2022-09-22 PROCEDURE — 1160F PR REVIEW ALL MEDS BY PRESCRIBER/CLIN PHARMACIST DOCUMENTED: ICD-10-PCS | Mod: CPTII,S$GLB,, | Performed by: INTERNAL MEDICINE

## 2022-09-22 PROCEDURE — 3074F PR MOST RECENT SYSTOLIC BLOOD PRESSURE < 130 MM HG: ICD-10-PCS | Mod: CPTII,S$GLB,, | Performed by: INTERNAL MEDICINE

## 2022-09-22 PROCEDURE — 99999 PR PBB SHADOW E&M-EST. PATIENT-LVL IV: ICD-10-PCS | Mod: PBBFAC,,, | Performed by: INTERNAL MEDICINE

## 2022-09-22 PROCEDURE — 4010F PR ACE/ARB THEARPY RXD/TAKEN: ICD-10-PCS | Mod: CPTII,S$GLB,, | Performed by: INTERNAL MEDICINE

## 2022-09-22 PROCEDURE — 1159F MED LIST DOCD IN RCRD: CPT | Mod: CPTII,S$GLB,, | Performed by: INTERNAL MEDICINE

## 2022-09-22 PROCEDURE — 3288F FALL RISK ASSESSMENT DOCD: CPT | Mod: CPTII,S$GLB,, | Performed by: INTERNAL MEDICINE

## 2022-09-22 PROCEDURE — 99213 OFFICE O/P EST LOW 20 MIN: CPT | Mod: S$GLB,,, | Performed by: INTERNAL MEDICINE

## 2022-09-22 PROCEDURE — 3288F PR FALLS RISK ASSESSMENT DOCUMENTED: ICD-10-PCS | Mod: CPTII,S$GLB,, | Performed by: INTERNAL MEDICINE

## 2022-09-22 RX ORDER — PROMETHAZINE HYDROCHLORIDE 6.25 MG/5ML
12.5 SYRUP ORAL EVERY 6 HOURS PRN
Qty: 473 ML | Refills: 0 | Status: SHIPPED | OUTPATIENT
Start: 2022-09-22 | End: 2022-11-09

## 2022-09-22 NOTE — TELEPHONE ENCOUNTER
Spk to pt, informed pt that we will test her for strep at her visit today. Pt confirmed understanding

## 2022-09-22 NOTE — TELEPHONE ENCOUNTER
Called pt Per Dr Shipley's requesting. Informed pt that Dr Shipley wants pt to have a PCR test, pt states she had COVID 2 months ago.     Pts grandchild has strep. Pt requesting strep test

## 2022-09-22 NOTE — TELEPHONE ENCOUNTER
She can keep her appointment, we will swab her here. My point was that it is unlikely allergies with a fever

## 2022-09-22 NOTE — PROGRESS NOTES
INTERNAL MEDICINE PROGRESS/URGENT CARE NOTE    CHIEF COMPLAINT     Chief Complaint   Patient presents with    Fever    Sore Throat    Cough       HPI     Dottie Galeana is a 71 y.o.  female who presents with a PMHx of  depression and anxiety, HTN, HLD, osteoporosis, stress incontinence, GERD, history of CVA who presents today for an urgent visit  Patient called in this am for an appointment due to some symptoms of coughing, sore throat and subjective fever. She reports that her grand child does have strep throat.  Associated symptoms include facial pain and congestion.   Negative symptoms include no ear pain    Past Medical History:  Past Medical History:   Diagnosis Date    Arthritis     Closed compression fracture of L1 lumbar vertebra 11/7/2018    Depression     General anesthetics causing adverse effect in therapeutic use     Generalized anxiety disorder     GERD (gastroesophageal reflux disease)     History of seasonal allergies     HTN (hypertension)     Hyperlipidemia     Hyponatremia     Insomnia     Osteoporosis     Stress incontinence     Stroke     ocular behind left eye       Home Medications:  Prior to Admission medications    Medication Sig Start Date End Date Taking? Authorizing Provider   alendronate (FOSAMAX) 70 MG tablet Take 1 tablet (70 mg total) by mouth every 7 days. 1/25/22 1/25/23  Josh Li MD   ALPRAZolam (XANAX) 0.25 MG tablet Take 1 tablet (0.25 mg total) by mouth 3 (three) times daily as needed for Anxiety. 1/25/22   Josh Li MD   amLODIPine (NORVASC) 10 MG tablet Take 1 tablet (10 mg total) by mouth once daily. 5/10/22   Josh Li MD   aspirin 325 MG tablet Take 325 mg by mouth once daily.  11/23/11   Historical Provider   diphenhydrAMINE-aluminum-magnesium hydroxide-simethicone-LIDOcaine HCl 2% Swish and spit 15 mLs every 4 (four) hours as needed (mouth pain). 9/13/22   Ml Shipley MD   EScitalopram oxalate (LEXAPRO) 10 MG tablet Take 1  "tablet (10 mg total) by mouth once daily. 9/13/22 9/13/23  Ml Shipley MD   losartan (COZAAR) 100 MG tablet Take 1 tablet (100 mg total) by mouth once daily. 1/25/22 1/25/23  Josh Li MD   multivitamin (THERAGRAN) per tablet Take 1 tablet by mouth once daily.  11/23/11   Historical Provider   nebivoloL (BYSTOLIC) 10 MG Tab Take 1 tablet (10 mg total) by mouth once daily. 1/25/22   Josh Li MD   NITROSTAT 0.4 mg SL tablet Place 0.4 mg under the tongue every 5 (five) minutes as needed.  12/9/16   José Miguel Bynum MD   pantoprazole (PROTONIX) 40 MG tablet Take 1 tablet (40 mg total) by mouth once daily. 8/3/22   Josh Li MD   rosuvastatin (CRESTOR) 10 MG tablet Take 1 tablet (10 mg total) by mouth every evening. 1/25/22   Josh Li MD   traZODone (DESYREL) 50 MG tablet Take 1 tablet (50 mg total) by mouth every evening. 9/13/22 9/13/23  Ml Shipley MD   UNABLE TO FIND Take by mouth 4 (four) times daily as needed. Magic mouth wash    Historical Provider   venlafaxine (EFFEXOR-XR) 150 MG Cp24 Take 1 capsule (150 mg total) by mouth once daily. 8/10/22   Josh Li MD   zolpidem (AMBIEN) 10 mg Tab TAKE 1 TABLET (10 MG TOTAL) BY MOUTH NIGHTLY AS NEEDED (INSOMNIA). 8/19/22   Josh Li MD       Review of Systems:  Review of Systems      PHYSICAL EXAM     /70 (BP Location: Right arm, Patient Position: Sitting, BP Method: Large (Manual))   Pulse 94   Ht 5' 7" (1.702 m)   Wt 72.5 kg (159 lb 13.3 oz)   LMP 04/21/1994   SpO2 (!) 94%   BMI 25.03 kg/m²     GEN - A+OX4, NAD   HEENT - PERRL, EOMI, OP clear. Pharyngeal erythema. No exudate. Ear exam clear  Neck - No thyromegaly or cervical LAD. No thyroid masses felt.  CV - RRR, no m/r   Chest - CTAB, no wheezing or rhonchi  Abd - S/NT/ND/+BS.   Ext - 2+BDP and radial pulses. No C/C/E.  Skin - No rash.    LABS     Labs have been reveiwed.     ASSESSMENT/PLAN     Dottie Galeana is a 71 " y.o. female with  Dottie was seen today for fever, sore throat and cough.    Diagnoses and all orders for this visit:    Cough  -     POCT Rapid Strep A negative  -     POCT COVID-19 Rapid Screening negative     Sore throat  -     POCT Rapid Strep A  -     POCT COVID-19 Rapid Screening    Other orders  -     promethazine (PHENERGAN) 6.25 mg/5 mL syrup; Take 10 mLs (12.5 mg total) by mouth every 6 (six) hours as needed for Nausea.    Acute viral pharnygitis  Supportive care        WORRY SCORE 1    RTC as already scheduled.     Ml Shipley MD  Board Certified Internist/Geriatrician  [facility] ([place])

## 2022-09-23 ENCOUNTER — PATIENT MESSAGE (OUTPATIENT)
Dept: PRIMARY CARE CLINIC | Facility: CLINIC | Age: 72
End: 2022-09-23
Payer: MEDICARE

## 2022-09-23 RX ORDER — PROMETHAZINE HYDROCHLORIDE AND CODEINE PHOSPHATE 6.25; 1 MG/5ML; MG/5ML
5 SOLUTION ORAL EVERY 4 HOURS PRN
Qty: 118 ML | Refills: 0 | Status: SHIPPED | OUTPATIENT
Start: 2022-09-23 | End: 2022-09-26

## 2022-09-26 DIAGNOSIS — F41.1 GENERALIZED ANXIETY DISORDER: ICD-10-CM

## 2022-09-26 DIAGNOSIS — F41.9 ANXIETY: Primary | ICD-10-CM

## 2022-09-26 RX ORDER — ALPRAZOLAM 0.25 MG/1
0.25 TABLET ORAL 3 TIMES DAILY PRN
Qty: 30 TABLET | Refills: 0 | Status: SHIPPED | OUTPATIENT
Start: 2022-09-26 | End: 2022-12-10 | Stop reason: SDUPTHER

## 2022-09-26 NOTE — TELEPHONE ENCOUNTER
As discussed, longterm use of this medication requires a psychiatrist referral as I do not prescribe . Will send in for a short refill, and if she is to continue, she will see a psychiatrist. Isabelle placed a referral for psychiatrist

## 2022-09-27 ENCOUNTER — TELEPHONE (OUTPATIENT)
Dept: PRIMARY CARE CLINIC | Facility: CLINIC | Age: 72
End: 2022-09-27

## 2022-09-28 ENCOUNTER — PATIENT MESSAGE (OUTPATIENT)
Dept: PSYCHIATRY | Facility: CLINIC | Age: 72
End: 2022-09-28
Payer: MEDICARE

## 2022-10-13 RX ORDER — TRAZODONE HYDROCHLORIDE 50 MG/1
50 TABLET ORAL NIGHTLY
Qty: 90 TABLET | Refills: 0 | Status: SHIPPED | OUTPATIENT
Start: 2022-10-13 | End: 2023-01-07 | Stop reason: SDUPTHER

## 2022-10-13 NOTE — TELEPHONE ENCOUNTER
Patient states she is taking medication alone and has noted she has been sleeping well. Wants medication sent to [facility] pharmacy.

## 2022-10-13 NOTE — TELEPHONE ENCOUNTER
Before I refill, can you please inquire with patient, how shes sleeping on the trazodone. And if shes using it alone or with something else.

## 2022-10-26 ENCOUNTER — OFFICE VISIT (OUTPATIENT)
Dept: PRIMARY CARE CLINIC | Facility: CLINIC | Age: 72
End: 2022-10-26
Payer: MEDICARE

## 2022-10-26 VITALS
HEIGHT: 66 IN | WEIGHT: 162.13 LBS | HEART RATE: 84 BPM | BODY MASS INDEX: 26.06 KG/M2 | TEMPERATURE: 98 F | OXYGEN SATURATION: 98 % | SYSTOLIC BLOOD PRESSURE: 116 MMHG | DIASTOLIC BLOOD PRESSURE: 82 MMHG

## 2022-10-26 DIAGNOSIS — F32.0 MILD MAJOR DEPRESSION: Primary | ICD-10-CM

## 2022-10-26 DIAGNOSIS — K14.6 BURNING MOUTH SYNDROME: ICD-10-CM

## 2022-10-26 DIAGNOSIS — F41.1 GENERALIZED ANXIETY DISORDER: ICD-10-CM

## 2022-10-26 DIAGNOSIS — F51.01 PRIMARY INSOMNIA: ICD-10-CM

## 2022-10-26 PROCEDURE — 99999 PR PBB SHADOW E&M-EST. PATIENT-LVL IV: CPT | Mod: PBBFAC,,, | Performed by: INTERNAL MEDICINE

## 2022-10-26 PROCEDURE — 3074F SYST BP LT 130 MM HG: CPT | Mod: CPTII,S$GLB,, | Performed by: INTERNAL MEDICINE

## 2022-10-26 PROCEDURE — 1160F PR REVIEW ALL MEDS BY PRESCRIBER/CLIN PHARMACIST DOCUMENTED: ICD-10-PCS | Mod: CPTII,S$GLB,, | Performed by: INTERNAL MEDICINE

## 2022-10-26 PROCEDURE — 1126F PR PAIN SEVERITY QUANTIFIED, NO PAIN PRESENT: ICD-10-PCS | Mod: CPTII,S$GLB,, | Performed by: INTERNAL MEDICINE

## 2022-10-26 PROCEDURE — 1159F MED LIST DOCD IN RCRD: CPT | Mod: CPTII,S$GLB,, | Performed by: INTERNAL MEDICINE

## 2022-10-26 PROCEDURE — 3288F PR FALLS RISK ASSESSMENT DOCUMENTED: ICD-10-PCS | Mod: CPTII,S$GLB,, | Performed by: INTERNAL MEDICINE

## 2022-10-26 PROCEDURE — 4010F ACE/ARB THERAPY RXD/TAKEN: CPT | Mod: CPTII,S$GLB,, | Performed by: INTERNAL MEDICINE

## 2022-10-26 PROCEDURE — 4010F PR ACE/ARB THEARPY RXD/TAKEN: ICD-10-PCS | Mod: CPTII,S$GLB,, | Performed by: INTERNAL MEDICINE

## 2022-10-26 PROCEDURE — 3074F PR MOST RECENT SYSTOLIC BLOOD PRESSURE < 130 MM HG: ICD-10-PCS | Mod: CPTII,S$GLB,, | Performed by: INTERNAL MEDICINE

## 2022-10-26 PROCEDURE — 1159F PR MEDICATION LIST DOCUMENTED IN MEDICAL RECORD: ICD-10-PCS | Mod: CPTII,S$GLB,, | Performed by: INTERNAL MEDICINE

## 2022-10-26 PROCEDURE — 3079F PR MOST RECENT DIASTOLIC BLOOD PRESSURE 80-89 MM HG: ICD-10-PCS | Mod: CPTII,S$GLB,, | Performed by: INTERNAL MEDICINE

## 2022-10-26 PROCEDURE — 1160F RVW MEDS BY RX/DR IN RCRD: CPT | Mod: CPTII,S$GLB,, | Performed by: INTERNAL MEDICINE

## 2022-10-26 PROCEDURE — 99213 PR OFFICE/OUTPT VISIT, EST, LEVL III, 20-29 MIN: ICD-10-PCS | Mod: S$GLB,,, | Performed by: INTERNAL MEDICINE

## 2022-10-26 PROCEDURE — 1126F AMNT PAIN NOTED NONE PRSNT: CPT | Mod: CPTII,S$GLB,, | Performed by: INTERNAL MEDICINE

## 2022-10-26 PROCEDURE — 1101F PT FALLS ASSESS-DOCD LE1/YR: CPT | Mod: CPTII,S$GLB,, | Performed by: INTERNAL MEDICINE

## 2022-10-26 PROCEDURE — 99999 PR PBB SHADOW E&M-EST. PATIENT-LVL IV: ICD-10-PCS | Mod: PBBFAC,,, | Performed by: INTERNAL MEDICINE

## 2022-10-26 PROCEDURE — 1101F PR PT FALLS ASSESS DOC 0-1 FALLS W/OUT INJ PAST YR: ICD-10-PCS | Mod: CPTII,S$GLB,, | Performed by: INTERNAL MEDICINE

## 2022-10-26 PROCEDURE — 3288F FALL RISK ASSESSMENT DOCD: CPT | Mod: CPTII,S$GLB,, | Performed by: INTERNAL MEDICINE

## 2022-10-26 PROCEDURE — 99213 OFFICE O/P EST LOW 20 MIN: CPT | Mod: S$GLB,,, | Performed by: INTERNAL MEDICINE

## 2022-10-26 PROCEDURE — 3079F DIAST BP 80-89 MM HG: CPT | Mod: CPTII,S$GLB,, | Performed by: INTERNAL MEDICINE

## 2022-10-26 NOTE — ASSESSMENT & PLAN NOTE
Recently started on lexapro on 9/15/22, and today reports doing much much better. Says she loves this medication. No side effects. Continue  PHQ9 NOW DOWN TO 6.27, From 13/27.   Denies HI/SI  Continue with family support, socializing  Advised consistent exercise and healthy diet

## 2022-10-26 NOTE — PROGRESS NOTES
INTERNAL MEDICINE PROGRESS/URGENT CARE NOTE    CHIEF COMPLAINT     Chief Complaint   Patient presents with    Follow-up     Pt presents today for 6 month follow up and medication check        HPI     Dottie Galeana is a 71 y.o.  female who presents with a PMHx of  depression and anxiety, HTN, HLD, osteoporosis, stress incontinence, GERD, history of CVA who presents today for scheduled 6 week mood follow up.     Oral ulcers has been seeing medical providers for the past 2 months and has had extensive work up done. Was told to see her dentist. History and exam consistent with burning mouth syndrome. Likely brought on by stress and anxiety which she admittedly has a lot of. Today she does report that this has almost completely resolved.     Depression: effexor 150 daily. Takes xanax for anxiety. PHQ 9 at last visit was a 7/27. ALFONSO 7 was 12/21 which is moderate anxiety. She did confirmed that she currently has a lot of anxiety. Her  has visual issues and health concerns.has a very happy family and support system, 5 grandchildren that shes very close to but she has a lot of caregiver duties. Doesn't leave the house much as her  gets anxious when she does, so apart from her family, she does very little socializing with friends. Says she used to work so much that she has little to no friends that she socializes with.   At her last visit, we agreed to try lexparo 10mg and today she reports that the medication is great. That she loves it. Thinks its made an amazing difference with her mood. Shes happier and less anxious. May also be sleeping a bit better.   GAD7 today is a 7/21.   PHQ 9 today is a 3/27 which is an improvement compared to the values above  Denies SI/HI.      Polypharmacy. Inappropriate and appropriate polypharmacy. Currently takes ambien 10mg nighly and xanax 0.25mg PRN at least 1-2 times per week. Counseled about these medications and the worrisome concerns about their use regarding  falls etc. So we discussed trialling rtazodone. If patient unable to sleep without ambien she has agreed to see a sleep specialist or psychiatry. Today, she states that the trazodone has done nothing to help with sleep, so ni instructed her that the 6 hours that she does get is sufficient. If at some point this worsens, I would like her to see a sleep specialist.      Insomnia: has been on ambien for years. Says she is unable to sleep without it. For unclear reasons has never been on 5mg instead of the 10mg. Has not tried any alternative sleep medications but says she has tried to go without the medication and was not able to get any sleep. Has tried melatonin and says this does nothing for her. Not strict about her sleep hygiene as her husbands health and wake sleep cycle affects hers. She is aware that she should be off the ambien as her son also tells her this. Is wiling tor trial off this and try an alternative. Tried the trazodone which did not work for her. Agreed to refer to sleep specialist if she starts having more trouble with sleep.     HCM: will get her shingles at the start of the year when it will become free.   Says she will indeed get her covid booster    Also informs me that she went to see her cardiologist and he did a heart cath and found that she has a mild form of HOCMs and this was contributing to her BP history of being difficult to control, but became controlled once he got her on bystolic.     Past Medical History:  Past Medical History:   Diagnosis Date    Arthritis     Closed compression fracture of L1 lumbar vertebra 11/7/2018    Depression     General anesthetics causing adverse effect in therapeutic use     Generalized anxiety disorder     GERD (gastroesophageal reflux disease)     History of seasonal allergies     HTN (hypertension)     Hyperlipidemia     Hyponatremia     Insomnia     Osteoporosis     Stress incontinence     Stroke     ocular behind left eye       Home  Medications:  Prior to Admission medications    Medication Sig Start Date End Date Taking? Authorizing Provider   alendronate (FOSAMAX) 70 MG tablet Take 1 tablet (70 mg total) by mouth every 7 days. 1/25/22 1/25/23 Yes Josh Li MD   ALPRAZolam (XANAX) 0.25 MG tablet TAKE 1 TABLET (0.25 MG TOTAL) BY MOUTH 3 (THREE) TIMES DAILY AS NEEDED FOR ANXIETY. 9/26/22  Yes Ml Shipley MD   amLODIPine (NORVASC) 10 MG tablet TAKE 1 TABLET ONE TIME DAILY 10/19/22  Yes Ml Shipley MD   aspirin 325 MG tablet Take 325 mg by mouth once daily.  11/23/11  Yes Historical Provider   EScitalopram oxalate (LEXAPRO) 10 MG tablet Take 1 tablet (10 mg total) by mouth once daily. 9/13/22 9/13/23 Yes Ml Shipley MD   losartan (COZAAR) 100 MG tablet Take 1 tablet (100 mg total) by mouth once daily. 1/25/22 1/25/23 Yes Josh Li MD   multivitamin (THERAGRAN) per tablet Take 1 tablet by mouth once daily.  11/23/11  Yes Historical Provider   nebivoloL (BYSTOLIC) 10 MG Tab Take 1 tablet (10 mg total) by mouth once daily. 1/25/22  Yes Josh Li MD   NITROSTAT 0.4 mg SL tablet Place 0.4 mg under the tongue every 5 (five) minutes as needed.  12/9/16  Yes José Miguel Bynum MD   pantoprazole (PROTONIX) 40 MG tablet Take 1 tablet (40 mg total) by mouth once daily. 8/3/22  Yes Josh Li MD   rosuvastatin (CRESTOR) 10 MG tablet Take 1 tablet (10 mg total) by mouth every evening. 1/25/22  Yes Josh Li MD   traZODone (DESYREL) 50 MG tablet Take 1 tablet (50 mg total) by mouth every evening. 10/13/22 10/13/23 Yes Ml Shipley MD   UNABLE TO FIND Take by mouth 4 (four) times daily as needed. Magic mouth wash   Yes Historical Provider   venlafaxine (EFFEXOR-XR) 150 MG Cp24 Take 1 capsule (150 mg total) by mouth once daily. 10/26/22  Yes Ml Shipley MD   diphenhydrAMINE-aluminum-magnesium hydroxide-simethicone-LIDOcaine HCl 2% Swish and spit 15 mLs every 4 (four)  "hours as needed (mouth pain).  Patient not taking: Reported on 10/26/2022 9/13/22   Ml Shipley MD   promethazine (PHENERGAN) 6.25 mg/5 mL syrup Take 10 mLs (12.5 mg total) by mouth every 6 (six) hours as needed for Nausea.  Patient not taking: Reported on 10/26/2022 9/22/22   Ml Shipley MD   zolpidem (AMBIEN) 10 mg Tab TAKE 1 TABLET (10 MG TOTAL) BY MOUTH NIGHTLY AS NEEDED (INSOMNIA).  Patient not taking: Reported on 10/26/2022 8/19/22 10/26/22  Josh Li MD       Review of Systems:  Review of Systems    PHYSICAL EXAM     /82 (BP Location: Left arm, Patient Position: Sitting, BP Method: Medium (Manual))   Pulse 84   Temp 98.3 °F (36.8 °C)   Ht 5' 6" (1.676 m)   Wt 73.5 kg (162 lb 1.8 oz)   LMP 04/21/1994   SpO2 98%   BMI 26.17 kg/m²     GEN - A+OX4, NAD   HEENT - PERRL, EOMI, OP clear  Neck - No thyromegaly or cervical LAD. No thyroid masses felt.  CV - RRR, no m/r   Chest - CTAB, no wheezing or rhonchi  Abd - S/NT/ND/+BS.   Ext - 2+BDP and radial pulses. No C/C/E.  Skin - No rash.    LABS     ;abs have been reviewed.     ASSESSMENT/PLAN     Dottie Galeana is a 72 y.o. female with  1. Mild major depression  Assessment & Plan:  Recently started on lexapro on 9/15/22, and today reports doing much much better. Says she loves this medication. No side effects. Continue  PHQ9 NOW DOWN TO 6.27, From 13/27.   Denies HI/SI  Continue with family support, socializing  Advised consistent exercise and healthy diet      2. Burning mouth syndrome  Assessment & Plan:  Resolved. Much better.   Continue to monitor      3. Generalized anxiety disorder  Assessment & Plan:  Improved with lexapro. Continue with medication. ALFONSO 7 today is a 6/21 and improved from last visit.       4. Primary insomnia  Trazodone die not help. But today she tells me that she gets at least 6 hours. So I have advised her that that is adequate.   Stop trazodone since its not working  Off ambien. Advised to " permanently discontinue this.   Discussed proper sleep hygiene.          WORRY SCORE 2    RTC in 6 months, sooner if needed and depending on labs.    Ml Shipley MD  Board Certified Internist/Geriatrician  [facility] ([place])

## 2022-10-26 NOTE — ASSESSMENT & PLAN NOTE
Improved with lexapro. Continue with medication. ALFONSO 7 today is a 6/21 and improved from last visit.

## 2022-11-09 ENCOUNTER — OFFICE VISIT (OUTPATIENT)
Dept: PRIMARY CARE CLINIC | Facility: CLINIC | Age: 72
End: 2022-11-09
Payer: MEDICARE

## 2022-11-09 VITALS
OXYGEN SATURATION: 97 % | BODY MASS INDEX: 25.15 KG/M2 | HEART RATE: 75 BPM | TEMPERATURE: 98 F | WEIGHT: 160.25 LBS | HEIGHT: 67 IN | SYSTOLIC BLOOD PRESSURE: 124 MMHG | DIASTOLIC BLOOD PRESSURE: 72 MMHG

## 2022-11-09 DIAGNOSIS — R05.3 CHRONIC COUGH: Primary | ICD-10-CM

## 2022-11-09 PROCEDURE — 99999 PR PBB SHADOW E&M-EST. PATIENT-LVL V: CPT | Mod: PBBFAC,,, | Performed by: INTERNAL MEDICINE

## 2022-11-09 PROCEDURE — 1126F PR PAIN SEVERITY QUANTIFIED, NO PAIN PRESENT: ICD-10-PCS | Mod: CPTII,S$GLB,, | Performed by: INTERNAL MEDICINE

## 2022-11-09 PROCEDURE — 99213 PR OFFICE/OUTPT VISIT, EST, LEVL III, 20-29 MIN: ICD-10-PCS | Mod: S$GLB,,, | Performed by: INTERNAL MEDICINE

## 2022-11-09 PROCEDURE — 1159F PR MEDICATION LIST DOCUMENTED IN MEDICAL RECORD: ICD-10-PCS | Mod: CPTII,S$GLB,, | Performed by: INTERNAL MEDICINE

## 2022-11-09 PROCEDURE — 3074F PR MOST RECENT SYSTOLIC BLOOD PRESSURE < 130 MM HG: ICD-10-PCS | Mod: CPTII,S$GLB,, | Performed by: INTERNAL MEDICINE

## 2022-11-09 PROCEDURE — 3078F DIAST BP <80 MM HG: CPT | Mod: CPTII,S$GLB,, | Performed by: INTERNAL MEDICINE

## 2022-11-09 PROCEDURE — 1159F MED LIST DOCD IN RCRD: CPT | Mod: CPTII,S$GLB,, | Performed by: INTERNAL MEDICINE

## 2022-11-09 PROCEDURE — 3288F FALL RISK ASSESSMENT DOCD: CPT | Mod: CPTII,S$GLB,, | Performed by: INTERNAL MEDICINE

## 2022-11-09 PROCEDURE — 3074F SYST BP LT 130 MM HG: CPT | Mod: CPTII,S$GLB,, | Performed by: INTERNAL MEDICINE

## 2022-11-09 PROCEDURE — 3008F PR BODY MASS INDEX (BMI) DOCUMENTED: ICD-10-PCS | Mod: CPTII,S$GLB,, | Performed by: INTERNAL MEDICINE

## 2022-11-09 PROCEDURE — 99999 PR PBB SHADOW E&M-EST. PATIENT-LVL V: ICD-10-PCS | Mod: PBBFAC,,, | Performed by: INTERNAL MEDICINE

## 2022-11-09 PROCEDURE — 1101F PT FALLS ASSESS-DOCD LE1/YR: CPT | Mod: CPTII,S$GLB,, | Performed by: INTERNAL MEDICINE

## 2022-11-09 PROCEDURE — 1126F AMNT PAIN NOTED NONE PRSNT: CPT | Mod: CPTII,S$GLB,, | Performed by: INTERNAL MEDICINE

## 2022-11-09 PROCEDURE — 1160F PR REVIEW ALL MEDS BY PRESCRIBER/CLIN PHARMACIST DOCUMENTED: ICD-10-PCS | Mod: CPTII,S$GLB,, | Performed by: INTERNAL MEDICINE

## 2022-11-09 PROCEDURE — 3288F PR FALLS RISK ASSESSMENT DOCUMENTED: ICD-10-PCS | Mod: CPTII,S$GLB,, | Performed by: INTERNAL MEDICINE

## 2022-11-09 PROCEDURE — 3078F PR MOST RECENT DIASTOLIC BLOOD PRESSURE < 80 MM HG: ICD-10-PCS | Mod: CPTII,S$GLB,, | Performed by: INTERNAL MEDICINE

## 2022-11-09 PROCEDURE — 1160F RVW MEDS BY RX/DR IN RCRD: CPT | Mod: CPTII,S$GLB,, | Performed by: INTERNAL MEDICINE

## 2022-11-09 PROCEDURE — 99213 OFFICE O/P EST LOW 20 MIN: CPT | Mod: S$GLB,,, | Performed by: INTERNAL MEDICINE

## 2022-11-09 PROCEDURE — 1101F PR PT FALLS ASSESS DOC 0-1 FALLS W/OUT INJ PAST YR: ICD-10-PCS | Mod: CPTII,S$GLB,, | Performed by: INTERNAL MEDICINE

## 2022-11-09 PROCEDURE — 4010F ACE/ARB THERAPY RXD/TAKEN: CPT | Mod: CPTII,S$GLB,, | Performed by: INTERNAL MEDICINE

## 2022-11-09 PROCEDURE — 4010F PR ACE/ARB THEARPY RXD/TAKEN: ICD-10-PCS | Mod: CPTII,S$GLB,, | Performed by: INTERNAL MEDICINE

## 2022-11-09 PROCEDURE — 3008F BODY MASS INDEX DOCD: CPT | Mod: CPTII,S$GLB,, | Performed by: INTERNAL MEDICINE

## 2022-11-09 RX ORDER — PROMETHAZINE HYDROCHLORIDE AND CODEINE PHOSPHATE 6.25; 1 MG/5ML; MG/5ML
5 SOLUTION ORAL EVERY 4 HOURS PRN
Qty: 240 ML | Refills: 0 | Status: SHIPPED | OUTPATIENT
Start: 2022-11-09 | End: 2022-11-10

## 2022-11-09 RX ORDER — BNT162B2 0.23 MG/2.25ML
INJECTION, SUSPENSION INTRAMUSCULAR
COMMUNITY
Start: 2022-07-23 | End: 2023-04-03

## 2022-11-09 NOTE — PROGRESS NOTES
INTERNAL MEDICINE PROGRESS/URGENT CARE NOTE    CHIEF COMPLAINT     Chief Complaint   Patient presents with    Cough     Pt presents today for cough x 8 days that is non productive. Pt states she has been taking mucinex with no relief. Denies any N/V/F/C       HPI     Dottie Galeana is a 72 y.o.  female who presents for an urgent/follow up visit today.  Patients main concern is an ongoing cough. Started a year ago. Had a CXR done and treated for sinus issues. Did improved but then shes been seen on her chart that shes been evaluated repeatedly this year for productive cough. Has been treated with cough medication and improves for a bit but then it returns. Has 3 times in the past 3 months. No significant smoking history. No cardiac issues.   No sick contacts.   No ENT evaluation.   Associated symptoms of runny nose, and sinus congestion without sinus facial pain, and no fever.   Has been taking flonase, not consistent use of antihistamine. And occasionally has been using azestaline. As instructed at our last visit she has been taking mucinex which she reports has questionable rates of efficacy.   No SOB. No chest pain  No ankle swelling or cardiac issues.   No medications on review that could cause this.     Past Medical History:  Past Medical History:   Diagnosis Date    Arthritis     Closed compression fracture of L1 lumbar vertebra 11/7/2018    Depression     General anesthetics causing adverse effect in therapeutic use     Generalized anxiety disorder     GERD (gastroesophageal reflux disease)     History of seasonal allergies     HTN (hypertension)     Hyperlipidemia     Hyponatremia     Insomnia     Osteoporosis     Stress incontinence     Stroke     ocular behind left eye       Home Medications:  Prior to Admission medications    Medication Sig Start Date End Date Taking? Authorizing Provider   alendronate (FOSAMAX) 70 MG tablet Take 1 tablet (70 mg total) by mouth every 7 days. 1/25/22 1/25/23 Yes  Josh Li MD   ALPRAZolam (XANAX) 0.25 MG tablet TAKE 1 TABLET (0.25 MG TOTAL) BY MOUTH 3 (THREE) TIMES DAILY AS NEEDED FOR ANXIETY. 9/26/22  Yes Ml Shipley MD   amLODIPine (NORVASC) 10 MG tablet TAKE 1 TABLET ONE TIME DAILY 10/19/22  Yes Ml Shipley MD   aspirin 325 MG tablet Take 325 mg by mouth once daily.  11/23/11  Yes Historical Provider   EScitalopram oxalate (LEXAPRO) 10 MG tablet Take 1 tablet (10 mg total) by mouth once daily. 9/13/22 9/13/23 Yes Ml Shipley MD   losartan (COZAAR) 100 MG tablet Take 1 tablet (100 mg total) by mouth once daily. 1/25/22 1/25/23 Yes Josh Li MD   multivitamin (THERAGRAN) per tablet Take 1 tablet by mouth once daily.  11/23/11  Yes Historical Provider   nebivoloL (BYSTOLIC) 10 MG Tab Take 1 tablet (10 mg total) by mouth once daily. 1/25/22  Yes Josh Li MD   NITROSTAT 0.4 mg SL tablet Place 0.4 mg under the tongue every 5 (five) minutes as needed.  12/9/16  Yes José Miguel Bynum MD   pantoprazole (PROTONIX) 40 MG tablet Take 1 tablet (40 mg total) by mouth once daily. 8/3/22  Yes Josh Li MD   PFIZER COVID-19 KATTY VACCN,PF, 30 mcg/0.3 mL injection  7/23/22  Yes Historical Provider   rosuvastatin (CRESTOR) 10 MG tablet Take 1 tablet (10 mg total) by mouth every evening. 1/25/22  Yes Josh Li MD   traZODone (DESYREL) 50 MG tablet Take 1 tablet (50 mg total) by mouth every evening. 10/13/22 10/13/23 Yes Ml Shipley MD   venlafaxine (EFFEXOR-XR) 150 MG Cp24 Take 1 capsule (150 mg total) by mouth once daily. 10/26/22  Yes Ml Shipley MD   diphenhydrAMINE-aluminum-magnesium hydroxide-simethicone-LIDOcaine HCl 2% Swish and spit 15 mLs every 4 (four) hours as needed (mouth pain).  Patient not taking: Reported on 10/26/2022 9/13/22   Ml Shipley MD   promethazine (PHENERGAN) 6.25 mg/5 mL syrup Take 10 mLs (12.5 mg total) by mouth every 6 (six) hours as needed for  "Nausea.  Patient not taking: Reported on 10/26/2022 9/22/22   Ml Shipley MD   promethazine-codeine 6.25-10 mg/5 ml (PHENERGAN WITH CODEINE) 6.25-10 mg/5 mL syrup Take 5 mLs by mouth every 4 (four) hours as needed for Cough. 11/9/22 11/19/22  Ml Shipley MD   UNABLE TO FIND Take by mouth 4 (four) times daily as needed. Magic mouth wash    Historical Provider       Review of Systems:  Review of Systems    PHYSICAL EXAM     /72 (BP Location: Right leg, Patient Position: Sitting, BP Method: Medium (Manual))   Pulse 75   Temp 98.4 °F (36.9 °C) (Oral)   Ht 5' 7" (1.702 m)   Wt 72.7 kg (160 lb 3.7 oz)   LMP 04/21/1994   SpO2 97%   BMI 25.10 kg/m²     GEN - A+OX4, NAD   Chest - CTAB, no wheezing or rhonchi      LABS     ASSESSMENT/PLAN     Dottie Galeana is a 72 y.o. female with  1. Chronic cough  Suspect sinus issue with PND sinus drainage etc. No suspicion of infection so no abx at this time. Continue with flonase and antihistamine. Continue with mucinex.   -     CT Chest With Contrast; Future; Expected date: 11/09/2022  -     Ambulatory referral/consult to ENT; Future; Expected date: 11/16/2022  -     Creatinine, serum; Future; Expected date: 11/09/2022    Other orders  -     promethazine-codeine 6.25-10 mg/5 ml (PHENERGAN WITH CODEINE) 6.25-10 mg/5 mL syrup; Take 5 mLs by mouth every 4 (four) hours as needed for Cough.  Dispense: 240 mL; Refill: 0      RTC as scheduled    Ml Shipley MD  Board Certified Internist/Geriatrician  [facility] ([place]        "

## 2022-11-11 ENCOUNTER — HOSPITAL ENCOUNTER (OUTPATIENT)
Dept: RADIOLOGY | Facility: HOSPITAL | Age: 72
Discharge: HOME | End: 2022-11-11
Attending: INTERNAL MEDICINE
Payer: MEDICARE

## 2022-11-11 DIAGNOSIS — R05.3 CHRONIC COUGH: ICD-10-CM

## 2022-11-11 PROCEDURE — 71250 CT THORAX DX C-: CPT | Mod: TC,PO

## 2022-11-11 PROCEDURE — 71250 CT THORAX DX C-: CPT | Mod: 26,,, | Performed by: RADIOLOGY

## 2022-11-11 PROCEDURE — 71250 CT CHEST WITHOUT CONTRAST: ICD-10-PCS | Mod: 26,,, | Performed by: RADIOLOGY

## 2022-11-12 ENCOUNTER — PATIENT MESSAGE (OUTPATIENT)
Dept: PRIMARY CARE CLINIC | Facility: CLINIC | Age: 72
End: 2022-11-12
Payer: MEDICARE

## 2022-11-14 ENCOUNTER — TELEPHONE (OUTPATIENT)
Dept: PRIMARY CARE CLINIC | Facility: CLINIC | Age: 72
End: 2022-11-14
Payer: MEDICARE

## 2022-11-14 DIAGNOSIS — R05.3 CHRONIC COUGH: Primary | ICD-10-CM

## 2022-11-14 PROBLEM — T41.205A GENERAL ANESTHETICS CAUSING ADVERSE EFFECT IN THERAPEUTIC USE: Status: ACTIVE | Noted: 2022-11-14

## 2022-11-14 PROBLEM — M19.90 ARTHRITIS: Status: ACTIVE | Noted: 2022-11-14

## 2022-11-14 PROBLEM — I25.10 CORONARY ARTERY CALCIFICATION: Status: ACTIVE | Noted: 2018-11-07

## 2022-11-14 PROBLEM — N39.3 STRESS INCONTINENCE: Status: ACTIVE | Noted: 2022-11-14

## 2022-11-14 PROBLEM — M81.0 OSTEOPOROSIS: Status: ACTIVE | Noted: 2022-11-14

## 2022-11-14 PROBLEM — I25.84 CORONARY ARTERY CALCIFICATION: Status: ACTIVE | Noted: 2018-11-07

## 2022-11-14 PROBLEM — I63.9 STROKE: Status: ACTIVE | Noted: 2022-11-14

## 2022-11-14 PROBLEM — Z88.9 HISTORY OF SEASONAL ALLERGIES: Status: ACTIVE | Noted: 2022-11-14

## 2022-11-14 PROBLEM — E87.1 HYPONATREMIA: Status: ACTIVE | Noted: 2022-11-14

## 2022-11-14 PROBLEM — K76.0 FATTY LIVER: Status: ACTIVE | Noted: 2022-11-14

## 2022-11-14 PROBLEM — K21.9 GERD (GASTROESOPHAGEAL REFLUX DISEASE): Status: ACTIVE | Noted: 2022-11-14

## 2022-11-14 NOTE — PROGRESS NOTES
CT lungs show no acute changes. Evidence of old infections.   Hiatal hernia of the stomach and fatty liver. Some mild chronic changes of the lungs that id like reviewed by a pulmonologist victor m with the ongoing cough. But no significant acute changes of the lung. Cough can possible be caused by the chronic changes though.

## 2022-11-14 NOTE — TELEPHONE ENCOUNTER
Called and spoke with patient. Information provided regarding results. Also advised that referral has been faxed to [facility] at [phone] and phone number given to call and schedule appt on tomorrow, [phone]. Pt verbalized understanding

## 2022-11-14 NOTE — TELEPHONE ENCOUNTER
Called, spoke with patient results given at this time. Pt is agreeable to pulm referral. PT also states she saw that she has plaque build up in carotid and want to know what the plan is for this and if she should follow up with cards regarding this?

## 2022-11-14 NOTE — TELEPHONE ENCOUNTER
----- Message from Ml Shipley MD sent at 11/14/2022  7:59 AM CST -----  CT lungs show no acute changes. Evidence of old infections.   Hiatal hernia of the stomach and fatty liver. Some mild chronic changes of the lungs that id like reviewed by a pulmonologist victor m with the ongoing cough. But no significant acute changes of the lung. Cough can possible be caused by the chronic changes though.

## 2022-11-14 NOTE — TELEPHONE ENCOUNTER
Small a mount of plaque is not abnormal age. Treatment is treat cholesterol, exercise and eat a well balanced diet to prevent further plaque build up.   Can discuss more when I see her.   Pulm referral placed. Please help her schedule. I believe we fax these pulm referrals to [facility]

## 2022-11-15 DIAGNOSIS — E78.5 HYPERLIPIDEMIA, UNSPECIFIED HYPERLIPIDEMIA TYPE: ICD-10-CM

## 2022-11-16 ENCOUNTER — TELEPHONE (OUTPATIENT)
Dept: PRIMARY CARE CLINIC | Facility: CLINIC | Age: 72
End: 2022-11-16
Payer: MEDICARE

## 2022-11-16 DIAGNOSIS — R05.3 CHRONIC COUGH: ICD-10-CM

## 2022-11-16 DIAGNOSIS — R91.8 ABNORMAL CT LUNG SCREENING: Primary | ICD-10-CM

## 2022-11-16 RX ORDER — ROSUVASTATIN CALCIUM 10 MG/1
10 TABLET, COATED ORAL NIGHTLY
Qty: 90 TABLET | Refills: 1 | Status: SHIPPED | OUTPATIENT
Start: 2022-11-16 | End: 2023-02-20 | Stop reason: SDUPTHER

## 2022-11-16 NOTE — TELEPHONE ENCOUNTER
Will send in refill but she needs to have her cholesterol levels check. Required for med refill and this has not been checked In over a year.

## 2022-11-27 ENCOUNTER — PATIENT MESSAGE (OUTPATIENT)
Dept: PRIMARY CARE CLINIC | Facility: CLINIC | Age: 72
End: 2022-11-27
Payer: MEDICARE

## 2022-11-27 DIAGNOSIS — R05.3 CHRONIC COUGH: Primary | ICD-10-CM

## 2022-11-28 ENCOUNTER — PATIENT MESSAGE (OUTPATIENT)
Dept: PRIMARY CARE CLINIC | Facility: CLINIC | Age: 72
End: 2022-11-28
Payer: MEDICARE

## 2022-11-28 RX ORDER — PROMETHAZINE HYDROCHLORIDE AND CODEINE PHOSPHATE 6.25; 1 MG/5ML; MG/5ML
5 SOLUTION ORAL EVERY 4 HOURS PRN
Qty: 240 ML | Refills: 0 | Status: SHIPPED | OUTPATIENT
Start: 2022-11-28 | End: 2022-12-04

## 2022-12-07 ENCOUNTER — PATIENT MESSAGE (OUTPATIENT)
Dept: PRIMARY CARE CLINIC | Facility: CLINIC | Age: 72
End: 2022-12-07
Payer: MEDICARE

## 2022-12-13 ENCOUNTER — OFFICE VISIT (OUTPATIENT)
Dept: OTOLARYNGOLOGY | Facility: CLINIC | Age: 72
End: 2022-12-13
Payer: MEDICARE

## 2022-12-13 VITALS — BODY MASS INDEX: 24.9 KG/M2 | WEIGHT: 158.94 LBS

## 2022-12-13 DIAGNOSIS — J38.7 IRRITABLE LARYNX SYNDROME: Primary | ICD-10-CM

## 2022-12-13 DIAGNOSIS — K21.9 GASTROESOPHAGEAL REFLUX DISEASE WITHOUT ESOPHAGITIS: ICD-10-CM

## 2022-12-13 DIAGNOSIS — R05.3 CHRONIC COUGH: ICD-10-CM

## 2022-12-13 PROCEDURE — 99204 OFFICE O/P NEW MOD 45 MIN: CPT | Mod: 25,S$GLB,, | Performed by: OTOLARYNGOLOGY

## 2022-12-13 PROCEDURE — 1160F PR REVIEW ALL MEDS BY PRESCRIBER/CLIN PHARMACIST DOCUMENTED: ICD-10-PCS | Mod: CPTII,S$GLB,, | Performed by: OTOLARYNGOLOGY

## 2022-12-13 PROCEDURE — 99999 PR PBB SHADOW E&M-EST. PATIENT-LVL IV: ICD-10-PCS | Mod: PBBFAC,,, | Performed by: OTOLARYNGOLOGY

## 2022-12-13 PROCEDURE — 3008F BODY MASS INDEX DOCD: CPT | Mod: CPTII,S$GLB,, | Performed by: OTOLARYNGOLOGY

## 2022-12-13 PROCEDURE — 1126F AMNT PAIN NOTED NONE PRSNT: CPT | Mod: CPTII,S$GLB,, | Performed by: OTOLARYNGOLOGY

## 2022-12-13 PROCEDURE — 31575 DIAGNOSTIC LARYNGOSCOPY: CPT | Mod: S$GLB,,, | Performed by: OTOLARYNGOLOGY

## 2022-12-13 PROCEDURE — 1159F MED LIST DOCD IN RCRD: CPT | Mod: CPTII,S$GLB,, | Performed by: OTOLARYNGOLOGY

## 2022-12-13 PROCEDURE — 1126F PR PAIN SEVERITY QUANTIFIED, NO PAIN PRESENT: ICD-10-PCS | Mod: CPTII,S$GLB,, | Performed by: OTOLARYNGOLOGY

## 2022-12-13 PROCEDURE — 99999 PR PBB SHADOW E&M-EST. PATIENT-LVL IV: CPT | Mod: PBBFAC,,, | Performed by: OTOLARYNGOLOGY

## 2022-12-13 PROCEDURE — 99204 PR OFFICE/OUTPT VISIT, NEW, LEVL IV, 45-59 MIN: ICD-10-PCS | Mod: 25,S$GLB,, | Performed by: OTOLARYNGOLOGY

## 2022-12-13 PROCEDURE — 3008F PR BODY MASS INDEX (BMI) DOCUMENTED: ICD-10-PCS | Mod: CPTII,S$GLB,, | Performed by: OTOLARYNGOLOGY

## 2022-12-13 PROCEDURE — 4010F PR ACE/ARB THEARPY RXD/TAKEN: ICD-10-PCS | Mod: CPTII,S$GLB,, | Performed by: OTOLARYNGOLOGY

## 2022-12-13 PROCEDURE — 4010F ACE/ARB THERAPY RXD/TAKEN: CPT | Mod: CPTII,S$GLB,, | Performed by: OTOLARYNGOLOGY

## 2022-12-13 PROCEDURE — 1160F RVW MEDS BY RX/DR IN RCRD: CPT | Mod: CPTII,S$GLB,, | Performed by: OTOLARYNGOLOGY

## 2022-12-13 PROCEDURE — 1159F PR MEDICATION LIST DOCUMENTED IN MEDICAL RECORD: ICD-10-PCS | Mod: CPTII,S$GLB,, | Performed by: OTOLARYNGOLOGY

## 2022-12-13 PROCEDURE — 31575 PR LARYNGOSCOPY, FLEXIBLE; DIAGNOSTIC: ICD-10-PCS | Mod: S$GLB,,, | Performed by: OTOLARYNGOLOGY

## 2022-12-13 RX ORDER — GABAPENTIN 300 MG/1
CAPSULE ORAL
Qty: 180 CAPSULE | Refills: 0 | Status: SHIPPED | OUTPATIENT
Start: 2022-12-13 | End: 2023-02-01 | Stop reason: SDUPTHER

## 2022-12-13 NOTE — PROGRESS NOTES
Subjective:       Patient ID: Dottie Galeana is a 72 y.o. female.    Chief Complaint: Cough    Dottie is here for cough.   Length of symptoms: 2 years.  It has been intermittent. It will come on for weeks / months at a time then go away. She has a tickle on the left side of the throat and when she talks. She does have mild increased sensitivity with smells, though this is not frequent. She has had mild dysphonia. She denies dysphagia. Laughing does stimulate cough.  Therapies tried include: oral AH, Flonase, Mucinex,   She has GERD and is on PPI. She has EGD with dilations for narrowing. Small hiatal hernia noted on CT Chest.   No tobacco history +2nd hand  Had negative RAST    Patient validated questionnaires (if applicable):      %       No flowsheet data found.  No flowsheet data found.  No flowsheet data found.         Social History     Tobacco Use   Smoking Status Never   Smokeless Tobacco Never     Social History     Substance and Sexual Activity   Alcohol Use Yes    Alcohol/week: 14.0 standard drinks    Types: 14 Glasses of wine per week    Comment: 2 glasses wine per day          Objective:        Constitutional:   She is oriented to person, place, and time. She appears well-developed and well-nourished. She appears alert. She is active. Normal speech.      Head:  Normocephalic and atraumatic. Head is without TMJ tenderness. No scars. Salivary glands normal.  Facial strength is normal.      Ears:    Right Ear: No drainage or swelling. No middle ear effusion.   Left Ear: No drainage or swelling.  No middle ear effusion.     Nose:  Septal deviation present. No mucosal edema, rhinorrhea or sinus tenderness. No turbinate hypertrophy.      Mouth/Throat  Oropharynx clear and moist without lesions or asymmetry, normal uvula midline and mirror exam normal. Normal dentition. No uvula swelling, lacerations or trismus. No oropharyngeal exudate. Tonsillar erythema, tonsillar exudate.      Neck:  Full range of  motion with neck supple and no adenopathy. Thyroid tenderness is present. No tracheal deviation, no edema, no erythema, normal range of motion, no stridor, no crepitus and no neck rigidity present. No thyroid mass present.     Cardiovascular:    Intact distal pulses and normal pulses.              Pulmonary/Chest:   Effort normal and breath sounds normal. No stridor.     Psychiatric:   Her speech is normal and behavior is normal. Her mood appears not anxious. Her affect is not labile.     Neurological:   She is alert and oriented to person, place, and time. No sensory deficit.     Skin:   No abrasions, lacerations, lesions, or rashes. No abrasion and no bruising noted.       Tests / Results:  CT Chest:  FINDINGS:  There is no evidence of mediastinal or hilar adenopathy.  There is no evidence of a pleural or pericardial effusion.  There is suggestion of a small hiatal hernia.  Vascular calcifications including coronary artery calcifications are present.  There is mild fatty liver infiltration.     There is a linear calcification in the left apex on series number 4, image number 84.  This likely relates to scarring or granuloma formation.  The there is a calcified granuloma on series number 4, image number 234 in the left upper lobe measuring 3 mm.  There is mild bibasal bronchiectasis, right greater than left.  No confluent infiltrates are identified.     Impression:     Evidence of prior granulomatous infection.     Mild basal bronchiectasis.     Vascular calcifications including coronary artery calcifications, hiatal hernia and fatty liver infiltration are incidentally noted.    EGD 1/2022:  Findings:        The examined esophagus was normal. Biopsies were taken with a cold        forceps for histology. A guidewire was placed and the scope was        withdrawn. Dilation was performed with a Savary dilator with mild        resistance at 51 Fr.        The entire examined stomach was normal.        The examined  duodenum was normal.   Impression:            - Normal esophagus. Biopsied. Dilated.                          - Normal stomach.                          - Normal examined duodenum.   Recommendation:        - Await pathology results.                          - Continue present medications.                          - Discharge patient to home (ambulatory).    Pre-procedure diagnosis: The primary encounter diagnosis was Irritable larynx syndrome. Diagnoses of Chronic cough and Gastroesophageal reflux disease without esophagitis were also pertinent to this visit.     Post-procedure diagnosis: same    Procedure: Flexible fiberoptic laryngoscopy    Surgeon: David Wei MD    Anesthesia: 4% Lidocaine with 0.25% Phenylephrine topical    Risks, benefits, and alternatives of the procedure were discussed with the patient, and the patient consented to the fiberoptic examination.  We applied a topical nasal decongestant and analgesic.  After adequate anesthesia was obtained, the flexible fiberoptic scope was passed through the nasal cavity. The entire pharynx (nasopharynx to hypopharynx) and the larynx were visualized. At the end of the examination, the scope was removed. The patient tolerated the procedure well with no complications.     Findings:  -     Laryngeal mucosa is normal  -     Post-cricoid region: mild PCE  -     Lingual tonsils have Grade 1 hypertrophy  -     Adenoids have NO  hypertrophy  -     Right vocal fold: normal mobility     mass/lesion: none  -     Left vocal fold: normal mobility     mass/lesion: none  -     Other findings: none    Assessment:       1. Irritable larynx syndrome    2. Chronic cough    3. 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 Will start gabapentin and titrate up pending response. She will update me with her progress and we can fine tune based  on that. Discussed other treatments if not improved. Continue reflux control with PPI

## 2022-12-19 ENCOUNTER — PATIENT MESSAGE (OUTPATIENT)
Dept: PRIMARY CARE CLINIC | Facility: CLINIC | Age: 72
End: 2022-12-19
Payer: MEDICARE

## 2022-12-19 DIAGNOSIS — R05.3 CHRONIC COUGH: Primary | ICD-10-CM

## 2022-12-19 DIAGNOSIS — K21.9 GASTROESOPHAGEAL REFLUX DISEASE, UNSPECIFIED WHETHER ESOPHAGITIS PRESENT: ICD-10-CM

## 2023-01-13 ENCOUNTER — OFFICE VISIT (OUTPATIENT)
Dept: GASTROENTEROLOGY | Facility: CLINIC | Age: 73
End: 2023-01-13
Payer: MEDICARE

## 2023-01-13 ENCOUNTER — PATIENT MESSAGE (OUTPATIENT)
Dept: GASTROENTEROLOGY | Facility: CLINIC | Age: 73
End: 2023-01-13

## 2023-01-13 VITALS — WEIGHT: 161.38 LBS | BODY MASS INDEX: 25.33 KG/M2 | HEIGHT: 67 IN

## 2023-01-13 DIAGNOSIS — Z86.010 HISTORY OF COLON POLYPS: ICD-10-CM

## 2023-01-13 DIAGNOSIS — K21.9 GASTROESOPHAGEAL REFLUX DISEASE WITHOUT ESOPHAGITIS: ICD-10-CM

## 2023-01-13 DIAGNOSIS — R05.3 CHRONIC COUGH: Primary | ICD-10-CM

## 2023-01-13 PROCEDURE — 1101F PR PT FALLS ASSESS DOC 0-1 FALLS W/OUT INJ PAST YR: ICD-10-PCS | Mod: HCNC,CPTII,S$GLB, | Performed by: NURSE PRACTITIONER

## 2023-01-13 PROCEDURE — 99214 OFFICE O/P EST MOD 30 MIN: CPT | Mod: HCNC,S$GLB,, | Performed by: NURSE PRACTITIONER

## 2023-01-13 PROCEDURE — 1160F RVW MEDS BY RX/DR IN RCRD: CPT | Mod: HCNC,CPTII,S$GLB, | Performed by: NURSE PRACTITIONER

## 2023-01-13 PROCEDURE — 1101F PT FALLS ASSESS-DOCD LE1/YR: CPT | Mod: HCNC,CPTII,S$GLB, | Performed by: NURSE PRACTITIONER

## 2023-01-13 PROCEDURE — 99999 PR PBB SHADOW E&M-EST. PATIENT-LVL IV: ICD-10-PCS | Mod: PBBFAC,HCNC,, | Performed by: NURSE PRACTITIONER

## 2023-01-13 PROCEDURE — 3288F FALL RISK ASSESSMENT DOCD: CPT | Mod: HCNC,CPTII,S$GLB, | Performed by: NURSE PRACTITIONER

## 2023-01-13 PROCEDURE — 3008F BODY MASS INDEX DOCD: CPT | Mod: HCNC,CPTII,S$GLB, | Performed by: NURSE PRACTITIONER

## 2023-01-13 PROCEDURE — 3008F PR BODY MASS INDEX (BMI) DOCUMENTED: ICD-10-PCS | Mod: HCNC,CPTII,S$GLB, | Performed by: NURSE PRACTITIONER

## 2023-01-13 PROCEDURE — 3288F PR FALLS RISK ASSESSMENT DOCUMENTED: ICD-10-PCS | Mod: HCNC,CPTII,S$GLB, | Performed by: NURSE PRACTITIONER

## 2023-01-13 PROCEDURE — 99214 PR OFFICE/OUTPT VISIT, EST, LEVL IV, 30-39 MIN: ICD-10-PCS | Mod: HCNC,S$GLB,, | Performed by: NURSE PRACTITIONER

## 2023-01-13 PROCEDURE — 1126F AMNT PAIN NOTED NONE PRSNT: CPT | Mod: HCNC,CPTII,S$GLB, | Performed by: NURSE PRACTITIONER

## 2023-01-13 PROCEDURE — 1159F PR MEDICATION LIST DOCUMENTED IN MEDICAL RECORD: ICD-10-PCS | Mod: HCNC,CPTII,S$GLB, | Performed by: NURSE PRACTITIONER

## 2023-01-13 PROCEDURE — 1159F MED LIST DOCD IN RCRD: CPT | Mod: HCNC,CPTII,S$GLB, | Performed by: NURSE PRACTITIONER

## 2023-01-13 PROCEDURE — 1126F PR PAIN SEVERITY QUANTIFIED, NO PAIN PRESENT: ICD-10-PCS | Mod: HCNC,CPTII,S$GLB, | Performed by: NURSE PRACTITIONER

## 2023-01-13 PROCEDURE — 99999 PR PBB SHADOW E&M-EST. PATIENT-LVL IV: CPT | Mod: PBBFAC,HCNC,, | Performed by: NURSE PRACTITIONER

## 2023-01-13 PROCEDURE — 1160F PR REVIEW ALL MEDS BY PRESCRIBER/CLIN PHARMACIST DOCUMENTED: ICD-10-PCS | Mod: HCNC,CPTII,S$GLB, | Performed by: NURSE PRACTITIONER

## 2023-01-13 RX ORDER — PANTOPRAZOLE SODIUM 40 MG/1
40 TABLET, DELAYED RELEASE ORAL 2 TIMES DAILY
Qty: 180 TABLET | Refills: 1 | Status: SHIPPED | OUTPATIENT
Start: 2023-01-13 | End: 2023-06-26

## 2023-01-13 NOTE — PROGRESS NOTES
Subjective:       Patient ID: Dottie Galeana is a 72 y.o. female, Body mass index is 25.28 kg/m².    Chief Complaint: Cough and Gastroesophageal Reflux      Patient is new to me. Established patient of Dr. Santiago.  Referred by Dr. Shipley for chronic cough and GERD.     Gastroesophageal Reflux  She complains of coughing (Chief complaint; reports a dry, non-productive cough; followed by ENT and Pulmonology), nausea and water brash. She reports no abdominal pain, no belching, no chest pain, no choking, no dysphagia (Resolved since esophageal dilation), no early satiety, no globus sensation, no heartburn, no hoarse voice, no sore throat, no stridor, no tooth decay or no wheezing. This is a chronic problem. The current episode started more than 1 year ago. The problem occurs frequently. The problem has been gradually worsening (over the past six months). The symptoms are aggravated by lying down (talking). Pertinent negatives include no anemia, melena or weight loss. Risk factors include ETOH use. She has tried a PPI (Currently: Protonix 40 mg once daily and Carafate 1 gm QID- no improvement) for the symptoms. Past procedures include an EGD.   Review of Systems   Constitutional:  Negative for appetite change, fever, unexpected weight change and weight loss.   HENT:  Negative for hoarse voice, sore throat and trouble swallowing.    Respiratory:  Positive for cough (Chief complaint; reports a dry, non-productive cough; followed by ENT and Pulmonology). Negative for choking, shortness of breath and wheezing.    Cardiovascular:  Negative for chest pain.   Gastrointestinal:  Positive for nausea. Negative for abdominal distention, abdominal pain, anal bleeding, blood in stool, constipation, diarrhea, dysphagia (Resolved since esophageal dilation), heartburn, melena, rectal pain and vomiting.   Genitourinary:  Negative for difficulty urinating and dysuria.   Musculoskeletal:  Negative for gait problem.   Skin:   Negative for rash.   Neurological:  Negative for speech difficulty.   Psychiatric/Behavioral:  Negative for confusion.      Past Medical History:   Diagnosis Date    Arthritis     Closed compression fracture of L1 lumbar vertebra 11/07/2018    Colon polyp     Coronary artery calcification     Depression     Fatty liver     General anesthetics causing adverse effect in therapeutic use     Generalized anxiety disorder     GERD (gastroesophageal reflux disease)     History of seasonal allergies     HTN (hypertension)     Hyperlipidemia     Hyponatremia     Insomnia     Osteoporosis     Stress incontinence     Stroke     ocular behind left eye      Past Surgical History:   Procedure Laterality Date    BREAST BIOPSY Left 1998    benign    COLONOSCOPY N/A 04/14/2022    Procedure: COLONOSCOPY;  Surgeon: Al Santiago MD;  Location: [facility] ENDO;  Service: Endoscopy;  Laterality: N/A;    ESOPHAGOGASTRODUODENOSCOPY N/A 08/17/2018    Procedure: EGD (ESOPHAGOGASTRODUODENOSCOPY);  Surgeon: Al Santiago MD;  Location: [facility] ENDO;  Service: Endoscopy;  Laterality: N/A;    ESOPHAGOGASTRODUODENOSCOPY N/A 01/12/2022    Procedure: ESOPHAGOGASTRODUODENOSCOPY (EGD);  Surgeon: Al Santiago MD;  Location: [facility] ENDO;  Service: Endoscopy;  Laterality: N/A;    FIXATION KYPHOPLASTY N/A 11/13/2018    Procedure: Kyphoplasty L1;  Surgeon: Russ Gramajo MD;  Location: [facility] OR;  Service: Pain Management;  Laterality: N/A;    ORIF WRIST FRACTURE Right     UPPER GASTROINTESTINAL ENDOSCOPY        Family History   Problem Relation Age of Onset    Ovarian cancer Mother 42      Wt Readings from Last 10 Encounters:   01/13/23 73.2 kg (161 lb 6 oz)   12/13/22 72.1 kg (158 lb 15.2 oz)   12/06/22 72.1 kg (159 lb)   11/09/22 72.7 kg (160 lb 3.7 oz)   10/26/22 73.5 kg (162 lb 1.8 oz)   09/22/22 72.5 kg (159 lb 13.3 oz)   09/13/22 74.4 kg (164 lb 0.4 oz)   08/21/22 72.1 kg (159 lb)   07/12/22 72.3 kg (159 lb 6.3 oz)   07/09/22 74.8 kg (165  lb)     Lab Results   Component Value Date    WBC 10.87 08/25/2022    HGB 14.2 08/25/2022    HCT 42.3 08/25/2022    MCV 95 08/25/2022     08/25/2022     CMP  Sodium   Date Value Ref Range Status   08/25/2022 139 136 - 145 mmol/L Final     Potassium   Date Value Ref Range Status   08/25/2022 4.4 3.5 - 5.1 mmol/L Final     Chloride   Date Value Ref Range Status   08/25/2022 102 95 - 110 mmol/L Final     CO2   Date Value Ref Range Status   08/25/2022 26 23 - 29 mmol/L Final     Glucose   Date Value Ref Range Status   08/25/2022 145 (H) 70 - 110 mg/dL Final     BUN   Date Value Ref Range Status   08/25/2022 11 8 - 23 mg/dL Final     Creatinine   Date Value Ref Range Status   11/11/2022 0.7 0.5 - 1.4 mg/dL Final     Calcium   Date Value Ref Range Status   08/25/2022 9.5 8.7 - 10.5 mg/dL Final     Total Protein   Date Value Ref Range Status   11/15/2021 8.4 6.0 - 8.4 g/dL Final     Albumin   Date Value Ref Range Status   11/15/2021 4.7 3.5 - 5.2 g/dL Final     Total Bilirubin   Date Value Ref Range Status   11/15/2021 1.3 0.2 - 1.3 mg/dL Final     Alkaline Phosphatase   Date Value Ref Range Status   11/15/2021 94 38 - 145 U/L Final     AST   Date Value Ref Range Status   11/15/2021 67 (H) 14 - 36 U/L Final     ALT   Date Value Ref Range Status   11/15/2021 42 (H) 0 - 35 U/L Final     Anion Gap   Date Value Ref Range Status   08/25/2022 11 8 - 16 mmol/L Final     eGFR if    Date Value Ref Range Status   11/15/2021 >60 >60 mL/min/1.73 m^2 Final     eGFR if non    Date Value Ref Range Status   11/15/2021 >60 >60 mL/min/1.73 m^2 Final     Comment:     Calculation used to obtain the estimated glomerular filtration  rate (eGFR) is the CKD-EPI equation.          Lab Results   Component Value Date    LIPASERES 109 11/15/2021             Reviewed prior medical records including endoscopy history (see surgical history).     Objective:      Physical Exam  Constitutional:       General: She is  not in acute distress.     Appearance: She is well-developed.   HENT:      Head: Normocephalic.      Right Ear: Hearing normal.      Left Ear: Hearing normal.      Nose: Nose normal.      Mouth/Throat:      Mouth: No oral lesions.      Pharynx: Uvula midline.   Eyes:      General: Lids are normal.      Conjunctiva/sclera: Conjunctivae normal.      Pupils: Pupils are equal, round, and reactive to light.   Neck:      Trachea: Trachea normal.   Cardiovascular:      Rate and Rhythm: Normal rate and regular rhythm.      Heart sounds: Normal heart sounds. No murmur heard.  Pulmonary:      Effort: Pulmonary effort is normal. No respiratory distress.      Breath sounds: Normal breath sounds. No stridor. No wheezing.   Abdominal:      General: Bowel sounds are normal. There is no distension.      Palpations: Abdomen is soft. There is no mass.      Tenderness: There is no abdominal tenderness. There is no guarding or rebound.   Musculoskeletal:         General: Normal range of motion.      Cervical back: Normal range of motion.   Skin:     General: Skin is warm and dry.      Findings: No rash.      Comments: Non jaundiced   Neurological:      Mental Status: She is alert and oriented to person, place, and time.   Psychiatric:         Speech: Speech normal.         Behavior: Behavior normal. Behavior is cooperative.         Assessment:       1. Chronic cough    2. Gastroesophageal reflux disease without esophagitis    3. History of colon polyps           Plan:   All diagnoses and orders for this visit:    Chronic cough  - Schedule EGD   - Increase Protonix 40 mg to BID: pantoprazole (PROTONIX) 40 MG tablet; Take 1 tablet (40 mg total) by mouth 2 (two) times daily.  Dispense: 180 tablet; Refill: 1  - Discontinue Carafate  - Continue follow-up with ENT and Pulmonology    Gastroesophageal reflux disease without esophagitis  - Schedule EGD   - Increase Protonix 40 mg to BID: pantoprazole (PROTONIX) 40 MG tablet; Take 1 tablet (40  mg total) by mouth 2 (two) times daily.  Dispense: 180 tablet; Refill: 1  - Take PPI in the morning 30 minutes before breakfast and dinner  - Recommend to avoid large meals, avoid eating within 3 hours of bedtime, elevate head of bed if nocturnal symptoms are present, smoking cessation (if current smoker), & weight loss (if overweight).   - Recommend minimize/avoid high-fat foods, chocolate, caffeine, citrus, alcohol, & tomato products.  - Advised to avoid/limit use of NSAID's, since they can cause GI upset, bleeding, and/or ulcers. If needed, take with food.      History of colon polyps   - Next surveillance colonoscopy due 4/2025    If no improvement in symptoms or symptoms worsen, call/follow-up at clinic or go to ER

## 2023-01-23 ENCOUNTER — HOSPITAL ENCOUNTER (OUTPATIENT)
Dept: RADIOLOGY | Facility: HOSPITAL | Age: 73
Discharge: HOME | End: 2023-01-23
Attending: FAMILY MEDICINE
Payer: MEDICARE

## 2023-01-23 DIAGNOSIS — Z78.0 MENOPAUSE: ICD-10-CM

## 2023-01-23 PROCEDURE — 77080 DEXA BONE DENSITY SPINE HIP: ICD-10-PCS | Mod: 26,HCNC,, | Performed by: RADIOLOGY

## 2023-01-23 PROCEDURE — 77080 DXA BONE DENSITY AXIAL: CPT | Mod: 26,HCNC,, | Performed by: RADIOLOGY

## 2023-01-23 PROCEDURE — 77080 DXA BONE DENSITY AXIAL: CPT | Mod: TC,HCNC,PO

## 2023-02-09 DIAGNOSIS — Z00.00 ENCOUNTER FOR MEDICARE ANNUAL WELLNESS EXAM: ICD-10-CM

## 2023-02-13 PROBLEM — I63.9 STROKE: Status: RESOLVED | Noted: 2022-11-14 | Resolved: 2023-02-13

## 2023-02-20 DIAGNOSIS — I10 ESSENTIAL HYPERTENSION: ICD-10-CM

## 2023-02-20 DIAGNOSIS — E78.5 HYPERLIPIDEMIA, UNSPECIFIED HYPERLIPIDEMIA TYPE: ICD-10-CM

## 2023-02-20 RX ORDER — ROSUVASTATIN CALCIUM 10 MG/1
10 TABLET, COATED ORAL NIGHTLY
Qty: 90 TABLET | Refills: 0 | Status: SHIPPED | OUTPATIENT
Start: 2023-02-20 | End: 2023-05-11

## 2023-02-20 RX ORDER — LOSARTAN POTASSIUM 100 MG/1
100 TABLET ORAL DAILY
Qty: 90 TABLET | Refills: 0 | Status: SHIPPED | OUTPATIENT
Start: 2023-02-20 | End: 2023-04-17 | Stop reason: SDUPTHER

## 2023-04-03 ENCOUNTER — OFFICE VISIT (OUTPATIENT)
Dept: GASTROENTEROLOGY | Facility: CLINIC | Age: 73
End: 2023-04-03
Payer: MEDICARE

## 2023-04-03 VITALS — BODY MASS INDEX: 25.3 KG/M2 | WEIGHT: 161.19 LBS | HEIGHT: 67 IN

## 2023-04-03 DIAGNOSIS — Z86.010 HISTORY OF COLON POLYPS: ICD-10-CM

## 2023-04-03 DIAGNOSIS — R05.3 CHRONIC COUGH: ICD-10-CM

## 2023-04-03 DIAGNOSIS — K21.9 GASTROESOPHAGEAL REFLUX DISEASE WITHOUT ESOPHAGITIS: ICD-10-CM

## 2023-04-03 DIAGNOSIS — R19.4 CHANGE IN BOWEL HABITS: Primary | ICD-10-CM

## 2023-04-03 DIAGNOSIS — R19.7 DIARRHEA, UNSPECIFIED TYPE: ICD-10-CM

## 2023-04-03 PROCEDURE — 1101F PR PT FALLS ASSESS DOC 0-1 FALLS W/OUT INJ PAST YR: ICD-10-PCS | Mod: HCNC,CPTII,S$GLB, | Performed by: NURSE PRACTITIONER

## 2023-04-03 PROCEDURE — 99999 PR PBB SHADOW E&M-EST. PATIENT-LVL III: CPT | Mod: PBBFAC,HCNC,, | Performed by: NURSE PRACTITIONER

## 2023-04-03 PROCEDURE — 99214 OFFICE O/P EST MOD 30 MIN: CPT | Mod: HCNC,S$GLB,, | Performed by: NURSE PRACTITIONER

## 2023-04-03 PROCEDURE — 1101F PT FALLS ASSESS-DOCD LE1/YR: CPT | Mod: HCNC,CPTII,S$GLB, | Performed by: NURSE PRACTITIONER

## 2023-04-03 PROCEDURE — 4010F PR ACE/ARB THEARPY RXD/TAKEN: ICD-10-PCS | Mod: HCNC,CPTII,S$GLB, | Performed by: NURSE PRACTITIONER

## 2023-04-03 PROCEDURE — 99214 PR OFFICE/OUTPT VISIT, EST, LEVL IV, 30-39 MIN: ICD-10-PCS | Mod: HCNC,S$GLB,, | Performed by: NURSE PRACTITIONER

## 2023-04-03 PROCEDURE — 3288F PR FALLS RISK ASSESSMENT DOCUMENTED: ICD-10-PCS | Mod: HCNC,CPTII,S$GLB, | Performed by: NURSE PRACTITIONER

## 2023-04-03 PROCEDURE — 1159F PR MEDICATION LIST DOCUMENTED IN MEDICAL RECORD: ICD-10-PCS | Mod: HCNC,CPTII,S$GLB, | Performed by: NURSE PRACTITIONER

## 2023-04-03 PROCEDURE — 1160F PR REVIEW ALL MEDS BY PRESCRIBER/CLIN PHARMACIST DOCUMENTED: ICD-10-PCS | Mod: HCNC,CPTII,S$GLB, | Performed by: NURSE PRACTITIONER

## 2023-04-03 PROCEDURE — 3008F BODY MASS INDEX DOCD: CPT | Mod: HCNC,CPTII,S$GLB, | Performed by: NURSE PRACTITIONER

## 2023-04-03 PROCEDURE — 1160F RVW MEDS BY RX/DR IN RCRD: CPT | Mod: HCNC,CPTII,S$GLB, | Performed by: NURSE PRACTITIONER

## 2023-04-03 PROCEDURE — 3288F FALL RISK ASSESSMENT DOCD: CPT | Mod: HCNC,CPTII,S$GLB, | Performed by: NURSE PRACTITIONER

## 2023-04-03 PROCEDURE — 99999 PR PBB SHADOW E&M-EST. PATIENT-LVL III: ICD-10-PCS | Mod: PBBFAC,HCNC,, | Performed by: NURSE PRACTITIONER

## 2023-04-03 PROCEDURE — 4010F ACE/ARB THERAPY RXD/TAKEN: CPT | Mod: HCNC,CPTII,S$GLB, | Performed by: NURSE PRACTITIONER

## 2023-04-03 PROCEDURE — 1159F MED LIST DOCD IN RCRD: CPT | Mod: HCNC,CPTII,S$GLB, | Performed by: NURSE PRACTITIONER

## 2023-04-03 PROCEDURE — 3008F PR BODY MASS INDEX (BMI) DOCUMENTED: ICD-10-PCS | Mod: HCNC,CPTII,S$GLB, | Performed by: NURSE PRACTITIONER

## 2023-04-03 RX ORDER — DEXAMETHASONE 0.5 MG/5ML
SOLUTION ORAL
COMMUNITY
Start: 2022-12-30 | End: 2023-04-23

## 2023-04-03 NOTE — PROGRESS NOTES
Subjective:       Patient ID: Dottie Galeana is a 72 y.o. female, Body mass index is 25.24 kg/m².    Chief Complaint: Diarrhea      Established patient of Dr. Santiago & myself.     Diarrhea   This is a new problem. The current episode started more than 1 month ago. The problem occurs 2 to 4 times per day (intermittent). The problem has been gradually improving. Diarrhea characteristics: describes stool as type 5-6 on bristol scale; reports yellow stool. The patient states that diarrhea does not awaken her from sleep. Associated symptoms include coughing (Improved since increasing Protonix 40 mg to BID; EGD scheduled on 4/5/23), increased flatus and weight loss (reported weight loss of four pounds over the past week; no documented weight loss since last office visit on 1/13/23; patient has eliminated wine from her diet). Pertinent negatives include no abdominal pain, bloating, chills, fever or vomiting. Exacerbated by: eating; drinking coffee. There are no known risk factors (denies recent antibiotics, hospitalization or foreign travel). She has tried change of diet (Added more fiber to her diet- symptoms improving) for the symptoms. There is no history of bowel resection, inflammatory bowel disease, irritable bowel syndrome or a recent abdominal surgery.   Review of Systems   Constitutional:  Positive for weight loss (reported weight loss of four pounds over the past week; no documented weight loss since last office visit on 1/13/23; patient has eliminated wine from her diet). Negative for appetite change, chills, fever and unexpected weight change.   HENT:  Negative for trouble swallowing.    Respiratory:  Positive for cough (Improved since increasing Protonix 40 mg to BID; EGD scheduled on 4/5/23). Negative for shortness of breath.    Cardiovascular:  Positive for leg swelling (instructed patient to follow-up with cardiology). Negative for chest pain.   Gastrointestinal:  Positive for diarrhea and flatus.  Negative for abdominal distention, abdominal pain, anal bleeding, bloating, blood in stool, constipation, nausea, rectal pain and vomiting.   Genitourinary:  Negative for difficulty urinating and dysuria.   Musculoskeletal:  Negative for gait problem.   Skin:  Negative for rash.   Neurological:  Negative for speech difficulty.   Psychiatric/Behavioral:  Negative for confusion.      Past Medical History:   Diagnosis Date    Arthritis     Closed compression fracture of L1 lumbar vertebra 11/07/2018    Colon polyp     Coronary artery calcification     Depression     Fatty liver     General anesthetics causing adverse effect in therapeutic use     Generalized anxiety disorder     GERD (gastroesophageal reflux disease)     History of seasonal allergies     HTN (hypertension)     Hyperlipidemia     Hyponatremia     Insomnia     Osteoporosis     Stress incontinence     Stroke     ocular behind left eye      Past Surgical History:   Procedure Laterality Date    BREAST BIOPSY Left 1998    benign    COLONOSCOPY N/A 04/14/2022    Procedure: COLONOSCOPY;  Surgeon: Al Santiago MD;  [place] [facility];  Service: Endoscopy;  Laterality: N/A;    ESOPHAGOGASTRODUODENOSCOPY N/A 08/17/2018    Procedure: EGD (ESOPHAGOGASTRODUODENOSCOPY);  Surgeon: Al Santiago MD;  [place] [facility];  Service: Endoscopy;  Laterality: N/A;    ESOPHAGOGASTRODUODENOSCOPY N/A 01/12/2022    Procedure: ESOPHAGOGASTRODUODENOSCOPY (EGD);  Surgeon: Al Santiago MD;  [place] [facility];  Service: Endoscopy;  Laterality: N/A;    FIXATION KYPHOPLASTY N/A 11/13/2018    Procedure: Kyphoplasty L1;  Surgeon: Russ Gramajo MD;  Location: [facility] OR;  Service: Pain Management;  Laterality: N/A;    ORIF WRIST FRACTURE Right     UPPER GASTROINTESTINAL ENDOSCOPY        Family History   Problem Relation Age of Onset    Ovarian cancer Mother 42      Wt Readings from Last 10 Encounters:   04/03/23 73.1 kg (161 lb 2.5 oz)   01/13/23 73.2 kg (161 lb 6  oz)   12/13/22 72.1 kg (158 lb 15.2 oz)   12/06/22 72.1 kg (159 lb)   11/09/22 72.7 kg (160 lb 3.7 oz)   10/26/22 73.5 kg (162 lb 1.8 oz)   09/22/22 72.5 kg (159 lb 13.3 oz)   09/13/22 74.4 kg (164 lb 0.4 oz)   08/21/22 72.1 kg (159 lb)   07/12/22 72.3 kg (159 lb 6.3 oz)     Lab Results   Component Value Date    WBC 10.87 08/25/2022    HGB 14.2 08/25/2022    HCT 42.3 08/25/2022    MCV 95 08/25/2022     08/25/2022     CMP  Sodium   Date Value Ref Range Status   08/25/2022 139 136 - 145 mmol/L Final     Potassium   Date Value Ref Range Status   08/25/2022 4.4 3.5 - 5.1 mmol/L Final     Chloride   Date Value Ref Range Status   08/25/2022 102 95 - 110 mmol/L Final     CO2   Date Value Ref Range Status   08/25/2022 26 23 - 29 mmol/L Final     Glucose   Date Value Ref Range Status   08/25/2022 145 (H) 70 - 110 mg/dL Final     BUN   Date Value Ref Range Status   08/25/2022 11 8 - 23 mg/dL Final     Creatinine   Date Value Ref Range Status   11/11/2022 0.7 0.5 - 1.4 mg/dL Final     Calcium   Date Value Ref Range Status   08/25/2022 9.5 8.7 - 10.5 mg/dL Final     Total Protein   Date Value Ref Range Status   11/15/2021 8.4 6.0 - 8.4 g/dL Final     Albumin   Date Value Ref Range Status   11/15/2021 4.7 3.5 - 5.2 g/dL Final     Total Bilirubin   Date Value Ref Range Status   11/15/2021 1.3 0.2 - 1.3 mg/dL Final     Alkaline Phosphatase   Date Value Ref Range Status   11/15/2021 94 38 - 145 U/L Final     AST   Date Value Ref Range Status   11/15/2021 67 (H) 14 - 36 U/L Final     ALT   Date Value Ref Range Status   11/15/2021 42 (H) 0 - 35 U/L Final     Anion Gap   Date Value Ref Range Status   08/25/2022 11 8 - 16 mmol/L Final     eGFR if    Date Value Ref Range Status   11/15/2021 >60 >60 mL/min/1.73 m^2 Final     eGFR if non    Date Value Ref Range Status   11/15/2021 >60 >60 mL/min/1.73 m^2 Final     Comment:     Calculation used to obtain the estimated glomerular filtration  rate  (eGFR) is the CKD-EPI equation.          Lab Results   Component Value Date    LIPASERES 109 11/15/2021      Lab Results   Component Value Date    TSH 1.165 06/22/2020           Reviewed prior medical records including endoscopy history (see surgical history).     Objective:      Physical Exam  Constitutional:       General: She is not in acute distress.     Appearance: She is well-developed.   HENT:      Head: Normocephalic.      Right Ear: Hearing normal.      Left Ear: Hearing normal.      Nose: Nose normal.      Mouth/Throat:      Mouth: No oral lesions.      Pharynx: Uvula midline.   Eyes:      General: Lids are normal.      Conjunctiva/sclera: Conjunctivae normal.      Pupils: Pupils are equal, round, and reactive to light.   Neck:      Trachea: Trachea normal.   Cardiovascular:      Rate and Rhythm: Normal rate and regular rhythm.      Heart sounds: Normal heart sounds. No murmur heard.  Pulmonary:      Effort: Pulmonary effort is normal. No respiratory distress.      Breath sounds: Normal breath sounds. No stridor. No wheezing.   Abdominal:      General: Bowel sounds are normal. There is no distension.      Palpations: Abdomen is soft. There is no mass.      Tenderness: There is abdominal tenderness (mild) in the epigastric area. There is no guarding or rebound.   Musculoskeletal:         General: Normal range of motion.      Cervical back: Normal range of motion.   Skin:     General: Skin is warm and dry.      Findings: No rash.      Comments: Non jaundiced   Neurological:      Mental Status: She is alert and oriented to person, place, and time.   Psychiatric:         Speech: Speech normal.         Behavior: Behavior normal. Behavior is cooperative.         Assessment:       1. Change in bowel habits    2. Diarrhea, unspecified type    3. Chronic cough    4. Gastroesophageal reflux disease without esophagitis    5. History of colon polyps           Plan:   All diagnoses and orders for this visit:    Change  in bowel habits & Diarrhea, unspecified type  - Stool Exam-Ova,Cysts,Parasites; Future; Expected date: 04/03/2023  - Giardia / Cryptosporidum, EIA; Future; Expected date: 04/03/2023  - Stool culture; Future; Expected date: 04/03/2023  - Occult blood x 1, stool; Future; Expected date: 04/03/2023  - WBC, Stool; Future; Expected date: 04/03/2023  - pH, stool; Future; Expected date: 04/03/2023  - Clostridium difficile EIA; Future; Expected date: 04/03/2023  - Recommended increase fiber in diet, especially soluble fiber since this can help bulk up the stool consistency and may help to slow down how fast the stool goes through the colon and can prevent diarrhea   - Start OTC Probiotic once daily (Florastor or Culturelle)  - Schedule Colonoscopy     Chronic cough & Gastroesophageal reflux disease without esophagitis   - Continue with scheduled EGD   - Continue Protonix 40 mg BID   - Take PPI in the morning 30 minutes before breakfast and dinner  - Recommend to avoid large meals, avoid eating within 3 hours of bedtime, elevate head of bed if nocturnal symptoms are present, smoking cessation (if current smoker), & weight loss (if overweight).   - Recommend minimize/avoid high-fat foods, chocolate, caffeine, citrus, alcohol, & tomato products.  - Advised to avoid/limit use of NSAID's, since they can cause GI upset, bleeding, and/or ulcers. If needed, take with food.      History of colon polyps   - Schedule Colonoscopy     If no improvement in symptoms or symptoms worsen, call/follow-up at clinic or go to ER

## 2023-04-04 ENCOUNTER — LAB VISIT (OUTPATIENT)
Dept: LAB | Facility: HOSPITAL | Age: 73
End: 2023-04-04
Attending: NURSE PRACTITIONER
Payer: MEDICARE

## 2023-04-04 DIAGNOSIS — R19.7 DIARRHEA, UNSPECIFIED TYPE: ICD-10-CM

## 2023-04-04 PROCEDURE — 87449 NOS EACH ORGANISM AG IA: CPT | Mod: HCNC | Performed by: NURSE PRACTITIONER

## 2023-04-04 PROCEDURE — 82272 OCCULT BLD FECES 1-3 TESTS: CPT | Mod: HCNC | Performed by: NURSE PRACTITIONER

## 2023-04-04 PROCEDURE — 89055 LEUKOCYTE ASSESSMENT FECAL: CPT | Mod: HCNC | Performed by: NURSE PRACTITIONER

## 2023-04-04 PROCEDURE — 87427 SHIGA-LIKE TOXIN AG IA: CPT | Mod: 59,HCNC | Performed by: NURSE PRACTITIONER

## 2023-04-04 PROCEDURE — 87209 SMEAR COMPLEX STAIN: CPT | Mod: HCNC | Performed by: NURSE PRACTITIONER

## 2023-04-04 PROCEDURE — 87177 OVA AND PARASITES SMEARS: CPT | Mod: HCNC | Performed by: NURSE PRACTITIONER

## 2023-04-04 PROCEDURE — 87046 STOOL CULTR AEROBIC BACT EA: CPT | Mod: HCNC | Performed by: NURSE PRACTITIONER

## 2023-04-04 PROCEDURE — 83986 ASSAY PH BODY FLUID NOS: CPT | Mod: HCNC | Performed by: NURSE PRACTITIONER

## 2023-04-04 PROCEDURE — 87045 FECES CULTURE AEROBIC BACT: CPT | Mod: HCNC | Performed by: NURSE PRACTITIONER

## 2023-04-04 PROCEDURE — 87329 GIARDIA AG IA: CPT | Mod: HCNC | Performed by: NURSE PRACTITIONER

## 2023-04-04 PROCEDURE — 87449 NOS EACH ORGANISM AG IA: CPT | Mod: 91,HCNC | Performed by: NURSE PRACTITIONER

## 2023-04-05 ENCOUNTER — ANESTHESIA (OUTPATIENT)
Dept: ENDOSCOPY | Facility: HOSPITAL | Age: 73
End: 2023-04-05
Payer: MEDICARE

## 2023-04-05 ENCOUNTER — HOSPITAL ENCOUNTER (OUTPATIENT)
Facility: HOSPITAL | Age: 73
Discharge: HOME | End: 2023-04-05
Attending: INTERNAL MEDICINE | Admitting: INTERNAL MEDICINE
Payer: MEDICARE

## 2023-04-05 ENCOUNTER — ANESTHESIA EVENT (OUTPATIENT)
Dept: ENDOSCOPY | Facility: HOSPITAL | Age: 73
End: 2023-04-05
Payer: MEDICARE

## 2023-04-05 DIAGNOSIS — K21.9 GERD (GASTROESOPHAGEAL REFLUX DISEASE): ICD-10-CM

## 2023-04-05 LAB
C DIFF GDH STL QL: NEGATIVE
C DIFF TOX A+B STL QL IA: NEGATIVE
CRYPTOSP AG STL QL IA: NEGATIVE
E COLI SXT1 STL QL IA: NEGATIVE
E COLI SXT2 STL QL IA: NEGATIVE
G LAMBLIA AG STL QL IA: NEGATIVE
OB PNL STL: NEGATIVE
WBC #/AREA STL HPF: NORMAL /[HPF]

## 2023-04-05 PROCEDURE — 43239 PR EGD, FLEX, W/BIOPSY, SGL/MULTI: ICD-10-PCS | Mod: HCNC,,, | Performed by: INTERNAL MEDICINE

## 2023-04-05 PROCEDURE — 63600175 PHARM REV CODE 636 W HCPCS: Mod: HCNC,PO | Performed by: INTERNAL MEDICINE

## 2023-04-05 PROCEDURE — 88305 TISSUE EXAM BY PATHOLOGIST: CPT | Mod: HCNC,PO | Performed by: STUDENT IN AN ORGANIZED HEALTH CARE EDUCATION/TRAINING PROGRAM

## 2023-04-05 PROCEDURE — 37000008 HC ANESTHESIA 1ST 15 MINUTES: Mod: HCNC,PO | Performed by: INTERNAL MEDICINE

## 2023-04-05 PROCEDURE — 88305 TISSUE EXAM BY PATHOLOGIST: ICD-10-PCS | Mod: 26,HCNC,, | Performed by: STUDENT IN AN ORGANIZED HEALTH CARE EDUCATION/TRAINING PROGRAM

## 2023-04-05 PROCEDURE — D9220A PRA ANESTHESIA: ICD-10-PCS | Mod: HCNC,ANES,, | Performed by: ANESTHESIOLOGY

## 2023-04-05 PROCEDURE — 43239 EGD BIOPSY SINGLE/MULTIPLE: CPT | Mod: HCNC,,, | Performed by: INTERNAL MEDICINE

## 2023-04-05 PROCEDURE — 43239 EGD BIOPSY SINGLE/MULTIPLE: CPT | Mod: HCNC,PO | Performed by: INTERNAL MEDICINE

## 2023-04-05 PROCEDURE — 25000003 PHARM REV CODE 250: Mod: HCNC,PO | Performed by: NURSE ANESTHETIST, CERTIFIED REGISTERED

## 2023-04-05 PROCEDURE — D9220A PRA ANESTHESIA: Mod: HCNC,ANES,, | Performed by: ANESTHESIOLOGY

## 2023-04-05 PROCEDURE — 63600175 PHARM REV CODE 636 W HCPCS: Mod: HCNC,PO | Performed by: NURSE ANESTHETIST, CERTIFIED REGISTERED

## 2023-04-05 PROCEDURE — 88305 TISSUE EXAM BY PATHOLOGIST: CPT | Mod: 26,HCNC,, | Performed by: STUDENT IN AN ORGANIZED HEALTH CARE EDUCATION/TRAINING PROGRAM

## 2023-04-05 PROCEDURE — 27201012 HC FORCEPS, HOT/COLD, DISP: Mod: HCNC,PO | Performed by: INTERNAL MEDICINE

## 2023-04-05 PROCEDURE — D9220A PRA ANESTHESIA: ICD-10-PCS | Mod: HCNC,CRNA,, | Performed by: NURSE ANESTHETIST, CERTIFIED REGISTERED

## 2023-04-05 PROCEDURE — D9220A PRA ANESTHESIA: Mod: HCNC,CRNA,, | Performed by: NURSE ANESTHETIST, CERTIFIED REGISTERED

## 2023-04-05 RX ORDER — SODIUM CHLORIDE 0.9 % (FLUSH) 0.9 %
10 SYRINGE (ML) INJECTION
Status: DISCONTINUED | OUTPATIENT
Start: 2023-04-05 | End: 2023-04-05 | Stop reason: HOSPADM

## 2023-04-05 RX ORDER — SODIUM CHLORIDE, SODIUM LACTATE, POTASSIUM CHLORIDE, CALCIUM CHLORIDE 600; 310; 30; 20 MG/100ML; MG/100ML; MG/100ML; MG/100ML
INJECTION, SOLUTION INTRAVENOUS CONTINUOUS
Status: DISCONTINUED | OUTPATIENT
Start: 2023-04-05 | End: 2023-04-05 | Stop reason: HOSPADM

## 2023-04-05 RX ORDER — PROPOFOL 10 MG/ML
VIAL (ML) INTRAVENOUS
Status: DISCONTINUED | OUTPATIENT
Start: 2023-04-05 | End: 2023-04-05

## 2023-04-05 RX ORDER — LIDOCAINE HYDROCHLORIDE 20 MG/ML
INJECTION INTRAVENOUS
Status: DISCONTINUED | OUTPATIENT
Start: 2023-04-05 | End: 2023-04-05

## 2023-04-05 RX ADMIN — PROPOFOL 50 MG: 10 INJECTION, EMULSION INTRAVENOUS at 08:04

## 2023-04-05 RX ADMIN — SODIUM CHLORIDE, POTASSIUM CHLORIDE, SODIUM LACTATE AND CALCIUM CHLORIDE: 600; 310; 30; 20 INJECTION, SOLUTION INTRAVENOUS at 07:04

## 2023-04-05 RX ADMIN — PROPOFOL 150 MG: 10 INJECTION, EMULSION INTRAVENOUS at 08:04

## 2023-04-05 RX ADMIN — PROPOFOL 25 MG: 10 INJECTION, EMULSION INTRAVENOUS at 08:04

## 2023-04-05 RX ADMIN — LIDOCAINE HYDROCHLORIDE 100 MG: 20 INJECTION INTRAVENOUS at 08:04

## 2023-04-05 NOTE — DISCHARGE SUMMARY
[place] - Endoscopy  Discharge Note  Short Stay  Discharge Note  Short Stay      SUMMARY     Admit Date: 4/5/2023    Attending Physician: Al Santiago MD     Discharge Physician: Al Santiago MD    Discharge Date: 4/5/2023 8:30 AM    Final Diagnosis: Cough, unspecified type [R05.9]  Gastroesophageal reflux disease, unspecified whether esophagitis present [K21.9]    Disposition: HOME OR SELF CARE    Patient Instructions:   Current Discharge Medication List        CONTINUE these medications which have NOT CHANGED    Details   alendronate (FOSAMAX) 70 MG tablet Take 1 tablet (70 mg total) by mouth every 7 days.  Qty: 12 tablet, Refills: 3      ALPRAZolam (XANAX) 0.25 MG tablet Take 1 tablet (0.25 mg total) by mouth 3 (three) times daily as needed for Anxiety.  Qty: 90 tablet, Refills: 0    Associated Diagnoses: Generalized anxiety disorder      amLODIPine (NORVASC) 10 MG tablet TAKE 1 TABLET ONE TIME DAILY  Qty: 90 tablet, Refills: 1      aspirin 325 MG tablet Take 325 mg by mouth once daily.       EScitalopram oxalate (LEXAPRO) 10 MG tablet Take 1 tablet (10 mg total) by mouth once daily.  Qty: 90 tablet, Refills: 1      gabapentin (NEURONTIN) 300 MG capsule Take 2 capsules (600 mg total) by mouth 2 (two) times daily. Titrate up to twice daily only if continued symptoms after once daily  Qty: 120 capsule, Refills: 3      losartan (COZAAR) 100 MG tablet Take 1 tablet (100 mg total) by mouth once daily.  Qty: 90 tablet, Refills: 0    Comments: .  Associated Diagnoses: Essential hypertension      multivitamin (THERAGRAN) per tablet Take 1 tablet by mouth once daily.       nebivoloL (BYSTOLIC) 10 MG Tab Take 1 tablet (10 mg total) by mouth once daily.  Qty: 90 tablet, Refills: 2    Comments: Please inactivate all prior scripts with same name and strength including on holds.  Associated Diagnoses: Essential hypertension      pantoprazole (PROTONIX) 40 MG tablet Take 1 tablet (40 mg total) by mouth 2 (two)  times daily.  Qty: 180 tablet, Refills: 1    Associated Diagnoses: Chronic cough; Gastroesophageal reflux disease without esophagitis      rosuvastatin (CRESTOR) 10 MG tablet Take 1 tablet (10 mg total) by mouth every evening.  Qty: 90 tablet, Refills: 0    Comments: Please delete all prior scripts with same name and strength including on holds.  Associated Diagnoses: Hyperlipidemia, unspecified hyperlipidemia type      traZODone (DESYREL) 50 MG tablet Take 1 tablet (50 mg total) by mouth every evening.  Qty: 90 tablet, Refills: 1      venlafaxine (EFFEXOR-XR) 150 MG Cp24 Take 1 capsule (150 mg total) by mouth once daily.  Qty: 90 capsule, Refills: 2    Comments: Please delete all prior scripts with same name and strength including on holds.   Associated Diagnoses: Generalized anxiety disorder      dexAMETHasone 0.5 mg/5 mL Soln solution TAKE 60 MLS (6 MG TOTAL) BY MOUTH EVERY 8 (EIGHT) HOURS. SWISH AND SPIT.      NITROSTAT 0.4 mg SL tablet Place 0.4 mg under the tongue every 5 (five) minutes as needed.              Discharge Procedure Orders (must include Diet, Follow-up, Activity)    Follow Up:  Follow up with PCP as previously scheduled  Resume routine diet.  Activity as tolerated.    No driving day of procedure.

## 2023-04-05 NOTE — ANESTHESIA PREPROCEDURE EVALUATION
04/05/2023  Dottie Galeana is a 72 y.o., female.    Pre-op Assessment    I have reviewed the Patient Summary Reports.    I have reviewed the Nursing Notes. I have reviewed the NPO Status.   I have reviewed the Medications.     Review of Systems  Anesthesia Hx:  No problems with previous Anesthesia  Denies Family Hx of Anesthesia complications.  Personal Hx of Anesthesia complications Slow To Awaken/Delayed Emergence and mild, somewhat sensitive to sedation / narcotics   Social:  Non-Smoker, No Alcohol Use    Hematology/Oncology:  Hematology Normal        Cardiovascular:   Hypertension, well controlled CAD   hyperlipidemia OLIVIA    Pulmonary:   Shortness of breath    Renal/:  Renal/ Normal     Hepatic/GI:   No Bowel Prep. 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 Neuromuscular Disease,    Endocrine:  Endocrine Normal    Psych:   Psychiatric History anxiety          Physical Exam  General:  Well nourished      Airway/Jaw/Neck:  Airway Findings: Mouth Opening: Normal   General Airway Assessment: Adult Mallampati: II  Jaw/Neck Findings:  Neck ROM: Extension Decreased, Mild       Dental:  Intact     Chest/Lungs:  Chest/Lungs Findings: Clear to auscultation, Normal Respiratory Rate      Heart/Vascular:  Heart Findings: Rate: Normal  Rhythm: Regular Rhythm  Sounds: Normal  Heart murmur: negative Vascular Findings: Normal (No carotid bruits.)       Mental Status:  Mental Status Findings:  Cooperative, Alert and Oriented         Anesthesia Plan  Type of Anesthesia, risks & benefits discussed:  Anesthesia Type:  Gen Natural Airway    Patient's Preference:   Plan Factors:          Intra-op Monitoring Plan: standard ASA monitors  Intra-op Monitoring Plan Comments:   Post Op Pain Control Plan:   Post Op Pain Control Plan  Comments:     Induction:   IV  Beta Blocker:  Patient is on a Beta-Blocker and has received one dose within the past 24 hours (No further documentation required).       Informed Consent: Informed consent signed with the Patient and all parties understand the risks and agree with anesthesia plan.  All questions answered.  Anesthesia consent signed with patient.  ASA Score: 2     Day of Surgery Review of History & Physical:  There are no significant changes.  H&P Update referred to the surgeon/provider.          Ready For Surgery From Anesthesia Perspective.           Physical Exam  General: Well nourished    Airway:  Mallampati: II   Mouth Opening: Normal  Neck ROM: Extension Decreased, Mild    Dental:  Intact    Chest/Lungs:  Clear to auscultation, Normal Respiratory Rate    Heart:  Rate: Normal  Rhythm: Regular Rhythm  Sounds: Normal          Anesthesia Plan  Type of Anesthesia, risks & benefits discussed:    Anesthesia Type: Gen Natural Airway  Intra-op Monitoring Plan: standard ASA monitors  Induction:  IV  Informed Consent: Informed consent signed with the Patient and all parties understand the risks and agree with anesthesia plan.  All questions answered.   ASA Score: 2  Day of Surgery Review of History & Physical: H&P Update referred to the surgeon/provider.    Ready For Surgery From Anesthesia Perspective.       .

## 2023-04-05 NOTE — H&P
History & Physical - Short Stay  Gastroenterology      SUBJECTIVE:     Procedure: EGD    Chief Complaint/Indication for Procedure: Reflux    PTA Medications   Medication Sig    alendronate (FOSAMAX) 70 MG tablet Take 1 tablet (70 mg total) by mouth every 7 days.    ALPRAZolam (XANAX) 0.25 MG tablet Take 1 tablet (0.25 mg total) by mouth 3 (three) times daily as needed for Anxiety.    amLODIPine (NORVASC) 10 MG tablet TAKE 1 TABLET ONE TIME DAILY    aspirin 325 MG tablet Take 325 mg by mouth once daily.     EScitalopram oxalate (LEXAPRO) 10 MG tablet Take 1 tablet (10 mg total) by mouth once daily.    gabapentin (NEURONTIN) 300 MG capsule Take 2 capsules (600 mg total) by mouth 2 (two) times daily. Titrate up to twice daily only if continued symptoms after once daily    losartan (COZAAR) 100 MG tablet Take 1 tablet (100 mg total) by mouth once daily.    multivitamin (THERAGRAN) per tablet Take 1 tablet by mouth once daily.     nebivoloL (BYSTOLIC) 10 MG Tab Take 1 tablet (10 mg total) by mouth once daily.    pantoprazole (PROTONIX) 40 MG tablet Take 1 tablet (40 mg total) by mouth 2 (two) times daily.    rosuvastatin (CRESTOR) 10 MG tablet Take 1 tablet (10 mg total) by mouth every evening.    traZODone (DESYREL) 50 MG tablet Take 1 tablet (50 mg total) by mouth every evening.    venlafaxine (EFFEXOR-XR) 150 MG Cp24 Take 1 capsule (150 mg total) by mouth once daily.    dexAMETHasone 0.5 mg/5 mL Soln solution TAKE 60 MLS (6 MG TOTAL) BY MOUTH EVERY 8 (EIGHT) HOURS. SWISH AND SPIT.    NITROSTAT 0.4 mg SL tablet Place 0.4 mg under the tongue every 5 (five) minutes as needed.        Review of patient's allergies indicates:   Allergen Reactions    No known drug allergies         Past Medical History:   Diagnosis Date    Arthritis     Closed compression fracture of L1 lumbar vertebra 11/07/2018    Colon polyp     Coronary artery calcification     Depression     Fatty liver     General anesthetics causing adverse effect in  therapeutic use     Generalized anxiety disorder     GERD (gastroesophageal reflux disease)     History of seasonal allergies     HTN (hypertension)     Hyperlipidemia     Hyponatremia     Insomnia     Osteoporosis     Stress incontinence     Stroke     ocular behind left eye     Past Surgical History:   Procedure Laterality Date    BREAST BIOPSY Left 1998    benign    COLONOSCOPY N/A 04/14/2022    Procedure: COLONOSCOPY;  Surgeon: Al Santiago MD;  Location: [facility] ENDO;  Service: Endoscopy;  Laterality: N/A;    ESOPHAGOGASTRODUODENOSCOPY N/A 08/17/2018    Procedure: EGD (ESOPHAGOGASTRODUODENOSCOPY);  Surgeon: Al Santiago MD;  Location: [facility] ENDO;  Service: Endoscopy;  Laterality: N/A;    ESOPHAGOGASTRODUODENOSCOPY N/A 01/12/2022    Procedure: ESOPHAGOGASTRODUODENOSCOPY (EGD);  Surgeon: Al Santiago MD;  Location: [facility] ENDO;  Service: Endoscopy;  Laterality: N/A;    FIXATION KYPHOPLASTY N/A 11/13/2018    Procedure: Kyphoplasty L1;  Surgeon: Russ Gramajo MD;  Location: [facility] OR;  Service: Pain Management;  Laterality: N/A;    ORIF WRIST FRACTURE Right     UPPER GASTROINTESTINAL ENDOSCOPY       Family History   Problem Relation Age of Onset    Ovarian cancer Mother 42     Social History     Tobacco Use    Smoking status: Never    Smokeless tobacco: Never   Substance Use Topics    Alcohol use: Yes     Alcohol/week: 14.0 standard drinks     Types: 14 Glasses of wine per week     Comment: 2 glasses wine per day    Drug use: Not Currently         OBJECTIVE:     Vital Signs (Most Recent)  Temp: 98.2 °F (36.8 °C) (04/05/23 0744)  Pulse: 74 (04/05/23 0744)  Resp: 16 (04/05/23 0744)  BP: 113/60 (04/05/23 0744)  SpO2: 98 % (04/05/23 0744)    Physical Exam:                                                       GENERAL:  Comfortable, in no acute distress.                                 HEENT EXAM:  Nonicteric.  No adenopathy.  Oropharynx is clear.               NECK:  Supple.                                                                LUNGS:  Clear.                                                               CARDIAC:  Regular rate and rhythm.  S1, S2.  No murmur.                      ABDOMEN:  Soft, positive bowel sounds, nontender.  No hepatosplenomegaly or masses.  No rebound or guarding.                                             EXTREMITIES:  No edema.     MENTAL STATUS:  Normal, alert and oriented.      ASSESSMENT/PLAN:     Assessment: Reflux    Plan: EGD    Anesthesia Plan: General    ASA Grade: ASA 2 - Patient with mild systemic disease with no functional limitations    MALLAMPATI SCORE:  I (soft palate, uvula, fauces, and tonsillar pillars visible)

## 2023-04-05 NOTE — PROVATION PATIENT INSTRUCTIONS
Discharge Summary/Instructions after an Endoscopic Procedure  Patient Name: Dottie Galeana  Patient MRN: [id number]  Patient YOB: 1950  Wednesday, April 5, 2023  Al Santiago MD  Dear patient,  As a result of recent federal legislation (The Federal Cures Act), you may   receive lab or pathology results from your procedure in your [facility]   account before your physician is able to contact you. Your physician or   their representative will relay the results to you with their   recommendations at their soonest availability.  Thank you,  RESTRICTIONS:  During your procedure today, you received medications for sedation.  These   medications may affect your judgment, balance and coordination.  Therefore,   for 24 hours, you have the following restrictions:   - DO NOT drive a car, operate machinery, make legal/financial decisions,   sign important papers or drink alcohol.    ACTIVITY:  Today: no heavy lifting, straining or running due to procedural   sedation/anesthesia.  The following day: return to full activity including work.  DIET:  Eat and drink normally unless instructed otherwise.     TREATMENT FOR COMMON SIDE EFFECTS:  - Mild abdominal pain, nausea, belching, bloating or excessive gas:  rest,   eat lightly and use a heating pad.  - Sore Throat: treat with throat lozenges and/or gargle with warm salt   water.  - Because air was used during the procedure, expelling large amounts of air   from your rectum or belching is normal.  - If a bowel prep was taken, you may not have a bowel movement for 1-3 days.    This is normal.  SYMPTOMS TO WATCH FOR AND REPORT TO YOUR PHYSICIAN:  1. Abdominal pain or bloating, other than gas cramps.  2. Chest pain.  3. Back pain.  4. Signs of infection such as: chills or fever occurring within 24 hours   after the procedure.  5. Rectal bleeding, which would show as bright red, maroon, or black stools.   (A tablespoon of blood from the rectum is not serious, especially  if   hemorrhoids are present.)  6. Vomiting.  7. Weakness or dizziness.  GO DIRECTLY TO THE NEAREST EMERGENCY ROOM IF YOU HAVE ANY OF THE FOLLOWING:      Difficulty breathing              Chills and/or fever over 101 F   Persistent vomiting and/or vomiting blood   Severe abdominal pain   Severe chest pain   Black, tarry stools   Bleeding- more than one tablespoon   Any other symptom or condition that you feel may need urgent attention  Your doctor recommends these additional instructions:  If any biopsies were taken, your doctors clinic will contact you in 1 to 2   weeks with any results.  We are waiting for your pathology results.   Continue your present medications.   You are being discharged to home.  For questions, problems or results please call your physician - Al Santiago MD at Work:  [phone].  EMERGENCY PHONE NUMBER: [phone], LAB RESULTS: [phone]  IF A COMPLICATION OR EMERGENCY SITUATION ARISES AND YOU ARE UNABLE TO REACH   YOUR PHYSICIAN - GO DIRECTLY TO THE EMERGENCY ROOM.  ___________________________________________  Nurse Signature  ___________________________________________  Patient/Designated Responsible Party Signature  Al Santiago MD  4/5/2023 8:30:17 AM  This report has been verified and signed electronically.  Dear patient,  As a result of recent federal legislation (The Federal Cures Act), you may   receive lab or pathology results from your procedure in your [facility]   account before your physician is able to contact you. Your physician or   their representative will relay the results to you with their   recommendations at their soonest availability.  Thank you.  PROVATION

## 2023-04-05 NOTE — ANESTHESIA POSTPROCEDURE EVALUATION
Anesthesia Post Evaluation    Patient: Dottie Galeana    Procedure(s) Performed: Procedure(s) (LRB):  EGD (ESOPHAGOGASTRODUODENOSCOPY) (N/A)    Final Anesthesia Type: general      Patient location during evaluation: PACU  Patient participation: Yes- Able to Participate  Level of consciousness: awake and alert and oriented  Post-procedure vital signs: reviewed and stable  Pain management: adequate  Airway patency: patent    PONV status at discharge: No PONV  Anesthetic complications: no      Cardiovascular status: blood pressure returned to baseline  Respiratory status: unassisted, spontaneous ventilation and room air  Hydration status: euvolemic  Follow-up not needed.          Vitals Value Taken Time   /66 04/05/23 0855   Temp 36.4 °C (97.5 °F) 04/05/23 0830   Pulse 58 04/05/23 0855   Resp 16 04/05/23 0855   SpO2 98 % 04/05/23 0855         Event Time   Out of Recovery 09:10:47         Pain/Mercedes Score: Mercedes Score: 10 (4/5/2023  8:55 AM)

## 2023-04-05 NOTE — TRANSFER OF CARE
"Anesthesia Transfer of Care Note    Patient: Dottie Galeana    Procedure(s) Performed: Procedure(s) (LRB):  EGD (ESOPHAGOGASTRODUODENOSCOPY) (N/A)    Patient location: PACU    Anesthesia Type: general    Transport from OR: Transported from OR on room air with adequate spontaneous ventilation    Post pain: adequate analgesia    Post assessment: no apparent anesthetic complications and tolerated procedure well    Post vital signs: stable    Level of consciousness: sedated and awake    Nausea/Vomiting: no nausea/vomiting    Complications: none    Transfer of care protocol was followed      Last vitals:   Visit Vitals  /60   Pulse 74   Temp 36.8 °C (98.2 °F)   Resp 16   Ht 5' 7" (1.702 m)   Wt 74.8 kg (165 lb)   LMP 04/21/1994   SpO2 98%   Breastfeeding No   BMI 25.84 kg/m²     "

## 2023-04-06 VITALS
BODY MASS INDEX: 25.9 KG/M2 | HEIGHT: 67 IN | SYSTOLIC BLOOD PRESSURE: 109 MMHG | TEMPERATURE: 98 F | DIASTOLIC BLOOD PRESSURE: 66 MMHG | RESPIRATION RATE: 16 BRPM | HEART RATE: 58 BPM | WEIGHT: 165 LBS | OXYGEN SATURATION: 98 %

## 2023-04-06 LAB — BACTERIA STL CULT: NORMAL

## 2023-04-07 LAB — PH STL: 6 [PH] (ref 5–8.5)

## 2023-04-12 LAB
FINAL PATHOLOGIC DIAGNOSIS: NORMAL
GROSS: NORMAL
Lab: NORMAL
MICROSCOPIC EXAM: NORMAL

## 2023-04-17 LAB — O+P STL MICRO: NORMAL

## 2023-04-19 ENCOUNTER — NURSE TRIAGE (OUTPATIENT)
Dept: ADMINISTRATIVE | Facility: CLINIC | Age: 73
End: 2023-04-19
Payer: MEDICARE

## 2023-04-19 NOTE — TELEPHONE ENCOUNTER
Pt states that she was pulling on stuff in her yard on yesterday. Now c/o pain, tingling and weakness 3/10 to right hand up to elbow. Care advice to be seen today per protocol. Verbalized understanding. Unable to schedule appt. Encounter routed to provider.       Reason for Disposition   Weakness (i.e., loss of strength) of new-onset in hand or finger   (Exception: Not truly weak, hand feels weak because of pain.)    Additional Information   Negative: Similar pain previously and it was from 'heart attack'   Negative: Similar pain previously from 'angina' and not relieved by nitroglycerin   Negative: Sounds like a life-threatening emergency to the triager   Negative: Fever and red area (or area very tender to touch)   Negative: Swollen joint and fever   Negative: Patient sounds very sick or weak to the triager   Negative: SEVERE pain (e.g., excruciating, unable to use hand at all)   Negative: Red area or streak > 2 inches (or 5 cm)    Protocols used: Hand and Wrist Pain-A-[place]

## 2023-04-19 NOTE — TELEPHONE ENCOUNTER
Pt was informed of Dr. Shipley's recommendation. She verbalized understanding. She will call back if numbness and tingling do not resolve within 7-10 days.

## 2023-04-19 NOTE — TELEPHONE ENCOUNTER
Totally agree with advise is rest, ice, elevate and I prefer tylenol for pain, its likely overuse tendonitis which can cause nerve compression and subsequent tingling. May take some time to improve but those things will work.

## 2023-04-19 NOTE — TELEPHONE ENCOUNTER
After working in her yard yesterday and doing a lot of pulling and lifting, pt c/o wrist pain. She said she put ice on her wrist last night before bed. Today she reports numbness and tingling in her wrist. I recommended she ice the area for 20 minutes at a time, elevate her wrist whenever possible, and take an otc pain reliever such as Tylenol or Ibuprofen.   What else should she do about her wrist?

## 2023-04-23 ENCOUNTER — OFFICE VISIT (OUTPATIENT)
Dept: URGENT CARE | Facility: CLINIC | Age: 73
End: 2023-04-23
Payer: MEDICARE

## 2023-04-23 VITALS
BODY MASS INDEX: 25.27 KG/M2 | HEIGHT: 67 IN | HEART RATE: 75 BPM | DIASTOLIC BLOOD PRESSURE: 76 MMHG | TEMPERATURE: 99 F | RESPIRATION RATE: 18 BRPM | WEIGHT: 161 LBS | SYSTOLIC BLOOD PRESSURE: 114 MMHG | OXYGEN SATURATION: 98 %

## 2023-04-23 DIAGNOSIS — J01.90 ACUTE NON-RECURRENT SINUSITIS, UNSPECIFIED LOCATION: Primary | ICD-10-CM

## 2023-04-23 PROCEDURE — 99213 PR OFFICE/OUTPT VISIT, EST, LEVL III, 20-29 MIN: ICD-10-PCS | Mod: S$GLB,,, | Performed by: EMERGENCY MEDICINE

## 2023-04-23 PROCEDURE — 99213 OFFICE O/P EST LOW 20 MIN: CPT | Mod: S$GLB,,, | Performed by: EMERGENCY MEDICINE

## 2023-04-23 RX ORDER — CEFDINIR 300 MG/1
300 CAPSULE ORAL 2 TIMES DAILY
Qty: 10 CAPSULE | Refills: 0 | Status: SHIPPED | OUTPATIENT
Start: 2023-04-23 | End: 2023-04-28

## 2023-04-23 RX ORDER — PROMETHAZINE HYDROCHLORIDE AND DEXTROMETHORPHAN HYDROBROMIDE 6.25; 15 MG/5ML; MG/5ML
5 SYRUP ORAL EVERY 4 HOURS PRN
Qty: 118 ML | Refills: 0 | Status: SHIPPED | OUTPATIENT
Start: 2023-04-23 | End: 2023-05-26

## 2023-04-23 RX ORDER — PREDNISONE 20 MG/1
TABLET ORAL
Qty: 5 TABLET | Refills: 0 | Status: SHIPPED | OUTPATIENT
Start: 2023-04-23 | End: 2023-04-27

## 2023-04-23 NOTE — PROGRESS NOTES
"Subjective:      Patient ID: Dottie Galeana is a 72 y.o. female.    Vitals:  height is 5' 7" (1.702 m) and weight is 73 kg (161 lb). Her temporal temperature is 98.8 °F (37.1 °C). Her blood pressure is 114/76 and her pulse is 75. Her respiration is 18 and oxygen saturation is 98%.     Chief Complaint: Cough    Pt. In clinic today c/o cough, headaches, and runny nose x 5 days she stated that she has been taking Mucinex but does not help.    Cough  This is a new problem. The current episode started in the past 7 days. The problem has been unchanged. The problem occurs constantly. The cough is Productive of sputum. Nothing aggravates the symptoms. She has tried OTC cough suppressant for the symptoms. The treatment provided no relief.     Respiratory:  Positive for cough.     Objective:     Physical Exam   Constitutional: She is oriented to person, place, and time. She appears well-developed. She is cooperative.  Non-toxic appearance. She does not appear ill. No distress.   HENT:   Head: Normocephalic and atraumatic.   Ears:   Right Ear: Hearing and external ear normal.   Left Ear: Hearing and external ear normal.   Nose: Nose normal. No mucosal edema, rhinorrhea or nasal deformity. No epistaxis. Right sinus exhibits no maxillary sinus tenderness and no frontal sinus tenderness. Left sinus exhibits no maxillary sinus tenderness and no frontal sinus tenderness.   Mouth/Throat: Uvula is midline, oropharynx is clear and moist and mucous membranes are normal. Mucous membranes are moist. No trismus in the jaw. Normal dentition. No uvula swelling. No oropharyngeal exudate, posterior oropharyngeal edema or posterior oropharyngeal erythema. Oropharynx is clear.   Eyes: Conjunctivae and lids are normal. No scleral icterus.      Comments: Yellow postnasal drip with posterior pharyngeal erythema   Neck: Trachea normal and phonation normal. Neck supple. No edema present. No erythema present. No neck rigidity present. "   Cardiovascular: Normal rate, regular rhythm, normal heart sounds and normal pulses.   Pulmonary/Chest: Effort normal and breath sounds normal. No respiratory distress. She has no decreased breath sounds. She has no rhonchi.   Abdominal: Normal appearance.   Musculoskeletal: Normal range of motion.         General: No deformity. Normal range of motion.   Neurological: She is alert and oriented to person, place, and time. She exhibits normal muscle tone. Coordination normal.   Skin: Skin is warm, dry, intact, not diaphoretic and not pale.   Psychiatric: Her speech is normal and behavior is normal. Judgment and thought content normal.   Nursing note and vitals reviewed.      72-year-old with 6 day history of sinus congestion and postnasal drip.  No malaise or fatigue, no body aches.  No shortness of breath.  No vomiting or diarrhea.  Will cover with short course of steroid and antibiotic.    Assessment:     1. Acute non-recurrent sinusitis, unspecified location        Plan:       Acute non-recurrent sinusitis, unspecified location  -     predniSONE (DELTASONE) 20 MG tablet; Take 1 tablet (20 mg total) by mouth 2 (two) times daily for 1 day, THEN 1 tablet (20 mg total) once daily for 3 days.  Dispense: 5 tablet; Refill: 0  -     promethazine-dextromethorphan (PROMETHAZINE-DM) 6.25-15 mg/5 mL Syrp; Take 5 mLs by mouth every 4 (four) hours as needed (cough).  Dispense: 118 mL; Refill: 0  -     cefdinir (OMNICEF) 300 MG capsule; Take 1 capsule (300 mg total) by mouth 2 (two) times daily. for 5 days  Dispense: 10 capsule; Refill: 0

## 2023-04-27 ENCOUNTER — OFFICE VISIT (OUTPATIENT)
Dept: URGENT CARE | Facility: CLINIC | Age: 73
End: 2023-04-27
Payer: MEDICARE

## 2023-04-27 VITALS
BODY MASS INDEX: 25.9 KG/M2 | RESPIRATION RATE: 18 BRPM | SYSTOLIC BLOOD PRESSURE: 103 MMHG | DIASTOLIC BLOOD PRESSURE: 71 MMHG | HEIGHT: 67 IN | HEART RATE: 77 BPM | TEMPERATURE: 98 F | WEIGHT: 165 LBS | OXYGEN SATURATION: 99 %

## 2023-04-27 DIAGNOSIS — R05.9 COUGH IN ADULT: Primary | ICD-10-CM

## 2023-04-27 DIAGNOSIS — R09.81 SINUS CONGESTION: ICD-10-CM

## 2023-04-27 LAB
CTP QC/QA: YES
SARS-COV-2 AG RESP QL IA.RAPID: NEGATIVE

## 2023-04-27 PROCEDURE — 87811 SARS-COV-2 COVID19 W/OPTIC: CPT | Mod: QW,S$GLB,, | Performed by: FAMILY MEDICINE

## 2023-04-27 PROCEDURE — 99214 PR OFFICE/OUTPT VISIT, EST, LEVL IV, 30-39 MIN: ICD-10-PCS | Mod: S$GLB,,, | Performed by: FAMILY MEDICINE

## 2023-04-27 PROCEDURE — 71046 XR CHEST PA AND LATERAL: ICD-10-PCS | Mod: S$GLB,,, | Performed by: RADIOLOGY

## 2023-04-27 PROCEDURE — 87811 SARS CORONAVIRUS 2 ANTIGEN POCT, MANUAL READ: ICD-10-PCS | Mod: QW,S$GLB,, | Performed by: FAMILY MEDICINE

## 2023-04-27 PROCEDURE — 99214 OFFICE O/P EST MOD 30 MIN: CPT | Mod: S$GLB,,, | Performed by: FAMILY MEDICINE

## 2023-04-27 PROCEDURE — 71046 X-RAY EXAM CHEST 2 VIEWS: CPT | Mod: S$GLB,,, | Performed by: RADIOLOGY

## 2023-04-27 RX ORDER — ALBUTEROL SULFATE 90 UG/1
2 AEROSOL, METERED RESPIRATORY (INHALATION) EVERY 6 HOURS PRN
Qty: 18 G | Refills: 2 | Status: SHIPPED | OUTPATIENT
Start: 2023-04-27 | End: 2023-05-08

## 2023-04-27 RX ORDER — DOXYCYCLINE 100 MG/1
100 CAPSULE ORAL 2 TIMES DAILY
Qty: 20 CAPSULE | Refills: 0 | Status: SHIPPED | OUTPATIENT
Start: 2023-04-27 | End: 2023-05-08

## 2023-04-27 RX ORDER — BENZONATATE 100 MG/1
100 CAPSULE ORAL EVERY 6 HOURS PRN
Qty: 30 CAPSULE | Refills: 1 | Status: SHIPPED | OUTPATIENT
Start: 2023-04-27 | End: 2023-05-26

## 2023-04-27 NOTE — PROGRESS NOTES
"Subjective:      Patient ID: Dottie Galeana is a 72 y.o. female.    Vitals:  height is 5' 7" (1.702 m) and weight is 74.8 kg (165 lb). Her temperature is 98.3 °F (36.8 °C). Her blood pressure is 103/71 and her pulse is 77. Her respiration is 18 and oxygen saturation is 99%.     Chief Complaint: Cough    Patient presents today as a revisit. Pt was in clinic on 04/23/2023. Symptoms today with continued  persistent cough, runny nose, dizziness and light headedness at rest and when coughing. Pt wants a Covid test and was exposed to mother who is Covid+ Tuesday this week. Pt stated that she felt as though her throat was closing last night. Pt has taken Flonase and Cough medicine.    Cough  This is a new problem. The current episode started in the past 7 days. The problem has been gradually worsening. The cough is Productive of sputum. Associated symptoms include headaches, postnasal drip and rhinorrhea. She has tried prescription cough suppressant for the symptoms. The treatment provided no relief.     HENT:  Positive for postnasal drip.    Respiratory:  Positive for cough.    Neurological:  Positive for headaches.    Objective:     Physical Exam    Physical Exam  Vitals signs and nursing note reviewed.   Constitutional:       Appearance: Pt is well-developed. Alert, NAD.  Pt is cooperative.  Non-toxic appearance.  HENT:      Head: Normocephalic and atraumatic. .      Right Ear: External ear normal.      Left Ear: External ear normal.   Eyes:      General: Lids are normal.      Conjunctiva/sclera: Conjunctivae normal. Visual tracking is normal. Right eye exhibits no exudate. Left eye exhibits no exudate. No scleral icterus.     Pupils: Pupils are equal, round  Neck:      Musculoskeletal: range of motion without pain and neck supple.      Trachea: Trachea and phonation normal.   Throat: No apparent pharyngeal edema or swelling  Cardiovascular:      Rate and Rhythm: Normal Rhythm. Extremities well perfused. "   Pulmonary:      Effort: Pulmonary effort is normal. No respiratory distress. NO stridor or difficulty breathing     Breath sounds: Normal breath sounds.   Abdomen: NO obvious distention.  Musculoskeletal: Normal range of motion. No ambulation issues  Skin:     General: Skin is warm and dry. No open wounds or abrasions. No petechiae No cyanosis  no jaundice not diaphoretic, not pale, not purpuric  Neurological:      Mental Status:Pt is alert and oriented to person, place, and time.   Psychiatric:         Speech: Speech normal.         Behavior: Behavior normal.         Thought Content: Thought content normal.         Judgment: Judgment normal.       Assessment:     1. Cough in adult    2. Sinus congestion        Plan:   Past medical hx, family hx, social hx, and current medications were provided by the patient and were reviewed and taken into consideration regarding treatment and plan. Any diagnostics performed today were discussed with patient. Diagnosis and treatment were discussed with the patient.   This patient's second visit for same problem with unknown prognosis.  Patient given antibiotics steroids and Phenergan at previous visit.  Given absence of improvement symptoms likely viral.  However given previous administration of antibiotics will changed to doxy for full course    Cough in adult  -     SARS Coronavirus 2 Antigen, POCT Manual Read  -     XR CHEST PA AND LATERAL; Future; Expected date: 04/27/2023    Sinus congestion    Other orders  -     doxycycline (VIBRAMYCIN) 100 MG Cap; Take 1 capsule (100 mg total) by mouth 2 (two) times daily. for 10 days  Dispense: 20 capsule; Refill: 0  -     albuterol (PROVENTIL/VENTOLIN HFA) 90 mcg/actuation inhaler; Inhale 2 puffs into the lungs every 6 (six) hours as needed for Wheezing. Rescue  Dispense: 18 g; Refill: 2  -     benzonatate (TESSALON PERLES) 100 MG capsule; Take 1 capsule (100 mg total) by mouth every 6 (six) hours as needed for Cough.  Dispense: 30  capsule; Refill: 1      XR CHEST PA AND LATERAL    Result Date: 4/27/2023  EXAMINATION: XR CHEST PA AND LATERAL CLINICAL HISTORY: Cough, unspecified TECHNIQUE: PA and lateral views of the chest were performed. COMPARISON: 11/15/2021 FINDINGS: The heart is not enlarged.  The lungs are well expanded and clear of infiltrate.  The costophrenic sulci are sharp..  Kyphoplasty noted of a vertebrae near the thoracolumbar junction.  Small calcified granuloma left midlung field.     No acute cardiopulmonary disease Electronically signed by: Mariana Hanna MD Date:    04/27/2023 Time:    11:58

## 2023-05-05 ENCOUNTER — TELEPHONE (OUTPATIENT)
Dept: PRIMARY CARE CLINIC | Facility: CLINIC | Age: 73
End: 2023-05-05
Payer: MEDICARE

## 2023-05-05 NOTE — TELEPHONE ENCOUNTER
Patient reports she has had a runny nose, post-nasal drip, diaphoresis, and has been coughing x 2 weeks. She went to urgent care on 4/22 and again on 4/27. She has been taking doxycycline since 4/22. She now c/o coughing 'fits' where her post-nasal drip tickles her throat causing her to cough. Once she starts, she is not able to stop coughing. She has been sleeping upright in a recliner for > 2 weeks. She asked if there is something she may have for the cough.She denies fever. She has been scheduled to see Dr. Shipley on Monday 5/8/23

## 2023-05-05 NOTE — TELEPHONE ENCOUNTER
I discussed Dr. Pina's recommendations with Ms. Galeana. She said she has high blood pressure so she has to use [employer]idin brand. There is a product by [employer] that contains a cough suppressant. Patient was encouraged to consult her pharmacist for the product best suited to her. Patient was also encouraged to use her albuterol inhaler as prescribed and steam inhaler as well. She verbalized understanding and agreed to return to urgent care if her symptoms became severe.

## 2023-05-05 NOTE — TELEPHONE ENCOUNTER
I reviewed x-ray done at urgent care which was now more than a week ago.  So if she is worsening then she may need to be seen again.  She was also given Tessalon Perles.  Given her medication list and medical problems they really are not that many safe cough medicines that can be prescribed.  If the Tessalon helped I can send in a refill otherwise I would recommend Robitussin DM over-the-counter.  She should also use the albuterol inhaler that was given.  Another thing that she could consider is a steam inhaler that can be purchased at a local drug store.

## 2023-05-08 ENCOUNTER — HOSPITAL ENCOUNTER (OUTPATIENT)
Dept: RADIOLOGY | Facility: HOSPITAL | Age: 73
Discharge: HOME | End: 2023-05-08
Attending: NURSE PRACTITIONER
Payer: MEDICARE

## 2023-05-08 ENCOUNTER — OFFICE VISIT (OUTPATIENT)
Dept: FAMILY MEDICINE | Facility: CLINIC | Age: 73
End: 2023-05-08
Payer: MEDICARE

## 2023-05-08 ENCOUNTER — TELEPHONE (OUTPATIENT)
Dept: OTOLARYNGOLOGY | Facility: CLINIC | Age: 73
End: 2023-05-08
Payer: MEDICARE

## 2023-05-08 VITALS
HEIGHT: 67 IN | TEMPERATURE: 98 F | SYSTOLIC BLOOD PRESSURE: 114 MMHG | BODY MASS INDEX: 25.38 KG/M2 | DIASTOLIC BLOOD PRESSURE: 80 MMHG | HEART RATE: 72 BPM | WEIGHT: 161.69 LBS

## 2023-05-08 DIAGNOSIS — R05.9 COUGH, UNSPECIFIED TYPE: ICD-10-CM

## 2023-05-08 DIAGNOSIS — J32.9 SINUSITIS, UNSPECIFIED CHRONICITY, UNSPECIFIED LOCATION: ICD-10-CM

## 2023-05-08 DIAGNOSIS — F32.0 MILD MAJOR DEPRESSION: ICD-10-CM

## 2023-05-08 DIAGNOSIS — J84.10 CALCIFIED GRANULOMA OF LUNG: ICD-10-CM

## 2023-05-08 DIAGNOSIS — E78.5 HYPERLIPIDEMIA, UNSPECIFIED HYPERLIPIDEMIA TYPE: ICD-10-CM

## 2023-05-08 DIAGNOSIS — J98.9 REACTIVE AIRWAY DISEASE WITHOUT ASTHMA: Primary | ICD-10-CM

## 2023-05-08 PROBLEM — J98.4 CALCIFIED GRANULOMA OF LUNG: Status: ACTIVE | Noted: 2023-05-08

## 2023-05-08 PROBLEM — R05.3 CHRONIC COUGH: Status: ACTIVE | Noted: 2023-05-08

## 2023-05-08 PROCEDURE — 71046 X-RAY EXAM CHEST 2 VIEWS: CPT | Mod: 26,HCNC,, | Performed by: RADIOLOGY

## 2023-05-08 PROCEDURE — 1125F PR PAIN SEVERITY QUANTIFIED, PAIN PRESENT: ICD-10-PCS | Mod: CPTII,S$GLB,, | Performed by: NURSE PRACTITIONER

## 2023-05-08 PROCEDURE — 3288F PR FALLS RISK ASSESSMENT DOCUMENTED: ICD-10-PCS | Mod: CPTII,S$GLB,, | Performed by: NURSE PRACTITIONER

## 2023-05-08 PROCEDURE — 4010F ACE/ARB THERAPY RXD/TAKEN: CPT | Mod: CPTII,S$GLB,, | Performed by: NURSE PRACTITIONER

## 2023-05-08 PROCEDURE — 4010F PR ACE/ARB THEARPY RXD/TAKEN: ICD-10-PCS | Mod: CPTII,S$GLB,, | Performed by: NURSE PRACTITIONER

## 2023-05-08 PROCEDURE — 71046 XR CHEST PA AND LATERAL: ICD-10-PCS | Mod: 26,HCNC,, | Performed by: RADIOLOGY

## 2023-05-08 PROCEDURE — 3074F PR MOST RECENT SYSTOLIC BLOOD PRESSURE < 130 MM HG: ICD-10-PCS | Mod: CPTII,S$GLB,, | Performed by: NURSE PRACTITIONER

## 2023-05-08 PROCEDURE — 3079F PR MOST RECENT DIASTOLIC BLOOD PRESSURE 80-89 MM HG: ICD-10-PCS | Mod: CPTII,S$GLB,, | Performed by: NURSE PRACTITIONER

## 2023-05-08 PROCEDURE — 3008F PR BODY MASS INDEX (BMI) DOCUMENTED: ICD-10-PCS | Mod: CPTII,S$GLB,, | Performed by: NURSE PRACTITIONER

## 2023-05-08 PROCEDURE — 99499 UNLISTED E&M SERVICE: CPT | Mod: S$GLB,,, | Performed by: NURSE PRACTITIONER

## 2023-05-08 PROCEDURE — 1159F PR MEDICATION LIST DOCUMENTED IN MEDICAL RECORD: ICD-10-PCS | Mod: CPTII,S$GLB,, | Performed by: NURSE PRACTITIONER

## 2023-05-08 PROCEDURE — 1101F PR PT FALLS ASSESS DOC 0-1 FALLS W/OUT INJ PAST YR: ICD-10-PCS | Mod: CPTII,S$GLB,, | Performed by: NURSE PRACTITIONER

## 2023-05-08 PROCEDURE — 3074F SYST BP LT 130 MM HG: CPT | Mod: CPTII,S$GLB,, | Performed by: NURSE PRACTITIONER

## 2023-05-08 PROCEDURE — 3008F BODY MASS INDEX DOCD: CPT | Mod: CPTII,S$GLB,, | Performed by: NURSE PRACTITIONER

## 2023-05-08 PROCEDURE — 96372 THER/PROPH/DIAG INJ SC/IM: CPT | Mod: S$GLB,,, | Performed by: NURSE PRACTITIONER

## 2023-05-08 PROCEDURE — 71046 X-RAY EXAM CHEST 2 VIEWS: CPT | Mod: TC,HCNC,FY,PO

## 2023-05-08 PROCEDURE — 3079F DIAST BP 80-89 MM HG: CPT | Mod: CPTII,S$GLB,, | Performed by: NURSE PRACTITIONER

## 2023-05-08 PROCEDURE — 1101F PT FALLS ASSESS-DOCD LE1/YR: CPT | Mod: CPTII,S$GLB,, | Performed by: NURSE PRACTITIONER

## 2023-05-08 PROCEDURE — 96372 PR INJECTION,THERAP/PROPH/DIAG2ST, IM OR SUBCUT: ICD-10-PCS | Mod: S$GLB,,, | Performed by: NURSE PRACTITIONER

## 2023-05-08 PROCEDURE — 1125F AMNT PAIN NOTED PAIN PRSNT: CPT | Mod: CPTII,S$GLB,, | Performed by: NURSE PRACTITIONER

## 2023-05-08 PROCEDURE — 3288F FALL RISK ASSESSMENT DOCD: CPT | Mod: CPTII,S$GLB,, | Performed by: NURSE PRACTITIONER

## 2023-05-08 PROCEDURE — 99214 OFFICE O/P EST MOD 30 MIN: CPT | Mod: 25,S$GLB,, | Performed by: NURSE PRACTITIONER

## 2023-05-08 PROCEDURE — 99214 PR OFFICE/OUTPT VISIT, EST, LEVL IV, 30-39 MIN: ICD-10-PCS | Mod: 25,S$GLB,, | Performed by: NURSE PRACTITIONER

## 2023-05-08 PROCEDURE — 1159F MED LIST DOCD IN RCRD: CPT | Mod: CPTII,S$GLB,, | Performed by: NURSE PRACTITIONER

## 2023-05-08 RX ORDER — AMOXICILLIN AND CLAVULANATE POTASSIUM 875; 125 MG/1; MG/1
1 TABLET, FILM COATED ORAL 2 TIMES DAILY
Qty: 14 TABLET | Refills: 0 | Status: SHIPPED | OUTPATIENT
Start: 2023-05-08 | End: 2023-05-15

## 2023-05-08 RX ORDER — PREDNISONE 20 MG/1
40 TABLET ORAL DAILY
Qty: 20 TABLET | Refills: 0 | Status: SHIPPED | OUTPATIENT
Start: 2023-05-08 | End: 2023-05-18

## 2023-05-08 RX ORDER — ALBUTEROL SULFATE 90 UG/1
2 AEROSOL, METERED RESPIRATORY (INHALATION) EVERY 6 HOURS PRN
Qty: 8 G | Refills: 6 | Status: SHIPPED | OUTPATIENT
Start: 2023-05-08 | End: 2023-11-27 | Stop reason: SDUPTHER

## 2023-05-08 RX ORDER — DEXAMETHASONE SODIUM PHOSPHATE 4 MG/ML
8 INJECTION, SOLUTION INTRA-ARTICULAR; INTRALESIONAL; INTRAMUSCULAR; INTRAVENOUS; SOFT TISSUE
Status: COMPLETED | OUTPATIENT
Start: 2023-05-08 | End: 2023-05-08

## 2023-05-08 RX ADMIN — DEXAMETHASONE SODIUM PHOSPHATE 8 MG: 4 INJECTION, SOLUTION INTRA-ARTICULAR; INTRALESIONAL; INTRAMUSCULAR; INTRAVENOUS; SOFT TISSUE at 11:05

## 2023-05-08 NOTE — PROGRESS NOTES
"Subjective:       Patient ID: Dottie Galeana is a 72 y.o. female.    Chief Complaint: Cough (X3 weeks, UC twice. Multiple negative COVID tests. ) and Headache    HPI  Review of Systems   Constitutional:  Positive for appetite change and fatigue.   HENT:  Positive for congestion, postnasal drip, rhinorrhea, sinus pressure, sneezing and sore throat.    Eyes:  Negative for pain and redness.   Respiratory:  Positive for cough (coughing until she vomits). Negative for choking, chest tightness and shortness of breath.    Cardiovascular:  Negative for chest pain and palpitations.   Gastrointestinal:  Negative for abdominal distention, abdominal pain, constipation, diarrhea, nausea and vomiting.   Endocrine: Negative for polydipsia and polyphagia.   Genitourinary:  Negative for dysuria and hematuria.   Musculoskeletal:  Negative for arthralgias, joint swelling and myalgias.   Skin:  Negative for color change and pallor.   Neurological:  Positive for headaches. Negative for dizziness and light-headedness.     Past medical, surgical, family and social history reviewed.  Objective:     Vitals:    05/08/23 1115   BP: 114/80   Pulse: 72   Temp: 98.3 °F (36.8 °C)   Weight: 73.4 kg (161 lb 11.3 oz)   Height: 5' 7" (1.702 m)   PainSc:   3   PainLoc: Head     Body mass index is 25.33 kg/m².     Physical Exam  Constitutional:       Appearance: She is well-developed.   HENT:      Head: Normocephalic and atraumatic.      Right Ear: Hearing, tympanic membrane, ear canal and external ear normal.      Left Ear: Hearing, tympanic membrane, ear canal and external ear normal.      Nose: Mucosal edema and rhinorrhea present. No laceration.      Right Sinus: Maxillary sinus tenderness and frontal sinus tenderness present.      Left Sinus: Maxillary sinus tenderness and frontal sinus tenderness present.      Mouth/Throat:      Pharynx: Uvula midline. Posterior oropharyngeal erythema present.   Eyes:      General: No scleral icterus.      "   Right eye: No discharge.         Left eye: No discharge.      Conjunctiva/sclera: Conjunctivae normal.   Neck:      Trachea: No tracheal deviation.   Cardiovascular:      Rate and Rhythm: Normal rate and regular rhythm.      Heart sounds: No murmur heard.  Pulmonary:      Effort: Pulmonary effort is normal. No respiratory distress.      Breath sounds: No stridor. Wheezing present. No rales.   Chest:      Chest wall: No tenderness.   Musculoskeletal:         General: Normal range of motion.      Cervical back: Normal range of motion and neck supple.   Lymphadenopathy:      Cervical: Cervical adenopathy present.   Skin:     General: Skin is warm and dry.   Neurological:      Mental Status: She is alert and oriented to person, place, and time.   Psychiatric:         Behavior: Behavior normal.         Thought Content: Thought content normal.         Judgment: Judgment normal.       Assessment:       1. Reactive airway disease without asthma    2. Cough, unspecified type    3. Sinusitis, unspecified chronicity, unspecified location    4. Calcified granuloma of lung    5. Mild major depression        Plan:       Dottie was seen today for cough and headache.    Diagnoses and all orders for this visit:    Reactive airway disease without asthma  -     dexAMETHasone injection 8 mg  -     predniSONE (DELTASONE) 20 MG tablet; Take 2 tablets (40 mg total) by mouth once daily. for 10 days  -     albuterol (VENTOLIN HFA) 90 mcg/actuation inhaler; Inhale 2 puffs into the lungs every 6 (six) hours as needed for Wheezing. Rescue    Cough, unspecified type  -     X-Ray Chest PA And Lateral; Future    Sinusitis, unspecified chronicity, unspecified location  -     amoxicillin-clavulanate 875-125mg (AUGMENTIN) 875-125 mg per tablet; Take 1 tablet by mouth 2 (two) times daily. for 7 days    Calcified granuloma of lung      Mild major depression  Managed by PCP Dr Shipley, depression is stable                 I spent 30 minutes on this  encounter, time includes face-to-face, chart review, documentation, test review and orders.

## 2023-05-08 NOTE — TELEPHONE ENCOUNTER
----- Message from Amanda Stanton sent at 5/8/2023  9:13 AM CDT -----  Regarding: Needs to schedule  Type:  Sooner Appointment Request    Caller is requesting a sooner appointment.  Caller declined first available appointment listed below.  Caller will not accept being placed on the waitlist and is requesting a message be sent to doctor.    Name of Caller:  Lilia  When is the first available appointment?  Dept book  Symptoms:  coughing so bad she is gagging, please advise   Best Call Back Number:  [phone]    Additional Information:

## 2023-05-09 ENCOUNTER — TELEPHONE (OUTPATIENT)
Dept: FAMILY MEDICINE | Facility: CLINIC | Age: 73
End: 2023-05-09
Payer: MEDICARE

## 2023-05-09 NOTE — TELEPHONE ENCOUNTER
----- Message from Cara Bustamante sent at 5/9/2023  1:19 PM CDT -----  Regarding: appt needed  Name of Who is Calling: JNENIE XIONG [[id number]]      What is the request in detail: Pt was advised by the provider to call back and make an appt if not feeling better in 72 hours. Pt would like to make that appt. Please call and advise.       Can the clinic reply by [facility]: no       What Number [facility]: [phone]

## 2023-05-11 RX ORDER — ROSUVASTATIN CALCIUM 10 MG/1
10 TABLET, COATED ORAL NIGHTLY
Qty: 90 TABLET | Refills: 1 | Status: SHIPPED | OUTPATIENT
Start: 2023-05-11 | End: 2023-05-31

## 2023-05-12 ENCOUNTER — LAB VISIT (OUTPATIENT)
Dept: LAB | Facility: HOSPITAL | Age: 73
End: 2023-05-12
Payer: MEDICARE

## 2023-05-12 DIAGNOSIS — Q25.8: ICD-10-CM

## 2023-05-12 DIAGNOSIS — E78.2 MIXED HYPERLIPIDEMIA: ICD-10-CM

## 2023-05-12 DIAGNOSIS — F45.8 ANXIETY HYPERVENTILATION: ICD-10-CM

## 2023-05-12 DIAGNOSIS — F41.9 ANXIETY HYPERVENTILATION: ICD-10-CM

## 2023-05-12 DIAGNOSIS — I34.0 MITRAL INCOMPETENCE: ICD-10-CM

## 2023-05-12 DIAGNOSIS — I10 ESSENTIAL HYPERTENSION, MALIGNANT: Primary | ICD-10-CM

## 2023-05-12 LAB
ALBUMIN SERPL BCP-MCNC: 3.7 G/DL (ref 3.5–5.2)
ALP SERPL-CCNC: 71 U/L (ref 55–135)
ALT SERPL W/O P-5'-P-CCNC: 32 U/L (ref 10–44)
ANION GAP SERPL CALC-SCNC: 12 MMOL/L (ref 8–16)
AST SERPL-CCNC: 25 U/L (ref 10–40)
BASOPHILS # BLD AUTO: 0.03 K/UL (ref 0–0.2)
BASOPHILS NFR BLD: 0.2 % (ref 0–1.9)
BILIRUB SERPL-MCNC: 0.5 MG/DL (ref 0.1–1)
BUN SERPL-MCNC: 11 MG/DL (ref 8–23)
CALCIUM SERPL-MCNC: 9.5 MG/DL (ref 8.7–10.5)
CHLORIDE SERPL-SCNC: 104 MMOL/L (ref 95–110)
CHOLEST SERPL-MCNC: 190 MG/DL (ref 120–199)
CHOLEST/HDLC SERPL: 2.8 {RATIO} (ref 2–5)
CK SERPL-CCNC: 30 U/L (ref 20–180)
CO2 SERPL-SCNC: 25 MMOL/L (ref 23–29)
CREAT SERPL-MCNC: 0.7 MG/DL (ref 0.5–1.4)
DIFFERENTIAL METHOD: ABNORMAL
EOSINOPHIL # BLD AUTO: 0.2 K/UL (ref 0–0.5)
EOSINOPHIL NFR BLD: 1.7 % (ref 0–8)
ERYTHROCYTE [DISTWIDTH] IN BLOOD BY AUTOMATED COUNT: 13.1 % (ref 11.5–14.5)
EST. GFR  (NO RACE VARIABLE): >60 ML/MIN/1.73 M^2
ESTIMATED AVG GLUCOSE: 114 MG/DL (ref 68–131)
GLUCOSE SERPL-MCNC: 80 MG/DL (ref 70–110)
HBA1C MFR BLD: 5.6 % (ref 4–5.6)
HCT VFR BLD AUTO: 43.4 % (ref 37–48.5)
HDLC SERPL-MCNC: 67 MG/DL (ref 40–75)
HDLC SERPL: 35.3 % (ref 20–50)
HGB BLD-MCNC: 14.2 G/DL (ref 12–16)
IMM GRANULOCYTES # BLD AUTO: 0.06 K/UL (ref 0–0.04)
IMM GRANULOCYTES NFR BLD AUTO: 0.5 % (ref 0–0.5)
LDLC SERPL CALC-MCNC: 97 MG/DL (ref 63–159)
LYMPHOCYTES # BLD AUTO: 4.7 K/UL (ref 1–4.8)
LYMPHOCYTES NFR BLD: 36.8 % (ref 18–48)
MCH RBC QN AUTO: 31.4 PG (ref 27–31)
MCHC RBC AUTO-ENTMCNC: 32.7 G/DL (ref 32–36)
MCV RBC AUTO: 96 FL (ref 82–98)
MONOCYTES # BLD AUTO: 1 K/UL (ref 0.3–1)
MONOCYTES NFR BLD: 7.7 % (ref 4–15)
NEUTROPHILS # BLD AUTO: 6.7 K/UL (ref 1.8–7.7)
NEUTROPHILS NFR BLD: 53.1 % (ref 38–73)
NONHDLC SERPL-MCNC: 123 MG/DL
NRBC BLD-RTO: 0 /100 WBC
PLATELET # BLD AUTO: 522 K/UL (ref 150–450)
PMV BLD AUTO: 8.5 FL (ref 9.2–12.9)
POTASSIUM SERPL-SCNC: 4.5 MMOL/L (ref 3.5–5.1)
PROT SERPL-MCNC: 6.8 G/DL (ref 6–8.4)
RBC # BLD AUTO: 4.52 M/UL (ref 4–5.4)
SODIUM SERPL-SCNC: 141 MMOL/L (ref 136–145)
T4 FREE SERPL-MCNC: 0.91 NG/DL (ref 0.71–1.51)
TRIGL SERPL-MCNC: 130 MG/DL (ref 30–150)
TSH SERPL DL<=0.005 MIU/L-ACNC: 5.84 UIU/ML (ref 0.4–4)
WBC # BLD AUTO: 12.65 K/UL (ref 3.9–12.7)

## 2023-05-12 PROCEDURE — 85025 COMPLETE CBC W/AUTO DIFF WBC: CPT | Performed by: INTERNAL MEDICINE

## 2023-05-12 PROCEDURE — 84439 ASSAY OF FREE THYROXINE: CPT | Performed by: INTERNAL MEDICINE

## 2023-05-12 PROCEDURE — 80061 LIPID PANEL: CPT | Performed by: INTERNAL MEDICINE

## 2023-05-12 PROCEDURE — 82550 ASSAY OF CK (CPK): CPT | Performed by: INTERNAL MEDICINE

## 2023-05-12 PROCEDURE — 83036 HEMOGLOBIN GLYCOSYLATED A1C: CPT | Performed by: INTERNAL MEDICINE

## 2023-05-12 PROCEDURE — 84443 ASSAY THYROID STIM HORMONE: CPT | Performed by: INTERNAL MEDICINE

## 2023-05-12 PROCEDURE — 80053 COMPREHEN METABOLIC PANEL: CPT | Performed by: INTERNAL MEDICINE

## 2023-05-12 PROCEDURE — 36415 COLL VENOUS BLD VENIPUNCTURE: CPT | Mod: PO | Performed by: INTERNAL MEDICINE

## 2023-05-14 DIAGNOSIS — I10 ESSENTIAL HYPERTENSION: ICD-10-CM

## 2023-05-15 ENCOUNTER — PATIENT MESSAGE (OUTPATIENT)
Dept: PRIMARY CARE CLINIC | Facility: CLINIC | Age: 73
End: 2023-05-15
Payer: MEDICARE

## 2023-05-15 DIAGNOSIS — Z12.39 ENCOUNTER FOR SCREENING FOR MALIGNANT NEOPLASM OF BREAST, UNSPECIFIED SCREENING MODALITY: Primary | ICD-10-CM

## 2023-05-15 DIAGNOSIS — Z12.31 ENCOUNTER FOR SCREENING MAMMOGRAM FOR MALIGNANT NEOPLASM OF BREAST: ICD-10-CM

## 2023-05-15 RX ORDER — LOSARTAN POTASSIUM 100 MG/1
100 TABLET ORAL DAILY
Qty: 90 TABLET | Refills: 1 | Status: SHIPPED | OUTPATIENT
Start: 2023-05-15 | End: 2023-05-31

## 2023-05-16 ENCOUNTER — TELEPHONE (OUTPATIENT)
Dept: PRIMARY CARE CLINIC | Facility: CLINIC | Age: 73
End: 2023-05-16
Payer: MEDICARE

## 2023-05-16 NOTE — TELEPHONE ENCOUNTER
----- Message from Bev Alonzo sent at 5/15/2023  5:08 PM CDT -----  Contact: Pt [phone]    ----- Message -----  From: Estefania Lara  Sent: 5/15/2023   3:30 PM CDT  To: Quinten Eng Staff    Caller is requesting to schedule their annual screening mammogram appointment. Order is not listed in Epic.  Please enter order and contact patient to schedule.  Would the patient like a call back, or a response through their [facility] portal?:   Call Back

## 2023-05-17 ENCOUNTER — TELEPHONE (OUTPATIENT)
Dept: PRIMARY CARE CLINIC | Facility: CLINIC | Age: 73
End: 2023-05-17
Payer: MEDICARE

## 2023-05-17 NOTE — TELEPHONE ENCOUNTER
----- Message from Medina Orosco RN sent at 5/16/2023  4:16 PM CDT -----  Contact: Pt [phone]    ----- Message -----  From: Bev Alonzo  Sent: 5/15/2023   5:08 PM CDT  To: Luz Marina Thomas RN, Medina Orosco RN      ----- Message -----  From: Estefania Lara  Sent: 5/15/2023   3:30 PM CDT  To: Quinten Eng Staff    Caller is requesting to schedule their annual screening mammogram appointment. Order is not listed in Epic.  Please enter order and contact patient to schedule.  Would the patient like a call back, or a response through their [facility] portal?:   Call Back

## 2023-05-23 ENCOUNTER — PATIENT MESSAGE (OUTPATIENT)
Dept: GASTROENTEROLOGY | Facility: CLINIC | Age: 73
End: 2023-05-23
Payer: MEDICARE

## 2023-05-24 ENCOUNTER — PATIENT MESSAGE (OUTPATIENT)
Dept: GASTROENTEROLOGY | Facility: CLINIC | Age: 73
End: 2023-05-24
Payer: MEDICARE

## 2023-05-25 ENCOUNTER — HOSPITAL ENCOUNTER (OUTPATIENT)
Dept: RADIOLOGY | Facility: HOSPITAL | Age: 73
Discharge: HOME | End: 2023-05-25
Attending: INTERNAL MEDICINE
Payer: MEDICARE

## 2023-05-25 DIAGNOSIS — Z12.31 ENCOUNTER FOR SCREENING MAMMOGRAM FOR MALIGNANT NEOPLASM OF BREAST: ICD-10-CM

## 2023-05-25 DIAGNOSIS — Z12.39 ENCOUNTER FOR SCREENING FOR MALIGNANT NEOPLASM OF BREAST, UNSPECIFIED SCREENING MODALITY: ICD-10-CM

## 2023-05-25 PROCEDURE — 77063 BREAST TOMOSYNTHESIS BI: CPT | Mod: 26,,, | Performed by: RADIOLOGY

## 2023-05-25 PROCEDURE — 77063 MAMMO DIGITAL SCREENING BILAT WITH TOMO: ICD-10-PCS | Mod: 26,,, | Performed by: RADIOLOGY

## 2023-05-25 PROCEDURE — 77067 MAMMO DIGITAL SCREENING BILAT WITH TOMO: ICD-10-PCS | Mod: 26,,, | Performed by: RADIOLOGY

## 2023-05-25 PROCEDURE — 77067 SCR MAMMO BI INCL CAD: CPT | Mod: TC,PO

## 2023-05-25 PROCEDURE — 77067 SCR MAMMO BI INCL CAD: CPT | Mod: 26,,, | Performed by: RADIOLOGY

## 2023-05-26 ENCOUNTER — TELEPHONE (OUTPATIENT)
Dept: PRIMARY CARE CLINIC | Facility: CLINIC | Age: 73
End: 2023-05-26

## 2023-05-26 ENCOUNTER — PATIENT MESSAGE (OUTPATIENT)
Dept: PRIMARY CARE CLINIC | Facility: CLINIC | Age: 73
End: 2023-05-26

## 2023-05-26 ENCOUNTER — OFFICE VISIT (OUTPATIENT)
Dept: PRIMARY CARE CLINIC | Facility: CLINIC | Age: 73
End: 2023-05-26
Payer: MEDICARE

## 2023-05-26 VITALS
TEMPERATURE: 98 F | RESPIRATION RATE: 18 BRPM | WEIGHT: 161.81 LBS | OXYGEN SATURATION: 97 % | HEIGHT: 65 IN | HEART RATE: 74 BPM | DIASTOLIC BLOOD PRESSURE: 78 MMHG | SYSTOLIC BLOOD PRESSURE: 110 MMHG | BODY MASS INDEX: 26.96 KG/M2

## 2023-05-26 DIAGNOSIS — Z86.73 HISTORY OF CEREBRAL INFARCTION: ICD-10-CM

## 2023-05-26 DIAGNOSIS — I25.84 CORONARY ARTERY CALCIFICATION: ICD-10-CM

## 2023-05-26 DIAGNOSIS — E78.00 PURE HYPERCHOLESTEROLEMIA: ICD-10-CM

## 2023-05-26 DIAGNOSIS — Z63.6 CAREGIVER STRESS: ICD-10-CM

## 2023-05-26 DIAGNOSIS — E53.8 VITAMIN B12 DEFICIENCY: ICD-10-CM

## 2023-05-26 DIAGNOSIS — F32.0 MILD MAJOR DEPRESSION: ICD-10-CM

## 2023-05-26 DIAGNOSIS — R53.83 FATIGUE, UNSPECIFIED TYPE: ICD-10-CM

## 2023-05-26 DIAGNOSIS — R35.0 FREQUENCY OF URINATION: Primary | ICD-10-CM

## 2023-05-26 DIAGNOSIS — E78.5 HYPERLIPIDEMIA, UNSPECIFIED HYPERLIPIDEMIA TYPE: ICD-10-CM

## 2023-05-26 DIAGNOSIS — G89.29 CHRONIC BILATERAL LOW BACK PAIN WITH RIGHT-SIDED SCIATICA: ICD-10-CM

## 2023-05-26 DIAGNOSIS — F51.01 PRIMARY INSOMNIA: ICD-10-CM

## 2023-05-26 DIAGNOSIS — F41.1 GENERALIZED ANXIETY DISORDER: ICD-10-CM

## 2023-05-26 DIAGNOSIS — M54.41 CHRONIC BILATERAL LOW BACK PAIN WITH RIGHT-SIDED SCIATICA: ICD-10-CM

## 2023-05-26 DIAGNOSIS — I25.10 CORONARY ARTERY CALCIFICATION: ICD-10-CM

## 2023-05-26 DIAGNOSIS — I10 PRIMARY HYPERTENSION: ICD-10-CM

## 2023-05-26 DIAGNOSIS — K21.9 GASTROESOPHAGEAL REFLUX DISEASE WITHOUT ESOPHAGITIS: ICD-10-CM

## 2023-05-26 LAB
BILIRUB SERPL-MCNC: NEGATIVE MG/DL
BILIRUB UR QL STRIP: NEGATIVE
BLOOD URINE, POC: NEGATIVE
CLARITY UR REFRACT.AUTO: CLEAR
CLARITY, POC UA: CLEAR
COLOR UR AUTO: YELLOW
COLOR, POC UA: NORMAL
GLUCOSE UR QL STRIP: NEGATIVE
GLUCOSE UR QL STRIP: NEGATIVE
HGB UR QL STRIP: NEGATIVE
KETONES UR QL STRIP: NEGATIVE
KETONES UR QL STRIP: NEGATIVE
LEUKOCYTE ESTERASE UR QL STRIP: NEGATIVE
LEUKOCYTE ESTERASE URINE, POC: NEGATIVE
NITRITE UR QL STRIP: NEGATIVE
NITRITE, POC UA: NEGATIVE
PH UR STRIP: 7 [PH] (ref 5–8)
PH, POC UA: 7
PROT UR QL STRIP: NEGATIVE
PROTEIN, POC: NEGATIVE
SP GR UR STRIP: 1.02 (ref 1–1.03)
SPECIFIC GRAVITY, POC UA: 1.02
URN SPEC COLLECT METH UR: NORMAL
UROBILINOGEN, POC UA: 0.2

## 2023-05-26 PROCEDURE — 4010F PR ACE/ARB THEARPY RXD/TAKEN: ICD-10-PCS | Mod: CPTII,S$GLB,, | Performed by: INTERNAL MEDICINE

## 2023-05-26 PROCEDURE — 99999 PR PBB SHADOW E&M-EST. PATIENT-LVL V: CPT | Mod: PBBFAC,,, | Performed by: INTERNAL MEDICINE

## 2023-05-26 PROCEDURE — 4010F ACE/ARB THERAPY RXD/TAKEN: CPT | Mod: CPTII,S$GLB,, | Performed by: INTERNAL MEDICINE

## 2023-05-26 PROCEDURE — 3288F FALL RISK ASSESSMENT DOCD: CPT | Mod: CPTII,S$GLB,, | Performed by: INTERNAL MEDICINE

## 2023-05-26 PROCEDURE — 3044F PR MOST RECENT HEMOGLOBIN A1C LEVEL <7.0%: ICD-10-PCS | Mod: CPTII,S$GLB,, | Performed by: INTERNAL MEDICINE

## 2023-05-26 PROCEDURE — 1126F AMNT PAIN NOTED NONE PRSNT: CPT | Mod: CPTII,S$GLB,, | Performed by: INTERNAL MEDICINE

## 2023-05-26 PROCEDURE — 3008F PR BODY MASS INDEX (BMI) DOCUMENTED: ICD-10-PCS | Mod: CPTII,S$GLB,, | Performed by: INTERNAL MEDICINE

## 2023-05-26 PROCEDURE — 3078F DIAST BP <80 MM HG: CPT | Mod: CPTII,S$GLB,, | Performed by: INTERNAL MEDICINE

## 2023-05-26 PROCEDURE — 1101F PR PT FALLS ASSESS DOC 0-1 FALLS W/OUT INJ PAST YR: ICD-10-PCS | Mod: CPTII,S$GLB,, | Performed by: INTERNAL MEDICINE

## 2023-05-26 PROCEDURE — 3288F PR FALLS RISK ASSESSMENT DOCUMENTED: ICD-10-PCS | Mod: CPTII,S$GLB,, | Performed by: INTERNAL MEDICINE

## 2023-05-26 PROCEDURE — 3078F PR MOST RECENT DIASTOLIC BLOOD PRESSURE < 80 MM HG: ICD-10-PCS | Mod: CPTII,S$GLB,, | Performed by: INTERNAL MEDICINE

## 2023-05-26 PROCEDURE — 81003 POCT URINALYSIS(INSTRUMENT): ICD-10-PCS | Mod: QW,S$GLB,, | Performed by: INTERNAL MEDICINE

## 2023-05-26 PROCEDURE — 1159F PR MEDICATION LIST DOCUMENTED IN MEDICAL RECORD: ICD-10-PCS | Mod: CPTII,S$GLB,, | Performed by: INTERNAL MEDICINE

## 2023-05-26 PROCEDURE — 99215 OFFICE O/P EST HI 40 MIN: CPT | Mod: S$GLB,,, | Performed by: INTERNAL MEDICINE

## 2023-05-26 PROCEDURE — 1101F PT FALLS ASSESS-DOCD LE1/YR: CPT | Mod: CPTII,S$GLB,, | Performed by: INTERNAL MEDICINE

## 2023-05-26 PROCEDURE — 1158F ADVNC CARE PLAN TLK DOCD: CPT | Mod: CPTII,S$GLB,, | Performed by: INTERNAL MEDICINE

## 2023-05-26 PROCEDURE — 3008F BODY MASS INDEX DOCD: CPT | Mod: CPTII,S$GLB,, | Performed by: INTERNAL MEDICINE

## 2023-05-26 PROCEDURE — 3074F PR MOST RECENT SYSTOLIC BLOOD PRESSURE < 130 MM HG: ICD-10-PCS | Mod: CPTII,S$GLB,, | Performed by: INTERNAL MEDICINE

## 2023-05-26 PROCEDURE — 1160F RVW MEDS BY RX/DR IN RCRD: CPT | Mod: CPTII,S$GLB,, | Performed by: INTERNAL MEDICINE

## 2023-05-26 PROCEDURE — 81003 URINALYSIS AUTO W/O SCOPE: CPT | Performed by: INTERNAL MEDICINE

## 2023-05-26 PROCEDURE — 1160F PR REVIEW ALL MEDS BY PRESCRIBER/CLIN PHARMACIST DOCUMENTED: ICD-10-PCS | Mod: CPTII,S$GLB,, | Performed by: INTERNAL MEDICINE

## 2023-05-26 PROCEDURE — 1159F MED LIST DOCD IN RCRD: CPT | Mod: CPTII,S$GLB,, | Performed by: INTERNAL MEDICINE

## 2023-05-26 PROCEDURE — 99999 PR PBB SHADOW E&M-EST. PATIENT-LVL V: ICD-10-PCS | Mod: PBBFAC,,, | Performed by: INTERNAL MEDICINE

## 2023-05-26 PROCEDURE — 1158F PR ADVANCE CARE PLANNING DISCUSS DOCUMENTED IN MEDICAL RECORD: ICD-10-PCS | Mod: CPTII,S$GLB,, | Performed by: INTERNAL MEDICINE

## 2023-05-26 PROCEDURE — 3074F SYST BP LT 130 MM HG: CPT | Mod: CPTII,S$GLB,, | Performed by: INTERNAL MEDICINE

## 2023-05-26 PROCEDURE — 1126F PR PAIN SEVERITY QUANTIFIED, NO PAIN PRESENT: ICD-10-PCS | Mod: CPTII,S$GLB,, | Performed by: INTERNAL MEDICINE

## 2023-05-26 PROCEDURE — 81003 URINALYSIS AUTO W/O SCOPE: CPT | Mod: QW,S$GLB,, | Performed by: INTERNAL MEDICINE

## 2023-05-26 PROCEDURE — 81002 POCT URINE DIPSTICK WITHOUT MICROSCOPE: ICD-10-PCS | Mod: S$GLB,,, | Performed by: INTERNAL MEDICINE

## 2023-05-26 PROCEDURE — 81002 URINALYSIS NONAUTO W/O SCOPE: CPT | Mod: S$GLB,,, | Performed by: INTERNAL MEDICINE

## 2023-05-26 PROCEDURE — 3044F HG A1C LEVEL LT 7.0%: CPT | Mod: CPTII,S$GLB,, | Performed by: INTERNAL MEDICINE

## 2023-05-26 PROCEDURE — 99215 PR OFFICE/OUTPT VISIT, EST, LEVL V, 40-54 MIN: ICD-10-PCS | Mod: S$GLB,,, | Performed by: INTERNAL MEDICINE

## 2023-05-26 RX ORDER — MAGNESIUM GLYCINATE 100 MG
TABLET ORAL
COMMUNITY
Start: 2023-03-01 | End: 2023-06-08 | Stop reason: CLARIF

## 2023-05-26 RX ORDER — ESCITALOPRAM OXALATE 20 MG/1
20 TABLET ORAL DAILY
Qty: 90 TABLET | Refills: 1 | Status: SHIPPED | OUTPATIENT
Start: 2023-05-26 | End: 2023-05-31

## 2023-05-26 RX ORDER — AMLODIPINE BESYLATE 5 MG/1
10 TABLET ORAL DAILY
Qty: 90 TABLET | Refills: 0 | Status: ON HOLD | OUTPATIENT
Start: 2023-05-26 | End: 2023-06-09 | Stop reason: SDUPTHER

## 2023-05-26 RX ORDER — DIPHENHYDRAMINE HCL 25 MG
CAPSULE ORAL
COMMUNITY
Start: 2023-03-01 | End: 2023-06-08 | Stop reason: CLARIF

## 2023-05-26 SDOH — SOCIAL DETERMINANTS OF HEALTH (SDOH): DEPENDENT RELATIVE NEEDING CARE AT HOME: Z63.6

## 2023-05-26 NOTE — TELEPHONE ENCOUNTER
----- Message from Ml Shipley MD sent at 5/26/2023  2:33 PM CDT -----  Negative appearing urine. Will send for culture. Try azo otc or cranberry tablet and continue good hydration

## 2023-05-26 NOTE — TELEPHONE ENCOUNTER
Patient was informed of what Dr. Shipley said about otc meds for urinary health. She verbalized understanding.

## 2023-05-26 NOTE — PROGRESS NOTES
"INTERNAL MEDICINE PROGRESS/URGENT CARE NOTE    CHIEF COMPLAINT     Chief Complaint   Patient presents with    Follow-up     Possible uti         HPI     Dottie Galeana is a 71 y.o.  female who presents with a PMHx of  depression and anxiety, HTN, HLD, osteoporosis, stress incontinence, GERD, history of CVA who presents today for scheduled 6 month mood follow up.     Her main complaints today are: care giver stress   Recent URI with severe cough. Was very  very ill for 3 weeks. Now feeling better     Of note, patiens labs suggest that she has been eating better, her glucose is lower and her cholesterol has improved. Great job. She reports shes "Eating better" shes done a wonderful job.      Oral ulcers has been seeing medical providers for the past 2 months and has had extensive work up done. Was told to see her dentist. History and exam consistent with burning mouth syndrome. Likely brought on by stress and anxiety which she admittedly has a lot of. Today she does report that this has almost completely resolved.   Today she reports she gets them every now and again but not as bad.      Depression: effexor 150 daily. Takes xanax for anxiety. PHQ 9 at last visit was a 7/27. ALFONSO 7 was 12/21 which is moderate anxiety. She did confirmed that she currently has a lot of anxiety. Her  has visual issues and health concerns.has a very happy family and support system, 5 grandchildren that shes very close to but she has a lot of caregiver duties. Doesn't leave the house much as her  gets anxious when she does, so apart from her family, she does very little socializing with friends. Says she used to work so much that she has little to no friends that she socializes with.   At her last visit, we agreed to try lexparo 10mg and today she reports that the medication is great. That she loves it. Thinks its made an amazing difference with her mood. Shes happier and less anxious. May also be sleeping a bit better. "   GAD7 today is a 4/21, previous visit was 7/21.   PHQ 9 today is a 3/27, on last visit was 3/27 which is an improvement compared to the values above  Denies SI/HI.      Polypharmacy. Inappropriate and appropriate polypharmacy. Currently takes ambien 10mg nighly and xanax 0.25mg PRN at least 1-2 times per week. Counseled about these medications and the worrisome concerns about their use regarding falls etc. So we discussed trialling rtazodone. If patient unable to sleep without ambien she has agreed to see a sleep specialist or psychiatry. Today, she states that the trazodone has done nothing to help with sleep, so ni instructed her that the 6 hours that she does get is sufficient. If at some point this worsens, I would like her to see a sleep specialist.      Insomnia: has been on ambien for years. Says she is unable to sleep without it. For unclear reasons has never been on 5mg instead of the 10mg. Has not tried any alternative sleep medications but says she has tried to go without the medication and was not able to get any sleep. Has tried melatonin and says this does nothing for her. Not strict about her sleep hygiene as her husbands health and wake sleep cycle affects hers. She is aware that she should be off the ambien as her son also tells her this. Is wiling tor trial off this and try an alternative. Tried the trazodone which did not work for her. Agreed to refer to sleep specialist if she starts having more trouble with sleep.      HCM: will get her shingles at the start of the year when it will become free.   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    Home Medications:  Prior to Admission medications    Medication Sig Start Date End Date Taking?  Authorizing Provider   albuterol (VENTOLIN HFA) 90 mcg/actuation inhaler Inhale 2 puffs into the lungs every 6 (six) hours as needed for Wheezing. Rescue 5/8/23   Jazmin Castañeda NP   alendronate (FOSAMAX) 70 MG tablet Take 1 tablet (70 mg total) by mouth every 7 days. 12/12/22 12/12/23  Ml Shipley MD   ALPRAZolam (XANAX) 0.25 MG tablet Take 1 tablet (0.25 mg total) by mouth 3 (three) times daily as needed for Anxiety. 12/12/22   Ml Shipley MD   amLODIPine (NORVASC) 10 MG tablet TAKE 1 TABLET ONE TIME DAILY 10/19/22   Ml Shipley MD   aspirin 325 MG tablet Take 325 mg by mouth once daily. Pt taking 1/2 daily 11/23/11   Historical Provider   benzonatate (TESSALON PERLES) 100 MG capsule Take 1 capsule (100 mg total) by mouth every 6 (six) hours as needed for Cough.  Patient not taking: Reported on 5/8/2023 4/27/23 4/26/24  Scottie Mcintosh MD   EScitalopram oxalate (LEXAPRO) 10 MG tablet Take 1 tablet (10 mg total) by mouth once daily. 12/13/22 12/13/23  Ml Shipley MD   gabapentin (NEURONTIN) 300 MG capsule Take 2 capsules (600 mg total) by mouth 2 (two) times daily. Titrate up to twice daily only if continued symptoms after once daily 2/1/23 5/8/23  David Wei MD   losartan (COZAAR) 100 MG tablet TAKE 1 TABLET (100 MG TOTAL) BY MOUTH ONCE DAILY. 5/15/23 5/14/24  Ml Shipley MD   multivitamin (THERAGRAN) per tablet Take 1 tablet by mouth once daily.  11/23/11   Historical Provider   nebivoloL (BYSTOLIC) 10 MG Tab Take 1 tablet (10 mg total) by mouth once daily.  Patient taking differently: Take 10 mg by mouth once daily. One tablet in am, 1/2 tab pm 1/25/22   Josh Li MD   NITROSTAT 0.4 mg SL tablet Place 0.4 mg under the tongue every 5 (five) minutes as needed.  12/9/16   José Miguel Bynum MD   pantoprazole (PROTONIX) 40 MG tablet Take 1 tablet (40 mg total) by mouth 2 (two) times daily. 1/13/23 7/12/23  Seema Crenshaw NP    promethazine-dextromethorphan (PROMETHAZINE-DM) 6.25-15 mg/5 mL Syrp Take 5 mLs by mouth every 4 (four) hours as needed (cough).  Patient not taking: Reported on 5/8/2023 4/23/23   Jose Guadalupe Newman MD   rosuvastatin (CRESTOR) 10 MG tablet TAKE 1 TABLET (10 MG TOTAL) BY MOUTH EVERY EVENING. 5/11/23   Ml Shipley MD   traZODone (DESYREL) 50 MG tablet Take 1 tablet (50 mg total) by mouth every evening. 1/9/23 1/9/24  Ml Shipley MD   venlafaxine (EFFEXOR-XR) 150 MG Cp24 Take 1 capsule (150 mg total) by mouth once daily. 10/26/22   Ml Shipley MD       Review of Systems:  Review of Systems    Health Maintainence:   Immunizations:   TDap is up to date, Influenza is up to date, Pneumovax is up to date. Zostavax is not UTD.       Cancer Screening:  PAP:  up to date.  Mammogram: is up to date.   Colonoscopy: is up to date.   DEXA:  is up to date.   Screening:  Hepatitis C is up to date in pts born between [id number].     Advance Care Planning     Date: 05/26/2023    Power of   I initiated the process of advance care planning today and explained the importance of this process to the patient.  I introduced the concept of advance directives to the patient, as well. Then the patient received detailed information about the importance of designating a Health Care Power of  (HCPOA). She was also instructed to communicate with this person about their wishes for future healthcare, should she become sick and lose decision-making capacity. The patient has not previously appointed a HCPOA. After our discussion, the patient has decided to complete a HCPOA and has appointed her significant other, health care agent:  name on file  & health care agent number:  name on file  I spent a total time of 10 minutes discussing this issue with the patient.            .AWV last done over a year. Ordered today      PHYSICAL EXAM     /78 (BP Location: Left arm, Patient Position: Sitting, BP  "Method: Medium (Manual))   Pulse 74   Temp 98.4 °F (36.9 °C) (Oral)   Resp 18   Ht 5' 5" (1.651 m)   Wt 73.4 kg (161 lb 13.1 oz)   LMP 04/21/1994   SpO2 97%   BMI 26.93 kg/m²     GEN - A+OX4, NAD   HEENT - PERRL, EOMI, OP clear  Neck - No thyromegaly or cervical LAD. No thyroid masses felt.  CV - RRR, no m/r   Chest - CTAB, no wheezing or rhonchi  Abd - S/NT/ND/+BS.   Ext - 2+BDP and radial pulses. No C/C/E.  Skin - No rash.    LABS       ASSESSMENT/PLAN     Dottie Galeana is a 72 y.o. female with  1. Frequency of urination  -     POCT URINE DIPSTICK WITHOUT MICROSCOPE    2. Mild major depression  Overview:  Stable and well controlled   abit " testy with her " so we increased the lexapro. PHQ 9 shows 4/27 with mild depression  Denies SI/HI       3. Generalized anxiety disorder  Overview:  Controlled  Increased dose of lexapro  Advised counseling and she is agreeable       4. Primary insomnia  Overview:  Trazodone works but shes also having to use benadryl. Will trial 100mg of the trazodone. Stop the benadryl.   Sleep hygiene advised    Orders:  -     TSH; Future; Expected date: 08/26/2023    5. History of cerebral infarction  On statin with great cholesterol control. Continue   shes eating better. Today we discuss need for cardio excerci    6. Pure hypercholesterolemia  On statin with great cholesterol control. Continue   shes eating better. Today we discuss need for cardio excerci    7. Hyperlipidemia, unspecified hyperlipidemia type  On statin with great cholesterol control. Continue   shes eating better. Today we discuss need for cardio excerci    8. Primary hypertension  Overview:  BP very well controlled. Lowered the amlodipine due to ankle swelling   Limit salt in diet  Needs more cardo excercise      9. Vitamin B12 deficiency  Overview:  Encouraged supplementing vitamin b12       10. Chronic bilateral low back pain with right-sided sciatica  Overview:  Stable  Tylenol as " needed        11. Fatigue, unspecified type  -     TSH; Future; Expected date: 08/26/2023    12. Caregiver stress  -     Ambulatory referral/consult to [facility]; Future; Expected date: 06/02/2023    13. Gastroesophageal reflux disease without esophagitis  Overview:  Very well controlled with protonix. Continue.   Lifestyle changes advised ie limit foods known to cause symptoms      14. Coronary artery calcification  Overview:  On statin with great cholesterol control. Continue   shes eating better. Today we discuss need for cardio excercise      Other orders  -     amLODIPine (NORVASC) 5 MG tablet; Take 2 tablets (10 mg total) by mouth once daily.  Dispense: 90 tablet; Refill: 0  -     EScitalopram oxalate (LEXAPRO) 20 MG tablet; Take 1 tablet (20 mg total) by mouth once daily.  Dispense: 90 tablet; Refill: 1       WORRY SCORE 1    RTC in 6 months, sooner if needed and depending on labs.    Ml Shipley MD  Board Certified Internist/Geriatrician  [facility]

## 2023-05-26 NOTE — PROGRESS NOTES
Negative appearing urine. Will send for culture. Try azo otc or cranberry tablet and continue good hydration

## 2023-05-26 NOTE — TELEPHONE ENCOUNTER
Patient states she thought Dr. Shipley told her to stop one of her BP medications. However, she cannot remember which med and cannot find it in her AVS or on the portal. Please advise.

## 2023-05-28 ENCOUNTER — PATIENT MESSAGE (OUTPATIENT)
Dept: ENDOSCOPY | Facility: HOSPITAL | Age: 73
End: 2023-05-28
Payer: MEDICARE

## 2023-05-29 ENCOUNTER — PATIENT MESSAGE (OUTPATIENT)
Dept: PRIMARY CARE CLINIC | Facility: CLINIC | Age: 73
End: 2023-05-29
Payer: MEDICARE

## 2023-05-30 ENCOUNTER — PATIENT MESSAGE (OUTPATIENT)
Dept: PRIMARY CARE CLINIC | Facility: CLINIC | Age: 73
End: 2023-05-30
Payer: MEDICARE

## 2023-05-30 LAB
BILIRUBIN, UA POC OHS: NEGATIVE
BLOOD, UA POC OHS: NEGATIVE
CLARITY, UA POC OHS: CLEAR
COLOR, UA POC OHS: YELLOW
GLUCOSE, UA POC OHS: NEGATIVE
KETONES, UA POC OHS: NEGATIVE
LEUKOCYTES, UA POC OHS: NEGATIVE
NITRITE, UA POC OHS: NEGATIVE
PH, UA POC OHS: 7
PROTEIN, UA POC OHS: NEGATIVE
SPECIFIC GRAVITY, UA POC OHS: 1.02
UROBILINOGEN, UA POC OHS: 0.2

## 2023-05-30 NOTE — TELEPHONE ENCOUNTER
LOV: 5/26/23    Patient is asking about you increasing her Trazodone to 100mg PO  nightly. Her orders still reflect 50mg PO nightly.         4. Primary insomnia  Overview:  Trazodone works but shes also having to use benadryl. Will trial 100mg of the trazodone. Stop the benadryl.   Sleep hygiene advised

## 2023-05-31 ENCOUNTER — TELEPHONE (OUTPATIENT)
Dept: PRIMARY CARE CLINIC | Facility: CLINIC | Age: 73
End: 2023-05-31
Payer: MEDICARE

## 2023-05-31 DIAGNOSIS — E78.5 HYPERLIPIDEMIA, UNSPECIFIED HYPERLIPIDEMIA TYPE: ICD-10-CM

## 2023-05-31 DIAGNOSIS — I10 ESSENTIAL HYPERTENSION: ICD-10-CM

## 2023-05-31 RX ORDER — ESCITALOPRAM OXALATE 20 MG/1
20 TABLET ORAL DAILY
Qty: 90 TABLET | Refills: 1 | Status: SHIPPED | OUTPATIENT
Start: 2023-05-31 | End: 2023-11-22

## 2023-05-31 RX ORDER — LOSARTAN POTASSIUM 100 MG/1
100 TABLET ORAL DAILY
Qty: 90 TABLET | Refills: 1 | Status: ON HOLD | OUTPATIENT
Start: 2023-05-31 | End: 2023-06-09 | Stop reason: SDUPTHER

## 2023-05-31 RX ORDER — ROSUVASTATIN CALCIUM 10 MG/1
10 TABLET, COATED ORAL DAILY
Qty: 90 TABLET | Refills: 0 | Status: SHIPPED | OUTPATIENT
Start: 2023-05-31 | End: 2023-12-07 | Stop reason: SDUPTHER

## 2023-05-31 NOTE — TELEPHONE ENCOUNTER
Clinician received referral from PCP for behavioral health services. Clinician received another message from this pt to follow up with transportation needs.    Client is scheduled for initial BHI on 6/5/2023 at 1:00.     Clinician will follow up on resources for transportation. She was given [facility] to contact at [phone] as a possibility.

## 2023-06-02 ENCOUNTER — DOCUMENTATION ONLY (OUTPATIENT)
Dept: REHABILITATION | Facility: HOSPITAL | Age: 73
End: 2023-06-02
Payer: MEDICARE

## 2023-06-05 ENCOUNTER — PATIENT MESSAGE (OUTPATIENT)
Dept: ENDOSCOPY | Facility: HOSPITAL | Age: 73
End: 2023-06-05
Payer: MEDICARE

## 2023-06-05 ENCOUNTER — CLINICAL SUPPORT (OUTPATIENT)
Dept: PRIMARY CARE CLINIC | Facility: CLINIC | Age: 73
End: 2023-06-05
Payer: MEDICARE

## 2023-06-05 DIAGNOSIS — F32.A DEPRESSIVE DISORDER: ICD-10-CM

## 2023-06-05 DIAGNOSIS — F41.1 GAD (GENERALIZED ANXIETY DISORDER): ICD-10-CM

## 2023-06-05 DIAGNOSIS — Z63.6 CAREGIVER STRESS: Primary | ICD-10-CM

## 2023-06-05 PROCEDURE — 99999 PR PBB SHADOW E&M-EST. PATIENT-LVL I: CPT | Mod: PBBFAC,,, | Performed by: SOCIAL WORKER

## 2023-06-05 PROCEDURE — 99999 PR PBB SHADOW E&M-EST. PATIENT-LVL I: ICD-10-PCS | Mod: PBBFAC,,, | Performed by: SOCIAL WORKER

## 2023-06-05 PROCEDURE — 99499 UNLISTED E&M SERVICE: CPT | Mod: S$GLB,,, | Performed by: SOCIAL WORKER

## 2023-06-05 PROCEDURE — 99499 NO LOS: ICD-10-PCS | Mod: S$GLB,,, | Performed by: SOCIAL WORKER

## 2023-06-05 SDOH — SOCIAL DETERMINANTS OF HEALTH (SDOH): DEPENDENT RELATIVE NEEDING CARE AT HOME: Z63.6
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
## 2023-06-06 ENCOUNTER — OFFICE VISIT (OUTPATIENT)
Dept: OTOLARYNGOLOGY | Facility: CLINIC | Age: 73
End: 2023-06-06
Payer: MEDICARE

## 2023-06-06 ENCOUNTER — PATIENT MESSAGE (OUTPATIENT)
Dept: ENDOSCOPY | Facility: HOSPITAL | Age: 73
End: 2023-06-06
Payer: MEDICARE

## 2023-06-06 VITALS — BODY MASS INDEX: 27.18 KG/M2 | HEIGHT: 65 IN | WEIGHT: 163.13 LBS

## 2023-06-06 DIAGNOSIS — R05.3 CHRONIC COUGH: Primary | ICD-10-CM

## 2023-06-06 DIAGNOSIS — K11.7 XEROSTOMIA: ICD-10-CM

## 2023-06-06 DIAGNOSIS — J38.7 IRRITABLE LARYNX SYNDROME: ICD-10-CM

## 2023-06-06 PROCEDURE — 4010F PR ACE/ARB THEARPY RXD/TAKEN: ICD-10-PCS | Mod: CPTII,S$GLB,, | Performed by: OTOLARYNGOLOGY

## 2023-06-06 PROCEDURE — 1160F RVW MEDS BY RX/DR IN RCRD: CPT | Mod: CPTII,S$GLB,, | Performed by: OTOLARYNGOLOGY

## 2023-06-06 PROCEDURE — 1126F PR PAIN SEVERITY QUANTIFIED, NO PAIN PRESENT: ICD-10-PCS | Mod: CPTII,S$GLB,, | Performed by: OTOLARYNGOLOGY

## 2023-06-06 PROCEDURE — 3044F PR MOST RECENT HEMOGLOBIN A1C LEVEL <7.0%: ICD-10-PCS | Mod: CPTII,S$GLB,, | Performed by: OTOLARYNGOLOGY

## 2023-06-06 PROCEDURE — 1101F PT FALLS ASSESS-DOCD LE1/YR: CPT | Mod: CPTII,S$GLB,, | Performed by: OTOLARYNGOLOGY

## 2023-06-06 PROCEDURE — 4010F ACE/ARB THERAPY RXD/TAKEN: CPT | Mod: CPTII,S$GLB,, | Performed by: OTOLARYNGOLOGY

## 2023-06-06 PROCEDURE — 1160F PR REVIEW ALL MEDS BY PRESCRIBER/CLIN PHARMACIST DOCUMENTED: ICD-10-PCS | Mod: CPTII,S$GLB,, | Performed by: OTOLARYNGOLOGY

## 2023-06-06 PROCEDURE — 1159F MED LIST DOCD IN RCRD: CPT | Mod: CPTII,S$GLB,, | Performed by: OTOLARYNGOLOGY

## 2023-06-06 PROCEDURE — 99999 PR PBB SHADOW E&M-EST. PATIENT-LVL III: CPT | Mod: PBBFAC,,, | Performed by: OTOLARYNGOLOGY

## 2023-06-06 PROCEDURE — 3288F PR FALLS RISK ASSESSMENT DOCUMENTED: ICD-10-PCS | Mod: CPTII,S$GLB,, | Performed by: OTOLARYNGOLOGY

## 2023-06-06 PROCEDURE — 99999 PR PBB SHADOW E&M-EST. PATIENT-LVL III: ICD-10-PCS | Mod: PBBFAC,,, | Performed by: OTOLARYNGOLOGY

## 2023-06-06 PROCEDURE — 1126F AMNT PAIN NOTED NONE PRSNT: CPT | Mod: CPTII,S$GLB,, | Performed by: OTOLARYNGOLOGY

## 2023-06-06 PROCEDURE — 1159F PR MEDICATION LIST DOCUMENTED IN MEDICAL RECORD: ICD-10-PCS | Mod: CPTII,S$GLB,, | Performed by: OTOLARYNGOLOGY

## 2023-06-06 PROCEDURE — 3008F PR BODY MASS INDEX (BMI) DOCUMENTED: ICD-10-PCS | Mod: CPTII,S$GLB,, | Performed by: OTOLARYNGOLOGY

## 2023-06-06 PROCEDURE — 1101F PR PT FALLS ASSESS DOC 0-1 FALLS W/OUT INJ PAST YR: ICD-10-PCS | Mod: CPTII,S$GLB,, | Performed by: OTOLARYNGOLOGY

## 2023-06-06 PROCEDURE — 3008F BODY MASS INDEX DOCD: CPT | Mod: CPTII,S$GLB,, | Performed by: OTOLARYNGOLOGY

## 2023-06-06 PROCEDURE — 99214 PR OFFICE/OUTPT VISIT, EST, LEVL IV, 30-39 MIN: ICD-10-PCS | Mod: S$GLB,,, | Performed by: OTOLARYNGOLOGY

## 2023-06-06 PROCEDURE — 3044F HG A1C LEVEL LT 7.0%: CPT | Mod: CPTII,S$GLB,, | Performed by: OTOLARYNGOLOGY

## 2023-06-06 PROCEDURE — 99214 OFFICE O/P EST MOD 30 MIN: CPT | Mod: S$GLB,,, | Performed by: OTOLARYNGOLOGY

## 2023-06-06 PROCEDURE — 3288F FALL RISK ASSESSMENT DOCD: CPT | Mod: CPTII,S$GLB,, | Performed by: OTOLARYNGOLOGY

## 2023-06-06 RX ORDER — GABAPENTIN 300 MG/1
900 CAPSULE ORAL 2 TIMES DAILY
Qty: 180 CAPSULE | Refills: 1 | Status: SHIPPED | OUTPATIENT
Start: 2023-06-06 | End: 2024-02-14 | Stop reason: SDUPTHER

## 2023-06-06 NOTE — PROGRESS NOTES
Subjective:       Patient ID: Dottie Galeana is a 72 y.o. female.    Chief Complaint: Cough    Dottie is here for follow-up of suspected ILS.     She is taking Gabapentin 600 mg BID  She has felt improvement in cough but her family still concerned about the amount of cough she has.   She feels more irritation /sensitivity on the left side.    HPI 12/2022:  Dottie is here for cough.   Length of symptoms: 2 years.  It has been intermittent. It will come on for weeks / months at a time then go away. She has a tickle on the left side of the throat and when she talks. She does have mild increased sensitivity with smells, though this is not frequent. She has had mild dysphonia. She denies dysphagia. Laughing does stimulate cough.  Therapies tried include: oral AH, Flonase, Mucinex,   She has GERD and is on PPI. She has EGD with dilations for narrowing. Small hiatal hernia noted on CT Chest.   No tobacco history +2nd hand  Had negative RAST    Patient validated questionnaires (if applicable):      %       No flowsheet data found.  No flowsheet data found.  No flowsheet data found.         Review of Systems   Constitutional: Negative for activity change and appetite change.   Respiratory: Negative for difficulty breathing and wheezing   Cardiovascular: Negative for chest pain.      Objective:        Constitutional:   Vital signs are normal. She appears well-developed and well-nourished.     Head:  Normocephalic and atraumatic.     Ears:  Hearing normal to normal and whispered voice; external ear normal without scars, lesions, or masses; ear canal, tympanic membrane, and middle ear normal..     Nose:  Nose normal including turbinates, nasal mucosa, sinuses and nasal septum.     Mouth/Throat  Oropharynx clear and moist without lesions or asymmetry.     Neck:  Neck normal without thyromegaly masses, asymmetry, normal tracheal structure, crepitus, and tenderness.   Mild laryngeal sensitivity with palpation of the left  hyoid region       Tests / Results:  None new    Assessment:       1. Chronic cough    2. Irritable larynx syndrome    3. Xerostomia          Plan:         Increase gabapentin to 900 b.i.d..  She will update me in a month or so we may increase to t.i.d..  She is not having any side effects medicine but will monitor for any change.  Overall she feels improved somewhat so may not look to be much more aggressive than that unless she declines.    We did discuss her xerostomia, I feel the lot of this is medication related.  She will look to stop her trazodone this is not helping her with her sleep.  We also discussed other mood stabilizing medicines can cause dryness.  We can consider salivary stimulant in the future but would not look to add more medicines with possible side effects unless we can deescalate the other 2.  Supportive care otherwise

## 2023-06-08 ENCOUNTER — PATIENT MESSAGE (OUTPATIENT)
Dept: ENDOSCOPY | Facility: HOSPITAL | Age: 73
End: 2023-06-08
Payer: MEDICARE

## 2023-06-08 ENCOUNTER — HOSPITAL ENCOUNTER (OUTPATIENT)
Facility: HOSPITAL | Age: 73
Discharge: HOME | End: 2023-06-08
Attending: INTERNAL MEDICINE | Admitting: INTERNAL MEDICINE
Payer: MEDICARE

## 2023-06-08 ENCOUNTER — ANESTHESIA EVENT (OUTPATIENT)
Dept: ENDOSCOPY | Facility: HOSPITAL | Age: 73
End: 2023-06-08
Payer: MEDICARE

## 2023-06-08 ENCOUNTER — ANESTHESIA (OUTPATIENT)
Dept: ENDOSCOPY | Facility: HOSPITAL | Age: 73
End: 2023-06-08
Payer: MEDICARE

## 2023-06-08 VITALS
BODY MASS INDEX: 26.82 KG/M2 | HEIGHT: 65 IN | DIASTOLIC BLOOD PRESSURE: 60 MMHG | OXYGEN SATURATION: 98 % | HEART RATE: 65 BPM | RESPIRATION RATE: 16 BRPM | TEMPERATURE: 97 F | WEIGHT: 161 LBS | SYSTOLIC BLOOD PRESSURE: 128 MMHG

## 2023-06-08 DIAGNOSIS — R19.7 DIARRHEA: ICD-10-CM

## 2023-06-08 PROBLEM — R50.9 FEVER: Status: ACTIVE | Noted: 2023-06-08

## 2023-06-08 PROBLEM — K52.9 CHRONIC DIARRHEA: Chronic | Status: ACTIVE | Noted: 2023-06-08

## 2023-06-08 PROBLEM — E87.6 HYPOKALEMIA: Status: ACTIVE | Noted: 2023-06-08

## 2023-06-08 PROBLEM — E87.20 LACTIC ACIDOSIS: Status: ACTIVE | Noted: 2023-06-08

## 2023-06-08 PROBLEM — Z87.81 HISTORY OF COMPRESSION FRACTURE OF SPINE: Chronic | Status: ACTIVE | Noted: 2023-06-08

## 2023-06-08 PROCEDURE — D9220A PRA ANESTHESIA: Mod: CRNA,,, | Performed by: NURSE ANESTHETIST, CERTIFIED REGISTERED

## 2023-06-08 PROCEDURE — 37000009 HC ANESTHESIA EA ADD 15 MINS: Mod: PO | Performed by: INTERNAL MEDICINE

## 2023-06-08 PROCEDURE — D9220A PRA ANESTHESIA: Mod: ANES,,, | Performed by: ANESTHESIOLOGY

## 2023-06-08 PROCEDURE — 88305 TISSUE EXAM BY PATHOLOGIST: CPT | Mod: 26,,, | Performed by: PATHOLOGY

## 2023-06-08 PROCEDURE — 27201012 HC FORCEPS, HOT/COLD, DISP: Mod: PO | Performed by: INTERNAL MEDICINE

## 2023-06-08 PROCEDURE — 45380 PR COLONOSCOPY,BIOPSY: ICD-10-PCS | Mod: ,,, | Performed by: INTERNAL MEDICINE

## 2023-06-08 PROCEDURE — 45380 COLONOSCOPY AND BIOPSY: CPT | Mod: ,,, | Performed by: INTERNAL MEDICINE

## 2023-06-08 PROCEDURE — D9220A PRA ANESTHESIA: ICD-10-PCS | Mod: ANES,,, | Performed by: ANESTHESIOLOGY

## 2023-06-08 PROCEDURE — 45380 COLONOSCOPY AND BIOPSY: CPT | Mod: PO | Performed by: INTERNAL MEDICINE

## 2023-06-08 PROCEDURE — 88305 TISSUE EXAM BY PATHOLOGIST: ICD-10-PCS | Mod: 26,,, | Performed by: PATHOLOGY

## 2023-06-08 PROCEDURE — 25000003 PHARM REV CODE 250: Mod: PO | Performed by: NURSE ANESTHETIST, CERTIFIED REGISTERED

## 2023-06-08 PROCEDURE — 63600175 PHARM REV CODE 636 W HCPCS: Mod: PO | Performed by: NURSE ANESTHETIST, CERTIFIED REGISTERED

## 2023-06-08 PROCEDURE — D9220A PRA ANESTHESIA: ICD-10-PCS | Mod: CRNA,,, | Performed by: NURSE ANESTHETIST, CERTIFIED REGISTERED

## 2023-06-08 PROCEDURE — 88305 TISSUE EXAM BY PATHOLOGIST: CPT | Mod: PO | Performed by: PATHOLOGY

## 2023-06-08 PROCEDURE — 63600175 PHARM REV CODE 636 W HCPCS: Mod: PO | Performed by: INTERNAL MEDICINE

## 2023-06-08 PROCEDURE — 37000008 HC ANESTHESIA 1ST 15 MINUTES: Mod: PO | Performed by: INTERNAL MEDICINE

## 2023-06-08 RX ORDER — SODIUM CHLORIDE 0.9 % (FLUSH) 0.9 %
10 SYRINGE (ML) INJECTION
Status: DISCONTINUED | OUTPATIENT
Start: 2023-06-08 | End: 2023-06-08 | Stop reason: HOSPADM

## 2023-06-08 RX ORDER — LIDOCAINE HYDROCHLORIDE 20 MG/ML
INJECTION INTRAVENOUS
Status: DISCONTINUED | OUTPATIENT
Start: 2023-06-08 | End: 2023-06-08

## 2023-06-08 RX ORDER — PROPOFOL 10 MG/ML
VIAL (ML) INTRAVENOUS
Status: DISCONTINUED | OUTPATIENT
Start: 2023-06-08 | End: 2023-06-08

## 2023-06-08 RX ORDER — SODIUM CHLORIDE, SODIUM LACTATE, POTASSIUM CHLORIDE, CALCIUM CHLORIDE 600; 310; 30; 20 MG/100ML; MG/100ML; MG/100ML; MG/100ML
INJECTION, SOLUTION INTRAVENOUS CONTINUOUS
Status: DISCONTINUED | OUTPATIENT
Start: 2023-06-08 | End: 2023-06-08 | Stop reason: HOSPADM

## 2023-06-08 RX ORDER — ONDANSETRON 2 MG/ML
INJECTION INTRAMUSCULAR; INTRAVENOUS
Status: DISCONTINUED | OUTPATIENT
Start: 2023-06-08 | End: 2023-06-08

## 2023-06-08 RX ORDER — PHENYLEPHRINE HYDROCHLORIDE 10 MG/ML
INJECTION INTRAVENOUS
Status: DISCONTINUED | OUTPATIENT
Start: 2023-06-08 | End: 2023-06-08

## 2023-06-08 RX ADMIN — PROPOFOL 25 MG: 10 INJECTION, EMULSION INTRAVENOUS at 11:06

## 2023-06-08 RX ADMIN — LIDOCAINE HYDROCHLORIDE 100 MG: 20 INJECTION INTRAVENOUS at 10:06

## 2023-06-08 RX ADMIN — PROPOFOL 50 MG: 10 INJECTION, EMULSION INTRAVENOUS at 11:06

## 2023-06-08 RX ADMIN — PROPOFOL 50 MG: 10 INJECTION, EMULSION INTRAVENOUS at 10:06

## 2023-06-08 RX ADMIN — SODIUM CHLORIDE, SODIUM LACTATE, POTASSIUM CHLORIDE, AND CALCIUM CHLORIDE: .6; .31; .03; .02 INJECTION, SOLUTION INTRAVENOUS at 10:06

## 2023-06-08 RX ADMIN — ONDANSETRON 8 MG: 2 INJECTION, SOLUTION INTRAMUSCULAR; INTRAVENOUS at 11:06

## 2023-06-08 RX ADMIN — PHENYLEPHRINE HYDROCHLORIDE 100 MCG: 10 INJECTION, SOLUTION INTRAMUSCULAR; INTRAVENOUS; SUBCUTANEOUS at 11:06

## 2023-06-08 RX ADMIN — PROPOFOL 150 MG: 10 INJECTION, EMULSION INTRAVENOUS at 10:06

## 2023-06-08 NOTE — DISCHARGE SUMMARY
[facility] - Endoscopy  Discharge Note  Short Stay  Discharge Note  Short Stay      SUMMARY     Admit Date: 6/8/2023    Attending Physician: Al Santiago MD     Discharge Physician: Al Santiago MD    Discharge Date: 6/8/2023 11:15 AM    Final Diagnosis: Diarrhea, unspecified type [R19.7]  History of colon polyps [Z86.010]    Disposition: HOME OR SELF CARE    Patient Instructions:   Current Discharge Medication List        CONTINUE these medications which have NOT CHANGED    Details   alendronate (FOSAMAX) 70 MG tablet Take 1 tablet (70 mg total) by mouth every 7 days.  Qty: 12 tablet, Refills: 3      amLODIPine (NORVASC) 5 MG tablet Take 2 tablets (10 mg total) by mouth once daily.  Qty: 90 tablet, Refills: 0    Comments: .      aspirin 325 MG tablet Take 325 mg by mouth once daily. Pt taking 1/2 daily      Bacillus coagulans/inulin (PROBIOTIC WITH PREBIOTIC ORAL)       EScitalopram oxalate (LEXAPRO) 20 MG tablet Take 1 tablet (20 mg total) by mouth once daily.  Qty: 90 tablet, Refills: 1      gabapentin (NEURONTIN) 300 MG capsule Take 3 capsules (900 mg total) by mouth 2 (two) times daily. Titrate up to twice daily only if continued symptoms after once daily  Qty: 180 capsule, Refills: 1      losartan (COZAAR) 100 MG tablet Take 1 tablet (100 mg total) by mouth once daily.  Qty: 90 tablet, Refills: 1    Associated Diagnoses: Essential hypertension      magnesium glycinate (MAG GLYCINATE) 100 mg Tab       multivitamin (THERAGRAN) per tablet Take 1 tablet by mouth once daily.       nebivoloL (BYSTOLIC) 10 MG Tab Take 1 tablet (10 mg total) by mouth once daily.  Qty: 90 tablet, Refills: 2    Comments: Please inactivate all prior scripts with same name and strength including on holds.  Associated Diagnoses: Essential hypertension      pantoprazole (PROTONIX) 40 MG tablet Take 1 tablet (40 mg total) by mouth 2 (two) times daily.  Qty: 180 tablet, Refills: 1    Associated Diagnoses: Chronic cough;  Gastroesophageal reflux disease without esophagitis      rosuvastatin (CRESTOR) 10 MG tablet Take 1 tablet (10 mg total) by mouth once daily.  Qty: 90 tablet, Refills: 0    Associated Diagnoses: Hyperlipidemia, unspecified hyperlipidemia type      traZODone (DESYREL) 50 MG tablet Take 1 tablet (50 mg total) by mouth every evening.  Qty: 90 tablet, Refills: 1      venlafaxine (EFFEXOR-XR) 150 MG Cp24 TAKE 1 CAPSULE (150 MG TOTAL) BY MOUTH ONCE DAILY.  Qty: 90 capsule, Refills: 2    Associated Diagnoses: Generalized anxiety disorder      albuterol (VENTOLIN HFA) 90 mcg/actuation inhaler Inhale 2 puffs into the lungs every 6 (six) hours as needed for Wheezing. Rescue  Qty: 8 g, Refills: 6    Associated Diagnoses: Reactive airway disease without asthma      diphenhydrAMINE (BENADRYL) 25 mg capsule       NITROSTAT 0.4 mg SL tablet Place 0.4 mg under the tongue every 5 (five) minutes as needed.              Discharge Procedure Orders (must include Diet, Follow-up, Activity)    Follow Up:  Follow up with PCP as previously scheduled  Resume routine diet.  Activity as tolerated.    No driving day of procedure.

## 2023-06-08 NOTE — TRANSFER OF CARE
"Anesthesia Transfer of Care Note    Patient: Dottie Galeana    Procedure(s) Performed: Procedure(s) (LRB):  COLONOSCOPY (N/A)    Patient location: PACU    Anesthesia Type: general    Transport from OR: Transported from OR on room air with adequate spontaneous ventilation    Post pain: adequate analgesia    Post assessment: no apparent anesthetic complications and tolerated procedure well    Post vital signs: stable    Level of consciousness: sedated and awake    Nausea/Vomiting: no nausea/vomiting    Complications: none    Transfer of care protocol was followed      Last vitals:   Visit Vitals  /68 (BP Location: Right arm, Patient Position: Lying)   Pulse 72   Temp 36.7 °C (98 °F) (Temporal)   Resp 16   Ht 5' 5" (1.651 m)   Wt 73 kg (161 lb)   LMP 04/21/1994   SpO2 95%   Breastfeeding No   BMI 26.79 kg/m²     "

## 2023-06-08 NOTE — PROVATION PATIENT INSTRUCTIONS
Discharge Summary/Instructions after an Endoscopic Procedure  Patient Name: Dottie Galeana  Patient MRN: [id number]  Patient YOB: 1950  Thursday, June 8, 2023  Al Santiago MD  Dear patient,  As a result of recent federal legislation (The Federal Cures Act), you may   receive lab or pathology results from your procedure in your [facility]   account before your physician is able to contact you. Your physician or   their representative will relay the results to you with their   recommendations at their soonest availability.  Thank you,  RESTRICTIONS:  During your procedure today, you received medications for sedation.  These   medications may affect your judgment, balance and coordination.  Therefore,   for 24 hours, you have the following restrictions:   - DO NOT drive a car, operate machinery, make legal/financial decisions,   sign important papers or drink alcohol.    ACTIVITY:  Today: no heavy lifting, straining or running due to procedural   sedation/anesthesia.  The following day: return to full activity including work.  DIET:  Eat and drink normally unless instructed otherwise.     TREATMENT FOR COMMON SIDE EFFECTS:  - Mild abdominal pain, nausea, belching, bloating or excessive gas:  rest,   eat lightly and use a heating pad.  - Sore Throat: treat with throat lozenges and/or gargle with warm salt   water.  - Because air was used during the procedure, expelling large amounts of air   from your rectum or belching is normal.  - If a bowel prep was taken, you may not have a bowel movement for 1-3 days.    This is normal.  SYMPTOMS TO WATCH FOR AND REPORT TO YOUR PHYSICIAN:  1. Abdominal pain or bloating, other than gas cramps.  2. Chest pain.  3. Back pain.  4. Signs of infection such as: chills or fever occurring within 24 hours   after the procedure.  5. Rectal bleeding, which would show as bright red, maroon, or black stools.   (A tablespoon of blood from the rectum is not serious, especially  if   hemorrhoids are present.)  6. Vomiting.  7. Weakness or dizziness.  GO DIRECTLY TO THE NEAREST EMERGENCY ROOM IF YOU HAVE ANY OF THE FOLLOWING:      Difficulty breathing              Chills and/or fever over 101 F   Persistent vomiting and/or vomiting blood   Severe abdominal pain   Severe chest pain   Black, tarry stools   Bleeding- more than one tablespoon   Any other symptom or condition that you feel may need urgent attention  Your doctor recommends these additional instructions:  If any biopsies were taken, your doctors clinic will contact you in 1 to 2   weeks with any results.  We are waiting for your pathology results.   Your physician has recommended a repeat colonoscopy in five years for   surveillance.   You are being discharged to home.  For questions, problems or results please call your physician - Al Santiago MD at Work:  [phone].  EMERGENCY PHONE NUMBER: [phone], LAB RESULTS: [phone]  IF A COMPLICATION OR EMERGENCY SITUATION ARISES AND YOU ARE UNABLE TO REACH   YOUR PHYSICIAN - GO DIRECTLY TO THE EMERGENCY ROOM.  ___________________________________________  Nurse Signature  ___________________________________________  Patient/Designated Responsible Party Signature  Al Santiago MD  6/8/2023 11:14:53 AM  This report has been verified and signed electronically.  Dear patient,  As a result of recent federal legislation (The Federal Cures Act), you may   receive lab or pathology results from your procedure in your [facility]   account before your physician is able to contact you. Your physician or   their representative will relay the results to you with their   recommendations at their soonest availability.  Thank you.  PROVATION

## 2023-06-08 NOTE — H&P
History & Physical - Short Stay  Gastroenterology      SUBJECTIVE:     Procedure: Colonoscopy    Chief Complaint/Indication for Procedure: Change in Bowel Habits and Previous Polyps    PTA Medications   Medication Sig    alendronate (FOSAMAX) 70 MG tablet Take 1 tablet (70 mg total) by mouth every 7 days.    amLODIPine (NORVASC) 5 MG tablet Take 2 tablets (10 mg total) by mouth once daily.    aspirin 325 MG tablet Take 325 mg by mouth once daily. Pt taking 1/2 daily    Bacillus coagulans/inulin (PROBIOTIC WITH PREBIOTIC ORAL)     EScitalopram oxalate (LEXAPRO) 20 MG tablet Take 1 tablet (20 mg total) by mouth once daily.    gabapentin (NEURONTIN) 300 MG capsule Take 3 capsules (900 mg total) by mouth 2 (two) times daily. Titrate up to twice daily only if continued symptoms after once daily    losartan (COZAAR) 100 MG tablet Take 1 tablet (100 mg total) by mouth once daily.    magnesium glycinate (MAG GLYCINATE) 100 mg Tab     multivitamin (THERAGRAN) per tablet Take 1 tablet by mouth once daily.     nebivoloL (BYSTOLIC) 10 MG Tab Take 1 tablet (10 mg total) by mouth once daily. (Patient taking differently: Take 10 mg by mouth once daily. One tablet in am, 1/2 tab pm)    pantoprazole (PROTONIX) 40 MG tablet Take 1 tablet (40 mg total) by mouth 2 (two) times daily.    rosuvastatin (CRESTOR) 10 MG tablet Take 1 tablet (10 mg total) by mouth once daily.    traZODone (DESYREL) 50 MG tablet Take 1 tablet (50 mg total) by mouth every evening.    venlafaxine (EFFEXOR-XR) 150 MG Cp24 TAKE 1 CAPSULE (150 MG TOTAL) BY MOUTH ONCE DAILY.    albuterol (VENTOLIN HFA) 90 mcg/actuation inhaler Inhale 2 puffs into the lungs every 6 (six) hours as needed for Wheezing. Rescue    diphenhydrAMINE (BENADRYL) 25 mg capsule     NITROSTAT 0.4 mg SL tablet Place 0.4 mg under the tongue every 5 (five) minutes as needed.        Review of patient's allergies indicates:   Allergen Reactions    No known drug allergies         Past Medical History:    Diagnosis Date    Arthritis     Closed compression fracture of L1 lumbar vertebra 11/07/2018    Colon polyp     Coronary artery calcification     Depression     Fatty liver     General anesthetics causing adverse effect in therapeutic use     Generalized anxiety disorder     GERD (gastroesophageal reflux disease)     History of seasonal allergies     HTN (hypertension)     Hyperlipidemia     Hyponatremia     Insomnia     Osteoporosis     Stress incontinence     Stroke     ocular behind left eye     Past Surgical History:   Procedure Laterality Date    BREAST BIOPSY Left 1998    benign    COLONOSCOPY N/A 04/14/2022    Procedure: COLONOSCOPY;  Surgeon: Al Santiago MD;  Location: [facility] ENDO;  Service: Endoscopy;  Laterality: N/A;    ESOPHAGOGASTRODUODENOSCOPY N/A 08/17/2018    Procedure: EGD (ESOPHAGOGASTRODUODENOSCOPY);  Surgeon: Al Santiago MD;  Location: [facility] ENDO;  Service: Endoscopy;  Laterality: N/A;    ESOPHAGOGASTRODUODENOSCOPY N/A 01/12/2022    Procedure: ESOPHAGOGASTRODUODENOSCOPY (EGD);  Surgeon: Al Santiago MD;  Location: [facility] ENDO;  Service: Endoscopy;  Laterality: N/A;    ESOPHAGOGASTRODUODENOSCOPY N/A 4/5/2023    Procedure: EGD (ESOPHAGOGASTRODUODENOSCOPY);  Surgeon: Al Santiago MD;  Location: [facility] ENDO;  Service: Endoscopy;  Laterality: N/A;    FIXATION KYPHOPLASTY N/A 11/13/2018    Procedure: Kyphoplasty L1;  Surgeon: Russ Gramajo MD;  Location: [facility] OR;  Service: Pain Management;  Laterality: N/A;    ORIF WRIST FRACTURE Right     UPPER GASTROINTESTINAL ENDOSCOPY       Family History   Problem Relation Age of Onset    Ovarian cancer Mother 42     Social History     Tobacco Use    Smoking status: Never    Smokeless tobacco: Never   Substance Use Topics    Alcohol use: Yes     Alcohol/week: 14.0 standard drinks     Types: 14 Glasses of wine per week     Comment: 2 glasses wine per day    Drug use: Not Currently         OBJECTIVE:     Vital Signs (Most Recent)  Temp: 98  °F (36.7 °C) (06/08/23 1042)  Pulse: 72 (06/08/23 1042)  Resp: 16 (06/08/23 1042)  BP: 124/68 (06/08/23 1042)  SpO2: 95 % (06/08/23 1042)    Physical Exam:                                                       GENERAL:  Comfortable, in no acute distress.                                 HEENT EXAM:  Nonicteric.  No adenopathy.  Oropharynx is clear.               NECK:  Supple.                                                               LUNGS:  Clear.                                                               CARDIAC:  Regular rate and rhythm.  S1, S2.  No murmur.                      ABDOMEN:  Soft, positive bowel sounds, nontender.  No hepatosplenomegaly or masses.  No rebound or guarding.                                             EXTREMITIES:  No edema.     MENTAL STATUS:  Normal, alert and oriented.      ASSESSMENT/PLAN:     Assessment: Change in Bowel Habits and Previous Polyps    Plan: Colonoscopy    Anesthesia Plan: General    ASA Grade: ASA 2 - Patient with mild systemic disease with no functional limitations    MALLAMPATI SCORE:  I (soft palate, uvula, fauces, and tonsillar pillars visible)

## 2023-06-09 ENCOUNTER — PATIENT MESSAGE (OUTPATIENT)
Dept: ENDOSCOPY | Facility: HOSPITAL | Age: 73
End: 2023-06-09
Payer: MEDICARE

## 2023-06-09 ENCOUNTER — TELEPHONE (OUTPATIENT)
Dept: GASTROENTEROLOGY | Facility: CLINIC | Age: 73
End: 2023-06-09
Payer: MEDICARE

## 2023-06-09 NOTE — TELEPHONE ENCOUNTER
----- Message from Trena Sanches sent at 6/9/2023  2:51 PM CDT -----  Contact: [facility]  Type:  Sooner Apoointment Request    Name of Caller: [facility]     When is the first available appointment?7/7    Symptoms:hos f/u     Would the patient rather a call back or a response via [facility]? Call     Best Call Back Number:[phone] (home)      Additional Information:

## 2023-06-12 ENCOUNTER — PATIENT MESSAGE (OUTPATIENT)
Dept: PRIMARY CARE CLINIC | Facility: CLINIC | Age: 73
End: 2023-06-12
Payer: MEDICARE

## 2023-06-12 NOTE — ANESTHESIA PREPROCEDURE EVALUATION
06/12/2023  Dottie Galeana is a 72 y.o., female.    Pre-op Assessment    I have reviewed the Patient Summary Reports.    I have reviewed the Nursing Notes. I have reviewed the NPO Status.   I have reviewed the Medications.     Review of Systems  Anesthesia Hx:  No problems with previous Anesthesia  Denies Family Hx of Anesthesia complications.  Personal Hx of Anesthesia complications Slow To Awaken/Delayed Emergence and mild, somewhat sensitive to sedation / narcotics   Social:  Non-Smoker, No Alcohol Use    Hematology/Oncology:  Hematology Normal        Cardiovascular:   Hypertension, well controlled CAD   hyperlipidemia OLIVIA    Pulmonary:   Shortness of breath    Renal/:  Renal/ Normal     Hepatic/GI:   No Bowel Prep. 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 Neuromuscular Disease,    Endocrine:  Endocrine Normal    Psych:   Psychiatric History anxiety          Physical Exam  General:  Well nourished      Airway/Jaw/Neck:  Airway Findings: Mouth Opening: Normal   General Airway Assessment: Adult Mallampati: II  Jaw/Neck Findings:  Neck ROM: Extension Decreased, Mild       Dental:  Intact     Chest/Lungs:  Chest/Lungs Findings: Clear to auscultation, Normal Respiratory Rate      Heart/Vascular:  Heart Findings: Rate: Normal  Rhythm: Regular Rhythm  Sounds: Normal  Heart murmur: negative Vascular Findings: Normal (No carotid bruits.)       Mental Status:  Mental Status Findings:  Cooperative, Alert and Oriented         Anesthesia Plan  Type of Anesthesia, risks & benefits discussed:  Anesthesia Type:  Gen Natural Airway    Patient's Preference:   Plan Factors:          Intra-op Monitoring Plan: Standard ASA Monitors  Intra-op Monitoring Plan Comments:   Post Op Pain Control Plan:   Post Op Pain Control Plan  Comments:     Induction:   IV  Beta Blocker:  Patient is on a Beta-Blocker and has received one dose within the past 24 hours (No further documentation required).       Informed Consent: Informed consent signed with the Patient and all parties understand the risks and agree with anesthesia plan.  All questions answered.  Anesthesia consent signed with patient.  ASA Score: 2     Day of Surgery Review of History & Physical:  There are no significant changes.  H&P Update referred to the surgeon/provider.          Ready For Surgery From Anesthesia Perspective.           Physical Exam  General: Well nourished    Airway:  Mallampati: II   Mouth Opening: Normal  Neck ROM: Extension Decreased, Mild    Dental:  Intact    Chest/Lungs:  Clear to auscultation, Normal Respiratory Rate    Heart:  Rate: Normal  Rhythm: Regular Rhythm  Sounds: Normal          Anesthesia Plan  Type of Anesthesia, risks & benefits discussed:    Anesthesia Type: Gen Natural Airway  Intra-op Monitoring Plan: Standard ASA Monitors  Induction:  IV  Informed Consent: Informed consent signed with the Patient and all parties understand the risks and agree with anesthesia plan.  All questions answered.   ASA Score: 2  Day of Surgery Review of History & Physical: H&P Update referred to the surgeon/provider.    Ready For Surgery From Anesthesia Perspective.       .

## 2023-06-15 ENCOUNTER — PATIENT MESSAGE (OUTPATIENT)
Dept: PRIMARY CARE CLINIC | Facility: CLINIC | Age: 73
End: 2023-06-15

## 2023-06-15 ENCOUNTER — OFFICE VISIT (OUTPATIENT)
Dept: PRIMARY CARE CLINIC | Facility: CLINIC | Age: 73
End: 2023-06-15
Payer: MEDICARE

## 2023-06-15 ENCOUNTER — TELEPHONE (OUTPATIENT)
Dept: GASTROENTEROLOGY | Facility: HOSPITAL | Age: 73
End: 2023-06-15
Payer: MEDICARE

## 2023-06-15 ENCOUNTER — CLINICAL SUPPORT (OUTPATIENT)
Dept: PRIMARY CARE CLINIC | Facility: CLINIC | Age: 73
End: 2023-06-15
Payer: MEDICARE

## 2023-06-15 VITALS
OXYGEN SATURATION: 98 % | RESPIRATION RATE: 18 BRPM | HEIGHT: 66 IN | HEART RATE: 76 BPM | WEIGHT: 164.38 LBS | TEMPERATURE: 98 F | SYSTOLIC BLOOD PRESSURE: 117 MMHG | DIASTOLIC BLOOD PRESSURE: 76 MMHG | BODY MASS INDEX: 26.42 KG/M2

## 2023-06-15 DIAGNOSIS — K52.9 CHRONIC DIARRHEA: ICD-10-CM

## 2023-06-15 DIAGNOSIS — M79.10 MUSCLE PAIN: ICD-10-CM

## 2023-06-15 DIAGNOSIS — F32.A DEPRESSIVE DISORDER: ICD-10-CM

## 2023-06-15 DIAGNOSIS — F41.1 GAD (GENERALIZED ANXIETY DISORDER): Primary | ICD-10-CM

## 2023-06-15 DIAGNOSIS — Z09 HOSPITAL DISCHARGE FOLLOW-UP: Primary | ICD-10-CM

## 2023-06-15 DIAGNOSIS — G47.00 INSOMNIA, UNSPECIFIED TYPE: ICD-10-CM

## 2023-06-15 DIAGNOSIS — R50.9 FEVER, UNSPECIFIED FEVER CAUSE: ICD-10-CM

## 2023-06-15 DIAGNOSIS — I70.0 AORTIC ATHEROSCLEROSIS: ICD-10-CM

## 2023-06-15 PROCEDURE — 1125F AMNT PAIN NOTED PAIN PRSNT: CPT | Mod: CPTII,S$GLB,, | Performed by: INTERNAL MEDICINE

## 2023-06-15 PROCEDURE — 99214 PR OFFICE/OUTPT VISIT, EST, LEVL IV, 30-39 MIN: ICD-10-PCS | Mod: S$GLB,,, | Performed by: INTERNAL MEDICINE

## 2023-06-15 PROCEDURE — 3044F PR MOST RECENT HEMOGLOBIN A1C LEVEL <7.0%: ICD-10-PCS | Mod: CPTII,S$GLB,, | Performed by: INTERNAL MEDICINE

## 2023-06-15 PROCEDURE — 1101F PR PT FALLS ASSESS DOC 0-1 FALLS W/OUT INJ PAST YR: ICD-10-PCS | Mod: CPTII,S$GLB,, | Performed by: INTERNAL MEDICINE

## 2023-06-15 PROCEDURE — 3288F PR FALLS RISK ASSESSMENT DOCUMENTED: ICD-10-PCS | Mod: CPTII,S$GLB,, | Performed by: INTERNAL MEDICINE

## 2023-06-15 PROCEDURE — 3074F PR MOST RECENT SYSTOLIC BLOOD PRESSURE < 130 MM HG: ICD-10-PCS | Mod: CPTII,S$GLB,, | Performed by: INTERNAL MEDICINE

## 2023-06-15 PROCEDURE — 1160F RVW MEDS BY RX/DR IN RCRD: CPT | Mod: CPTII,S$GLB,, | Performed by: INTERNAL MEDICINE

## 2023-06-15 PROCEDURE — 3078F PR MOST RECENT DIASTOLIC BLOOD PRESSURE < 80 MM HG: ICD-10-PCS | Mod: CPTII,S$GLB,, | Performed by: INTERNAL MEDICINE

## 2023-06-15 PROCEDURE — 1159F PR MEDICATION LIST DOCUMENTED IN MEDICAL RECORD: ICD-10-PCS | Mod: CPTII,S$GLB,, | Performed by: INTERNAL MEDICINE

## 2023-06-15 PROCEDURE — 1159F MED LIST DOCD IN RCRD: CPT | Mod: CPTII,S$GLB,, | Performed by: INTERNAL MEDICINE

## 2023-06-15 PROCEDURE — 99999 PR PBB SHADOW E&M-EST. PATIENT-LVL V: ICD-10-PCS | Mod: PBBFAC,,, | Performed by: INTERNAL MEDICINE

## 2023-06-15 PROCEDURE — 1101F PT FALLS ASSESS-DOCD LE1/YR: CPT | Mod: CPTII,S$GLB,, | Performed by: INTERNAL MEDICINE

## 2023-06-15 PROCEDURE — 3074F SYST BP LT 130 MM HG: CPT | Mod: CPTII,S$GLB,, | Performed by: INTERNAL MEDICINE

## 2023-06-15 PROCEDURE — 3288F FALL RISK ASSESSMENT DOCD: CPT | Mod: CPTII,S$GLB,, | Performed by: INTERNAL MEDICINE

## 2023-06-15 PROCEDURE — 4010F ACE/ARB THERAPY RXD/TAKEN: CPT | Mod: CPTII,S$GLB,, | Performed by: INTERNAL MEDICINE

## 2023-06-15 PROCEDURE — 99999 PR PBB SHADOW E&M-EST. PATIENT-LVL V: CPT | Mod: PBBFAC,,, | Performed by: INTERNAL MEDICINE

## 2023-06-15 PROCEDURE — 3044F HG A1C LEVEL LT 7.0%: CPT | Mod: CPTII,S$GLB,, | Performed by: INTERNAL MEDICINE

## 2023-06-15 PROCEDURE — 99214 OFFICE O/P EST MOD 30 MIN: CPT | Mod: S$GLB,,, | Performed by: INTERNAL MEDICINE

## 2023-06-15 PROCEDURE — 3008F PR BODY MASS INDEX (BMI) DOCUMENTED: ICD-10-PCS | Mod: CPTII,S$GLB,, | Performed by: INTERNAL MEDICINE

## 2023-06-15 PROCEDURE — 3078F DIAST BP <80 MM HG: CPT | Mod: CPTII,S$GLB,, | Performed by: INTERNAL MEDICINE

## 2023-06-15 PROCEDURE — 99499 UNLISTED E&M SERVICE: CPT | Mod: S$GLB,,, | Performed by: SOCIAL WORKER

## 2023-06-15 PROCEDURE — 99499 NO LOS: ICD-10-PCS | Mod: S$GLB,,, | Performed by: SOCIAL WORKER

## 2023-06-15 PROCEDURE — 1125F PR PAIN SEVERITY QUANTIFIED, PAIN PRESENT: ICD-10-PCS | Mod: CPTII,S$GLB,, | Performed by: INTERNAL MEDICINE

## 2023-06-15 PROCEDURE — 1160F PR REVIEW ALL MEDS BY PRESCRIBER/CLIN PHARMACIST DOCUMENTED: ICD-10-PCS | Mod: CPTII,S$GLB,, | Performed by: INTERNAL MEDICINE

## 2023-06-15 PROCEDURE — 3008F BODY MASS INDEX DOCD: CPT | Mod: CPTII,S$GLB,, | Performed by: INTERNAL MEDICINE

## 2023-06-15 PROCEDURE — 4010F PR ACE/ARB THEARPY RXD/TAKEN: ICD-10-PCS | Mod: CPTII,S$GLB,, | Performed by: INTERNAL MEDICINE

## 2023-06-15 RX ORDER — TEMAZEPAM 15 MG/1
15 CAPSULE ORAL NIGHTLY PRN
Qty: 30 CAPSULE | Refills: 0 | Status: SHIPPED | OUTPATIENT
Start: 2023-06-15 | End: 2023-07-17 | Stop reason: SDUPTHER

## 2023-06-15 RX ORDER — DICYCLOMINE HYDROCHLORIDE 20 MG/1
20 TABLET ORAL EVERY 6 HOURS
Qty: 120 TABLET | Refills: 0 | Status: SHIPPED | OUTPATIENT
Start: 2023-06-15 | End: 2023-07-15

## 2023-06-15 RX ORDER — CHOLESTYRAMINE 4 G/9G
4 POWDER, FOR SUSPENSION ORAL DAILY
Qty: 60 PACKET | Refills: 0 | Status: SHIPPED | OUTPATIENT
Start: 2023-06-15 | End: 2023-06-29 | Stop reason: SDUPTHER

## 2023-06-15 NOTE — TELEPHONE ENCOUNTER
Tell pt I discussed with PCP Dr. Shipley.  Suggest try questran for the diarrhea, starting at low dose (once daily). Once colon biopsies return, will decide on next step.

## 2023-06-15 NOTE — PROGRESS NOTES
INTERNAL MEDICINE PROGRESS/URGENT CARE NOTE    CHIEF COMPLAINT     Chief Complaint   Patient presents with    Follow-up    Insomnia     Pt states she is not sleeping well, gets about 3 hours of sleep.     Cough     Pt states she has post nasal drip that causing her to cough.       HPI     Dottie Galeana is a 72 y.o.  female who presents for an urgent/follow up visit today.  Patients is here today for hospital follow up after presenting to the ER on 6/9/23 with a feve after undergoing a colonsocpy the same day.     Hospital course: (copied directly from ER note for accuracy)    earlier today, states biopsies were submitted.  She states on returning home she ate, took a nap, on awakening developed chills with fever of 101.3°.  Currently she reports a mild headache, no upper respiratory tract symptoms, has a chronic cough for which she takes gabapentin, no chest pain, nausea, vomiting, abdominal pain, urinary symptoms, open wound/lesions. Denies neck stiffness or pain. States she did continue with diarrhea at home and had episode of fecal incontinence.  States last week her granddaughter did have strep throat but patient without any current symptoms.  In the ED vital stable, T-max 99.5°.  Labs with WBC 13.6, hemoglobin 13.8, potassium 3.4, BUN/creatinine 11/0.5, lactic acid 2.4.  Chest x-ray without any focal infiltrates or consolidation.  Rapid COVID and influenza negative.  UA negative.  Blood cultures were submitted.  CT abdomen and pelvis without evidence of perforation, multiple diverticulosis, no acute pathology.  She received septic fluid bolus in the ED as well as IV piperacillin/tazobactam.  ED provider requested admission, gastroenterologist has been consulted.  Plan of care reviewed with patient and son present at bedside.  Hospital Course:   Patient admitted for fever, blood cultures remained negative.  She remains afebrile.  No further complaints of back pain or headache.  Patient with  ongoing issues with diarrhea for the past 4 months.  Had an episode of incontinence.  Urinalysis negative.  CT abdomen pelvis negative post colonoscopy.  Patient to be discharged with 5 day course of ciprofloxacin.  Patient discussed with Gastroenterology on-call for [facility].    Since discharge, patient reports fever is gone, but she is feeling very ill today. Worsening of the diarrhea.of note, she had the colonoscopy due to ongoing diarrhea. Was referred to GI. Says after the colonoscopy diarrhea has worsened severely and now having to wear depends due to fecal urgency.     Also with worsening insomnia. Was on trazodone 50mg. At her last visit, I instructed her to try 2 tabs 100mg. Today she says shes only getting about 2-3 hours of sleep. Of note, was previously on ambien and xanax which she has since stopped.   Unable to fall asleep and then unable to stay asleep.            Home Medications:  Prior to Admission medications    Medication Sig Start Date End Date Taking? Authorizing Provider   albuterol (VENTOLIN HFA) 90 mcg/actuation inhaler Inhale 2 puffs into the lungs every 6 (six) hours as needed for Wheezing. Rescue 5/8/23   Jazmin Castañeda, NP   alendronate (FOSAMAX) 70 MG tablet Take 1 tablet (70 mg total) by mouth every 7 days. 12/12/22 12/12/23  Ml Shipley MD   amLODIPine (NORVASC) 5 MG tablet Take 2 tablets (10 mg total) by mouth once daily. Hold for systolic less than 120 6/9/23   Adriana Stout MD   aspirin 325 MG tablet Take 325 mg by mouth once daily. Pt taking 1/2 daily 11/23/11   Historical Provider   ciprofloxacin HCl (CIPRO) 500 MG tablet Take 1 tablet (500 mg total) by mouth 2 (two) times daily. for 5 days 6/9/23 6/14/23  Adriana Stout MD   EScitalopram oxalate (LEXAPRO) 20 MG tablet Take 1 tablet (20 mg total) by mouth once daily. 5/31/23   Ml Shipley MD   gabapentin (NEURONTIN) 300 MG capsule Take 3 capsules (900 mg total) by mouth 2 (two) times daily. Titrate up  "to twice daily only if continued symptoms after once daily 6/6/23 7/6/23  David Wei MD   losartan (COZAAR) 100 MG tablet Take 1 tablet (100 mg total) by mouth once daily. Hold for systolic less than 120 6/9/23   Adriana Stout MD   multivitamin (THERAGRAN) per tablet Take 1 tablet by mouth once daily.  11/23/11   Historical Provider   nebivoloL (BYSTOLIC) 10 MG Tab Take 1 tablet (10 mg total) by mouth once daily. One tablet in am, 1/2 tab pm 6/9/23   Adriana Stout MD   NITROSTAT 0.4 mg SL tablet Place 0.4 mg under the tongue every 5 (five) minutes as needed.  12/9/16   José Miguel Bynum MD   pantoprazole (PROTONIX) 40 MG tablet Take 1 tablet (40 mg total) by mouth 2 (two) times daily. 1/13/23 7/12/23  Seema Crenshaw NP   rosuvastatin (CRESTOR) 10 MG tablet Take 1 tablet (10 mg total) by mouth once daily. 5/31/23   Ml Shipley MD   traZODone (DESYREL) 50 MG tablet Take 1 tablet (50 mg total) by mouth every evening. 1/9/23 1/9/24  Ml Shipley MD   venlafaxine (EFFEXOR-XR) 150 MG Cp24 TAKE 1 CAPSULE (150 MG TOTAL) BY MOUTH ONCE DAILY. 6/7/23   Ml Shipley MD       Review of Systems:  Review of Systems      Advance Care Planning         PHYSICAL EXAM     /76 (BP Location: Right arm, Patient Position: Sitting, BP Method: Medium (Manual))   Pulse 76   Temp 98.2 °F (36.8 °C) (Oral)   Resp 18   Ht 5' 6" (1.676 m)   Wt 74.6 kg (164 lb 5.7 oz)   LMP 04/21/1994   SpO2 98%   BMI 26.53 kg/m²     GEN - A+OX4, NAD     LABS       ASSESSMENT/PLAN     Dottie Galeana is a 72 y.o. female with  1. Hospital discharge follow-up  Not feeling better but unrelated to the reason she went into the hospital   Continue work up for cause of diarrhea     2. Fever, unspecified fever cause  Resolved with abx and tylenol     3. Muscle pain  Started after colonoscopy. Unclear cause at this time   -     Sedimentation rate; Future; Expected date: 06/15/2023  -     C-REACTIVE PROTEIN; " Future; Expected date: 06/15/2023     Diarrhea: normal colonscopy  Normal EGD  Normal CT.   Will trial diet change for two weeks.   If not change will try cholestyramine   Given recent stressors wit hhusbands vision, will trial bentyl for IBS-D.     Aortic atherosclerosis.   Target a low cholesterol diet       WORRY SCORE 3    RTC in 2 weeks    Ml Shipley MD  Board Certified Internist/Geriatrician  [facility]

## 2023-06-15 NOTE — PROGRESS NOTES
Individual Psychotherapy (PhD/LCSW)    6/15/2023    Site:  [place] [id number]      Chief complaint/reason for encounter: appetite     Mood check: scale of:0 best, 10 worst. Patient rated:  Depression at 5   Anxiety at  5     Risk parameters:  Patient reports no suicidal ideation  Patient reports no homicidal ideation  Patient reports no self-injurious behavior  Patient reports no violent behavior    Bridge:  She was able to recall to reviewing her childhood history.     Review of home assignment:   N/A    [place]:  Goals   Recent medical crisis  Identified areas of clinical concern     History of present condition/content of session:   Review of sx: She reported a long hx of sleep px. No reported changes in appetite.     Pt began the session  talking about her recent medical crisis. She reported she had sepsis after the colonoscopy last week. She said she was really afraid because she knows someone who  from it (used this to exemplify that fears sometimes have a basis and are not irrational). She noted having the colonoscopy secondary to GI complaint.     She provided additional information about her 's condition and how this affects him currently. Also she described more hx of relationship with her mother in law. She spent some time talking about caregiver stress.  She related 2 incidents in which her  took too much of his medication by accident. She identified feeling angry with him about it. Time was spent exploring the associated feelings.     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 His mother is living and pt has care giving responsibilities for her as well. Pt has a long hx of working, and fired about 12 years ago (identified area of clinical concern.)      Therapeutic Intervention:   Patient was assessed for present condition and areas of clinical concern. She was provided with supportive therapy. Anger sx were  normalized. Coping skills and self care were addressed. Patient was educated on CBT (Cognitive Model): the connection between thought, mood, and behavior and the ability to change behavior and mood by examining thoughts.    Treatment plan:  Target symptoms: anxiety   Why chosen therapy is appropriate versus another modality: evidence based practice  Outcome monitoring methods: checklist/rating scale  Therapeutic intervention type: supportive psychotherapy, changing thinking patterns that cause anxiety responses       Patient's response to intervention:  The patient's response to intervention is accepting.    Progress toward goals and other mental status changes:  The patient's progress toward goals is N/A - still working on goals.     Diagnosis:   Generalized Anxiety Disorder  Major Depressive Disorder     Plan:  individual psychotherapy    Return to clinic: 2 weeks  -she was given handout of the Cognitive Model.     Length of Service (minutes): 45

## 2023-06-16 ENCOUNTER — LAB VISIT (OUTPATIENT)
Dept: LAB | Facility: HOSPITAL | Age: 73
End: 2023-06-16
Attending: INTERNAL MEDICINE
Payer: MEDICARE

## 2023-06-16 DIAGNOSIS — M79.10 MUSCLE PAIN: ICD-10-CM

## 2023-06-16 LAB
ERYTHROCYTE [SEDIMENTATION RATE] IN BLOOD BY PHOTOMETRIC METHOD: 15 MM/HR (ref 0–36)
FINAL PATHOLOGIC DIAGNOSIS: NORMAL
Lab: NORMAL

## 2023-06-16 PROCEDURE — 85652 RBC SED RATE AUTOMATED: CPT | Performed by: INTERNAL MEDICINE

## 2023-06-16 PROCEDURE — 36415 COLL VENOUS BLD VENIPUNCTURE: CPT | Mod: PO | Performed by: INTERNAL MEDICINE

## 2023-06-16 PROCEDURE — 86140 C-REACTIVE PROTEIN: CPT | Performed by: INTERNAL MEDICINE

## 2023-06-16 NOTE — TELEPHONE ENCOUNTER
LOV: 6/15/23  NOV: 6/29/23    Should this question be directed to Dr. Santiago since that is who prescribed the Cholestramine?

## 2023-06-17 LAB — CRP SERPL-MCNC: 1.7 MG/L (ref 0–8.2)

## 2023-06-19 ENCOUNTER — PATIENT MESSAGE (OUTPATIENT)
Dept: PRIMARY CARE CLINIC | Facility: CLINIC | Age: 73
End: 2023-06-19
Payer: MEDICARE

## 2023-06-19 ENCOUNTER — TELEPHONE (OUTPATIENT)
Dept: PRIMARY CARE CLINIC | Facility: CLINIC | Age: 73
End: 2023-06-19
Payer: MEDICARE

## 2023-06-19 NOTE — TELEPHONE ENCOUNTER
----- Message from Ml Shipley MD sent at 6/19/2023  8:07 AM CDT -----  Inflammatory markers are normal so no evidence of muscle inflammation.

## 2023-06-20 NOTE — TELEPHONE ENCOUNTER
Patient was encouraged to go slowly easing back into her normal daily routines, hydrate and eat highly nutritious foods. She verbalized understanding. Is there anything else you would add?

## 2023-06-24 DIAGNOSIS — K21.9 GASTROESOPHAGEAL REFLUX DISEASE WITHOUT ESOPHAGITIS: ICD-10-CM

## 2023-06-24 DIAGNOSIS — R05.3 CHRONIC COUGH: ICD-10-CM

## 2023-06-26 ENCOUNTER — TELEPHONE (OUTPATIENT)
Dept: GASTROENTEROLOGY | Facility: CLINIC | Age: 73
End: 2023-06-26
Payer: MEDICARE

## 2023-06-26 RX ORDER — PANTOPRAZOLE SODIUM 40 MG/1
TABLET, DELAYED RELEASE ORAL
Qty: 180 TABLET | Refills: 1 | Status: SHIPPED | OUTPATIENT
Start: 2023-06-26 | End: 2024-02-07

## 2023-06-26 NOTE — TELEPHONE ENCOUNTER
----- Message from Al Santiago MD sent at 6/26/2023  2:41 PM CDT -----  Tell pt colon bx normal (no colitis).  If pt still having diarrhea on once daily questran, can increase to BID.  Needs f/u office visit with RAUDEL Crenshaw for review next month.

## 2023-06-26 NOTE — TELEPHONE ENCOUNTER
Spoke with pt. Informed of results & recommendations per Dr. Santiago. Pt states her stool is not diarrhea but still loose without urgency. Pt scheduled f.u appointment. Pt verbalized understanding.

## 2023-06-29 ENCOUNTER — OFFICE VISIT (OUTPATIENT)
Dept: PRIMARY CARE CLINIC | Facility: CLINIC | Age: 73
End: 2023-06-29
Payer: MEDICARE

## 2023-06-29 VITALS
TEMPERATURE: 98 F | WEIGHT: 164.44 LBS | DIASTOLIC BLOOD PRESSURE: 66 MMHG | HEART RATE: 72 BPM | SYSTOLIC BLOOD PRESSURE: 124 MMHG | HEIGHT: 66 IN | OXYGEN SATURATION: 96 % | RESPIRATION RATE: 18 BRPM | BODY MASS INDEX: 26.43 KG/M2

## 2023-06-29 DIAGNOSIS — K52.9 CHRONIC DIARRHEA: Primary | ICD-10-CM

## 2023-06-29 PROCEDURE — 1101F PT FALLS ASSESS-DOCD LE1/YR: CPT | Mod: CPTII,S$GLB,, | Performed by: INTERNAL MEDICINE

## 2023-06-29 PROCEDURE — 3008F PR BODY MASS INDEX (BMI) DOCUMENTED: ICD-10-PCS | Mod: CPTII,S$GLB,, | Performed by: INTERNAL MEDICINE

## 2023-06-29 PROCEDURE — 1126F AMNT PAIN NOTED NONE PRSNT: CPT | Mod: CPTII,S$GLB,, | Performed by: INTERNAL MEDICINE

## 2023-06-29 PROCEDURE — 1101F PR PT FALLS ASSESS DOC 0-1 FALLS W/OUT INJ PAST YR: ICD-10-PCS | Mod: CPTII,S$GLB,, | Performed by: INTERNAL MEDICINE

## 2023-06-29 PROCEDURE — 1160F PR REVIEW ALL MEDS BY PRESCRIBER/CLIN PHARMACIST DOCUMENTED: ICD-10-PCS | Mod: CPTII,S$GLB,, | Performed by: INTERNAL MEDICINE

## 2023-06-29 PROCEDURE — 3078F DIAST BP <80 MM HG: CPT | Mod: CPTII,S$GLB,, | Performed by: INTERNAL MEDICINE

## 2023-06-29 PROCEDURE — 99999 PR PBB SHADOW E&M-EST. PATIENT-LVL IV: ICD-10-PCS | Mod: PBBFAC,,, | Performed by: INTERNAL MEDICINE

## 2023-06-29 PROCEDURE — 4010F PR ACE/ARB THEARPY RXD/TAKEN: ICD-10-PCS | Mod: CPTII,S$GLB,, | Performed by: INTERNAL MEDICINE

## 2023-06-29 PROCEDURE — 1126F PR PAIN SEVERITY QUANTIFIED, NO PAIN PRESENT: ICD-10-PCS | Mod: CPTII,S$GLB,, | Performed by: INTERNAL MEDICINE

## 2023-06-29 PROCEDURE — 1158F ADVNC CARE PLAN TLK DOCD: CPT | Mod: CPTII,S$GLB,, | Performed by: INTERNAL MEDICINE

## 2023-06-29 PROCEDURE — 3074F PR MOST RECENT SYSTOLIC BLOOD PRESSURE < 130 MM HG: ICD-10-PCS | Mod: CPTII,S$GLB,, | Performed by: INTERNAL MEDICINE

## 2023-06-29 PROCEDURE — 99999 PR PBB SHADOW E&M-EST. PATIENT-LVL IV: CPT | Mod: PBBFAC,,, | Performed by: INTERNAL MEDICINE

## 2023-06-29 PROCEDURE — 1159F MED LIST DOCD IN RCRD: CPT | Mod: CPTII,S$GLB,, | Performed by: INTERNAL MEDICINE

## 2023-06-29 PROCEDURE — 1158F PR ADVANCE CARE PLANNING DISCUSS DOCUMENTED IN MEDICAL RECORD: ICD-10-PCS | Mod: CPTII,S$GLB,, | Performed by: INTERNAL MEDICINE

## 2023-06-29 PROCEDURE — 3044F PR MOST RECENT HEMOGLOBIN A1C LEVEL <7.0%: ICD-10-PCS | Mod: CPTII,S$GLB,, | Performed by: INTERNAL MEDICINE

## 2023-06-29 PROCEDURE — 4010F ACE/ARB THERAPY RXD/TAKEN: CPT | Mod: CPTII,S$GLB,, | Performed by: INTERNAL MEDICINE

## 2023-06-29 PROCEDURE — 3078F PR MOST RECENT DIASTOLIC BLOOD PRESSURE < 80 MM HG: ICD-10-PCS | Mod: CPTII,S$GLB,, | Performed by: INTERNAL MEDICINE

## 2023-06-29 PROCEDURE — 3044F HG A1C LEVEL LT 7.0%: CPT | Mod: CPTII,S$GLB,, | Performed by: INTERNAL MEDICINE

## 2023-06-29 PROCEDURE — 1159F PR MEDICATION LIST DOCUMENTED IN MEDICAL RECORD: ICD-10-PCS | Mod: CPTII,S$GLB,, | Performed by: INTERNAL MEDICINE

## 2023-06-29 PROCEDURE — 99213 OFFICE O/P EST LOW 20 MIN: CPT | Mod: S$GLB,,, | Performed by: INTERNAL MEDICINE

## 2023-06-29 PROCEDURE — 3074F SYST BP LT 130 MM HG: CPT | Mod: CPTII,S$GLB,, | Performed by: INTERNAL MEDICINE

## 2023-06-29 PROCEDURE — 1160F RVW MEDS BY RX/DR IN RCRD: CPT | Mod: CPTII,S$GLB,, | Performed by: INTERNAL MEDICINE

## 2023-06-29 PROCEDURE — 3288F FALL RISK ASSESSMENT DOCD: CPT | Mod: CPTII,S$GLB,, | Performed by: INTERNAL MEDICINE

## 2023-06-29 PROCEDURE — 3288F PR FALLS RISK ASSESSMENT DOCUMENTED: ICD-10-PCS | Mod: CPTII,S$GLB,, | Performed by: INTERNAL MEDICINE

## 2023-06-29 PROCEDURE — 3008F BODY MASS INDEX DOCD: CPT | Mod: CPTII,S$GLB,, | Performed by: INTERNAL MEDICINE

## 2023-06-29 PROCEDURE — 99213 PR OFFICE/OUTPT VISIT, EST, LEVL III, 20-29 MIN: ICD-10-PCS | Mod: S$GLB,,, | Performed by: INTERNAL MEDICINE

## 2023-06-29 RX ORDER — CHOLESTYRAMINE 4 G/9G
4 POWDER, FOR SUSPENSION ORAL DAILY
Qty: 30 PACKET | Refills: 0 | Status: SHIPPED | OUTPATIENT
Start: 2023-06-29 | End: 2023-11-22 | Stop reason: ALTCHOICE

## 2023-06-29 NOTE — PROGRESS NOTES
INTERNAL MEDICINE PROGRESS/URGENT CARE NOTE    CHIEF COMPLAINT     Chief Complaint   Patient presents with    Follow-up       HPI     Dottie Galeana is a 72 y.o.  female who presents for an urgent/follow up visit today.  Seen a week ago with severe diarrhea and fecal urgency requiring pull ups and diapers and greatly affecting her QOL of activities.   With the help of gastro Dr. Santiago, we started her on questran. Today shes here for follow up and states she is 100% better. No more diarrhea and no urgency. Still with loose stools but not diarrhea. No other symptoms  With a rash on arms, trunk and back. Started after abx in the hospital where she was treated for sepsis with zosyn.     Otherwise, she is doing very well. No fevers, no abdominal pain.   Recently EGD and colonoscopy, negative for colitis which was my suspicion as she had diarrhea in her sleep.     Past Medical History:  Past Medical History:   Diagnosis Date    Arthritis     Closed compression fracture of L1 lumbar vertebra 11/07/2018    Colon polyp     Coronary artery calcification     Depression     Fatty liver     General anesthetics causing adverse effect in therapeutic use     Generalized anxiety disorder     GERD (gastroesophageal reflux disease)     History of seasonal allergies     HTN (hypertension)     Hyperlipidemia     Hyponatremia     Insomnia     Osteoporosis     Stress incontinence     Stroke     ocular behind left eye       Home Medications:  Prior to Admission medications    Medication Sig Start Date End Date Taking? Authorizing Provider   albuterol (VENTOLIN HFA) 90 mcg/actuation inhaler Inhale 2 puffs into the lungs every 6 (six) hours as needed for Wheezing. Rescue 5/8/23  Yes Jazmin Castañeda NP   alendronate (FOSAMAX) 70 MG tablet Take 1 tablet (70 mg total) by mouth every 7 days. 12/12/22 12/12/23 Yes Ml Shipley MD   amLODIPine (NORVASC) 5 MG tablet Take 2 tablets (10 mg total) by mouth once daily. Hold for  systolic less than 120 6/9/23  Yes Adriana Stout MD   aspirin 325 MG tablet Take 325 mg by mouth once daily. Pt taking 1/2 daily 11/23/11  Yes Historical Provider   dicyclomine (BENTYL) 20 mg tablet Take 1 tablet (20 mg total) by mouth every 6 (six) hours. 6/15/23 7/15/23 Yes Ml Shipley MD   EScitalopram oxalate (LEXAPRO) 20 MG tablet Take 1 tablet (20 mg total) by mouth once daily. 5/31/23  Yes Ml Shipley MD   gabapentin (NEURONTIN) 300 MG capsule Take 3 capsules (900 mg total) by mouth 2 (two) times daily. Titrate up to twice daily only if continued symptoms after once daily 6/6/23 7/6/23 Yes David Wei MD   losartan (COZAAR) 100 MG tablet Take 1 tablet (100 mg total) by mouth once daily. Hold for systolic less than 120 6/9/23  Yes Adriana Stout MD   multivitamin (THERAGRAN) per tablet Take 1 tablet by mouth once daily.  11/23/11  Yes Historical Provider   nebivoloL (BYSTOLIC) 10 MG Tab Take 1 tablet (10 mg total) by mouth once daily. One tablet in am, 1/2 tab pm 6/9/23  Yes Adriana Stout MD   pantoprazole (PROTONIX) 40 MG tablet TAKE 1 TABLET TWICE DAILY 6/26/23  Yes Seema Crenshaw, RAUDEL   rosuvastatin (CRESTOR) 10 MG tablet Take 1 tablet (10 mg total) by mouth once daily. 5/31/23  Yes Ml Shipley MD   temazepam (RESTORIL) 15 mg Cap Take 1 capsule (15 mg total) by mouth nightly as needed (insomnia). 6/15/23 7/15/23 Yes Ml Shipley MD   traZODone (DESYREL) 50 MG tablet Take 1 tablet (50 mg total) by mouth every evening. 1/9/23 1/9/24 Yes Ml Shipley MD   venlafaxine (EFFEXOR-XR) 150 MG Cp24 TAKE 1 CAPSULE (150 MG TOTAL) BY MOUTH ONCE DAILY. 6/7/23  Yes Ml Shipley MD   cholestyramine (QUESTRAN) 4 gram packet Take 1 packet (4 g total) by mouth once daily. 6/15/23 6/29/23 Yes Al Santiago MD   cholestyramine (QUESTRAN) 4 gram packet Take 1 packet (4 g total) by mouth once daily. 6/29/23 6/28/24  Ml Shipley MD   NITROSTAT 0.4  "mg SL tablet Place 0.4 mg under the tongue every 5 (five) minutes as needed.  12/9/16   José Miguel Bynum MD         Advance Care Planning     Date: 06/29/2023    Power of   I initiated the process of voluntary advance care planning today and explained the importance of this process to the patient.  I introduced the concept of advance directives to the patient, as well. Then the patient received detailed information about the importance of designating a Health Care Power of  (HCPOA). She was also instructed to communicate with this person about their wishes for future healthcare, should she become sick and lose decision-making capacity. The patient has not previously appointed a HCPOA. After our discussion, the patient has decided to complete a HCPOA and will take the form home and fill it out       .AWV scheduled for 8/1/23       PHYSICAL EXAM     /66 (BP Location: Left arm, Patient Position: Sitting, BP Method: Medium (Manual))   Pulse 72   Temp 98.2 °F (36.8 °C) (Oral)   Resp 18   Ht 5' 6" (1.676 m)   Wt 74.6 kg (164 lb 7.4 oz)   LMP 04/21/1994   SpO2 96%   BMI 26.55 kg/m²     GEN - A+OX4, NAD   HEENT - PERRL, EOMI, OP clear  Neck - No thyromegaly or cervical LAD. No thyroid masses felt.  CV - RRR, no m/r   Chest - CTAB, no wheezing or rhonchi  Abd - S/NT/ND/+BS.   Ext - 2+BDP and radial pulses. No C/C/E.  Skin - No rash.    LABS     ASSESSMENT/PLAN     Dottie Galeana is a 72 y.o. female with  1. Chronic diarrhea  Much better with questran. Having trouble wit the cost. We found it on goodrx for 1/4th the cost so she will be able to increase the amount per gastros instruction.   Hydrate.   Continue with GI.   Continue with medication.     Other orders  -     cholestyramine (QUESTRAN) 4 gram packet; Take 1 packet (4 g total) by mouth once daily.  Dispense: 30 packet; Refill: 0           WORRY SCORE 2    RTC in 6 months, sooner if needed and depending on labs.    Ml GRULLON" MD Quinten  Board Certified Internist/Geriatrician  [facility]

## 2023-07-01 ENCOUNTER — OFFICE VISIT (OUTPATIENT)
Dept: URGENT CARE | Facility: CLINIC | Age: 73
End: 2023-07-01
Payer: MEDICARE

## 2023-07-01 VITALS
SYSTOLIC BLOOD PRESSURE: 132 MMHG | WEIGHT: 165 LBS | RESPIRATION RATE: 18 BRPM | DIASTOLIC BLOOD PRESSURE: 77 MMHG | HEIGHT: 66 IN | OXYGEN SATURATION: 97 % | BODY MASS INDEX: 26.52 KG/M2 | HEART RATE: 66 BPM | TEMPERATURE: 98 F

## 2023-07-01 DIAGNOSIS — J06.9 UPPER RESPIRATORY VIRUS: Primary | ICD-10-CM

## 2023-07-01 DIAGNOSIS — J02.9 SORE THROAT: ICD-10-CM

## 2023-07-01 DIAGNOSIS — R05.9 COUGH, UNSPECIFIED TYPE: ICD-10-CM

## 2023-07-01 LAB
CTP QC/QA: YES
CTP QC/QA: YES
MOLECULAR STREP A: NEGATIVE
RSV RAPID ANTIGEN: NEGATIVE

## 2023-07-01 PROCEDURE — 87651 POCT STREP A MOLECULAR: ICD-10-PCS | Mod: QW,S$GLB,, | Performed by: PHYSICIAN ASSISTANT

## 2023-07-01 PROCEDURE — 99213 PR OFFICE/OUTPT VISIT, EST, LEVL III, 20-29 MIN: ICD-10-PCS | Mod: S$GLB,,, | Performed by: PHYSICIAN ASSISTANT

## 2023-07-01 PROCEDURE — 87807 POCT RESPIRATORY SYNCYTIAL VIRUS: ICD-10-PCS | Mod: QW,S$GLB,, | Performed by: PHYSICIAN ASSISTANT

## 2023-07-01 PROCEDURE — 87651 STREP A DNA AMP PROBE: CPT | Mod: QW,S$GLB,, | Performed by: PHYSICIAN ASSISTANT

## 2023-07-01 PROCEDURE — 99213 OFFICE O/P EST LOW 20 MIN: CPT | Mod: S$GLB,,, | Performed by: PHYSICIAN ASSISTANT

## 2023-07-01 PROCEDURE — 87807 RSV ASSAY W/OPTIC: CPT | Mod: QW,S$GLB,, | Performed by: PHYSICIAN ASSISTANT

## 2023-07-01 RX ORDER — BENZONATATE 200 MG/1
200 CAPSULE ORAL 3 TIMES DAILY PRN
Qty: 30 CAPSULE | Refills: 0 | Status: SHIPPED | OUTPATIENT
Start: 2023-07-01 | End: 2023-07-11

## 2023-07-01 RX ORDER — IPRATROPIUM BROMIDE 21 UG/1
2 SPRAY, METERED NASAL 3 TIMES DAILY
Qty: 30 ML | Refills: 0 | Status: SHIPPED | OUTPATIENT
Start: 2023-07-01 | End: 2023-11-22 | Stop reason: ALTCHOICE

## 2023-07-01 NOTE — PROGRESS NOTES
"Subjective:      Patient ID: Dottie Galeana is a 72 y.o. female.    Vitals:  height is 5' 6" (1.676 m) and weight is 74.8 kg (165 lb). Her temperature is 97.9 °F (36.6 °C). Her blood pressure is 132/77 and her pulse is 66. Her respiration is 18 and oxygen saturation is 97%.     Chief Complaint: Sore Throat    72 year old patients presents today with possible strep. Symptoms include sore throat, fatigue, cough, sinus drainage. Onset yesterday 06/30/2023. Treatments tried tylenol with mild relief. Pain 2/10.    Sore Throat   This is a new problem. The current episode started yesterday. The problem has been gradually worsening. Neither side of throat is experiencing more pain than the other. The maximum temperature recorded prior to her arrival was 100.4 - 100.9 F. The fever has been present for Less than 1 day. The pain is at a severity of 2/10. The pain is mild. Associated symptoms include congestion, coughing, swollen glands and trouble swallowing. Pertinent negatives include no abdominal pain, diarrhea, drooling, ear pain, headaches, neck pain, shortness of breath, stridor or vomiting. She has had exposure to strep. She has tried acetaminophen for the symptoms. The treatment provided mild relief.     Constitution: Negative for chills, sweating, fatigue and fever.   HENT:  Positive for congestion, sore throat and trouble swallowing. Negative for ear pain, drooling and voice change.    Neck: Negative for neck pain, neck stiffness, painful lymph nodes and neck swelling.   Cardiovascular:  Negative for chest pain, leg swelling, palpitations, sob on exertion and passing out.   Eyes:  Negative for eye pain, eye redness, photophobia, double vision, blurred vision and eyelid swelling.   Respiratory:  Positive for cough. Negative for chest tightness, sputum production, bloody sputum, shortness of breath, stridor and wheezing.    Gastrointestinal:  Negative for abdominal pain, abdominal bloating, nausea, vomiting, " constipation, diarrhea and heartburn.   Musculoskeletal:  Negative for joint pain, joint swelling, abnormal ROM of joint, back pain, muscle cramps and muscle ache.   Skin:  Negative for rash and hives.   Allergic/Immunologic: Negative for seasonal allergies, food allergies, hives, itching and sneezing.   Neurological:  Negative for dizziness, light-headedness, passing out, loss of balance, headaches, altered mental status, loss of consciousness and seizures.   Hematologic/Lymphatic: Negative for swollen lymph nodes.   Psychiatric/Behavioral:  Negative for altered mental status and nervous/anxious. The patient is not nervous/anxious.     Objective:     Physical Exam   Constitutional: She is oriented to person, place, and time. She appears well-developed. She is cooperative.  Non-toxic appearance. She does not appear ill. No distress.   HENT:   Head: Normocephalic and atraumatic.   Ears:   Right Ear: Hearing, tympanic membrane, external ear and ear canal normal.   Left Ear: Hearing, tympanic membrane, external ear and ear canal normal.   Nose: Mucosal edema and rhinorrhea present. No nasal deformity. No epistaxis. Right sinus exhibits no maxillary sinus tenderness and no frontal sinus tenderness. Left sinus exhibits no maxillary sinus tenderness and no frontal sinus tenderness.   Mouth/Throat: Uvula is midline and mucous membranes are normal. No trismus in the jaw. Normal dentition. No uvula swelling. Posterior oropharyngeal erythema and cobblestoning present. No oropharyngeal exudate, posterior oropharyngeal edema or tonsillar abscesses. No tonsillar exudate.   Eyes: Conjunctivae and lids are normal. No scleral icterus.   Neck: Trachea normal and phonation normal. Neck supple. No edema present. No erythema present. No neck rigidity present.   Cardiovascular: Normal rate, regular rhythm, normal heart sounds and normal pulses.   Pulmonary/Chest: Effort normal and breath sounds normal. No accessory muscle usage or  stridor. No respiratory distress. She has no decreased breath sounds. She has no wheezes. She has no rhonchi. She has no rales.   Abdominal: Normal appearance.   Musculoskeletal: Normal range of motion.         General: No deformity. Normal range of motion.   Lymphadenopathy:     She has no cervical adenopathy.   Neurological: She is alert and oriented to person, place, and time. She exhibits normal muscle tone. Coordination normal.   Skin: Skin is warm, dry, intact, not diaphoretic, not pale and no rash. Capillary refill takes less than 2 seconds.   Psychiatric: Her speech is normal and behavior is normal. Judgment and thought content normal.   Nursing note and vitals reviewed.  Results for orders placed or performed in visit on 07/01/23   POCT Strep A, Molecular   Result Value Ref Range    Molecular Strep A, POC Negative Negative     Acceptable Yes    POCT respiratory syncytial virus   Result Value Ref Range    RSV Rapid Ag Negative Negative     Acceptable Yes        Assessment:     1. Upper respiratory virus    2. Cough, unspecified type    3. Sore throat        Plan:   Continue claritin daily.    Upper respiratory virus  -     ipratropium (ATROVENT) 21 mcg (0.03 %) nasal spray; 2 sprays by Each Nostril route 3 (three) times daily.  Dispense: 30 mL; Refill: 0    Cough, unspecified type  -     POCT respiratory syncytial virus  -     benzonatate (TESSALON) 200 MG capsule; Take 1 capsule (200 mg total) by mouth 3 (three) times daily as needed for Cough.  Dispense: 30 capsule; Refill: 0    Sore throat  -     POCT Strep A, Molecular      Patient Instructions   If you were prescribed a narcotic or controlled medication, do not drive or operate heavy equipment or machinery while taking these medications.  You must understand that you've received an Urgent Care treatment only and that you may be released before all your medical problems are known or treated. You, the patient, will arrange  for follow up care as instructed.  Follow up with your PCP or specialty clinic as directed if not improved or as needed. You can call [phone] to schedule an appointment with the appropriate provider.  If your condition worsens we recommend that you receive another evaluation at the Emergency Department for any concerns or worsening of condition.  Patient aware and verbalized understanding.

## 2023-07-01 NOTE — PATIENT INSTRUCTIONS
If you were prescribed a narcotic or controlled medication, do not drive or operate heavy equipment or machinery while taking these medications.  You must understand that you've received an Urgent Care treatment only and that you may be released before all your medical problems are known or treated. You, the patient, will arrange for follow up care as instructed.  Follow up with your PCP or specialty clinic as directed if not improved or as needed. You can call [phone] to schedule an appointment with the appropriate provider.  If your condition worsens we recommend that you receive another evaluation at the Emergency Department for any concerns or worsening of condition.  Patient aware and verbalized understanding.   
What Type Of Note Output Would You Prefer (Optional)?: Bullet Format
How Severe Is Your Skin Lesion?: mild
Has Your Skin Lesion Been Treated?: not been treated
Is This A New Presentation, Or A Follow-Up?: Mole

## 2023-07-09 ENCOUNTER — PATIENT MESSAGE (OUTPATIENT)
Dept: PRIMARY CARE CLINIC | Facility: CLINIC | Age: 73
End: 2023-07-09
Payer: MEDICARE

## 2023-07-10 ENCOUNTER — PATIENT MESSAGE (OUTPATIENT)
Dept: PRIMARY CARE CLINIC | Facility: CLINIC | Age: 73
End: 2023-07-10
Payer: MEDICARE

## 2023-07-17 DIAGNOSIS — F51.01 PRIMARY INSOMNIA: Primary | ICD-10-CM

## 2023-07-18 DIAGNOSIS — R09.82 PND (POST-NASAL DRIP): ICD-10-CM

## 2023-07-18 RX ORDER — TEMAZEPAM 15 MG/1
15 CAPSULE ORAL NIGHTLY PRN
Qty: 30 CAPSULE | Refills: 3 | Status: SHIPPED | OUTPATIENT
Start: 2023-07-18 | End: 2023-08-17

## 2023-07-19 RX ORDER — AMLODIPINE BESYLATE 10 MG/1
TABLET ORAL
Qty: 90 TABLET | Refills: 3 | Status: SHIPPED | OUTPATIENT
Start: 2023-07-19

## 2023-07-19 RX ORDER — FLUTICASONE PROPIONATE 50 MCG
SPRAY, SUSPENSION (ML) NASAL
Qty: 48 G | Refills: 1 | Status: SHIPPED | OUTPATIENT
Start: 2023-07-19

## 2023-07-19 NOTE — TELEPHONE ENCOUNTER
Refill Routing Note   Medication(s) are not appropriate for processing by [facility] for the following reason(s):      Non-participating provider    [facility] action(s):  Route None identified          Appointments  past 12m or future 3m with PCP    Date Provider   Last Visit   6/29/2023 Ml Shipley MD   Next Visit   11/27/2023 Ml Shipley MD   ED visits in past 90 days: 0        Note composed:7:08 PM 07/18/2023

## 2023-08-04 ENCOUNTER — TELEPHONE (OUTPATIENT)
Dept: PRIMARY CARE CLINIC | Facility: CLINIC | Age: 73
End: 2023-08-04
Payer: MEDICARE

## 2023-08-04 NOTE — TELEPHONE ENCOUNTER
Called patient to schedule AWV, but patient refused at this time because  has a lot of appointments scheduled at this time. Patient said she may call [employer] directly and schedule AWV herself.  
Yes - the patient is able to be screened

## 2023-08-18 RX ORDER — AMLODIPINE BESYLATE 5 MG/1
10 TABLET ORAL
Qty: 90 TABLET | Refills: 0 | OUTPATIENT
Start: 2023-08-18

## 2023-08-24 ENCOUNTER — DOCUMENTATION ONLY (OUTPATIENT)
Dept: REHABILITATION | Facility: HOSPITAL | Age: 73
End: 2023-08-24
Payer: MEDICARE

## 2023-09-11 ENCOUNTER — PATIENT MESSAGE (OUTPATIENT)
Dept: PRIMARY CARE CLINIC | Facility: CLINIC | Age: 73
End: 2023-09-11
Payer: MEDICARE

## 2023-09-11 ENCOUNTER — LAB VISIT (OUTPATIENT)
Dept: LAB | Facility: HOSPITAL | Age: 73
End: 2023-09-11
Attending: INTERNAL MEDICINE
Payer: MEDICARE

## 2023-09-11 DIAGNOSIS — H04.123 DRY EYES, BILATERAL: ICD-10-CM

## 2023-09-11 DIAGNOSIS — H04.123 DRY EYES, BILATERAL: Primary | ICD-10-CM

## 2023-09-11 PROCEDURE — 83520 IMMUNOASSAY QUANT NOS NONAB: CPT | Mod: HCNC | Performed by: INTERNAL MEDICINE

## 2023-09-11 PROCEDURE — 86235 NUCLEAR ANTIGEN ANTIBODY: CPT | Mod: HCNC | Performed by: INTERNAL MEDICINE

## 2023-09-11 PROCEDURE — 86235 NUCLEAR ANTIGEN ANTIBODY: CPT | Mod: 59,HCNC | Performed by: INTERNAL MEDICINE

## 2023-09-11 PROCEDURE — 83516 IMMUNOASSAY NONANTIBODY: CPT | Mod: 59,HCNC | Performed by: INTERNAL MEDICINE

## 2023-09-11 PROCEDURE — 83516 IMMUNOASSAY NONANTIBODY: CPT | Mod: HCNC | Performed by: INTERNAL MEDICINE

## 2023-09-11 NOTE — TELEPHONE ENCOUNTER
Patient wants you to review her meds and see if any could be contributing to her dry eye problem. Her ophthalmologist suggested that some of her current medications may be causing or exacerbating it.

## 2023-09-13 ENCOUNTER — TELEPHONE (OUTPATIENT)
Dept: PRIMARY CARE CLINIC | Facility: CLINIC | Age: 73
End: 2023-09-13
Payer: MEDICARE

## 2023-09-13 LAB
ANTI SM/RNP ANTIBODY: 0.07 RATIO (ref 0–0.99)
ANTI-SM/RNP INTERPRETATION: NEGATIVE
ANTI-SSA ANTIBODY: 0.06 RATIO (ref 0–0.99)
ANTI-SSA INTERPRETATION: NEGATIVE
ANTI-SSB ANTIBODY: 0.06 RATIO (ref 0–0.99)
ANTI-SSB INTERPRETATION: NEGATIVE
ENA SCL70 IGG SER IA-ACNC: <0.2 U

## 2023-09-13 NOTE — PROGRESS NOTES
Welll..... all these labs came back normal so at this point not sure what is causing the dryness. We may have to stop the lexapro. Trouble is with her mood as is stopping this wont be very helpful. Can she find out from the eye doctor if just using eye drops will be sufficient. I worry about stopping a mood medicine at this time when shes already having a hard time   So go down to half the lexapro which is 10mg

## 2023-09-13 NOTE — TELEPHONE ENCOUNTER
----- Message from Ml Shipley MD sent at 9/13/2023  1:23 PM CDT -----  Welll..... all these labs came back normal so at this point not sure what is causing the dryness. We may have to stop the lexapro. Trouble is with her mood as is stopping this wont be very helpful. Can she find out from the eye doctor if just using e  ye drops will be sufficient. I worry about stopping a mood medicine at this time when shes already having a hard time   So go down to half the lexapro which is 10mg

## 2023-09-13 NOTE — TELEPHONE ENCOUNTER
Patient notified of her lab results and Dr. Shipley's comments. She will try just using the eye drops for now.

## 2023-09-14 LAB — ENA SCL70 AB SER-ACNC: <0.6 U/ML

## 2023-09-15 ENCOUNTER — TELEPHONE (OUTPATIENT)
Dept: PRIMARY CARE CLINIC | Facility: CLINIC | Age: 73
End: 2023-09-15
Payer: MEDICARE

## 2023-09-18 PROBLEM — Z09 HOSPITAL DISCHARGE FOLLOW-UP: Status: RESOLVED | Noted: 2023-06-15 | Resolved: 2023-09-18

## 2023-09-19 ENCOUNTER — PATIENT MESSAGE (OUTPATIENT)
Dept: PRIMARY CARE CLINIC | Facility: CLINIC | Age: 73
End: 2023-09-19
Payer: MEDICARE

## 2023-09-19 LAB — RNAP III AB SER-ACNC: <20 UNITS

## 2023-09-27 LAB

## 2023-09-28 ENCOUNTER — TELEPHONE (OUTPATIENT)
Dept: PRIMARY CARE CLINIC | Facility: CLINIC | Age: 73
End: 2023-09-28
Payer: MEDICARE

## 2023-09-28 ENCOUNTER — LAB VISIT (OUTPATIENT)
Dept: LAB | Facility: HOSPITAL | Age: 73
End: 2023-09-28
Attending: INTERNAL MEDICINE
Payer: MEDICARE

## 2023-09-28 ENCOUNTER — CLINICAL SUPPORT (OUTPATIENT)
Dept: PRIMARY CARE CLINIC | Facility: CLINIC | Age: 73
End: 2023-09-28
Payer: MEDICARE

## 2023-09-28 DIAGNOSIS — Z63.6 CAREGIVER STRESS: ICD-10-CM

## 2023-09-28 DIAGNOSIS — R39.9 UTI SYMPTOMS: Primary | ICD-10-CM

## 2023-09-28 DIAGNOSIS — R39.9 UTI SYMPTOMS: ICD-10-CM

## 2023-09-28 DIAGNOSIS — F32.A DEPRESSIVE DISORDER: Primary | ICD-10-CM

## 2023-09-28 DIAGNOSIS — F41.1 GAD (GENERALIZED ANXIETY DISORDER): ICD-10-CM

## 2023-09-28 LAB
BILIRUB UR QL STRIP: NEGATIVE
CLARITY UR REFRACT.AUTO: CLEAR
COLOR UR AUTO: YELLOW
GLUCOSE UR QL STRIP: NEGATIVE
HGB UR QL STRIP: NEGATIVE
KETONES UR QL STRIP: NEGATIVE
LEUKOCYTE ESTERASE UR QL STRIP: NEGATIVE
MICROSCOPIC COMMENT: NORMAL
NITRITE UR QL STRIP: NEGATIVE
PH UR STRIP: 7 [PH] (ref 5–8)
PROT UR QL STRIP: NEGATIVE
RBC #/AREA URNS AUTO: 3 /HPF (ref 0–4)
SP GR UR STRIP: 1.01 (ref 1–1.03)
URN SPEC COLLECT METH UR: NORMAL
WBC #/AREA URNS AUTO: 1 /HPF (ref 0–5)

## 2023-09-28 PROCEDURE — 99499 NO LOS: ICD-10-PCS | Mod: HCNC,S$GLB,, | Performed by: SOCIAL WORKER

## 2023-09-28 PROCEDURE — 99499 UNLISTED E&M SERVICE: CPT | Mod: HCNC,S$GLB,, | Performed by: SOCIAL WORKER

## 2023-09-28 PROCEDURE — 81001 URINALYSIS AUTO W/SCOPE: CPT | Mod: HCNC | Performed by: INTERNAL MEDICINE

## 2023-09-28 PROCEDURE — 87086 URINE CULTURE/COLONY COUNT: CPT | Mod: HCNC | Performed by: INTERNAL MEDICINE

## 2023-09-28 SDOH — SOCIAL DETERMINANTS OF HEALTH (SDOH): DEPENDENT RELATIVE NEEDING CARE AT HOME: Z63.6

## 2023-09-28 NOTE — TELEPHONE ENCOUNTER
Patient reports that starting last weekend, 9/23/23, she began having a frequent urge to urinate and urinary leakage. She will drop off a urine specimen later today.

## 2023-09-28 NOTE — PROGRESS NOTES
Individual Psychotherapy (PhD/LCSW)    9/28/2023    Site:  [facility]      Chief complaint/reason for encounter: appetite     Mood check: scale of:0 best, 10 worst. Patient rated:  Depression  - PHQ 9  - score is 14, increased to Moderate Depression   Anxiety - ALFONSO 7 score - score is 17- increased from 6/5/2023 from 10    Risk parameters:  Patient reports no suicidal ideation  Patient reports no homicidal ideation  Patient reports no self-injurious behavior  Patient reports no violent behavior    Bridge:  N/A - pt was last seen 6/5/2023     Review of home assignment:   N/A    [place]:  Goals   Identified areas of clinical concern     History of present condition/content of session:   She provided additional information about her 's condition and how this affects her currently. She said that his condition has deteriorated to the point that he is almost totally blind.  Therefore, she had to assume the caregiver duties for her mother in law, and mother in law's  who are both aging.  She described hx of relationship with her mother in law, whom she said that can be demanding and rigid. She acknowledged that she is busy trying to please everyone. Which, she admitted is causing her to feel overwhelmed.     Pt was encouraged to take care of herself. Discussion was held on having structure of her day. Such as getting up at 9:30, and before her  gets up (so that she has some time to herself). She spent some time talking about caregiver stress. She verbalized understanding when clinician explained that she has to take care of herself if she is taking care of others. She was also encouraged to come to therapy appointments as good self care. And not to see therapy as an obligation, but a safe place to vent.     Review of sx: she continues to have sleep px with racing thoughts and worry about what she has  to do or forgot to do. She admitted that she worries about things she can't control.     Pertinent  information:  Pt's  has genetic degenerative eye disease that began at birth, and worsened over the years. His mother is living and pt has care giving responsibilities for her as well. Pt has a long hx of working, and fired about 12 years ago (identified area of clinical concern.) Pt is  for 43 years. Pt has 2 children, who both have young children.     Therapeutic Intervention:   Patient was assessed for present condition and areas of clinical concern. She was provided with supportive therapy. Coping skills and self care were addressed.     Treatment plan:  Target symptoms: anxiety   Why chosen therapy is appropriate versus another modality: evidence based practice  Outcome monitoring methods: checklist/rating scale  Therapeutic intervention type: supportive psychotherapy, changing thinking patterns that cause anxiety responses       Patient's response to intervention:  The patient's response to intervention is accepting.    Progress toward goals and other mental status changes:  The patient's progress toward goals is N/A - pt has not identified gaols     Diagnosis:   Generalized Anxiety Disorder  Major Depressive Disorder  Caregiver stress     Plan:  individual psychotherapy    Return to clinic: 2 weeks  10/17/2023 at 2:00    Length of Service (minutes): 45

## 2023-09-30 LAB — BACTERIA UR CULT: NO GROWTH

## 2023-10-17 ENCOUNTER — PATIENT MESSAGE (OUTPATIENT)
Dept: PRIMARY CARE CLINIC | Facility: CLINIC | Age: 73
End: 2023-10-17
Payer: MEDICARE

## 2023-10-20 ENCOUNTER — TELEPHONE (OUTPATIENT)
Dept: PRIMARY CARE CLINIC | Facility: CLINIC | Age: 73
End: 2023-10-20
Payer: MEDICARE

## 2023-10-20 NOTE — TELEPHONE ENCOUNTER
Clinician returned call to pt. There was no answer and voicemail was left asking her to return the call.

## 2023-10-23 RX ORDER — ARIPIPRAZOLE 2 MG/1
2 TABLET ORAL DAILY
Qty: 30 TABLET | Refills: 0 | Status: SHIPPED | OUTPATIENT
Start: 2023-10-23 | End: 2023-11-22

## 2023-11-01 ENCOUNTER — CLINICAL SUPPORT (OUTPATIENT)
Dept: PRIMARY CARE CLINIC | Facility: CLINIC | Age: 73
End: 2023-11-01
Payer: MEDICARE

## 2023-11-01 DIAGNOSIS — Z63.6 CAREGIVER STRESS: ICD-10-CM

## 2023-11-01 DIAGNOSIS — F41.1 GAD (GENERALIZED ANXIETY DISORDER): Primary | ICD-10-CM

## 2023-11-01 SDOH — SOCIAL DETERMINANTS OF HEALTH (SDOH): DEPENDENT RELATIVE NEEDING CARE AT HOME: Z63.6

## 2023-11-01 NOTE — PROGRESS NOTES
"Individual Psychotherapy (PhD/LCSW)    11/1/2023    Site:  [facility]      Chief complaint/reason for encounter: anxiety and caregiver stress     Mood check: scale of:0 best, 10 worst. Patient rated:  Depression  - 5    Anxiety - "always an 8 or 9"       Risk parameters:  Patient reports no suicidal ideation  Patient reports no homicidal ideation  Patient reports no self-injurious behavior  Patient reports no violent behavior    Bridge:  N/A     Review of home assignment:   N/A    [place]:  Identified areas of clinical concern   Assess for level and stress  Self care     History of present condition/content of session:   She noted being forgetful, some age related some stress related. Pt was educated on the should statements, but not able to make the connection to judgement statements.  Assessed for negative self talk and unrealistic expectations of herself.     Time was spent exploring her current level of stress. It's a px for her to have to bring her mother in law out to eat, etc. But has to take care of 3 people, helping to walk, read the menu. And it is not able to just take her. In the past 2 years, his sight has deteriorated.  Pt verbalized understanding that her role is changing from wife to caregiver. She noted that there is a change in their level of intimacy. She acknowledged that her  is also experiencing some cognitive decline in addition to his declining health.  She admitted that his physical limitation are a barrier to continued socialization. When mentioned to her, pt verbalized recognition that she is mourning the loss of their relationship.    Review of sx: she continues to have sleep px with racing thoughts and worry about what she has  to do or forgot to do even with Ability. She admitted that she worries about things she can't control. She acknowledged recent overeating, and gained about 10 lbs. To the point she had to get  bigger clothes.  She is coming to clinic 2x/week for " exercise. Lack of motivation, lack of energy, and px with memory. Feeling down.  No reported anhedonia, and admitted that some of the lack of motivation to to the yard work is more age related changes in ability to do it.  She admitted she is irritated with her , mother in law, step father in law, and brother in law,  and her children. But not at the grandchildren. No reported hopeless, and she admitted to some worthless feelings when she doesn't do things around the house.  She reported a hx of taking Xanax which was noted to improved racing thoughts.     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 His mother is living and pt has care giving responsibilities for her and his mother's .  Pt has a long hx of working, and fired about 12 years ago (identified area of clinical concern.) Pt is  for 43 years. Pt has 2 children, who both have young children.      Therapeutic Intervention:   Patient was assessed for present condition and areas of clinical concern. She was provided with supportive therapy. Coping skills and self care were addressed. Cognitive Therapy techniques were used to explore biased self-talk and beliefs that contribute to faulty thinking. Cognitive Therapy techniques were used to explore sources of irrational thoughts that contribute to anxiety response.  Sx of forgetfulness were normalized.     Treatment plan:  Target symptoms: anxiety   Why chosen therapy is appropriate versus another modality: evidence based practice  Outcome monitoring methods: checklist/rating scale  Therapeutic intervention type: supportive psychotherapy, changing thinking patterns that cause anxiety responses       Patient's response to intervention:  The patient's response to intervention is accepting.    Progress toward goals and other mental status changes:  The patient's progress toward goals is N/A - pt has not identified  celestino     Diagnosis:   Generalized Anxiety Disorder  Major Depressive Disorder  Caregiver stress     Plan:  individual psychotherapy - Tasks for next session: look for ways that she has control  over her life     Return to clinic: 2 weeks  11/14/2023 at 2:00 - pt has a  conflicting appointment and will need to change     Length of Service (minutes): 60

## 2023-11-02 ENCOUNTER — SOCIAL WORK (OUTPATIENT)
Dept: PRIMARY CARE CLINIC | Facility: CLINIC | Age: 73
End: 2023-11-02
Payer: MEDICARE

## 2023-11-02 NOTE — PROGRESS NOTES
Met virtually with Psych-PA, Radha Dejesus, to discuss patient and possible strategies to better assist patient. PA reviewed chart with LCSW. Discussion was held on pt's current medication and reviewed hx of other medications used to treat anxiety sx. PA educated clinician on how  the medication class of Benzodiazepine work on receptor in the brain. Pt provided suggestions for clinician to a better assist pt with anxiety sx.

## 2023-11-05 ENCOUNTER — PATIENT MESSAGE (OUTPATIENT)
Dept: PRIMARY CARE CLINIC | Facility: CLINIC | Age: 73
End: 2023-11-05
Payer: MEDICARE

## 2023-11-05 DIAGNOSIS — F41.1 GENERALIZED ANXIETY DISORDER: Primary | ICD-10-CM

## 2023-11-05 DIAGNOSIS — Z63.6 CAREGIVER STRESS: ICD-10-CM

## 2023-11-05 DIAGNOSIS — F51.01 PRIMARY INSOMNIA: ICD-10-CM

## 2023-11-05 SDOH — SOCIAL DETERMINANTS OF HEALTH (SDOH): DEPENDENT RELATIVE NEEDING CARE AT HOME: Z63.6

## 2023-11-06 ENCOUNTER — PATIENT MESSAGE (OUTPATIENT)
Dept: PSYCHIATRY | Facility: CLINIC | Age: 73
End: 2023-11-06
Payer: MEDICARE

## 2023-11-06 NOTE — TELEPHONE ENCOUNTER
The [facility] psychiatrists are working off of a waitlist that is about 90 days out. Can the patient get an external referral?

## 2023-11-06 NOTE — TELEPHONE ENCOUNTER
Patient states she does not feel like the Abilify is helping her. She wants to know if her dose can be increased or something.

## 2023-11-06 NOTE — TELEPHONE ENCOUNTER
Dr. Hoffman has a 3 month wait list for scheduling. Patient notified. She verbalized understanding. Referral faxed to another psychiatrist, Dr. Samm Ramos.

## 2023-11-10 ENCOUNTER — TELEPHONE (OUTPATIENT)
Dept: PSYCHIATRY | Facility: CLINIC | Age: 73
End: 2023-11-10
Payer: MEDICARE

## 2023-11-12 ENCOUNTER — PATIENT MESSAGE (OUTPATIENT)
Dept: PRIMARY CARE CLINIC | Facility: CLINIC | Age: 73
End: 2023-11-12
Payer: MEDICARE

## 2023-11-12 DIAGNOSIS — M79.673 ACUTE FOOT PAIN, UNSPECIFIED LATERALITY: Primary | ICD-10-CM

## 2023-11-13 ENCOUNTER — TELEPHONE (OUTPATIENT)
Dept: ADMINISTRATIVE | Facility: CLINIC | Age: 73
End: 2023-11-13
Payer: MEDICARE

## 2023-11-13 NOTE — TELEPHONE ENCOUNTER
Patient states her big toe feels better this morning. She reports she still has pain under the ball of her foot and the nail is grown to the skin below it. Also, she stubbed her 2nd toe and it is now bruised.   
Patient was informed of Dr. Shipley's recs. She verbalized understanding.   
RICE. 
No

## 2023-11-14 NOTE — PROGRESS NOTES
Outpatient Psychiatry Initial Visit  11/22/2023    ID:  72 yo F presenting for an initial evaluation. Met with patient.    Reason for encounter: Referral from Dr. Shipley. Patient complains of anxiety, caregiver stress, insomnia.    History of Present Illness:  Pt. is a 72 yo F with a past psychiatric hx of Generalized anxiety disorder, Caregiver stress, Primary insomnia presenting to the clinic for an initial evaluation and treatment. Past medical history outlined below. She is currently taking temazepam (RESTORIL), ARIPiprazole (ABILIFY), venlafaxine (EFFEXOR-XR), EScitalopram oxalate (LEXAPRO), prescribed and managed by Dr. Shipley. She reports that these medications have not adequately managed her symptoms.       Pt currently endorses or denies the following symptoms:  Psych ROS  Depression: mild  Anxiety: 3 days ago last panic attack, no agoraphobia, no social anxiety  PTSD: no flashbacks, nightmares, or avoidance of stimuli  Shahla/Psychosis: No manic episodes, no A/V hallucinations  SI - No SI - access to guns:  stowed safely    Past Psychiatric History:  Past Psych Hx:    anxiety, depression           First psych contact:  15 years ago  Prior hospitalizations:    denies  Prior suicide attempts or self harm:  denies  Prior diagnosis:  anxiety, depression, insomnia, caregiver stress  Prior meds:  Xanax, Celexa, Benadryl, Ambien, Trazodone  Current meds:  Effexor, Abilify, Lexapro, Restoril   Past Medical Hx: Hx of TBI:   denies         Hx of seizures:  denies  Past Medical History:   Diagnosis Date    Arthritis     Closed compression fracture of L1 lumbar vertebra 11/07/2018    Colon polyp     Coronary artery calcification     Depression     Fatty liver     General anesthetics causing adverse effect in therapeutic use     Generalized anxiety disorder     GERD (gastroesophageal reflux disease)     History of seasonal allergies     HTN (hypertension)     Hyperlipidemia     Hyponatremia     Insomnia     Osteoporosis      Stress incontinence     Stroke     ocular behind left eye             Past Surgical Hx:  Past Surgical History:   Procedure Laterality Date    BREAST BIOPSY Left 1998    benign    COLONOSCOPY N/A 04/14/2022    Procedure: COLONOSCOPY;  Surgeon: Al Santiago MD;  Location: [facility];  Service: Endoscopy;  Laterality: N/A;    COLONOSCOPY N/A 6/8/2023    Procedure: COLONOSCOPY;  Surgeon: Al Santiago MD;  Location: [facility];  Service: Endoscopy;  Laterality: N/A;    ESOPHAGOGASTRODUODENOSCOPY N/A 08/17/2018    Procedure: EGD (ESOPHAGOGASTRODUODENOSCOPY);  Surgeon: Al Santiago MD;  Location: [facility];  Service: Endoscopy;  Laterality: N/A;    ESOPHAGOGASTRODUODENOSCOPY N/A 01/12/2022    Procedure: ESOPHAGOGASTRODUODENOSCOPY (EGD);  Surgeon: Al Santiago MD;  Location: [facility];  Service: Endoscopy;  Laterality: N/A;    ESOPHAGOGASTRODUODENOSCOPY N/A 4/5/2023    Procedure: EGD (ESOPHAGOGASTRODUODENOSCOPY);  Surgeon: Al Santiago MD;  Location: [facility];  Service: Endoscopy;  Laterality: N/A;    FIXATION KYPHOPLASTY N/A 11/13/2018    Procedure: Kyphoplasty L1;  Surgeon: Russ Gramajo MD;  Location: [facility] OR;  Service: Pain Management;  Laterality: N/A;    ORIF WRIST FRACTURE Right     UPPER GASTROINTESTINAL ENDOSCOPY             Family Hx:   Paternal:  denies  Maternal:  depression, anxiety, bipolar            Social Hx:   Childhood:  dysfunctional, chaotic, mom left at 16 yo  Marital Status:  , 42 years, great  Children:  2 children ages 40 & 36  Resides:    Occupation:  Retired   Hobbies:  puzzles, used to like yard work  Hoahaoism:  Orthodoxy  Education level:  Bachelor's degree  :  denies   Legal:  denies    Substance Hx:  Tobacco:  denies  Alcohol:  occasionally  Drug use:  denies  Caffeine:  3 cups daily  Rehab:  denies  Prior/current AA:  denies    Review of Symptoms  GENERAL: no weight gain/loss  SKIN: no rashes or lacerations  HEAD: no  headaches  EYES: no jaundice, blindness. No exophthalmos  EARS: no dizziness, tinnitus, or hearing loss  NOSE: no changes in smell  Mouth/throat: no dyskinetic movements or obvious goiter  CHEST: no SOB, hyperventilation or cough  CARDIO: no tachycardia, bradycardia, or chest pain  ABDOMEN: no nausea, vomiting, pain, constipation, or diarrhea  URINARY:  no frequency, dysuria, or sexual dysfunction  ENDOCRINE: No polydipsia, polyuria, no cold/hot intolerance  MUSCULOSKELETAL: no joint pain/stiffness  NEUROLOGIC: no weakness or sensory changes, no seizures, no confusion, memory loss, or forgetfulness, no tremor or abnormal movements    Current Evaluation:  Nutritional Screening:  Considering the patient's height and weight, medications, medical history and preferences, should a referral be made to the dietitian? No  Vitals: most recent vitals signs, dated greater than 90 days prior to this appointment, were reviewed  General: age appropriate, well nourished, casually dressed, neatly groomed  MSK: muscle strength/tone : no tremor or abnormal movements. Gait/Station: no ataxic, steady    Suicide Risk Assessment:  Protective factors: age, gender, no prior attempts, no prior hospitalizations, no ongoing substance abuse, no psychosis, , has children, denies SI/intent/plan, seeking treatment, access to treatment, future oriented, good primary support, firearms safely stowed (not loaded).    Risks:   Patient is a low immediate and long-term risk considering risk factors    Psychiatric:  Speech: Normal rate, rhythm, volume. No latency, no pressured speech  Mood/Affect: euthymic, congruent and appropriate   Thought Process: organized, logical, linear  Thought Content: no suicidal or homicidal ideation, no A/V hallucinations, delusions or paranoia  Insight: Intact; aware of illness  Judgement: behavior is adequate to circumstances  Orientation: A&O x 4  Memory: Intact for content of interview, 3/3 immediate, 3/3 after 3  "mins. Able to recall recent and remote events.  Language: Grossly intact, no aphasias   Concentration: Spells "world" correctly forward & backwards  Knowledge/Intelligence: appropriate to age and level of education.   Spouse/Partner: Supportive    ASSESSMENT - DIAGNOSIS - GOALS:  Impression:          Pt presents to OP Psychiatry for initial evaluation and medication management of MDD, ALFONSO w/panic attacks, Primary insomnia. Pt reports mild depression, but unmanageable anxiety with last panic attack 3 days ago. Pt states her sleep is poor and believes if her sleep was better she wouldn't be as anxious. States she has been on Effexor for over 10 years and it has been good for her, but her anxiety is worsened r/t caregiver stress/burnout. Her  of 42 years is legally blind and depends on her for almost all of his care. She also cares for her mother-in-law who is 91, who lives at the [facility]. Pt states she is constantly very busy going to doctor's appointments and running errands between her  and MIL. Pt reports a traumatic childhood with a mom who was bipolar and abandoned her when she was 18 yo. Pt reports poor sleep, and has tried several hypnotics and sleep medications. She reports last night she got a decent amount of sleep but it is rare and few and far between. Discussed adding Seroquel low dose for mood/sleep. Also discussed adding Buspar and increasing her Effexor to 225 mg daily to help with symptoms of anxiety. Discussed in depth the risks of benzodiazepines in older adults such as cognitive decline, falls, and fractures, and habit forming potential. Pt verbalized understanding of risks. Discussed restarting 0.25 mg Xanax PRN for panic attacks. Pt in agreement to take sparingly. Pt reports low energy and lack of motivation and currently taking Lexapro. We discussed weaning and switching to Wellbutrin. Pt in agreement to try all medications. Detailed instructions and " medication information sheets provided in the AVS, pt to login [facility] to reference. Pt denies SI/HI/AVH, self-harm, plan, or intent. Pt verbalizes understanding of medication instructions through teach back, will reassess symptoms in 30 days.        Safe for outpatient tx and no acute safety concerns.  Diagnosis/Diagnoses:  - MDD (major depressive disorder), recurrent episode, mild  - Generalized anxiety disorder with panic attacks  - Primary insomnia    Strengths/Liabilities: Patient accepts feedback & guidance. Patient is motivated for change.     Treatment Goals: Specify outcomes written in observable, behavioral terms  Anxiety: acquire relapse prevention skills, reduce physical symptoms of anxiety, reduce time spent worrying (>30 minutes/day)  Depression: Acquire relapse prevention skills, increasing energy, increasing interest in usual activities, increasing motivation, reducing excessive guilt and reducing fatigue.    Treatment Plan/Recommendations:   Medication Management: The risks and benefits of medication were discussed with the patient.   Meds:    1) Start buPROPion (WELLBUTRIN XL) 150 MG TB24 tablet - Take 1 tablet (150 mg total) by mouth once daily in the morning for depression.    2) Increase venlafaxine (EFFEXOR-XR) 75 MG 24 hr capsule - Take 1 capsule (75 mg) along with (150 mg) by mouth for a total of 225 mg once daily for depression and anxiety.    3) Start busPIRone (BUSPAR) 10 MG tablet - Take 0.5 tablets (5 mg total) by mouth 2 (two) times daily for 8 days, THEN 1 tablet (10 mg total) 2 (two) times daily for anxiety.    4) Start QUEtiapine (SEROQUEL) 50 MG tablet - Take 1 tablet (50 mg total) by mouth every evening for mood/sleep.    5) Continue ALPRAZolam (XANAX) 0.25 MG tablet - Take 1 tablet (0.25 mg total) by mouth daily as needed for Anxiety. Discussed risk of decreased RT, sedation, addictive potential, and not to mix with alcohol.     6) Taper off EScitalopram oxalate (LEXAPRO) 20 MG  tablet - Take 0.5 tablet (10 mg total) by mouth once daily for 4 days, THEN Stop Lexapro.    7) Stop ARIPiprazole (ABILIFY) 2 MG Tab.    8) Stop temazepam (RESTORIL) 15 mg Cap.      Labs:  none  Return to Clinic:  30 days  Counseling time:  35 mins  Total time:  60 mins    Psychotherapy:   Target symptoms: inattention/distractibility, anxiety    Why chosen therapy is appropriate versus another modality: relevant to diagnosis, patient responds to this modality  Outcome monitoring methods: self-report, observation, feedback from family   Therapeutic intervention type: supportive psychotherapy  Topics discussed/themes: building skills sets for symptom management, symptom recognition, nutrition, exercise  The patient's response to the intervention is accepting. The patient's progress toward treatment goals is positive progress.  Duration of intervention: 30 minutes    - Patient given contact # for psychotherapists at [facility] and also instructed they may check with insurance for a list of providers.   - Call to report any worsening of symptoms or problems associated with medication  - Pt instructed to go to ER if thoughts of harming self or others arise     - Spent 60min face to face with the pt; >50% time spent in counseling   - Supportive therapy and psycho education provided  - R/B/SE's of medications discussed with the pt who expresses understanding and chooses to take medications as prescribed.   - Pt instructed to call clinic, 911 or go to nearest emergency room if symptoms worsen or pt is in crisis. The pt expresses understanding.      Rema Melgar NP  Psychiatric-Mental Health Nurse Practitioner  Department of Psychiatry    [facility] [place] [address] [place] [address]  Phone:  [phone]  Fax: [phone]

## 2023-11-20 ENCOUNTER — PATIENT MESSAGE (OUTPATIENT)
Dept: PSYCHIATRY | Facility: CLINIC | Age: 73
End: 2023-11-20
Payer: MEDICARE

## 2023-11-22 ENCOUNTER — OFFICE VISIT (OUTPATIENT)
Dept: PSYCHIATRY | Facility: CLINIC | Age: 73
End: 2023-11-22
Payer: MEDICARE

## 2023-11-22 VITALS
HEART RATE: 81 BPM | HEIGHT: 66 IN | BODY MASS INDEX: 28.11 KG/M2 | WEIGHT: 174.94 LBS | DIASTOLIC BLOOD PRESSURE: 93 MMHG | SYSTOLIC BLOOD PRESSURE: 143 MMHG

## 2023-11-22 DIAGNOSIS — F51.01 PRIMARY INSOMNIA: ICD-10-CM

## 2023-11-22 DIAGNOSIS — F33.0 MDD (MAJOR DEPRESSIVE DISORDER), RECURRENT EPISODE, MILD: ICD-10-CM

## 2023-11-22 DIAGNOSIS — F43.23 ADJUSTMENT DISORDER WITH MIXED ANXIETY AND DEPRESSED MOOD: Primary | ICD-10-CM

## 2023-11-22 DIAGNOSIS — F41.1 GENERALIZED ANXIETY DISORDER: ICD-10-CM

## 2023-11-22 DIAGNOSIS — Z63.6 CAREGIVER STRESS: ICD-10-CM

## 2023-11-22 DIAGNOSIS — F41.0 GENERALIZED ANXIETY DISORDER WITH PANIC ATTACKS: ICD-10-CM

## 2023-11-22 DIAGNOSIS — F41.1 GENERALIZED ANXIETY DISORDER WITH PANIC ATTACKS: ICD-10-CM

## 2023-11-22 PROCEDURE — 3044F PR MOST RECENT HEMOGLOBIN A1C LEVEL <7.0%: ICD-10-PCS | Mod: HCNC,CPTII,S$GLB,

## 2023-11-22 PROCEDURE — 99999 PR PBB SHADOW E&M-EST. PATIENT-LVL V: ICD-10-PCS | Mod: PBBFAC,HCNC,,

## 2023-11-22 PROCEDURE — 1126F PR PAIN SEVERITY QUANTIFIED, NO PAIN PRESENT: ICD-10-PCS | Mod: HCNC,CPTII,S$GLB,

## 2023-11-22 PROCEDURE — 3077F SYST BP >= 140 MM HG: CPT | Mod: HCNC,CPTII,S$GLB,

## 2023-11-22 PROCEDURE — 3080F DIAST BP >= 90 MM HG: CPT | Mod: HCNC,CPTII,S$GLB,

## 2023-11-22 PROCEDURE — 99205 OFFICE O/P NEW HI 60 MIN: CPT | Mod: HCNC,S$GLB,,

## 2023-11-22 PROCEDURE — 1101F PR PT FALLS ASSESS DOC 0-1 FALLS W/OUT INJ PAST YR: ICD-10-PCS | Mod: HCNC,CPTII,S$GLB,

## 2023-11-22 PROCEDURE — 90833 PR PSYCHOTHERAPY W/PATIENT W/E&M, 30 MIN (ADD ON): ICD-10-PCS | Mod: HCNC,S$GLB,,

## 2023-11-22 PROCEDURE — 4010F ACE/ARB THERAPY RXD/TAKEN: CPT | Mod: HCNC,CPTII,S$GLB,

## 2023-11-22 PROCEDURE — 1159F MED LIST DOCD IN RCRD: CPT | Mod: HCNC,CPTII,S$GLB,

## 2023-11-22 PROCEDURE — 1126F AMNT PAIN NOTED NONE PRSNT: CPT | Mod: HCNC,CPTII,S$GLB,

## 2023-11-22 PROCEDURE — 3008F PR BODY MASS INDEX (BMI) DOCUMENTED: ICD-10-PCS | Mod: HCNC,CPTII,S$GLB,

## 2023-11-22 PROCEDURE — 3288F FALL RISK ASSESSMENT DOCD: CPT | Mod: HCNC,CPTII,S$GLB,

## 2023-11-22 PROCEDURE — 3008F BODY MASS INDEX DOCD: CPT | Mod: HCNC,CPTII,S$GLB,

## 2023-11-22 PROCEDURE — 1160F PR REVIEW ALL MEDS BY PRESCRIBER/CLIN PHARMACIST DOCUMENTED: ICD-10-PCS | Mod: HCNC,CPTII,S$GLB,

## 2023-11-22 PROCEDURE — 3080F PR MOST RECENT DIASTOLIC BLOOD PRESSURE >= 90 MM HG: ICD-10-PCS | Mod: HCNC,CPTII,S$GLB,

## 2023-11-22 PROCEDURE — 1160F RVW MEDS BY RX/DR IN RCRD: CPT | Mod: HCNC,CPTII,S$GLB,

## 2023-11-22 PROCEDURE — 99999 PR PBB SHADOW E&M-EST. PATIENT-LVL V: CPT | Mod: PBBFAC,HCNC,,

## 2023-11-22 PROCEDURE — 4010F PR ACE/ARB THEARPY RXD/TAKEN: ICD-10-PCS | Mod: HCNC,CPTII,S$GLB,

## 2023-11-22 PROCEDURE — 1159F PR MEDICATION LIST DOCUMENTED IN MEDICAL RECORD: ICD-10-PCS | Mod: HCNC,CPTII,S$GLB,

## 2023-11-22 PROCEDURE — 3044F HG A1C LEVEL LT 7.0%: CPT | Mod: HCNC,CPTII,S$GLB,

## 2023-11-22 PROCEDURE — 3288F PR FALLS RISK ASSESSMENT DOCUMENTED: ICD-10-PCS | Mod: HCNC,CPTII,S$GLB,

## 2023-11-22 PROCEDURE — 1101F PT FALLS ASSESS-DOCD LE1/YR: CPT | Mod: HCNC,CPTII,S$GLB,

## 2023-11-22 PROCEDURE — 90833 PSYTX W PT W E/M 30 MIN: CPT | Mod: HCNC,S$GLB,,

## 2023-11-22 PROCEDURE — 3077F PR MOST RECENT SYSTOLIC BLOOD PRESSURE >= 140 MM HG: ICD-10-PCS | Mod: HCNC,CPTII,S$GLB,

## 2023-11-22 PROCEDURE — 99205 PR OFFICE/OUTPT VISIT, NEW, LEVL V, 60-74 MIN: ICD-10-PCS | Mod: HCNC,S$GLB,,

## 2023-11-22 RX ORDER — TEMAZEPAM 15 MG/1
15 CAPSULE ORAL NIGHTLY PRN
COMMUNITY
Start: 2023-10-26 | End: 2023-11-22

## 2023-11-22 RX ORDER — CYCLOSPORINE 0.5 MG/ML
1 EMULSION OPHTHALMIC 2 TIMES DAILY
COMMUNITY
Start: 2023-09-08

## 2023-11-22 RX ORDER — TRETINOIN 0.25 MG/G
CREAM TOPICAL
COMMUNITY
Start: 2023-10-27

## 2023-11-22 RX ORDER — QUETIAPINE FUMARATE 50 MG/1
50 TABLET, FILM COATED ORAL NIGHTLY
Qty: 30 TABLET | Refills: 1 | Status: SHIPPED | OUTPATIENT
Start: 2023-11-22 | End: 2023-12-19 | Stop reason: SDUPTHER

## 2023-11-22 RX ORDER — ALPRAZOLAM 0.25 MG/1
0.25 TABLET ORAL DAILY PRN
Qty: 30 TABLET | Refills: 0 | Status: SHIPPED | OUTPATIENT
Start: 2023-11-22 | End: 2023-12-19 | Stop reason: SDUPTHER

## 2023-11-22 RX ORDER — DIPHENHYDRAMINE HCL 25 MG
CAPSULE ORAL
COMMUNITY

## 2023-11-22 RX ORDER — ALPRAZOLAM 0.25 MG/1
0.25 TABLET ORAL DAILY PRN
COMMUNITY
Start: 2023-07-20 | End: 2023-11-22 | Stop reason: SDUPTHER

## 2023-11-22 RX ORDER — BUPROPION HYDROCHLORIDE 150 MG/1
150 TABLET ORAL DAILY
Qty: 30 TABLET | Refills: 1 | Status: SHIPPED | OUTPATIENT
Start: 2023-11-22 | End: 2023-12-19 | Stop reason: SDUPTHER

## 2023-11-22 RX ORDER — VENLAFAXINE HYDROCHLORIDE 75 MG/1
75 CAPSULE, EXTENDED RELEASE ORAL DAILY
Qty: 30 CAPSULE | Refills: 1 | Status: SHIPPED | OUTPATIENT
Start: 2023-11-22 | End: 2023-12-19 | Stop reason: SDUPTHER

## 2023-11-22 RX ORDER — BUSPIRONE HYDROCHLORIDE 10 MG/1
TABLET ORAL
Qty: 60 TABLET | Refills: 0 | Status: SHIPPED | OUTPATIENT
Start: 2023-11-22 | End: 2023-12-19 | Stop reason: SDUPTHER

## 2023-11-22 SDOH — SOCIAL DETERMINANTS OF HEALTH (SDOH): DEPENDENT RELATIVE NEEDING CARE AT HOME: Z63.6

## 2023-11-22 NOTE — PATIENT INSTRUCTIONS
1) Start buPROPion (WELLBUTRIN XL) 150 MG TB24 tablet - Take 1 tablet (150 mg total) by mouth once daily in the morning for depression.    2) Increase venlafaxine (EFFEXOR-XR) 75 MG 24 hr capsule - Take 1 capsule (75 mg) along with (150 mg) by mouth for a total of 225 mg once daily for depression and anxiety.    3) Start busPIRone (BUSPAR) 10 MG tablet - Take 0.5 tablets (5 mg total) by mouth 2 (two) times daily for 8 days, THEN 1 tablet (10 mg total) 2 (two) times daily for anxiety.    4) Start QUEtiapine (SEROQUEL) 50 MG tablet - Take 1 tablet (50 mg total) by mouth every evening for mood/sleep.    5) Continue ALPRAZolam (XANAX) 0.25 MG tablet - Take 1 tablet (0.25 mg total) by mouth daily as needed for Anxiety. Discussed risk of decreased RT, sedation, addictive potential, and not to mix with alcohol.     6) Taper off EScitalopram oxalate (LEXAPRO) 20 MG tablet - Take 0.5 tablet (10 mg total) by mouth once daily for 4 days, THEN Stop Lexapro.    7) Stop ARIPiprazole (ABILIFY) 2 MG Tab.    8) Stop temazepam (RESTORIL) 15 mg Cap.

## 2023-11-27 ENCOUNTER — PATIENT MESSAGE (OUTPATIENT)
Dept: PRIMARY CARE CLINIC | Facility: CLINIC | Age: 73
End: 2023-11-27

## 2023-11-27 ENCOUNTER — OFFICE VISIT (OUTPATIENT)
Dept: PRIMARY CARE CLINIC | Facility: CLINIC | Age: 73
End: 2023-11-27
Payer: MEDICARE

## 2023-11-27 VITALS
OXYGEN SATURATION: 97 % | SYSTOLIC BLOOD PRESSURE: 132 MMHG | BODY MASS INDEX: 28.34 KG/M2 | WEIGHT: 176.38 LBS | HEIGHT: 66 IN | RESPIRATION RATE: 18 BRPM | HEART RATE: 83 BPM | DIASTOLIC BLOOD PRESSURE: 80 MMHG

## 2023-11-27 DIAGNOSIS — M54.41 CHRONIC BILATERAL LOW BACK PAIN WITH RIGHT-SIDED SCIATICA: ICD-10-CM

## 2023-11-27 DIAGNOSIS — I25.84 CORONARY ARTERY CALCIFICATION: ICD-10-CM

## 2023-11-27 DIAGNOSIS — F41.1 GAD (GENERALIZED ANXIETY DISORDER): Primary | ICD-10-CM

## 2023-11-27 DIAGNOSIS — K21.9 GASTROESOPHAGEAL REFLUX DISEASE, UNSPECIFIED WHETHER ESOPHAGITIS PRESENT: ICD-10-CM

## 2023-11-27 DIAGNOSIS — R05.3 CHRONIC COUGH: ICD-10-CM

## 2023-11-27 DIAGNOSIS — J98.9 REACTIVE AIRWAY DISEASE WITHOUT ASTHMA: ICD-10-CM

## 2023-11-27 DIAGNOSIS — I10 PRIMARY HYPERTENSION: ICD-10-CM

## 2023-11-27 DIAGNOSIS — Z63.6 CAREGIVER STRESS: ICD-10-CM

## 2023-11-27 DIAGNOSIS — I25.10 CORONARY ARTERY CALCIFICATION: ICD-10-CM

## 2023-11-27 DIAGNOSIS — G47.00 INSOMNIA, UNSPECIFIED TYPE: ICD-10-CM

## 2023-11-27 DIAGNOSIS — K21.9 GASTROESOPHAGEAL REFLUX DISEASE WITHOUT ESOPHAGITIS: ICD-10-CM

## 2023-11-27 DIAGNOSIS — E53.8 VITAMIN B12 DEFICIENCY: ICD-10-CM

## 2023-11-27 DIAGNOSIS — F51.01 PRIMARY INSOMNIA: ICD-10-CM

## 2023-11-27 DIAGNOSIS — F32.A DEPRESSIVE DISORDER: ICD-10-CM

## 2023-11-27 DIAGNOSIS — F41.1 GENERALIZED ANXIETY DISORDER: ICD-10-CM

## 2023-11-27 DIAGNOSIS — G89.29 CHRONIC BILATERAL LOW BACK PAIN WITH RIGHT-SIDED SCIATICA: ICD-10-CM

## 2023-11-27 DIAGNOSIS — E78.5 HYPERLIPIDEMIA, UNSPECIFIED HYPERLIPIDEMIA TYPE: ICD-10-CM

## 2023-11-27 PROCEDURE — 3008F BODY MASS INDEX DOCD: CPT | Mod: HCNC,CPTII,S$GLB, | Performed by: INTERNAL MEDICINE

## 2023-11-27 PROCEDURE — 1100F PTFALLS ASSESS-DOCD GE2>/YR: CPT | Mod: HCNC,CPTII,S$GLB, | Performed by: INTERNAL MEDICINE

## 2023-11-27 PROCEDURE — 3008F PR BODY MASS INDEX (BMI) DOCUMENTED: ICD-10-PCS | Mod: HCNC,CPTII,S$GLB, | Performed by: INTERNAL MEDICINE

## 2023-11-27 PROCEDURE — 3075F SYST BP GE 130 - 139MM HG: CPT | Mod: HCNC,CPTII,S$GLB, | Performed by: INTERNAL MEDICINE

## 2023-11-27 PROCEDURE — 4010F ACE/ARB THERAPY RXD/TAKEN: CPT | Mod: HCNC,CPTII,S$GLB, | Performed by: INTERNAL MEDICINE

## 2023-11-27 PROCEDURE — 99999 PR PBB SHADOW E&M-EST. PATIENT-LVL V: CPT | Mod: PBBFAC,HCNC,, | Performed by: INTERNAL MEDICINE

## 2023-11-27 PROCEDURE — 4010F PR ACE/ARB THEARPY RXD/TAKEN: ICD-10-PCS | Mod: HCNC,CPTII,S$GLB, | Performed by: INTERNAL MEDICINE

## 2023-11-27 PROCEDURE — 3044F HG A1C LEVEL LT 7.0%: CPT | Mod: HCNC,CPTII,S$GLB, | Performed by: INTERNAL MEDICINE

## 2023-11-27 PROCEDURE — 99213 PR OFFICE/OUTPT VISIT, EST, LEVL III, 20-29 MIN: ICD-10-PCS | Mod: HCNC,S$GLB,, | Performed by: INTERNAL MEDICINE

## 2023-11-27 PROCEDURE — 3044F PR MOST RECENT HEMOGLOBIN A1C LEVEL <7.0%: ICD-10-PCS | Mod: HCNC,CPTII,S$GLB, | Performed by: INTERNAL MEDICINE

## 2023-11-27 PROCEDURE — 3079F DIAST BP 80-89 MM HG: CPT | Mod: HCNC,CPTII,S$GLB, | Performed by: INTERNAL MEDICINE

## 2023-11-27 PROCEDURE — 3079F PR MOST RECENT DIASTOLIC BLOOD PRESSURE 80-89 MM HG: ICD-10-PCS | Mod: HCNC,CPTII,S$GLB, | Performed by: INTERNAL MEDICINE

## 2023-11-27 PROCEDURE — 1160F RVW MEDS BY RX/DR IN RCRD: CPT | Mod: HCNC,CPTII,S$GLB, | Performed by: INTERNAL MEDICINE

## 2023-11-27 PROCEDURE — 3075F PR MOST RECENT SYSTOLIC BLOOD PRESS GE 130-139MM HG: ICD-10-PCS | Mod: HCNC,CPTII,S$GLB, | Performed by: INTERNAL MEDICINE

## 2023-11-27 PROCEDURE — 99999 PR PBB SHADOW E&M-EST. PATIENT-LVL V: ICD-10-PCS | Mod: PBBFAC,HCNC,, | Performed by: INTERNAL MEDICINE

## 2023-11-27 PROCEDURE — 1160F PR REVIEW ALL MEDS BY PRESCRIBER/CLIN PHARMACIST DOCUMENTED: ICD-10-PCS | Mod: HCNC,CPTII,S$GLB, | Performed by: INTERNAL MEDICINE

## 2023-11-27 PROCEDURE — 3288F FALL RISK ASSESSMENT DOCD: CPT | Mod: HCNC,CPTII,S$GLB, | Performed by: INTERNAL MEDICINE

## 2023-11-27 PROCEDURE — 1125F AMNT PAIN NOTED PAIN PRSNT: CPT | Mod: HCNC,CPTII,S$GLB, | Performed by: INTERNAL MEDICINE

## 2023-11-27 PROCEDURE — 1159F MED LIST DOCD IN RCRD: CPT | Mod: HCNC,CPTII,S$GLB, | Performed by: INTERNAL MEDICINE

## 2023-11-27 PROCEDURE — 3288F PR FALLS RISK ASSESSMENT DOCUMENTED: ICD-10-PCS | Mod: HCNC,CPTII,S$GLB, | Performed by: INTERNAL MEDICINE

## 2023-11-27 PROCEDURE — 99213 OFFICE O/P EST LOW 20 MIN: CPT | Mod: HCNC,S$GLB,, | Performed by: INTERNAL MEDICINE

## 2023-11-27 PROCEDURE — 1100F PR PT FALLS ASSESS DOC 2+ FALLS/FALL W/INJURY/YR: ICD-10-PCS | Mod: HCNC,CPTII,S$GLB, | Performed by: INTERNAL MEDICINE

## 2023-11-27 PROCEDURE — 1159F PR MEDICATION LIST DOCUMENTED IN MEDICAL RECORD: ICD-10-PCS | Mod: HCNC,CPTII,S$GLB, | Performed by: INTERNAL MEDICINE

## 2023-11-27 PROCEDURE — 1125F PR PAIN SEVERITY QUANTIFIED, PAIN PRESENT: ICD-10-PCS | Mod: HCNC,CPTII,S$GLB, | Performed by: INTERNAL MEDICINE

## 2023-11-27 RX ORDER — ALBUTEROL SULFATE 90 UG/1
2 AEROSOL, METERED RESPIRATORY (INHALATION) EVERY 6 HOURS PRN
Qty: 8 G | Refills: 6 | Status: ON HOLD | OUTPATIENT
Start: 2023-11-27 | End: 2023-12-27

## 2023-11-27 SDOH — SOCIAL DETERMINANTS OF HEALTH (SDOH): DEPENDENT RELATIVE NEEDING CARE AT HOME: Z63.6

## 2023-11-27 NOTE — PROGRESS NOTES
INTERNAL MEDICINE PROGRESS/URGENT CARE NOTE    CHIEF COMPLAINT     Chief Complaint   Patient presents with    Follow-up     6 mo f/u    Toe Pain     Hurts with movement and touching-- has swelling-- has been injured since 11/11/2023-pt fell trying to get her dog in        HPI     Dottie Galeana is a 71 y.o.  female who presents with a PMHx of  depression and anxiety, HTN, HLD, osteoporosis, stress incontinence, GERD, history of CVA who presents today for scheduled 6 month follow up.      Her main complaints today are: had a fall recently and with some toe pain. Improving.     Noted on vitals elevated blood pressures. At her last visit BP was 143/93 so amlodipine was added. Today BP is 132/80    Previous complaints include:   care giver stress   2. Recent URI with severe cough. Was very  very ill for 3 weeks. Now feeling better   3. Chronic diarrhea.        Oral ulcers has been seeing medical providers for the past 2 months and has had extensive work up done. Was told to see her dentist. History and exam consistent with burning mouth syndrome. Likely brought on by stress and anxiety which she admittedly has a lot of. Today she does report that this has almost completely resolved.   Today she reports she gets them every now and again but not as bad.      Depression: effexor 150 daily. Takes xanax for anxiety. PHQ 9 at last visit was a 7/27. ALFONSO 7 was 12/21 which is moderate anxiety. She did confirmed that she currently has a lot of anxiety. Her  has visual issues and health concerns.has a very happy family and support system, 5 grandchildren that shes very close to but she has a lot of caregiver duties. Doesn't leave the house much as her  gets anxious when she does, so apart from her family, she does very little socializing with friends. Says she used to work so much that she has little to no friends that she socializes with.   At her last visit, we agreed to try lexparo 10mg and today she  reports that the medication is great. That she loves it. Thinks its made an amazing difference with her mood. Shes happier and less anxious. May also be sleeping a bit better.   GAD7 today is a 4/21, previous visit was 7/21.   PHQ 9 today is a 3/27, on last visit was 3/27 which is an improvement compared to the values above  Denies SI/HI.     since her last visit, she's seen psychiatry and all her medicatiosn changed. Added venlafaxine 75mg to theh 150mg.  Buspar was added and so was seroquel for sleep. Today she looks albin much better. Mood is improved and she's sleeping a lot better which has helped with her sleep.        Polypharmacy. Inappropriate and appropriate polypharmacy. Currently takes ambien 10mg nighly and xanax 0.25mg PRN at least 1-2 times per week. Counseled about these medications and the worrisome concerns about their use regarding falls etc. So we discussed trialling rtazodone. If patient unable to sleep without ambien she has agreed to see a sleep specialist or psychiatry. Today, she states that the trazodone has done nothing to help with sleep, so ni instructed her that the 6 hours that she does get is sufficient. If at some point this worsens, I would like her to see a sleep specialist.      Insomnia: has been on ambien for years. Says she is unable to sleep without it. For unclear reasons has never been on 5mg instead of the 10mg. Has not tried any alternative sleep medications but says she has tried to go without the medication and was not able to get any sleep. Has tried melatonin and says this does nothing for her. Not strict about her sleep hygiene as her husbands health and wake sleep cycle affects hers. She is aware that she should be off the ambien as her son also tells her this. Is wiling tor trial off this and try an alternative. Tried the trazodone which did not work for her. Agreed to refer to sleep specialist if she starts having more trouble with sleep.      HCM: will get her  shingles at the start of the year when it will become free.   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      Home Medications:  Prior to Admission medications    Medication Sig Start Date End Date Taking? Authorizing Provider   albuterol (VENTOLIN HFA) 90 mcg/actuation inhaler Inhale 2 puffs into the lungs every 6 (six) hours as needed for Wheezing. Rescue 5/8/23   Jazmin Castañeda NP   ALPRAZolam (XANAX) 0.25 MG tablet Take 1 tablet (0.25 mg total) by mouth daily as needed for Anxiety. 11/22/23   Rema Melgar NP   amLODIPine (NORVASC) 10 MG tablet TAKE 1 TABLET EVERY DAY 7/19/23   Ml Shipley MD   aspirin 325 MG tablet Take 325 mg by mouth once daily. Pt taking 1/2 daily 11/23/11   Provider, Historical   B.coagul,subtilis/inulin/vit C (UP4 PROBIOTICS PLUS PREBIOTIC ORAL)     Provider, Historical   buPROPion (WELLBUTRIN XL) 150 MG TB24 tablet Take 1 tablet (150 mg total) by mouth once daily. 11/22/23 1/21/24  Rema Melgar NP   busPIRone (BUSPAR) 10 MG tablet Take 0.5 tablets (5 mg total) by mouth 2 (two) times daily for 8 days, THEN 1 tablet (10 mg total) 2 (two) times daily for 26 days. 11/22/23 12/26/23  Rema Melgar NP   diphenhydrAMINE (BENADRYL) 25 mg capsule     Provider, Historical   fluticasone propionate (FLONASE) 50 mcg/actuation nasal spray USE 1 SPRAY IN EACH NOSTRIL ONCE DAILY 7/19/23   Ml Shipley MD   gabapentin (NEURONTIN) 300 MG capsule Take 3 capsules (900 mg total) by mouth 2 (two) times daily. Titrate up to twice daily only if continued symptoms after once daily 6/6/23 11/22/23  David Wei MD   losartan (COZAAR) 100 MG tablet Take 1 tablet (100 mg total) by mouth once daily. Hold for systolic less than 120  "6/9/23   Adriana Stout MD   multivitamin (THERAGRAN) per tablet Take 1 tablet by mouth once daily.  11/23/11   Provider, Historical   nebivoloL (BYSTOLIC) 10 MG Tab Take 1 tablet (10 mg total) by mouth once daily. One tablet in am, 1/2 tab pm 6/9/23   Adriana Stout MD   NITROSTAT 0.4 mg SL tablet Place 0.4 mg under the tongue every 5 (five) minutes as needed.  12/9/16   José Miguel Bynum MD   pantoprazole (PROTONIX) 40 MG tablet TAKE 1 TABLET TWICE DAILY 6/26/23   Seema Crenshaw NP   QUEtiapine (SEROQUEL) 50 MG tablet Take 1 tablet (50 mg total) by mouth every evening. 11/22/23 1/21/24  Rema Melgar NP   RESTASIS 0.05 % ophthalmic emulsion Place 1 drop into both eyes 2 (two) times daily. 9/8/23   Provider, Historical   rosuvastatin (CRESTOR) 10 MG tablet Take 1 tablet (10 mg total) by mouth once daily. 5/31/23   Ml Shipley MD   tretinoin (RETIN-A) 0.025 % cream SMARTSIG:sparingly Topical Every Night 10/27/23   Provider, Historical   venlafaxine (EFFEXOR-XR) 150 MG Cp24 TAKE 1 CAPSULE (150 MG TOTAL) BY MOUTH ONCE DAILY. 6/7/23   Ml Shipley MD   venlafaxine (EFFEXOR-XR) 75 MG 24 hr capsule Take 1 capsule (75 mg total) by mouth once daily. 11/22/23 1/21/24  Rema Melgar, NP       Review of Systems:  Review of Systems    Health Maintainence:       PHYSICAL EXAM     /80 (BP Location: Right arm, Patient Position: Sitting, BP Method: Medium (Manual))   Pulse 83   Resp 18   Ht 5' 6" (1.676 m)   Wt 80 kg (176 lb 5.9 oz)   LMP 04/21/1994   SpO2 97%   BMI 28.47 kg/m²     GEN - A+OX4, NAD   HEENT - PERRL, EOMI, OP clear  Neck - No thyromegaly or cervical LAD. No thyroid masses felt.  CV - RRR, no m/r   Chest - CTAB, no wheezing or rhonchi  Abd - S/NT/ND/+BS.   Ext - 2+BDP and radial pulses. No C/C/E.  Skin - No rash.    LABS         ASSESSMENT/PLAN     Dottie Galeana is a 73 y.o. female with  1. ALFONSO (generalized anxiety disorder)  Much improved with the changes " psychiatry has made to her medications including adding buspar, increasing the dose of venlafaxine.     2. Primary insomnia  Much improved with seroquel added by psychiatry.   Overview:    Orders:  -     CBC Auto Differential; Future; Expected date: 11/27/2023    3. Caregiver stress  Doing well with therapy    4. Generalized anxiety disorder  Improved with buspar and increased dose of venlafaxine    5. Depressive disorder  Significantly improved with higher dose venlafaxine, addtion of buspar.  -     CBC Auto Differential; Future; Expected date: 11/27/2023    6. Insomnia, unspecified type  Improved with seroquel added by psychiatry.   Encouraged proper slee phygiene     7. Hyperlipidemia, unspecified hyperlipidemia type  Overview:  On statin with great cholesterol control. Continue   shes eating better. Today we discuss need for cardio excerci    Orders:  -     Lipid Panel; Future; Expected date: 11/27/2023    8. Primary hypertension  Overview:  BP very well controlled. Lowered the amlodipine due to ankle swelling   Limit salt in diet  Needs more cardo excercise    Orders:  -     CBC Auto Differential; Future; Expected date: 11/27/2023  -     Comprehensive Metabolic Panel; Future; Expected date: 11/27/2023  -     TSH; Future; Expected date: 11/27/2023    9. Vitamin B12 deficiency  Overview:  Encouraged supplementing vitamin b12     Orders:  -     CBC Auto Differential; Future; Expected date: 11/27/2023    10. Chronic bilateral low back pain with right-sided sciatica  Overview:  Stable  Tylenol as needed        11. Coronary artery calcification  Overview:  On statin with great cholesterol control. Continue   shes eating better. Today we discuss need for cardio excercise      12. Gastroesophageal reflux disease without esophagitis  Overview:  Very well controlled with protonix. Continue.   Lifestyle changes advised ie limit foods known to cause symptoms      13. Chronic cough    14. Gastroesophageal reflux disease,  unspecified whether esophagitis present  Overview:  Very well controlled with protonix. Continue.   Lifestyle changes advised ie limit foods known to cause symptoms      15. Reactive airway disease without asthma  -     albuterol (VENTOLIN HFA) 90 mcg/actuation inhaler; Inhale 2 puffs into the lungs every 6 (six) hours as needed for Wheezing. Rescue  Dispense: 8 g; Refill: 6           WORRY SCORE 1    RTC in 6 months, sooner if needed and depending on labs.    Ml Shipley MD  Board Certified Internist/Geriatrician  [facility]

## 2023-12-07 DIAGNOSIS — E78.5 HYPERLIPIDEMIA, UNSPECIFIED HYPERLIPIDEMIA TYPE: ICD-10-CM

## 2023-12-07 RX ORDER — ROSUVASTATIN CALCIUM 10 MG/1
10 TABLET, COATED ORAL DAILY
Qty: 90 TABLET | Refills: 0 | Status: SHIPPED | OUTPATIENT
Start: 2023-12-07 | End: 2024-02-05 | Stop reason: SDUPTHER

## 2023-12-19 ENCOUNTER — PATIENT MESSAGE (OUTPATIENT)
Dept: PRIMARY CARE CLINIC | Facility: CLINIC | Age: 73
End: 2023-12-19
Payer: MEDICARE

## 2023-12-19 ENCOUNTER — PATIENT MESSAGE (OUTPATIENT)
Dept: PSYCHIATRY | Facility: CLINIC | Age: 73
End: 2023-12-19
Payer: MEDICARE

## 2023-12-19 DIAGNOSIS — F41.1 GENERALIZED ANXIETY DISORDER: ICD-10-CM

## 2023-12-19 DIAGNOSIS — F43.23 ADJUSTMENT DISORDER WITH MIXED ANXIETY AND DEPRESSED MOOD: ICD-10-CM

## 2023-12-19 DIAGNOSIS — F51.01 PRIMARY INSOMNIA: ICD-10-CM

## 2023-12-20 ENCOUNTER — PATIENT MESSAGE (OUTPATIENT)
Dept: PSYCHIATRY | Facility: CLINIC | Age: 73
End: 2023-12-20
Payer: MEDICARE

## 2023-12-20 RX ORDER — ALPRAZOLAM 0.25 MG/1
0.25 TABLET ORAL DAILY PRN
Qty: 30 TABLET | Refills: 0 | Status: SHIPPED | OUTPATIENT
Start: 2023-12-20 | End: 2024-01-12 | Stop reason: SDUPTHER

## 2023-12-20 RX ORDER — VENLAFAXINE HYDROCHLORIDE 75 MG/1
75 CAPSULE, EXTENDED RELEASE ORAL DAILY
Qty: 30 CAPSULE | Refills: 1 | Status: SHIPPED | OUTPATIENT
Start: 2023-12-20 | End: 2024-02-14 | Stop reason: SDUPTHER

## 2023-12-20 RX ORDER — BUPROPION HYDROCHLORIDE 150 MG/1
150 TABLET ORAL DAILY
Qty: 30 TABLET | Refills: 1 | Status: SHIPPED | OUTPATIENT
Start: 2023-12-20 | End: 2024-02-14 | Stop reason: SDUPTHER

## 2023-12-20 RX ORDER — QUETIAPINE FUMARATE 50 MG/1
50 TABLET, FILM COATED ORAL NIGHTLY PRN
Qty: 30 TABLET | Refills: 1 | Status: SHIPPED | OUTPATIENT
Start: 2023-12-20 | End: 2024-02-14

## 2023-12-20 RX ORDER — BUSPIRONE HYDROCHLORIDE 10 MG/1
10 TABLET ORAL 2 TIMES DAILY
Qty: 60 TABLET | Refills: 1 | Status: SHIPPED | OUTPATIENT
Start: 2023-12-20 | End: 2024-01-05 | Stop reason: DRUGHIGH

## 2023-12-27 PROBLEM — S82.231A CLOSED DISPLACED OBLIQUE FRACTURE OF SHAFT OF RIGHT TIBIA: Status: ACTIVE | Noted: 2023-12-27

## 2023-12-27 PROBLEM — S89.301A: Status: ACTIVE | Noted: 2023-12-27

## 2024-01-03 NOTE — PROGRESS NOTES
Health  Wellness Visit Note    Name: Dottie Galeana  Clinic Number: [id number]  Physician: No ref. provider found  Diagnosis: No diagnosis found.  Past Medical History:   Diagnosis Date    Arthritis     Closed compression fracture of L1 lumbar vertebra 11/07/2018    Colon polyp     Coronary artery calcification     Depression     Fatty liver     Generalized anxiety disorder     GERD (gastroesophageal reflux disease)     History of seasonal allergies     HTN (hypertension)     Hyperlipidemia     Hyponatremia     Insomnia     Osteoporosis     Stress incontinence     Stroke     ocular behind left eye       Visit Number: Wellness ***  Precautions: ***    1st PT visit:  ***  Year of care end date:  ***  6 Month test  performed:[] ***  12 Month test performed:[] ***  Mind body plan:***  Waiver: [] Signed   Patient level:***    Time In: ***  Time Out: ***  Total Treatment Time: ***    Wellness Vision 2022:  Handout on this week's wellness topic *** provided  along with a discussion on what it means, the benefits, and suggestions for practice.  Reviewed last week's topic of ***.    Subjective:   Dottie reports ***    Objective:   Dottie completed therapeutic stretches and the following exercises/ fitness machines:     [] ***  [] ***  [] ***  [] ***  [] ***  [] ***    Fitness Equipment Education Key  (E, I, ) Used this   visit HEP or Alternate Exercise   core lumbar E [] []    torso rotation E [] []    leg extension E [] []    leg curl E [] []    chest press E [] []    seated row E [] []    triceps extension E [] []    biceps curl E [] []    hip abduction E [] []    Hip adduction E [] []    leg press E [] []      See exercise log in patient folder for rate of exertion and repetitions completed.     Fitness Machine Education Key:  E=education on equipment initiated and further follow up and education needed  I=independent with  and exercise.  The patient:  Adjusts machines to his/her settings  Uses  equipment levers, pins, weights safely  Maintains safe and correct posture while exercising  Moves through exercise with correct pace and control  Gets on and off equipment safely    Assessment:   Dottie tolerated exercises, stretches, and all peripheral strengthening equipment listed above, without any increase in symptoms.     Plan:  Continue with established plan of care towards wellness goals set by PT and Dottie, including ***.    Health : Jose Long  1/3/2024

## 2024-01-05 ENCOUNTER — OFFICE VISIT (OUTPATIENT)
Dept: PSYCHIATRY | Facility: CLINIC | Age: 74
End: 2024-01-05
Payer: MEDICARE

## 2024-01-05 DIAGNOSIS — F41.0 GENERALIZED ANXIETY DISORDER WITH PANIC ATTACKS: Primary | ICD-10-CM

## 2024-01-05 DIAGNOSIS — F51.01 PRIMARY INSOMNIA: ICD-10-CM

## 2024-01-05 DIAGNOSIS — F41.1 GENERALIZED ANXIETY DISORDER WITH PANIC ATTACKS: Primary | ICD-10-CM

## 2024-01-05 DIAGNOSIS — F33.0 MDD (MAJOR DEPRESSIVE DISORDER), RECURRENT EPISODE, MILD: ICD-10-CM

## 2024-01-05 PROCEDURE — 1159F MED LIST DOCD IN RCRD: CPT | Mod: HCNC,CPTII,95,

## 2024-01-05 PROCEDURE — 1160F RVW MEDS BY RX/DR IN RCRD: CPT | Mod: HCNC,CPTII,95,

## 2024-01-05 PROCEDURE — 99214 OFFICE O/P EST MOD 30 MIN: CPT | Mod: HCNC,95,,

## 2024-01-05 RX ORDER — BUSPIRONE HYDROCHLORIDE 10 MG/1
20 TABLET ORAL 2 TIMES DAILY
Qty: 120 TABLET | Refills: 1 | Status: SHIPPED | OUTPATIENT
Start: 2024-01-05 | End: 2024-02-14 | Stop reason: SDUPTHER

## 2024-01-05 NOTE — PROGRESS NOTES
Outpatient Psychiatry Follow-Up Visit    Clinical Status of Patient: Outpatient (Ambulatory)  01/05/2024    The patient location is:  [place], [place]  The patient phone number is: [phone]   Visit type: Virtual visit with synchronous audio and video  Each patient to whom he or she provides medical services by telemedicine is:  (1) informed of the relationship between the physician and patient and the respective role of any other health care provider with respect to management of the patient; and (2) notified that he or she may decline to receive medical services by telemedicine and may withdraw from such care at any time.     Chief Complaint: Pt is a 74 yo F who presents today for a follow-up. Met with patient.       Interval History and Content of Current Session:  Interim Events/Subjective Report/Content of Current Session:  follow up appointment.    Pt is a 74 yo F with past psychiatric hx of MDD (major depressive disorder), recurrent episode, mild, Generalized anxiety disorder with panic attacks, Primary insomnia who presents for follow up treatment.     Past Psychiatric hx:   Pt. is a 74 yo F with a past psychiatric hx of Generalized anxiety disorder, Caregiver stress, Primary insomnia presenting to the clinic for an initial evaluation and treatment. Past medical history outlined below. She is currently taking temazepam (RESTORIL), ARIPiprazole (ABILIFY), venlafaxine (EFFEXOR-XR), EScitalopram oxalate (LEXAPRO), prescribed and managed by Dr. Shipley. She reports that these medications have not adequately managed her symptoms.     Pt presents to OP Psychiatry for initial evaluation and medication management of MDD, ALFONSO w/panic attacks, Primary insomnia. Pt reports mild depression, but unmanageable anxiety with last panic attack 3 days ago. Pt states her sleep is poor and believes if her sleep was better she wouldn't be as anxious. States she has been on Effexor for over 10 years and it has been good for her,  but her anxiety is worsened r/t caregiver stress/burnout. Her  of 42 years is legally blind and depends on her for almost all of his care. She also cares for her mother-in-law who is 91, who lives at the [facility]. Pt states she is constantly very busy going to doctor's appointments and running errands between her  and MIL. Pt reports a traumatic childhood with a mom who was bipolar and abandoned her when she was 16 yo. Pt reports poor sleep, and has tried several hypnotics and sleep medications. She reports last night she got a decent amount of sleep but it is rare and few and far between. Discussed adding Seroquel low dose for mood/sleep. Also discussed adding Buspar and increasing her Effexor to 225 mg daily to help with symptoms of anxiety. Discussed in depth the risks of benzodiazepines in older adults such as cognitive decline, falls, and fractures, and habit forming potential. Pt verbalized understanding of risks. Discussed restarting 0.25 mg Xanax PRN for panic attacks. Pt in agreement to take sparingly. Pt reports low energy and lack of motivation and currently taking Lexapro. We discussed weaning and switching to Wellbutrin. Pt in agreement to try all medications. Detailed instructions and medication information sheets provided in the AVS, pt to login [employer] to reference. Pt denies SI/HI/AVH, self-harm, plan, or intent. Pt verbalizes understanding of medication instructions through teach back, will reassess symptoms in 30 days.     Past Medical hx:   Past Medical History:   Diagnosis Date    Arthritis     Closed compression fracture of L1 lumbar vertebra 11/07/2018    Colon polyp     Coronary artery calcification     Depression     Fatty liver     Generalized anxiety disorder     GERD (gastroesophageal reflux disease)     History of seasonal allergies     HTN (hypertension)     Hyperlipidemia     Hyponatremia     Insomnia     Osteoporosis     Stress incontinence      Stroke     ocular behind left eye        Interim hx:  Medication changes last visit:     1) Start buPROPion (WELLBUTRIN XL) 150 MG TB24 tablet - Take 1 tablet (150 mg total) by mouth once daily in the morning for depression.  2) Increase venlafaxine (EFFEXOR-XR) 75 MG 24 hr capsule - Take 1 capsule (75 mg) along with (150 mg) by mouth for a total of 225 mg once daily for depression and anxiety.  3) Start busPIRone (BUSPAR) 10 MG tablet - Take 0.5 tablets (5 mg total) by mouth 2 (two) times daily for 8 days, THEN 1 tablet (10 mg total) 2 (two) times daily for anxiety.  4) Start QUEtiapine (SEROQUEL) 50 MG tablet - Take 1 tablet (50 mg total) by mouth every evening for mood/sleep.  5) Continue ALPRAZolam (XANAX) 0.25 MG tablet - Take 1 tablet (0.25 mg total) by mouth daily as needed for Anxiety. Discussed risk of decreased RT, sedation, addictive potential, and not to mix with alcohol.   6) Taper off EScitalopram oxalate (LEXAPRO) 20 MG tablet - Take 0.5 tablet (10 mg total) by mouth once daily for 4 days, THEN Stop Lexapro.  7) Stop ARIPiprazole (ABILIFY) 2 MG Tab.  8) Stop temazepam (RESTORIL) 15 mg Cap.    Anxiety: worsened  Depression: Improved with Wellbutrin  Sleep: improved  Appetite: same no issues     Denies suicidal/homicidal ideations.  Denies hopelessness/worthlessness.    Denies auditory/visual hallucinations      Tobacco:  denies  Alcohol:  occasionally  Drug use:  denies  Caffeine:  3 cups daily      Review of Systems   PSYCHIATRIC: Pertinent items are noted in the narrative.        CONSTITUTIONAL: weight stable        M/S: no pain today         ENT: no allergies noted today        ABD: no n/v/d     Past Medical, Family and Social History: The patient's past medical, family and social history have been reviewed and updated as appropriate within the electronic medical record. See encounter notes.     Medication Compliance: yes      Side effects: tolerates     Risk Parameters:  Patient reports no suicidal  ideation  Patient reports no homicidal ideation  Patient reports no self-injurious behavior  Patient reports no violent behavior     Exam (detailed: at least 9 elements; comprehensive: all 15 elements)   Constitutional  Vitals:  Most recent vital signs, dated less than 90 days prior to this appointment, were reviewed.     General:  unremarkable, age appropriate, casual attire, good eye contact, good rapport       Musculoskeletal  Muscle Strength/Tone:  no flaccidity, no tremor    Gait & Station:  normal      Psychiatric                       Speech:  normal tone, normal rate, rhythm, and volume   Mood & Affect:   Euthymic, congruent, appropriate         Thought Process:   Goal directed; Linear    Associations:   intact   Thought Content:   No SI/HI, delusions, or paranoia, no AV/VH   Insight & Judgement:   Good, adequate to circumstances   Orientation:   grossly intact; alert and oriented x 4    Memory:  intact for content of interview    Language:  grossly intact, can repeat    Attention Span  : Grossly intact for content of interview   Fund of Knowledge:   intact and appropriate to age and level of education        Assessment and Diagnosis   Status/Progress: Based on the examination today, the patient's problem(s) is/are under fair control.  New problems have not been presented today. Comorbidities are not currently complicating management of the primary condition.      Impression:  Pt presents to OP Psychiatry for routine follow-up for treatment/medication management of MDD, ALFONSO w/panic attacks, Insomnia. Pt reports improved depression after starting Wellbutrin. Pt already dealing with 's recent medical dx, and added anxiety d/t fx of ankle on [place]. Non-weight bearing for 5 more weeks, pt reports increased anxiety d/t already stressful situations. Discussed increasing Buspar as Xanax not recommended with opiates or older adults d/t increased risk of falls and fx. Pt verbalized understanding and in  agreement to increase of Buspar. Also discussed increasing Effexor in the future for unresolved anxiety/depression. Pt verbalized understanding. Pt denies SI/HI/AVH, self-harm, plan, or intent. Pt verbalizes understanding of medication instructions through teach back. No other issues, concerns, or problems, will reassess symptoms and medications in 30 days or PRN if symptoms worsen.      Diagnosis:   - MDD (major depressive disorder), recurrent episode, mild  - Generalized anxiety disorder with panic attacks  - Primary insomnia      Intervention/Counseling/Treatment Plan   Medication Management:      1. Continue buPROPion (WELLBUTRIN XL) 150 MG TB24 tablet - Take 1 tablet (150 mg total) by mouth once daily in the morning for depression.     2. Continue venlafaxine (EFFEXOR-XR) 75 MG + 150 MG 24 hr capsule - Take 1 capsule (75 mg) along with (150 mg) by mouth for a total of 225 mg once daily for depression and anxiety.     3. Increase busPIRone (BUSPAR) 10 MG tablet - Take 2 tablet (20 mg total) 2 (two) times daily for anxiety.     4. Continue QUEtiapine (SEROQUEL) 50 MG tablet - Take 1 tablet (50 mg total) by mouth every evening for mood/sleep.     5. Continue ALPRAZolam (XANAX) 0.25 MG tablet - Take 1 tablet (0.25 mg total) by mouth daily as needed for Anxiety. Discussed risk of decreased RT, sedation, addictive potential, and not to mix with alcohol.      6. Call to report any worsening of symptoms or problems with the medication. Pt instructed to go to ER with thoughts of harming self, others     7. Patient given contact # for psychotherapists at [facility] and also instructed she may check with insurance for list of providers.          Labs: none         Return to clinic:      30 days or PRN if symptoms worsen    -Spent 30min face to face with the pt; >50% time spent in counseling   -Supportive therapy and psychoeducation provided  -R/B/SE's of medications discussed with the pt who expresses understanding  and chooses to take medications as prescribed.   -Pt instructed to call clinic, 911 or go to nearest emergency room if sxs worsen or pt is in   crisis. The pt expresses understanding.      Rema Melgar NP  Psychiatric-Mental Health Nurse Practitioner  Department of Psychiatry    [facility] [place] [address] [place] [address]  Phone:  [phone]  Fax: [phone]

## 2024-01-10 ENCOUNTER — OUTPATIENT CASE MANAGEMENT (OUTPATIENT)
Dept: ADMINISTRATIVE | Facility: OTHER | Age: 74
End: 2024-01-10
Payer: MEDICARE

## 2024-01-10 NOTE — LETTER
Dottie Galeana  [id number] [address] [place] [id number]    Dear Dottie Galeana,     Welcome to [facility] Outpatient Care Management Program. We are here to assist patients with multiple long-term (chronic) conditions who often need more personalized healthcare.    It was a pleasure talking with you today. My name is Maddie Bolden RN. I look forward to working with you as your Care Manager. I will be contacting you by telephone routinely to help coordinate care and resolve issues.    My goal is to help you function at the healthiest and highest level possible. You can contact me directly at [phone].    As an [facility] patient with [employer], some of the services we provide, at no cost to you, include:     Development of an individualized care plan with a Registered Nurse   Connection with a   Assistance from a Community Health Worker  Connection with available resources and services    Coordinate communication among your care team members   Provide coaching and education  Help you understand your doctor's treatment plan  Help you obtain information about your insurance coverage.    All services provided by [facility]s Outpatient Care Managers and other care team members are coordinated with and communicated to your primary care team.      As part of your enrollment, you will be receiving education materials and more information about these services in your [facility] account, by phone, or through the mail. If you do not wish to participate or receive information, you can Opt Out by contacting our office at [phone].      Sincerely,        Maddie Bolden RN  [facility]   Outpatient Care Management

## 2024-01-10 NOTE — PROGRESS NOTES
"Outpatient Care Management  Initial Patient Assessment    Patient: Dottie Galeana  MRN: [id number]  Date of Service: 01/10/2024  Completed by: Maddie Moss RN  Referral Date: 12/28/2023  Date of Eligibility: 12/29/2023  Program: High Risk  Status: Ongoing  Effective Dates: 1/11/2024 - present  Responsible Staff: Maddie Moss RN        Reason for Visit   Patient presents with    OPCM Enrollment Call       Brief Summary:  Dottie Galeana was referred by Dr. Lloyd for closed displaced fracture of right tibia. Patient qualifies for program based on high risk score of 62.3%.   Active problem list, medical, surgical and social history reviewed. Active comorbidities include HTN, HLD, depression, insomnia, CVA, osteoarthritis, osteoporosis, stress incontinence. Areas of need identified by patient include " to be mobile again."   Next steps:   Review Seroquel side effects on follow up call.  Review clinical reference on coping.   Review patient's sleep schedule/pattern to ensure she is getting adequate rest with the addition of her new medication, Seroquel.   Referral to  for transportation concerns; cost/amount of  physical therapy visits.  Follow up post appointment with Dr. Ferrara to review continuity of care.   Send portal message with [employer] materials on falls prevention and DVT prevention, review on follow up call.  Mrs. Galeana agreed to OPCM RN follow up on or around 1/18/24.    Disability Status  Is the patient alert and oriented (person, place, time, and situation)?: Alert and oriented x 4  Hearing Difficulty or Deaf: no  Visual Difficulty or Blind: no (history of bilateral cataract repair)  Visual and Hearing Needs Conclusion: Pt reports no visual or hearing difficulties.  Difficulty Concentrating, Remembering or Making Decisions: no  Communication Difficulty: no  Eating/Swallowing Difficulty: no  Walking or Climbing Stairs Difficulty: yes  Walking or Climbing Stairs: ambulation difficulty, " requires equipment  Mobility Management: utilizes both a rolling walker and wheelchair  Dressing/Bathing Difficulty: yes  Dressing/Bathing: bathing difficulty, requires equipment  Toileting : Independent  Continence : Incontience - Bladder (wears pads)  Difficulty Managing Errands Independently: yes (Pt is unable to drive r/t tib/fib fracture and surgical repair.)  Equipment Currently Used at Home: bedside commode; walker, rolling; wheelchair; other (see comments); blood pressure machine (grabber)  ADL Conclusion Statement: Pt is currently non weightbearing after ORIF for tib/fib fracture utilizes both a rolling walker and wheelchair; has a bedside commode over her toilet to assist with transferring on/off the toilet; currently relying on her son to run errands as patient's  does not drive.  Change in Functional Status Since Onset of Current Illness/Injury: yes        Spiritual Beliefs  Spiritual, Cultural Beliefs, Pentecostal Practices, Values that Affect Care: no      Social History     Socioeconomic History    Marital status:    Occupational History    Occupation: realtor   Tobacco Use    Smoking status: Never    Smokeless tobacco: Never   Substance and Sexual Activity    Alcohol use: Not Currently     Alcohol/week: 14.0 standard drinks of alcohol     Types: 14 Glasses of wine per week     Comment: 2 glasses wine per day    Drug use: Not Currently    Sexual activity: Yes     Social Determinants of Health     Financial Resource Strain: Low Risk  (1/5/2024)    Overall Financial Resource Strain (CARDIA)     Difficulty of Paying Living Expenses: Not hard at all   Food Insecurity: No Food Insecurity (1/5/2024)    Hunger Vital Sign     Worried About Running Out of Food in the Last Year: Never true     Ran Out of Food in the Last Year: Never true   Transportation Needs: Unmet Transportation Needs (1/5/2024)    PRAPARE - Transportation     Lack of Transportation (Medical): Yes     Lack of Transportation  (Non-Medical): Yes   Physical Activity: Inactive (1/5/2024)    Exercise Vital Sign     Days of Exercise per Week: 0 days     Minutes of Exercise per Session: 30 min   Stress: Stress Concern Present (1/5/2024)    [employer] [place] [employer] - Occupational Stress Questionnaire     Feeling of Stress : Very much   Social Connections: Unknown (1/5/2024)    Social Connection and Isolation Panel [NHANES]     Frequency of Communication with Friends and Family: More than three times a week     Frequency of Social Gatherings with Friends and Family: Twice a week     Active Member of Clubs or Organizations: No     Attends Club or Organization Meetings: Never     Marital Status:    Housing Stability: Low Risk  (1/5/2024)    Housing Stability Vital Sign     Unable to Pay for Housing in the Last Year: No     Number of Places Lived in the Last Year: 1     Unstable Housing in the Last Year: No       Roles and Relationships  Primary Source of Support/Comfort: spouse  Name of Support/Comfort Primary Source: Ben  Secondary Source of Support/Comfort: child(juan) (Al)      Advance Directives (For Healthcare)  Advance Directive  (If Adv Dir status is received, view document under Adv Dir in header or Chart Review Media tab): Patient does not have Advance Directive, declines information.        Patient Reported Insurance  Verified current insurance plan:: [employer] Advantage  [employer] benefits discussed:: Transportation            1/11/2024     3:39 PM 11/27/2023     1:41 PM 10/3/2023     4:37 PM 6/15/2023    10:25 AM 6/5/2023     3:10 PM 9/13/2022     2:00 PM   Depression Patient Health Questionnaire   Over the last two weeks how often have you been bothered by little interest or pleasure in doing things Not at all Not at all Not at all Not at all Several days More than half the days   Over the last two weeks how often have you been bothered by feeling down, depressed or hopeless Several days Not at all More  than half the days Not at all Not at all More than half the days   PHQ-2 Total Score 1 0 2 0 1 4   Over the last two weeks how often have you been bothered by trouble falling or staying asleep, or sleeping too much  Several days Nearly every day Nearly every day Nearly every day    Over the last two weeks how often have you been bothered by feeling tired or having little energy  Several days Nearly every day Nearly every day Several days    Over the last two weeks how often have you been bothered by a poor appetite or overeating  Not at all Several days More than half the days Not at all    Over the last two weeks how often have you been bothered by feeling bad about yourself - or that you are a failure or have let yourself or your family down  Not at all Nearly every day Not at all More than half the days    Over the last two weeks how often have you been bothered by trouble concentrating on things, such as reading the newspaper or watching television  Not at all More than half the days Not at all Not at all    Over the last two weeks how often have you been bothered by moving or speaking so slowly that other people could have noticed. Or the opposite - being so fidgety or restless that you have been moving around a lot more than usual.  Several days Not at all Nearly every day Not at all    Over the last two weeks how often have you been bothered by thoughts that you would be better off dead, or of hurting yourself  Not at all Not at all Not at all Not at all    If you checked off any problems, how difficult have these problems made it for you to do your work, take care of things at home or get along with other people?  Somewhat difficult  Somewhat difficult     PHQ-9 Score  3 14 11 7    PHQ-9 Interpretation  Minimal or None Moderate Moderate Mild        Learning Assessment       01/11/2024 2012 [facility] (1/10/2024 - Present)   Created by Maddie Moss, RN - RN (Nurse) Status: Complete               "   PRIMARY LEARNER     Primary Learner Name:  Dottie Galeana (Carleen) ED - 01/11/2024 2012    Relationship:  Patient ED - 01/11/2024 2012    Does the primary learner have any barriers to learning?:  No Barriers ED - 01/11/2024 2012    What is the preferred language of the primary learner?:  English ED - 01/11/2024 2012    Is an  required?:  No ED - 01/11/2024 2012    How does the primary learner prefer to learn new concepts?:  Listening, Reading ED - 01/11/2024 2012    How often do you need to have someone help you read instructions, pamphlets, or written material from your doctor or pharmacy?:  Never ED - 01/11/2024 2012        CO-LEARNER #1     No question answered        CO-LEARNER #2     No question answered        SPECIAL TOPICS     No question answered        ANSWERED BY:     No question answered        Maddie Gamboa, RN - RN (Nurse)   01/11/2024 2012                                "

## 2024-01-10 NOTE — PROGRESS NOTES
1/10/2024  1st attempt to complete Initial Assessment  for Outpatient Care Management, left message.  Will send portal message with unable to assess letter.

## 2024-01-11 ENCOUNTER — PATIENT MESSAGE (OUTPATIENT)
Dept: ADMINISTRATIVE | Facility: OTHER | Age: 74
End: 2024-01-11
Payer: MEDICARE

## 2024-01-12 ENCOUNTER — OUTPATIENT CASE MANAGEMENT (OUTPATIENT)
Dept: ADMINISTRATIVE | Facility: OTHER | Age: 74
End: 2024-01-12
Payer: MEDICARE

## 2024-01-12 DIAGNOSIS — F41.1 GENERALIZED ANXIETY DISORDER: ICD-10-CM

## 2024-01-12 NOTE — PROGRESS NOTES
1/12/2024    Received contact from Mrs. Galeana in regard to transportation needs for cardiology appt on 1/18/24; OPCM RN collaborated with OPCM SW and CHW; CHW assisting patient at this time and will provide status update.

## 2024-01-16 RX ORDER — ALPRAZOLAM 0.25 MG/1
0.25 TABLET ORAL DAILY PRN
Qty: 30 TABLET | Refills: 0 | Status: SHIPPED | OUTPATIENT
Start: 2024-01-19 | End: 2024-02-14

## 2024-01-18 ENCOUNTER — OUTPATIENT CASE MANAGEMENT (OUTPATIENT)
Dept: ADMINISTRATIVE | Facility: OTHER | Age: 74
End: 2024-01-18
Payer: MEDICARE

## 2024-01-22 ENCOUNTER — OUTPATIENT CASE MANAGEMENT (OUTPATIENT)
Dept: ADMINISTRATIVE | Facility: OTHER | Age: 74
End: 2024-01-22
Payer: MEDICARE

## 2024-01-22 NOTE — PROGRESS NOTES
Outpatient Care Management  Plan of Care Follow Up Visit    Patient: Dottie Galeana  MRN: [id number]  Date of Service: 01/22/2024  Completed by: Maddie Bolden RN  Referral Date: 12/28/2023    Reason for Visit   Patient presents with    OPCM RN Follow Up Call       Brief Summary: OPCM RN followed up with Mrs. Galeana today for care plan review.   Next Steps: Follow up post appt with Dr. Ferrara to review for continuity of care.   Mrs. Galeana agreed to OPCM RN follow up on or around 2/2/24.

## 2024-01-25 ENCOUNTER — OUTPATIENT CASE MANAGEMENT (OUTPATIENT)
Dept: ADMINISTRATIVE | Facility: OTHER | Age: 74
End: 2024-01-25
Payer: MEDICARE

## 2024-01-25 NOTE — PROGRESS NOTES
Outpatient Care Management   - High Risk Patient Assessment    Patient: Dottie Galeana  MRN:  [id number]  Date of Service:  1/25/2024  Completed by:  Alley Cardoza LMSW  Referral Date: 12/28/2023    Reason for Visit   Patient presents with    Other     1/25/2024  1st attempt to complete Initial Assessment  for Outpatient Care Management, left message.  Will mail unable to assess letter.        Social Work Assessment - High Risk     1/25/2024       Brief Summary:  received a referral from [facility] JUVENAL Bolden for the following patient identified psycho-social needs: transportation and HH information.  SW completed social assessment with pt via telephone. Pt lives with her  who is blind. Pt is currently in a wheelchair due to breaking her leg on [place] [address]. Pt is receiving home health services via [facility]. Pt is wondering how many physical therapy visits she has left and how much it would cost out of pocket per visit. She reported she asked the PT and they did not know. SW informed pt she should have a  assigned to her case. SW will follow up to with home health for information regarding this. Pt started since being in a wheelchair she is no longer able to drive. She reports  she has about 2 more months until she is cleared. SW and pt discussed resources available. Pt already knows about [facility] through [place] [facility]. She has used them in the past. Pt does not have transportation benefits with her insurance plan. SW offered to mail her the [facility] information but she stated she already has it on her refrigerator. Pt denied any issues affording groceries or utilities. She signed up for [employer] and [employer] delivery. Pt is followed by a psychiatrist and sees him virtually monthly. Pt's children help when they can. Pt agreeable to follow up in 1 week.  Care plan was created in collaboration with patient/caregiver input.  completed the SDOH questionnaire.

## 2024-01-25 NOTE — LETTER
Dottie Galeana  [id number] [facility] [address]      Dear Dottie Galeana,     I am writing from the Outpatient Complex Care Management Department at [facility].  I received a referral from Maddie Bolden to contact you regarding any needs you may have. I was unable to reach you by phone. Please contact me for assistance.     I can be reached at [phone] Monday thru Friday from 8:00am to 4:30pm.      Additionally, [facility] also has a program with a nurse available 24/7 to answer questions or provide medical advice.  [facility] can be reached at [phone].      Sincerely,        Alley Cardoza LMSW  Outpatient Care Management

## 2024-02-01 ENCOUNTER — OUTPATIENT CASE MANAGEMENT (OUTPATIENT)
Dept: ADMINISTRATIVE | Facility: OTHER | Age: 74
End: 2024-02-01
Payer: MEDICARE

## 2024-02-01 NOTE — PROGRESS NOTES
Outpatient Care Management   - Care Plan Follow Up    Patient: Dottie Galeana  MRN:  [id number]  Date of Service:  2/1/2024  Completed by:  Alley Cardoza LMSW  Referral Date: 12/28/2023    Reason for Visit   Patient presents with    Case Closure     2/1/2024       Brief Summary: SW followed up with pt via telephone. She reported things are going well at home. Felipa informed pt that she spoke with Manan at [facility] in [place]. He was able to see that patient had 6 physical therapy visits left and they will slowly be tapered until discharge on 2/21. Manan was unable to tell SW how much a visit would cost out of pocket. He suggested pt call [facility] closer to DC date to get that information. Manan also suggested pt asking her  PT if they knew any [facility] therapists that made home visits. Pt understood. Pt denied any other needs or concerns. FELIPA encouraged pt to reach out if anything arises. Closed case, notified OPCM RN.     Complex Care Plan     Care plan was discussed and completed today with input from patient and/or caregiver.    Patient Instructions     No follow-ups on file.

## 2024-02-02 ENCOUNTER — OUTPATIENT CASE MANAGEMENT (OUTPATIENT)
Dept: ADMINISTRATIVE | Facility: OTHER | Age: 74
End: 2024-02-02
Payer: MEDICARE

## 2024-02-05 ENCOUNTER — OUTPATIENT CASE MANAGEMENT (OUTPATIENT)
Dept: ADMINISTRATIVE | Facility: OTHER | Age: 74
End: 2024-02-05
Payer: MEDICARE

## 2024-02-05 DIAGNOSIS — E78.5 HYPERLIPIDEMIA, UNSPECIFIED HYPERLIPIDEMIA TYPE: ICD-10-CM

## 2024-02-05 RX ORDER — ROSUVASTATIN CALCIUM 10 MG/1
10 TABLET, COATED ORAL DAILY
Qty: 90 TABLET | Refills: 0 | Status: SHIPPED | OUTPATIENT
Start: 2024-02-05 | End: 2024-05-16 | Stop reason: SDUPTHER

## 2024-02-05 NOTE — PROGRESS NOTES
Outpatient Care Management  Plan of Care Follow Up Visit    Patient: Dottie Galeana  MRN: [id number]  Date of Service: 02/05/2024  Completed by: Maddie Bolden RN  Referral Date: 12/28/2023    Reason for Visit   Patient presents with    OPCM RN Follow Up Call       Brief Summary: OPCM RN followed up with Mrs. Galeana today for care plan review.    Next Steps:   Explore if monthly appt with Rema Melgar NP was scheduled.  Explore how Mrs. Galeana is coping with her 's health after his appt.  Review clinical reference on coping skills.  Mrs. Galeana agreed to OPCM RN follow up on or around 2/19/24.

## 2024-02-07 DIAGNOSIS — K21.9 GASTROESOPHAGEAL REFLUX DISEASE WITHOUT ESOPHAGITIS: ICD-10-CM

## 2024-02-07 DIAGNOSIS — R05.3 CHRONIC COUGH: ICD-10-CM

## 2024-02-07 RX ORDER — PANTOPRAZOLE SODIUM 40 MG/1
TABLET, DELAYED RELEASE ORAL
Qty: 180 TABLET | Refills: 0 | Status: SHIPPED | OUTPATIENT
Start: 2024-02-07

## 2024-02-12 ENCOUNTER — TELEPHONE (OUTPATIENT)
Dept: PRIMARY CARE CLINIC | Facility: CLINIC | Age: 74
End: 2024-02-12
Payer: MEDICARE

## 2024-02-12 NOTE — TELEPHONE ENCOUNTER
Contacted pt to schedule AWV, no answer received, I wanted to offer pt appt w/ Leila Tian at [facility] on 5/20/2024 at 8:30a or 9:30 a since she already has an appt that day. If pt calls back please help schedule

## 2024-02-14 DIAGNOSIS — F41.1 GENERALIZED ANXIETY DISORDER: ICD-10-CM

## 2024-02-14 DIAGNOSIS — F43.23 ADJUSTMENT DISORDER WITH MIXED ANXIETY AND DEPRESSED MOOD: ICD-10-CM

## 2024-02-14 DIAGNOSIS — F41.0 GENERALIZED ANXIETY DISORDER WITH PANIC ATTACKS: ICD-10-CM

## 2024-02-14 DIAGNOSIS — F41.1 GENERALIZED ANXIETY DISORDER WITH PANIC ATTACKS: ICD-10-CM

## 2024-02-14 DIAGNOSIS — F51.01 PRIMARY INSOMNIA: ICD-10-CM

## 2024-02-14 RX ORDER — VENLAFAXINE HYDROCHLORIDE 75 MG/1
75 CAPSULE, EXTENDED RELEASE ORAL DAILY
Qty: 30 CAPSULE | Refills: 1 | Status: SHIPPED | OUTPATIENT
Start: 2024-02-14 | End: 2024-06-05

## 2024-02-14 RX ORDER — GABAPENTIN 300 MG/1
900 CAPSULE ORAL 2 TIMES DAILY
Qty: 180 CAPSULE | Refills: 1 | Status: SHIPPED | OUTPATIENT
Start: 2024-02-14 | End: 2024-03-15

## 2024-02-14 RX ORDER — ALPRAZOLAM 0.25 MG/1
0.25 TABLET ORAL DAILY PRN
Qty: 30 TABLET | Refills: 0 | OUTPATIENT
Start: 2024-02-14

## 2024-02-14 RX ORDER — BUSPIRONE HYDROCHLORIDE 10 MG/1
20 TABLET ORAL 2 TIMES DAILY
Qty: 120 TABLET | Refills: 1 | Status: SHIPPED | OUTPATIENT
Start: 2024-02-14 | End: 2024-06-05

## 2024-02-14 RX ORDER — BUPROPION HYDROCHLORIDE 150 MG/1
150 TABLET ORAL DAILY
Qty: 30 TABLET | Refills: 1 | Status: SHIPPED | OUTPATIENT
Start: 2024-02-14 | End: 2024-05-10

## 2024-02-14 RX ORDER — QUETIAPINE FUMARATE 50 MG/1
50 TABLET, FILM COATED ORAL NIGHTLY PRN
Qty: 30 TABLET | Refills: 1 | OUTPATIENT
Start: 2024-02-14 | End: 2024-04-14

## 2024-02-19 ENCOUNTER — OUTPATIENT CASE MANAGEMENT (OUTPATIENT)
Dept: ADMINISTRATIVE | Facility: OTHER | Age: 74
End: 2024-02-19
Payer: MEDICARE

## 2024-02-19 NOTE — PROGRESS NOTES
Outpatient Care Management  Plan of Care Follow Up Visit    Patient: Dottie Galeana  MRN: [id number]  Date of Service: 02/19/2024  Completed by: Maddie Bolden RN  Referral Date: 12/28/2023    Reason for Visit   Patient presents with    OPCM RN Follow Up Call       Brief Summary: OPCM RN followed up with Mrs. Galeana today for care plan review.   Next Steps:   Contact [facility] in re: status of meals and light housekeeping duties; 0945 Spoke with Neva at [facility] and there is a currently a wait list for meals and housekeeping, they will reach out to Mrs. Galeana when her name is next on the list.  Message FELIPA in regard to meals through 24h00. 1110 spoke with Thi LEO, Mrs. Galeana would not be eligible for paid meals through 24h00 as her discharge from hospital was more than 30 days ago; will call Mrs. Galeana about the option to purchase prepared meals at a discounted rate through [employer] website.  Confirm that Mrs. Galeana scheduled a new appt with [facility].   Mrs. Galeana agreed to OPCM RN follow up on or around 3/4/24.

## 2024-03-04 ENCOUNTER — OUTPATIENT CASE MANAGEMENT (OUTPATIENT)
Dept: ADMINISTRATIVE | Facility: OTHER | Age: 74
End: 2024-03-04
Payer: MEDICARE

## 2024-03-04 NOTE — PROGRESS NOTES
Outpatient Care Management  Plan of Care Follow Up Visit    Patient: Dottie Galeana  MRN: [id number]  Date of Service: 03/04/2024  Completed by: Maddie Bolden RN  Referral Date: 12/28/2023    Reason for Visit   Patient presents with    OPCM RN Follow Up Call       Brief Summary: OPCM RN followed up with Mrs. Galeana today for care plan review.   Next Steps:   Review follow up appt with psych for plan of care.   Review if Mrs. Galeana utilized the 3-3-3 method.  Mrs. Galeana agreed to OPCM RN follow up on or around 3/25/24.

## 2024-03-05 ENCOUNTER — OFFICE VISIT (OUTPATIENT)
Dept: PSYCHIATRY | Facility: CLINIC | Age: 74
End: 2024-03-05
Payer: MEDICARE

## 2024-03-05 DIAGNOSIS — F51.01 PRIMARY INSOMNIA: ICD-10-CM

## 2024-03-05 DIAGNOSIS — F33.0 MDD (MAJOR DEPRESSIVE DISORDER), RECURRENT EPISODE, MILD: ICD-10-CM

## 2024-03-05 DIAGNOSIS — F41.0 GENERALIZED ANXIETY DISORDER WITH PANIC ATTACKS: Primary | ICD-10-CM

## 2024-03-05 DIAGNOSIS — F41.1 GENERALIZED ANXIETY DISORDER WITH PANIC ATTACKS: Primary | ICD-10-CM

## 2024-03-05 PROCEDURE — 1159F MED LIST DOCD IN RCRD: CPT | Mod: HCNC,CPTII,95,

## 2024-03-05 PROCEDURE — 99214 OFFICE O/P EST MOD 30 MIN: CPT | Mod: HCNC,95,,

## 2024-03-05 PROCEDURE — 1160F RVW MEDS BY RX/DR IN RCRD: CPT | Mod: HCNC,CPTII,95,

## 2024-03-05 NOTE — PROGRESS NOTES
Outpatient Psychiatry Follow-Up Visit    Clinical Status of Patient: Outpatient (Ambulatory)  03/05/2024    The patient location is:  [place], [place]  The patient phone number is: [phone]   Visit type: Virtual visit with synchronous audio and video  Each patient to whom he or she provides medical services by telemedicine is:  (1) informed of the relationship between the physician and patient and the respective role of any other health care provider with respect to management of the patient; and (2) notified that he or she may decline to receive medical services by telemedicine and may withdraw from such care at any time.     Chief Complaint: Pt is a 72 yo F who presents today for a follow-up. Met with patient.       Interval History and Content of Current Session:  Interim Events/Subjective Report/Content of Current Session:  follow up appointment.    Pt is a 72 yo F with past psychiatric hx of MDD (major depressive disorder), recurrent episode, mild, Generalized anxiety disorder with panic attacks, Primary insomnia who presents for follow up treatment.     Past Psychiatric hx:   Pt. is a 72 yo F with a past psychiatric hx of Generalized anxiety disorder, Caregiver stress, Primary insomnia presenting to the clinic for an initial evaluation and treatment. Past medical history outlined below. She is currently taking temazepam (RESTORIL), ARIPiprazole (ABILIFY), venlafaxine (EFFEXOR-XR), EScitalopram oxalate (LEXAPRO), prescribed and managed by Dr. Shipley. She reports that these medications have not adequately managed her symptoms.     Pt presents to OP Psychiatry for routine follow-up for treatment/medication management of MDD, ALFONSO w/panic attacks, Insomnia. Pt reports improved depression after starting Wellbutrin. Pt already dealing with 's recent medical dx, and added anxiety d/t fx of ankle on [place]. Non-weight bearing for 5 more weeks, pt reports increased anxiety d/t already stressful  situations. Discussed increasing Buspar as Xanax not recommended with opiates or older adults d/t increased risk of falls and fx. Pt verbalized understanding and in agreement to increase of Buspar. Also discussed increasing Effexor in the future for unresolved anxiety/depression. Pt verbalized understanding. Pt denies SI/HI/AVH, self-harm, plan, or intent. Pt verbalizes understanding of medication instructions through teach back. No other issues, concerns, or problems, will reassess symptoms and medications in 30 days or PRN if symptoms worsen.     Past Medical hx:   Past Medical History:   Diagnosis Date    Arthritis     Closed compression fracture of L1 lumbar vertebra 11/07/2018    Colon polyp     Coronary artery calcification     Depression     Fatty liver     Generalized anxiety disorder     GERD (gastroesophageal reflux disease)     History of seasonal allergies     HTN (hypertension)     Hyperlipidemia     Hyponatremia     Insomnia     Osteoporosis     Stress incontinence     Stroke     ocular behind left eye        Interim hx:  Medication changes last visit:     1. Continue buPROPion (WELLBUTRIN XL) 150 MG TB24 tablet - Take 1 tablet (150 mg total) by mouth once daily in the morning for depression.  2. Continue venlafaxine (EFFEXOR-XR) 75 MG + 150 MG 24 hr capsule - Take 1 capsule (75 mg) along with (150 mg) by mouth for a total of 225 mg once daily for depression and anxiety.  3. Increase busPIRone (BUSPAR) 10 MG tablet - Take 2 tablet (20 mg total) 2 (two) times daily for anxiety.  4. Continue QUEtiapine (SEROQUEL) 50 MG tablet - Take 1 tablet (50 mg total) by mouth every evening for mood/sleep.  5. Continue ALPRAZolam (XANAX) 0.25 MG tablet - Take 1 tablet (0.25 mg total) by mouth daily as needed for Anxiety. Discussed risk of decreased RT, sedation, addictive potential, and not to mix with alcohol.    Anxiety: Improvement  Depression: Improvement  Sleep: Improvement  Appetite: Same, no issues     Denies  suicidal/homicidal ideations.  Denies hopelessness/worthlessness.    Denies auditory/visual hallucinations      Tobacco:  denies  Alcohol:  occasionally  Drug use:  denies  Caffeine:  3 cups daily      Review of Systems   PSYCHIATRIC: Pertinent items are noted in the narrative.        CONSTITUTIONAL: weight stable        M/S: no pain today         ENT: no allergies noted today        ABD: no n/v/d     Past Medical, Family and Social History: The patient's past medical, family and social history have been reviewed and updated as appropriate within the electronic medical record. See encounter notes.     Medication Compliance: yes      Side effects: tolerates     Risk Parameters:  Patient reports no suicidal ideation  Patient reports no homicidal ideation  Patient reports no self-injurious behavior  Patient reports no violent behavior     Exam (detailed: at least 9 elements; comprehensive: all 15 elements)   Constitutional  Vitals:  Most recent vital signs, dated less than 90 days prior to this appointment, were reviewed.     General:  unremarkable, age appropriate, casual attire, good eye contact, good rapport       Musculoskeletal  Muscle Strength/Tone:  no flaccidity, no tremor    Gait & Station:  normal      Psychiatric                       Speech:  normal tone, normal rate, rhythm, and volume   Mood & Affect:   Euthymic, congruent, appropriate         Thought Process:   Goal directed; Linear    Associations:   intact   Thought Content:   No SI/HI, delusions, or paranoia, no AV/VH   Insight & Judgement:   Good, adequate to circumstances   Orientation:   grossly intact; alert and oriented x 4    Memory:  intact for content of interview    Language:  grossly intact, can repeat    Attention Span  : Grossly intact for content of interview   Fund of Knowledge:   intact and appropriate to age and level of education        Assessment and Diagnosis   Status/Progress: Based on the examination today, the patient's  problem(s) is/are under fair control.  New problems have not been presented today. Comorbidities are not currently complicating management of the primary condition.      Impression:  Pt presents to OP Psychiatry for routine follow-up for treatment/medication management of MDD, ALFONSO w/panic attacks, Insomnia. Pt reports all medications working effectively and denies any need for medication changes. Reports boot has been removed and she is able and cleared to drive again so her mood has improved as well. Takes Xanax sparingly and denies any adverse SE to any medications. Anxiety, depression, and sleep have all improved. Pt denies SI/HI/AVH, self-harm, plan, or intent. Pt verbalizes understanding of medication instructions through teach back. No other issues, concerns, or problems, will reassess symptoms and medications in 3 months or PRN if symptoms worsen.      Diagnosis:   - MDD (major depressive disorder), recurrent episode, mild  - Generalized anxiety disorder with panic attacks  - Primary insomnia      Intervention/Counseling/Treatment Plan   Medication Management:      1. Continue buPROPion (WELLBUTRIN XL) 150 MG TB24 tablet - Take 1 tablet (150 mg total) by mouth once daily in the morning for depression.     2. Continue venlafaxine (EFFEXOR-XR) 75 MG + 150 MG 24 hr capsule - Take 1 capsule (75 mg) along with (150 mg) by mouth for a total of 225 mg once daily for depression and anxiety.     3. Continue busPIRone (BUSPAR) 10 MG tablet - Take 2 tablet (20 mg total) 2 (two) times daily for anxiety.     4. Continue QUEtiapine (SEROQUEL) 50 MG tablet - Take 1 tablet (50 mg total) by mouth every evening for mood/sleep.     5. Continue ALPRAZolam (XANAX) 0.25 MG tablet - Take 1 tablet (0.25 mg total) by mouth daily as needed for Anxiety. Discussed risk of decreased RT, sedation, addictive potential, and not to mix with alcohol.      6. Call to report any worsening of symptoms or problems with the medication. Pt  instructed to go to ER with thoughts of harming self, others     7. Patient given contact # for psychotherapists at [facility] and also instructed she may check with insurance for list of providers.          Labs: none         Return to clinic:     3 months or PRN if symptoms worsen     -Spent 30min face to face with the pt; >50% time spent in counseling   -Supportive therapy and psychoeducation provided  -R/B/SE's of medications discussed with the pt who expresses understanding and chooses to take medications as prescribed.   -Pt instructed to call clinic, 911 or go to nearest emergency room if sxs worsen or pt is in   crisis. The pt expresses understanding.      Rema Melgar NP  Psychiatric-Mental Health Nurse Practitioner  Department of Psychiatry    [facility] - [address] [address]  Phone:  [phone]  Fax: [phone]

## 2024-03-19 DIAGNOSIS — F41.1 GENERALIZED ANXIETY DISORDER: ICD-10-CM

## 2024-03-19 RX ORDER — ALPRAZOLAM 0.25 MG/1
0.25 TABLET ORAL DAILY PRN
Qty: 30 TABLET | Refills: 0 | Status: SHIPPED | OUTPATIENT
Start: 2024-03-19 | End: 2024-04-23 | Stop reason: SDUPTHER

## 2024-03-25 ENCOUNTER — OUTPATIENT CASE MANAGEMENT (OUTPATIENT)
Dept: ADMINISTRATIVE | Facility: OTHER | Age: 74
End: 2024-03-25
Payer: MEDICARE

## 2024-03-25 NOTE — PROGRESS NOTES
Outpatient Care Management  Plan of Care Follow Up Visit    Patient: Dottie Galeana  MRN: [id number]  Date of Service: 03/25/2024  Completed by: Maddie Bolden RN  Referral Date: 12/28/2023    Reason for Visit   Patient presents with    OPCM RN Follow Up Call    Case Closure     Mrs. Rodrigues is in agreement with graduation from OPCM program. Case closure.        Brief Summary: OPCM RN followed up with Mrs. Rodrigues today for care plan review. Mrs. Rodrigues is in agreement with graduation from OPCM program. Case closure.

## 2024-03-27 DIAGNOSIS — F41.1 GENERALIZED ANXIETY DISORDER: ICD-10-CM

## 2024-03-27 RX ORDER — VENLAFAXINE HYDROCHLORIDE 150 MG/1
150 CAPSULE, EXTENDED RELEASE ORAL
Qty: 90 CAPSULE | Refills: 3 | Status: SHIPPED | OUTPATIENT
Start: 2024-03-27 | End: 2024-06-05

## 2024-04-03 DIAGNOSIS — F51.01 PRIMARY INSOMNIA: ICD-10-CM

## 2024-04-03 DIAGNOSIS — F41.1 GENERALIZED ANXIETY DISORDER: ICD-10-CM

## 2024-04-03 DIAGNOSIS — F43.23 ADJUSTMENT DISORDER WITH MIXED ANXIETY AND DEPRESSED MOOD: ICD-10-CM

## 2024-04-03 RX ORDER — QUETIAPINE FUMARATE 50 MG/1
50 TABLET, FILM COATED ORAL NIGHTLY
Qty: 30 TABLET | Refills: 1 | OUTPATIENT
Start: 2024-04-03 | End: 2024-06-02

## 2024-04-03 RX ORDER — VENLAFAXINE HYDROCHLORIDE 75 MG/1
75 CAPSULE, EXTENDED RELEASE ORAL DAILY
Qty: 30 CAPSULE | Refills: 1 | OUTPATIENT
Start: 2024-04-03 | End: 2024-06-02

## 2024-04-03 RX ORDER — BUPROPION HYDROCHLORIDE 150 MG/1
150 TABLET ORAL DAILY
Qty: 30 TABLET | Refills: 1 | OUTPATIENT
Start: 2024-04-03 | End: 2024-06-02

## 2024-04-08 ENCOUNTER — OFFICE VISIT (OUTPATIENT)
Dept: PRIMARY CARE CLINIC | Facility: CLINIC | Age: 74
End: 2024-04-08
Payer: MEDICARE

## 2024-04-08 VITALS
SYSTOLIC BLOOD PRESSURE: 118 MMHG | DIASTOLIC BLOOD PRESSURE: 68 MMHG | BODY MASS INDEX: 28.22 KG/M2 | TEMPERATURE: 98 F | HEART RATE: 69 BPM | OXYGEN SATURATION: 95 % | WEIGHT: 179.81 LBS | HEIGHT: 67 IN

## 2024-04-08 DIAGNOSIS — F32.0 MILD MAJOR DEPRESSION: ICD-10-CM

## 2024-04-08 DIAGNOSIS — I70.0 AORTIC ATHEROSCLEROSIS: ICD-10-CM

## 2024-04-08 DIAGNOSIS — F43.23 ADJUSTMENT DISORDER WITH MIXED ANXIETY AND DEPRESSED MOOD: ICD-10-CM

## 2024-04-08 DIAGNOSIS — F41.1 GENERALIZED ANXIETY DISORDER WITH PANIC ATTACKS: Primary | ICD-10-CM

## 2024-04-08 DIAGNOSIS — J84.10 CALCIFIED GRANULOMA OF LUNG: ICD-10-CM

## 2024-04-08 DIAGNOSIS — F41.0 GENERALIZED ANXIETY DISORDER WITH PANIC ATTACKS: Primary | ICD-10-CM

## 2024-04-08 PROCEDURE — 99999 PR PBB SHADOW E&M-EST. PATIENT-LVL V: CPT | Mod: PBBFAC,HCNC,, | Performed by: INTERNAL MEDICINE

## 2024-04-08 PROCEDURE — 4010F ACE/ARB THERAPY RXD/TAKEN: CPT | Mod: HCNC,CPTII,S$GLB, | Performed by: INTERNAL MEDICINE

## 2024-04-08 PROCEDURE — 3074F SYST BP LT 130 MM HG: CPT | Mod: HCNC,CPTII,S$GLB, | Performed by: INTERNAL MEDICINE

## 2024-04-08 PROCEDURE — 3078F DIAST BP <80 MM HG: CPT | Mod: HCNC,CPTII,S$GLB, | Performed by: INTERNAL MEDICINE

## 2024-04-08 PROCEDURE — 1126F AMNT PAIN NOTED NONE PRSNT: CPT | Mod: HCNC,CPTII,S$GLB, | Performed by: INTERNAL MEDICINE

## 2024-04-08 PROCEDURE — 99212 OFFICE O/P EST SF 10 MIN: CPT | Mod: HCNC,S$GLB,, | Performed by: INTERNAL MEDICINE

## 2024-04-08 PROCEDURE — 3008F BODY MASS INDEX DOCD: CPT | Mod: HCNC,CPTII,S$GLB, | Performed by: INTERNAL MEDICINE

## 2024-04-08 PROCEDURE — 1160F RVW MEDS BY RX/DR IN RCRD: CPT | Mod: HCNC,CPTII,S$GLB, | Performed by: INTERNAL MEDICINE

## 2024-04-08 PROCEDURE — 3288F FALL RISK ASSESSMENT DOCD: CPT | Mod: HCNC,CPTII,S$GLB, | Performed by: INTERNAL MEDICINE

## 2024-04-08 PROCEDURE — 1159F MED LIST DOCD IN RCRD: CPT | Mod: HCNC,CPTII,S$GLB, | Performed by: INTERNAL MEDICINE

## 2024-04-08 PROCEDURE — 1100F PTFALLS ASSESS-DOCD GE2>/YR: CPT | Mod: HCNC,CPTII,S$GLB, | Performed by: INTERNAL MEDICINE

## 2024-04-08 NOTE — PROGRESS NOTES
"INTERNAL MEDICINE PROGRESS/URGENT CARE NOTE    CHIEF COMPLAINT     Chief Complaint   Patient presents with    Follow-up     Patient is here for a follow up since recovering from her broken leg. She wants to update Dr. Shipley on "what's been going on."       HPI     Dottie Galeana is a 71 y.o.  female who presents with a PMHx of  depression and anxiety, HTN, HLD, osteoporosis, stress incontinence, GERD, history of CVA who presents today for appointment  Patient scheduled appointment to discuss "health update"      Her main complaints today are:   Recent tib fib fracture of the right leg. S/p surgical fixure.   Since been doing well. Healed well.   Discussed her health status. Overall doing very well            Home Medications:  Prior to Admission medications    Medication Sig Start Date End Date Taking? Authorizing Provider   ALPRAZolam (XANAX) 0.25 MG tablet Take 1 tablet (0.25 mg total) by mouth daily as needed for Anxiety. 3/19/24 4/18/24  Rema Melgar, RAUDEL   amLODIPine (NORVASC) 10 MG tablet TAKE 1 TABLET EVERY DAY 7/19/23   Ml Shipley MD   aspirin 325 MG tablet Take 1 tablet (325 mg total) by mouth once daily. Pt taking 1/2 daily 12/29/23   Shashi Helton MD   buPROPion (WELLBUTRIN XL) 150 MG TB24 tablet Take 1 tablet (150 mg total) by mouth once daily. 2/14/24 4/14/24  Rema Melgar NP   busPIRone (BUSPAR) 10 MG tablet Take 2 tablets (20 mg total) by mouth 2 (two) times daily. 2/14/24 4/14/24  Rema Melgar NP   cholecalciferol, vitamin D3, 125 mcg (5,000 unit) Tab Take 1 tablet (5,000 Units total) by mouth once daily. 12/29/23   Shashi Helton MD   diphenhydrAMINE (BENADRYL) 25 mg capsule     Provider, Karin   docusate sodium (COLACE) 100 MG capsule Take 1 capsule (100 mg total) by mouth 2 (two) times daily.  Patient not taking: Reported on 1/11/2024 12/29/23   Shashi Helton MD   fluticasone propionate (FLONASE) 50 mcg/actuation nasal spray USE 1 SPRAY IN EACH " NOSTRIL ONCE DAILY 7/19/23   Ml Shipley MD   gabapentin (NEURONTIN) 300 MG capsule Take 3 capsules (900 mg total) by mouth 2 (two) times daily. Titrate up to twice daily only if continued symptoms after once daily 2/14/24 3/15/24  David Wei MD   HYDROcodone-acetaminophen (NORCO)  mg per tablet Take 1 tablet by mouth every 4 (four) hours as needed for Pain. 12/29/23   Shashi Helton MD   losartan (COZAAR) 100 MG tablet Take 1 tablet (100 mg total) by mouth once daily. Hold for systolic less than 120 6/9/23   Adriana Stout MD   multivitamin (THERAGRAN) per tablet Take 1 tablet by mouth once daily.  11/23/11   Provider, Historical   nebivoloL (BYSTOLIC) 10 MG Tab Take 1 tablet (10 mg total) by mouth once daily. One tablet in am, 1/2 tab pm 6/9/23   Adriana Stout MD   NITROSTAT 0.4 mg SL tablet Place 0.4 mg under the tongue every 5 (five) minutes as needed.  12/9/16   José Miguel Bynum MD   pantoprazole (PROTONIX) 40 MG tablet TAKE 1 TABLET TWICE DAILY 2/7/24   Seema Crenshaw NP   QUEtiapine (SEROQUEL) 50 MG tablet Take 1 tablet (50 mg total) by mouth every evening. 2/14/24 4/14/24  Rema Melgar NP   RESTASIS 0.05 % ophthalmic emulsion Place 1 drop into both eyes 2 (two) times daily. 9/8/23   Provider, Historical   rosuvastatin (CRESTOR) 10 MG tablet Take 1 tablet (10 mg total) by mouth once daily. 2/5/24   Sukhdev Pina MD   tretinoin (RETIN-A) 0.025 % cream SMARTSIG:sparingly Topical Every Night 10/27/23   Provider, Historical   venlafaxine (EFFEXOR-XR) 150 MG Cp24 TAKE 1 CAPSULE ONE TIME DAILY 3/27/24   Ml Shipley MD   venlafaxine (EFFEXOR-XR) 75 MG 24 hr capsule Take 1 capsule (75 mg total) by mouth once daily. 2/14/24 4/14/24  Rema Melgar, NP       Review of Systems:  Review of Systems          PHYSICAL EXAM     /68 (BP Location: Left arm, Patient Position: Sitting, BP Method: Medium (Manual))   Pulse 69   Temp 97.9 °F (36.6 °C) (Oral)   " Ht 5' 7" (1.702 m)   Wt 81.5 kg (179 lb 12.6 oz)   LMP 04/21/1994   SpO2 95%   BMI 28.16 kg/m²     GEN - A+OX4, NAD   HEENT - PERRL, EOMI, OP clear  Neck - No thyromegaly or cervical LAD. No thyroid masses felt.  CV - RRR, no m/r   Chest - CTAB, no wheezing or rhonchi  Abd - S/NT/ND/+BS.   Ext - 2+BDP and radial pulses. No C/C/E.  Skin - No rash.    LABS       ASSESSMENT/PLAN     Dottie Galeana is a 73 y.o. female with  1. Generalized anxiety disorder with panic attacks  Overview:  Controlled  Increased dose of lexapro  Advised counseling and she is agreeable       2. Adjustment disorder with mixed anxiety and depressed mood  Doing very well.   Great supportive family    3. Mild major depression  Overview:  Stable and well controlled   abit " testy with her " so we increased the lexapro. PHQ 9 shows 4/27 with mild depression  Denies SI/HI       4. Calcified granuloma of lung  Stable reviwed  Continue to monitor    5. Aortic atherosclerosis  On statin therapu  Encouraged a low cholesterol diet            WORRY SCORE 2    RTC in 3 months, sooner if needed and depending on labs.    Ml Shipley MD  Board Certified Internist/Geriatrician  [facility]      "

## 2024-04-23 DIAGNOSIS — F41.1 GENERALIZED ANXIETY DISORDER: ICD-10-CM

## 2024-04-24 RX ORDER — ALPRAZOLAM 0.25 MG/1
0.25 TABLET ORAL DAILY PRN
Qty: 30 TABLET | Refills: 0 | Status: SHIPPED | OUTPATIENT
Start: 2024-04-24 | End: 2024-06-05

## 2024-05-01 DIAGNOSIS — F51.01 PRIMARY INSOMNIA: ICD-10-CM

## 2024-05-01 DIAGNOSIS — F43.23 ADJUSTMENT DISORDER WITH MIXED ANXIETY AND DEPRESSED MOOD: ICD-10-CM

## 2024-05-01 RX ORDER — QUETIAPINE FUMARATE 50 MG/1
50 TABLET, FILM COATED ORAL NIGHTLY
Qty: 30 TABLET | Refills: 1 | Status: SHIPPED | OUTPATIENT
Start: 2024-05-01 | End: 2024-06-30

## 2024-05-10 DIAGNOSIS — F43.23 ADJUSTMENT DISORDER WITH MIXED ANXIETY AND DEPRESSED MOOD: ICD-10-CM

## 2024-05-10 RX ORDER — BUPROPION HYDROCHLORIDE 150 MG/1
150 TABLET ORAL
Qty: 30 TABLET | Refills: 1 | Status: SHIPPED | OUTPATIENT
Start: 2024-05-10

## 2024-05-16 DIAGNOSIS — E78.5 HYPERLIPIDEMIA, UNSPECIFIED HYPERLIPIDEMIA TYPE: ICD-10-CM

## 2024-05-17 RX ORDER — ROSUVASTATIN CALCIUM 10 MG/1
10 TABLET, COATED ORAL DAILY
Qty: 90 TABLET | Refills: 3 | Status: SHIPPED | OUTPATIENT
Start: 2024-05-17

## 2024-05-20 ENCOUNTER — LAB VISIT (OUTPATIENT)
Dept: LAB | Facility: HOSPITAL | Age: 74
End: 2024-05-20
Attending: INTERNAL MEDICINE
Payer: MEDICARE

## 2024-05-20 DIAGNOSIS — I10 PRIMARY HYPERTENSION: ICD-10-CM

## 2024-05-20 DIAGNOSIS — E53.8 VITAMIN B12 DEFICIENCY: ICD-10-CM

## 2024-05-20 DIAGNOSIS — F51.01 PRIMARY INSOMNIA: ICD-10-CM

## 2024-05-20 DIAGNOSIS — F32.A DEPRESSIVE DISORDER: ICD-10-CM

## 2024-05-20 DIAGNOSIS — E78.5 HYPERLIPIDEMIA, UNSPECIFIED HYPERLIPIDEMIA TYPE: ICD-10-CM

## 2024-05-20 LAB
ALBUMIN SERPL BCP-MCNC: 4 G/DL (ref 3.5–5.2)
ALP SERPL-CCNC: 93 U/L (ref 55–135)
ALT SERPL W/O P-5'-P-CCNC: 15 U/L (ref 10–44)
ANION GAP SERPL CALC-SCNC: 8 MMOL/L (ref 8–16)
AST SERPL-CCNC: 16 U/L (ref 10–40)
BASOPHILS # BLD AUTO: 0.07 K/UL (ref 0–0.2)
BASOPHILS NFR BLD: 1 % (ref 0–1.9)
BILIRUB SERPL-MCNC: 0.5 MG/DL (ref 0.1–1)
BUN SERPL-MCNC: 14 MG/DL (ref 8–23)
CALCIUM SERPL-MCNC: 9.6 MG/DL (ref 8.7–10.5)
CHLORIDE SERPL-SCNC: 108 MMOL/L (ref 95–110)
CHOLEST SERPL-MCNC: 167 MG/DL (ref 120–199)
CHOLEST/HDLC SERPL: 2.8 {RATIO} (ref 2–5)
CO2 SERPL-SCNC: 26 MMOL/L (ref 23–29)
CREAT SERPL-MCNC: 0.8 MG/DL (ref 0.5–1.4)
DIFFERENTIAL METHOD BLD: ABNORMAL
EOSINOPHIL # BLD AUTO: 0.5 K/UL (ref 0–0.5)
EOSINOPHIL NFR BLD: 7.3 % (ref 0–8)
ERYTHROCYTE [DISTWIDTH] IN BLOOD BY AUTOMATED COUNT: 13.5 % (ref 11.5–14.5)
EST. GFR  (NO RACE VARIABLE): >60 ML/MIN/1.73 M^2
GLUCOSE SERPL-MCNC: 120 MG/DL (ref 70–110)
HCT VFR BLD AUTO: 41.9 % (ref 37–48.5)
HDLC SERPL-MCNC: 59 MG/DL (ref 40–75)
HDLC SERPL: 35.3 % (ref 20–50)
HGB BLD-MCNC: 14 G/DL (ref 12–16)
IMM GRANULOCYTES # BLD AUTO: 0.03 K/UL (ref 0–0.04)
IMM GRANULOCYTES NFR BLD AUTO: 0.4 % (ref 0–0.5)
LDLC SERPL CALC-MCNC: 83.2 MG/DL (ref 63–159)
LYMPHOCYTES # BLD AUTO: 2 K/UL (ref 1–4.8)
LYMPHOCYTES NFR BLD: 27.7 % (ref 18–48)
MCH RBC QN AUTO: 31.6 PG (ref 27–31)
MCHC RBC AUTO-ENTMCNC: 33.4 G/DL (ref 32–36)
MCV RBC AUTO: 95 FL (ref 82–98)
MONOCYTES # BLD AUTO: 0.6 K/UL (ref 0.3–1)
MONOCYTES NFR BLD: 8.4 % (ref 4–15)
NEUTROPHILS # BLD AUTO: 4 K/UL (ref 1.8–7.7)
NEUTROPHILS NFR BLD: 55.2 % (ref 38–73)
NONHDLC SERPL-MCNC: 108 MG/DL
NRBC BLD-RTO: 0 /100 WBC
PLATELET # BLD AUTO: 351 K/UL (ref 150–450)
PMV BLD AUTO: 9.5 FL (ref 9.2–12.9)
POTASSIUM SERPL-SCNC: 4.8 MMOL/L (ref 3.5–5.1)
PROT SERPL-MCNC: 6.9 G/DL (ref 6–8.4)
RBC # BLD AUTO: 4.43 M/UL (ref 4–5.4)
SODIUM SERPL-SCNC: 142 MMOL/L (ref 136–145)
TRIGL SERPL-MCNC: 124 MG/DL (ref 30–150)
TSH SERPL DL<=0.005 MIU/L-ACNC: 3.26 UIU/ML (ref 0.4–4)
WBC # BLD AUTO: 7.16 K/UL (ref 3.9–12.7)

## 2024-05-20 PROCEDURE — 85025 COMPLETE CBC W/AUTO DIFF WBC: CPT | Mod: HCNC | Performed by: INTERNAL MEDICINE

## 2024-05-20 PROCEDURE — 36415 COLL VENOUS BLD VENIPUNCTURE: CPT | Mod: HCNC,PO | Performed by: INTERNAL MEDICINE

## 2024-05-20 PROCEDURE — 80061 LIPID PANEL: CPT | Mod: HCNC | Performed by: INTERNAL MEDICINE

## 2024-05-20 PROCEDURE — 80053 COMPREHEN METABOLIC PANEL: CPT | Mod: HCNC | Performed by: INTERNAL MEDICINE

## 2024-05-20 PROCEDURE — 84443 ASSAY THYROID STIM HORMONE: CPT | Mod: HCNC | Performed by: INTERNAL MEDICINE

## 2024-05-27 ENCOUNTER — OFFICE VISIT (OUTPATIENT)
Dept: PRIMARY CARE CLINIC | Facility: CLINIC | Age: 74
End: 2024-05-27
Payer: MEDICARE

## 2024-05-27 VITALS
HEIGHT: 67 IN | SYSTOLIC BLOOD PRESSURE: 96 MMHG | HEART RATE: 83 BPM | OXYGEN SATURATION: 98 % | TEMPERATURE: 98 F | WEIGHT: 177.25 LBS | BODY MASS INDEX: 27.82 KG/M2 | DIASTOLIC BLOOD PRESSURE: 68 MMHG

## 2024-05-27 DIAGNOSIS — I70.0 AORTIC ATHEROSCLEROSIS: ICD-10-CM

## 2024-05-27 DIAGNOSIS — Z63.6 CAREGIVER STRESS: ICD-10-CM

## 2024-05-27 DIAGNOSIS — R73.09 ELEVATED GLUCOSE: ICD-10-CM

## 2024-05-27 DIAGNOSIS — M54.41 CHRONIC BILATERAL LOW BACK PAIN WITH RIGHT-SIDED SCIATICA: ICD-10-CM

## 2024-05-27 DIAGNOSIS — K21.9 GASTROESOPHAGEAL REFLUX DISEASE WITHOUT ESOPHAGITIS: ICD-10-CM

## 2024-05-27 DIAGNOSIS — F41.0 GENERALIZED ANXIETY DISORDER WITH PANIC ATTACKS: Primary | ICD-10-CM

## 2024-05-27 DIAGNOSIS — F41.1 GENERALIZED ANXIETY DISORDER WITH PANIC ATTACKS: Primary | ICD-10-CM

## 2024-05-27 DIAGNOSIS — R09.82 PND (POST-NASAL DRIP): ICD-10-CM

## 2024-05-27 DIAGNOSIS — F51.01 PRIMARY INSOMNIA: ICD-10-CM

## 2024-05-27 DIAGNOSIS — E53.8 VITAMIN B12 DEFICIENCY: ICD-10-CM

## 2024-05-27 DIAGNOSIS — J98.9 REACTIVE AIRWAY DISEASE WITHOUT ASTHMA: ICD-10-CM

## 2024-05-27 DIAGNOSIS — R73.03 PREDIABETES: ICD-10-CM

## 2024-05-27 DIAGNOSIS — I10 PRIMARY HYPERTENSION: ICD-10-CM

## 2024-05-27 DIAGNOSIS — E78.5 HYPERLIPIDEMIA, UNSPECIFIED HYPERLIPIDEMIA TYPE: ICD-10-CM

## 2024-05-27 DIAGNOSIS — G89.29 CHRONIC BILATERAL LOW BACK PAIN WITH RIGHT-SIDED SCIATICA: ICD-10-CM

## 2024-05-27 DIAGNOSIS — F32.0 MILD MAJOR DEPRESSION: ICD-10-CM

## 2024-05-27 PROCEDURE — 3288F FALL RISK ASSESSMENT DOCD: CPT | Mod: HCNC,CPTII,S$GLB, | Performed by: INTERNAL MEDICINE

## 2024-05-27 PROCEDURE — 1160F RVW MEDS BY RX/DR IN RCRD: CPT | Mod: HCNC,CPTII,S$GLB, | Performed by: INTERNAL MEDICINE

## 2024-05-27 PROCEDURE — 3008F BODY MASS INDEX DOCD: CPT | Mod: HCNC,CPTII,S$GLB, | Performed by: INTERNAL MEDICINE

## 2024-05-27 PROCEDURE — 3078F DIAST BP <80 MM HG: CPT | Mod: HCNC,CPTII,S$GLB, | Performed by: INTERNAL MEDICINE

## 2024-05-27 PROCEDURE — 3074F SYST BP LT 130 MM HG: CPT | Mod: HCNC,CPTII,S$GLB, | Performed by: INTERNAL MEDICINE

## 2024-05-27 PROCEDURE — 1126F AMNT PAIN NOTED NONE PRSNT: CPT | Mod: HCNC,CPTII,S$GLB, | Performed by: INTERNAL MEDICINE

## 2024-05-27 PROCEDURE — 1159F MED LIST DOCD IN RCRD: CPT | Mod: HCNC,CPTII,S$GLB, | Performed by: INTERNAL MEDICINE

## 2024-05-27 PROCEDURE — 99213 OFFICE O/P EST LOW 20 MIN: CPT | Mod: HCNC,S$GLB,, | Performed by: INTERNAL MEDICINE

## 2024-05-27 PROCEDURE — 1100F PTFALLS ASSESS-DOCD GE2>/YR: CPT | Mod: HCNC,CPTII,S$GLB, | Performed by: INTERNAL MEDICINE

## 2024-05-27 PROCEDURE — 99999 PR PBB SHADOW E&M-EST. PATIENT-LVL IV: CPT | Mod: PBBFAC,HCNC,, | Performed by: INTERNAL MEDICINE

## 2024-05-27 PROCEDURE — 4010F ACE/ARB THERAPY RXD/TAKEN: CPT | Mod: HCNC,CPTII,S$GLB, | Performed by: INTERNAL MEDICINE

## 2024-05-27 RX ORDER — AMLODIPINE BESYLATE 5 MG/1
5 TABLET ORAL DAILY
Qty: 90 TABLET | Refills: 0 | Status: SHIPPED | OUTPATIENT
Start: 2024-05-27

## 2024-05-27 RX ORDER — FLUTICASONE PROPIONATE 50 MCG
1 SPRAY, SUSPENSION (ML) NASAL DAILY
Qty: 48 G | Refills: 1 | Status: SHIPPED | OUTPATIENT
Start: 2024-05-27

## 2024-05-27 SDOH — SOCIAL DETERMINANTS OF HEALTH (SDOH): DEPENDENT RELATIVE NEEDING CARE AT HOME: Z63.6

## 2024-05-27 NOTE — PROGRESS NOTES
INTERNAL MEDICINE PROGRESS/URGENT CARE NOTE    CHIEF COMPLAINT     Chief Complaint   Patient presents with    Follow-up     Patient is here for a 6 month follow up.     Extremity Weakness     Patient reports weakness and fatigue of her BLE.     urinary leaking     Patient states she started having urinary leakage when her leg was broken that has continued.        HPI     Dottie Galeana is a 73 y.o.  female who presents with a PMHx of  depression and anxiety, HTN, HLD, osteoporosis, stress incontinence, GERD, history of CVA who presents today for scheduled 6 month follow up.      Her main complaints today are:   More concerned about her husbands well being. Referred to our LCSW. Did not continue   Leg weakness. After her tibia tracture was mostly immobile for awhile, not excercising.        Previous complaints include:   care giver stress : cares for her  who amoung other health concerns has lost his sight so she's having to do a lot more to help support him   2. Recent fall with tibia fracture   3. Chronic diarrhea.         Oral ulcers has been seeing medical providers in the past and has had extensive work up done. Was told to see her dentist. History and exam consistent with burning mouth syndrome. Likely brought on by stress and anxiety which she admittedly has a lot of. Today she does report that this has almost completely resolved.   Today she reports she gets them every now and again but not as bad.      Depression: effexor 150 daily. Takes xanax for anxiety. PHQ 9 at last visit was a 7/27. ALFONSO 7 was 12/21 which is moderate anxiety. She did confirmed that she currently has a lot of anxiety. Her  has visual issues and health concerns.has a very happy family and support system, 5 grandchildren that shes very close to but she has a lot of caregiver duties. Doesn't leave the house much as her  gets anxious when she does, so apart from her family, she does very little socializing with  friends. Says she used to work so much that she has little to no friends that she socializes with.   At her last visit, we agreed to try lexparo 10mg and today she reports that the medication is great. That she loves it. Thinks its made an amazing difference with her mood. Shes happier and less anxious. May also be sleeping a bit better.   GAD7 today is a 4/21, previous visit was 7/21.   PHQ 9 today is a 3/27, on last visit was 3/27 which is an improvement compared to the values above  Denies SI/HI.      since her last visit, she's seen psychiatry and all her medicatiosn changed. Added venlafaxine 75mg to theh 150mg.  Buspar was added and so was seroquel for sleep. Today she looks albin much better. Mood is improved and she's sleeping a lot better which has helped with her sleep.         Polypharmacy. Inappropriate and appropriate polypharmacy. Currently takes ambien 10mg nighly and xanax 0.25mg PRN at least 1-2 times per week. Counseled about these medications and the worrisome concerns about their use regarding falls etc. So we discussed trialling rtazodone. If patient unable to sleep without ambien she has agreed to see a sleep specialist or psychiatry. Today, she states that the trazodone has done nothing to help with sleep, so ni instructed her that the 6 hours that she does get is sufficient. If at some point this worsens, I would like her to see a sleep specialist.      Insomnia: has been on ambien for years. Says she is unable to sleep without it. For unclear reasons has never been on 5mg instead of the 10mg. Has not tried any alternative sleep medications but says she has tried to go without the medication and was not able to get any sleep. Has tried melatonin and says this does nothing for her. Not strict about her sleep hygiene as her husbands health and wake sleep cycle affects hers. She is aware that she should be off the ambien as her son also tells her this. Is wiling tor trial off this and try an  alternative. Tried the trazodone which did not work for her. Agreed to refer to sleep specialist if she starts having more trouble with sleep.      HCM: will get her shingles at the start of the year when it will become free.   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      Home Medications:  Prior to Admission medications    Medication Sig Start Date End Date Taking? Authorizing Provider   ALPRAZolam (XANAX) 0.25 MG tablet Take 1 tablet (0.25 mg total) by mouth daily as needed for Anxiety. 4/24/24 5/24/24  Rema Melgar NP   amLODIPine (NORVASC) 10 MG tablet TAKE 1 TABLET EVERY DAY 7/19/23   Ml Shipley MD   aspirin 325 MG tablet Take 1 tablet (325 mg total) by mouth once daily. Pt taking 1/2 daily 12/29/23   Shashi Helton MD   buPROPion (WELLBUTRIN XL) 150 MG TB24 tablet TAKE 1 TABLET BY MOUTH DAILY 5/10/24   Rema Melgar NP   busPIRone (BUSPAR) 10 MG tablet Take 2 tablets (20 mg total) by mouth 2 (two) times daily. 2/14/24 4/14/24  Rema Melgar NP   cholecalciferol, vitamin D3, 125 mcg (5,000 unit) Tab Take 1 tablet (5,000 Units total) by mouth once daily. 12/29/23   Shashi Helton MD   diphenhydrAMINE (BENADRYL) 25 mg capsule     Provider, Karin   docusate sodium (COLACE) 100 MG capsule Take 1 capsule (100 mg total) by mouth 2 (two) times daily.  Patient not taking: Reported on 4/8/2024 12/29/23   Shashi Helton MD   fluticasone propionate (FLONASE) 50 mcg/actuation nasal spray USE 1 SPRAY IN EACH NOSTRIL ONCE DAILY 7/19/23   Ml Shipley MD   gabapentin (NEURONTIN) 300 MG capsule Take 3 capsules (900 mg total) by mouth 2 (two) times daily. Titrate up to twice daily only if continued symptoms after once daily 2/14/24  "3/15/24  David Wei MD   HYDROcodone-acetaminophen (NORCO)  mg per tablet Take 1 tablet by mouth every 4 (four) hours as needed for Pain.  Patient not taking: Reported on 4/8/2024 12/29/23   Shashi Helton MD   losartan (COZAAR) 100 MG tablet Take 1 tablet (100 mg total) by mouth once daily. Hold for systolic less than 120 6/9/23   Adriana Stout MD   multivitamin (THERAGRAN) per tablet Take 1 tablet by mouth once daily.  11/23/11   Provider, Historical   nebivoloL (BYSTOLIC) 10 MG Tab Take 1 tablet (10 mg total) by mouth once daily. One tablet in am, 1/2 tab pm 6/9/23   Adriana Stout MD   NITROSTAT 0.4 mg SL tablet Place 0.4 mg under the tongue every 5 (five) minutes as needed.  12/9/16   José Miguel Bynum MD   pantoprazole (PROTONIX) 40 MG tablet TAKE 1 TABLET TWICE DAILY 2/7/24   Seema Crenshaw NP   QUEtiapine (SEROQUEL) 50 MG tablet Take 1 tablet (50 mg total) by mouth nightly. 5/1/24 6/30/24  Rmea Melgar NP   RESTASIS 0.05 % ophthalmic emulsion Place 1 drop into both eyes 2 (two) times daily. 9/8/23   Provider, Historical   rosuvastatin (CRESTOR) 10 MG tablet Take 1 tablet (10 mg total) by mouth once daily. 5/17/24   Ml Shipley MD   tretinoin (RETIN-A) 0.025 % cream SMARTSIG:sparingly Topical Every Night 10/27/23   Provider, Historical   venlafaxine (EFFEXOR-XR) 150 MG Cp24 TAKE 1 CAPSULE ONE TIME DAILY 3/27/24   Ml Shipley MD       Review of Systems:  Review of Systems        PHYSICAL EXAM     BP 96/68 (BP Location: Left arm, Patient Position: Sitting, BP Method: Medium (Manual))   Pulse 83   Temp 98.3 °F (36.8 °C) (Oral)   Ht 5' 7" (1.702 m)   Wt 80.4 kg (177 lb 4 oz)   LMP 04/21/1994   SpO2 98%   BMI 27.76 kg/m²     GEN - A+OX4, NAD   HEENT - PERRL, EOMI, OP clear  Neck - No thyromegaly or cervical LAD. No thyroid masses felt.  CV - RRR, no m/r   Chest - CTAB, no wheezing or rhonchi  Abd - S/NT/ND/+BS.   Ext - 2+BDP and radial pulses. No " "C/C/E.  Skin - No rash.    LABS         ASSESSMENT/PLAN     Dottie Galeana is a 73 y.o. female with  1. Generalized anxiety disorder with panic attacks  Overview:  Controlled  Following with psychiatry who preascribes her xanax   Advised counseling and she is agreeable       2. Mild major depression  Overview:  Stable and well controlled   abit " testy with her " so we increased the lexapro. PHQ 9 shows 4/27 with mild depression  Denies SI/HI       3. Aortic atherosclerosis  On statin with good improvement in cholesterol  Encouraged to adhere to a low cholesterol diet and continuous exercise     4. Caregiver stress  Discussed therapy    5. Primary insomnia  Overview:  Much improved with seroquel added by psychiatry.   Sleep hygiene advised      6. Primary hypertension  Overview:  BP very well controlled. Lowered the amlodipine due to ankle swelling   Limit salt in diet  Needs more cardo excercise      7. Hyperlipidemia, unspecified hyperlipidemia type  Overview:  On statin with great cholesterol control. Continue   shes eating better. Today we discuss need for cardio excerci      8. Vitamin B12 deficiency  Overview:  Encouraged supplementing vitamin b12       9. Chronic bilateral low back pain with right-sided sciatica  Overview:  Stable  Tylenol as needed        10. Gastroesophageal reflux disease without esophagitis  Overview:  Very well controlled with protonix. Continue.   Lifestyle changes advised ie limit foods known to cause symptoms      11. Reactive airway disease without asthma  Stable and well controlled     12. Prediabetes  Limit carb intake     13. PND (post-nasal drip)  -     fluticasone propionate (FLONASE) 50 mcg/actuation nasal spray; 1 spray (50 mcg total) by Each Nostril route once daily.  Dispense: 48 g; Refill: 1    14. Elevated glucose  -     HEMOGLOBIN A1C; Future; Expected date: 05/27/2024    Other orders  -     amLODIPine (NORVASC) 5 MG tablet; Take 1 tablet (5 mg total) by " mouth once daily.  Dispense: 90 tablet; Refill: 0           WORRY SCORE2    RTC in 3 months, sooner if needed and depending on labs.    Ml Shipley MD  Board Certified Internist/Geriatrician  [facility]

## 2024-06-03 ENCOUNTER — TELEPHONE (OUTPATIENT)
Dept: PSYCHIATRY | Facility: CLINIC | Age: 74
End: 2024-06-03
Payer: MEDICARE

## 2024-06-04 ENCOUNTER — TELEPHONE (OUTPATIENT)
Dept: PRIMARY CARE CLINIC | Facility: CLINIC | Age: 74
End: 2024-06-04
Payer: MEDICARE

## 2024-06-04 DIAGNOSIS — F41.1 GENERALIZED ANXIETY DISORDER: ICD-10-CM

## 2024-06-04 DIAGNOSIS — F43.23 ADJUSTMENT DISORDER WITH MIXED ANXIETY AND DEPRESSED MOOD: ICD-10-CM

## 2024-06-04 NOTE — TELEPHONE ENCOUNTER
Patient called stating she has been having pain under her left arm that radiates to her jaw, she is SOB, weak and tired since yesterday. Patient was encouraged to go to the nearest ER for cardiac evaluation. She verbalized understanding.

## 2024-06-05 ENCOUNTER — OFFICE VISIT (OUTPATIENT)
Dept: PSYCHIATRY | Facility: CLINIC | Age: 74
End: 2024-06-05
Payer: MEDICARE

## 2024-06-05 DIAGNOSIS — F41.1 GENERALIZED ANXIETY DISORDER WITH PANIC ATTACKS: ICD-10-CM

## 2024-06-05 DIAGNOSIS — F51.01 PRIMARY INSOMNIA: ICD-10-CM

## 2024-06-05 DIAGNOSIS — F33.0 MDD (MAJOR DEPRESSIVE DISORDER), RECURRENT EPISODE, MILD: Primary | ICD-10-CM

## 2024-06-05 DIAGNOSIS — F41.0 GENERALIZED ANXIETY DISORDER WITH PANIC ATTACKS: ICD-10-CM

## 2024-06-05 PROCEDURE — 1159F MED LIST DOCD IN RCRD: CPT | Mod: HCNC,CPTII,95,

## 2024-06-05 PROCEDURE — 4010F ACE/ARB THERAPY RXD/TAKEN: CPT | Mod: HCNC,CPTII,95,

## 2024-06-05 PROCEDURE — 1160F RVW MEDS BY RX/DR IN RCRD: CPT | Mod: HCNC,CPTII,95,

## 2024-06-05 PROCEDURE — 99214 OFFICE O/P EST MOD 30 MIN: CPT | Mod: HCNC,95,,

## 2024-06-05 RX ORDER — ALPRAZOLAM 0.25 MG/1
0.25 TABLET ORAL DAILY PRN
Qty: 30 TABLET | Refills: 0 | Status: SHIPPED | OUTPATIENT
Start: 2024-06-05 | End: 2024-07-05

## 2024-06-05 RX ORDER — VENLAFAXINE HYDROCHLORIDE 75 MG/1
225 CAPSULE, EXTENDED RELEASE ORAL DAILY
Qty: 90 CAPSULE | Refills: 1 | Status: SHIPPED | OUTPATIENT
Start: 2024-06-05 | End: 2024-08-04

## 2024-06-05 NOTE — PROGRESS NOTES
Outpatient Psychiatry Follow-Up Visit    Clinical Status of Patient: Outpatient (Ambulatory)  06/05/2024    The patient location is:  [place], [place]  The patient phone number is: [phone]   Visit type: Virtual visit with synchronous audio and video  Each patient to whom he or she provides medical services by telemedicine is:  (1) informed of the relationship between the physician and patient and the respective role of any other health care provider with respect to management of the patient; and (2) notified that he or she may decline to receive medical services by telemedicine and may withdraw from such care at any time.     Chief Complaint: Pt is a 73 y.o. female who presents today for a follow-up. Met with patient.       Interval History and Content of Current Session:  Interim Events/Subjective Report/Content of Current Session:  follow up appointment.    Pt is a 73 y.o. female with past psychiatric hx of MDD (major depressive disorder), recurrent episode, mild, Generalized anxiety disorder with panic attacks, Primary insomnia who presents for follow up treatment.     Past Psychiatric hx:   Pt. is a 74 yo F with a past psychiatric hx of Generalized anxiety disorder, Caregiver stress, Primary insomnia presenting to the clinic for an initial evaluation and treatment. Past medical history outlined below. She is currently taking temazepam (RESTORIL), ARIPiprazole (ABILIFY), venlafaxine (EFFEXOR-XR), EScitalopram oxalate (LEXAPRO), prescribed and managed by Dr. Shipley. She reports that these medications have not adequately managed her symptoms.     3/5/24: Pt presents to OP Psychiatry for routine follow-up for treatment/medication management of MDD, ALFONSO w/panic attacks, Insomnia. Pt reports all medications working effectively and denies any need for medication changes. Reports boot has been removed and she is able and cleared to drive again so her mood has improved as well. Takes Xanax sparingly and denies any  adverse SE to any medications. Anxiety, depression, and sleep have all improved. Pt denies SI/HI/AVH, self-harm, plan, or intent. Pt verbalizes understanding of medication instructions through teach back. No other issues, concerns, or problems, will reassess symptoms and medications in 3 months or PRN if symptoms worsen.     Past Medical hx:   Past Medical History:   Diagnosis Date    Arthritis     Closed compression fracture of L1 lumbar vertebra 11/07/2018    Colon polyp     Coronary artery calcification     Depression     Fatty liver     Generalized anxiety disorder     GERD (gastroesophageal reflux disease)     History of seasonal allergies     HTN (hypertension)     Hyperlipidemia     Hyponatremia     Insomnia     Osteoporosis     Stress incontinence     Stroke     ocular behind left eye        Interim hx:  Medication changes last visit:     1. Continue buPROPion (WELLBUTRIN XL) 150 MG TB24 tablet - Take 1 tablet (150 mg total) by mouth once daily in the morning for depression.  2. Continue venlafaxine (EFFEXOR-XR) 75 MG + 150 MG 24 hr capsule - Take 1 capsule (75 mg) along with (150 mg) by mouth for a total of 225 mg once daily for depression and anxiety.  3. Continue busPIRone (BUSPAR) 10 MG tablet - Take 2 tablet (20 mg total) 2 (two) times daily for anxiety.  4. Continue QUEtiapine (SEROQUEL) 50 MG tablet - Take 1 tablet (50 mg total) by mouth every evening for mood/sleep.  5. Continue ALPRAZolam (XANAX) 0.25 MG tablet - Take 1 tablet (0.25 mg total) by mouth daily as needed for Anxiety. Discussed risk of decreased RT, sedation, addictive potential, and not to mix with alcohol.           Anxiety: Good, no issues  Depression: Good, no issues  Sleep: Same, no issues  Appetite:  Same, no issues     Denies suicidal/homicidal ideations.  Denies hopelessness/worthlessness.    Denies auditory/visual hallucinations.      Tobacco:  denies  Alcohol:  occasionally  Drug use:  denies  Caffeine:  3 cups daily       Review of Systems   PSYCHIATRIC: Pertinent items are noted in the narrative.        CONSTITUTIONAL: weight stable        M/S: no pain today         ENT: no allergies noted today        ABD: no n/v/d     Past Medical, Family and Social History: The patient's past medical, family and social history have been reviewed and updated as appropriate within the electronic medical record. See encounter notes.     Medication Compliance: yes      Side effects: tolerates     Risk Parameters:  Patient reports no suicidal ideation  Patient reports no homicidal ideation  Patient reports no self-injurious behavior  Patient reports no violent behavior     Exam (detailed: at least 9 elements; comprehensive: all 15 elements)   Constitutional  Vitals:  Most recent vital signs, dated less than 90 days prior to this appointment, were reviewed.    General:  unremarkable, age appropriate, casual attire, good eye contact, good rapport       Musculoskeletal  Muscle Strength/Tone:  no flaccidity, no tremor    Gait & Station:  normal      Psychiatric                       Speech:  normal tone, normal rate, rhythm, and volume   Mood & Affect:   Euthymic, congruent, appropriate         Thought Process:   Goal directed; Linear    Associations:   intact   Thought Content:   No SI/HI, delusions, or paranoia, no AV/VH   Insight & Judgement:   Good, adequate to circumstances   Orientation:   grossly intact; alert and oriented x 4    Memory:  intact for content of interview    Language:  grossly intact, can repeat    Attention Span  : Grossly intact for content of interview   Fund of Knowledge:   intact and appropriate to age and level of education        Assessment and Diagnosis   Status/Progress: Based on the examination today, the patient's problem(s) is/are under fair control.  New problems have not been presented today. Comorbidities are not currently complicating management of the primary condition.      Impression:  Pt presents to OP  Psychiatry for routine follow-up for treatment/medication management of MDD, ALFONSO w/panic attacks, Insomnia. Pt reports medications have effectively treated her symptoms, has continue to heal physically from fx leg. Denies any need to changes medications at this time. Pt denies SI/HI/AVH, self-harm, plan, or intent. Pt verbalizes understanding of medication instructions through teach back. No other issues, concerns, or problems, will reassess symptoms and medications in 3 months or PRN if symptoms worsen.      Diagnosis:   - MDD (major depressive disorder), recurrent episode, mild  - Generalized anxiety disorder with panic attacks  - Primary insomnia      Intervention/Counseling/Treatment Plan   Medication Management:      1. Continue buPROPion (WELLBUTRIN XL) 150 MG TB24 tablet - Take 1 tablet (150 mg total) by mouth once daily in the morning for depression.     2. Continue venlafaxine (EFFEXOR-XR) 75 MG MG 24 hr capsule - Take 3 capsules (225 mg) once daily for depression and anxiety.     3. Continue busPIRone (BUSPAR) 10 MG tablet - Take 2 tablet (20 mg total) 2 (two) times daily for anxiety.     4. Continue QUEtiapine (SEROQUEL) 50 MG tablet - Take 1 tablet (50 mg total) by mouth every evening for mood/sleep.     5. Continue ALPRAZolam (XANAX) 0.25 MG tablet - Take 1 tablet (0.25 mg total) by mouth daily as needed for Anxiety. Discussed risk of decreased RT, sedation, addictive potential, and not to mix with alcohol.      6. Call to report any worsening of symptoms or problems with the medication. Pt instructed to go to ER with thoughts of harming self, others     7. Patient given contact # for psychotherapists at [facility] and also instructed she may check with insurance for list of providers.          Labs: none         Return to clinic:      3 months or PRN if symptoms worsen    -Spent 30min face to face with the pt; >50% time spent in counseling   -Supportive therapy and psychoeducation  provided  -R/B/SE's of medications discussed with the pt who expresses understanding and chooses to take medications as prescribed.   -Pt instructed to call clinic, 911 or go to nearest emergency room if sxs worsen or pt is in   crisis. The pt expresses understanding.      Rema Melgar NP  Psychiatric-Mental Health Nurse Practitioner  Department of Psychiatry    [facility] [place] [address] [place] [address]  Phone:  [phone]  Fax: [phone]

## 2024-06-05 NOTE — TELEPHONE ENCOUNTER
Spoke with patient. She states she was diagnosed with having a pinched nerve. She was scheduled for a hfu 6/10/24.

## 2024-06-20 DIAGNOSIS — I10 ESSENTIAL HYPERTENSION: ICD-10-CM

## 2024-06-20 RX ORDER — LOSARTAN POTASSIUM 100 MG/1
100 TABLET ORAL DAILY
Qty: 90 TABLET | Refills: 1 | Status: SHIPPED | OUTPATIENT
Start: 2024-06-20

## 2024-06-24 DIAGNOSIS — F51.01 PRIMARY INSOMNIA: ICD-10-CM

## 2024-06-24 DIAGNOSIS — F43.23 ADJUSTMENT DISORDER WITH MIXED ANXIETY AND DEPRESSED MOOD: ICD-10-CM

## 2024-06-24 RX ORDER — GABAPENTIN 300 MG/1
CAPSULE ORAL
Qty: 180 CAPSULE | Refills: 6 | Status: SHIPPED | OUTPATIENT
Start: 2024-06-24

## 2024-06-24 RX ORDER — QUETIAPINE FUMARATE 50 MG/1
50 TABLET, FILM COATED ORAL NIGHTLY
Qty: 30 TABLET | Refills: 1 | Status: SHIPPED | OUTPATIENT
Start: 2024-06-24

## 2024-06-24 RX ORDER — BUPROPION HYDROCHLORIDE 150 MG/1
150 TABLET ORAL
Qty: 30 TABLET | Refills: 1 | Status: SHIPPED | OUTPATIENT
Start: 2024-06-24

## 2024-06-26 ENCOUNTER — OFFICE VISIT (OUTPATIENT)
Dept: PRIMARY CARE CLINIC | Facility: CLINIC | Age: 74
End: 2024-06-26
Payer: MEDICARE

## 2024-06-26 VITALS
DIASTOLIC BLOOD PRESSURE: 68 MMHG | HEIGHT: 67 IN | HEART RATE: 77 BPM | OXYGEN SATURATION: 95 % | WEIGHT: 177.38 LBS | BODY MASS INDEX: 27.84 KG/M2 | SYSTOLIC BLOOD PRESSURE: 110 MMHG

## 2024-06-26 DIAGNOSIS — M54.9 UPPER BACK PAIN ON LEFT SIDE: Primary | ICD-10-CM

## 2024-06-26 PROCEDURE — 1125F AMNT PAIN NOTED PAIN PRSNT: CPT | Mod: HCNC,CPTII,S$GLB, | Performed by: PHYSICIAN ASSISTANT

## 2024-06-26 PROCEDURE — 3078F DIAST BP <80 MM HG: CPT | Mod: HCNC,CPTII,S$GLB, | Performed by: PHYSICIAN ASSISTANT

## 2024-06-26 PROCEDURE — 99215 OFFICE O/P EST HI 40 MIN: CPT | Mod: HCNC,S$GLB,, | Performed by: PHYSICIAN ASSISTANT

## 2024-06-26 PROCEDURE — 3288F FALL RISK ASSESSMENT DOCD: CPT | Mod: HCNC,CPTII,S$GLB, | Performed by: PHYSICIAN ASSISTANT

## 2024-06-26 PROCEDURE — 3074F SYST BP LT 130 MM HG: CPT | Mod: HCNC,CPTII,S$GLB, | Performed by: PHYSICIAN ASSISTANT

## 2024-06-26 PROCEDURE — 1101F PT FALLS ASSESS-DOCD LE1/YR: CPT | Mod: HCNC,CPTII,S$GLB, | Performed by: PHYSICIAN ASSISTANT

## 2024-06-26 PROCEDURE — 4010F ACE/ARB THERAPY RXD/TAKEN: CPT | Mod: HCNC,CPTII,S$GLB, | Performed by: PHYSICIAN ASSISTANT

## 2024-06-26 PROCEDURE — 1159F MED LIST DOCD IN RCRD: CPT | Mod: HCNC,CPTII,S$GLB, | Performed by: PHYSICIAN ASSISTANT

## 2024-06-26 PROCEDURE — 3008F BODY MASS INDEX DOCD: CPT | Mod: HCNC,CPTII,S$GLB, | Performed by: PHYSICIAN ASSISTANT

## 2024-06-26 PROCEDURE — 99999 PR PBB SHADOW E&M-EST. PATIENT-LVL IV: CPT | Mod: PBBFAC,HCNC,, | Performed by: PHYSICIAN ASSISTANT

## 2024-06-26 NOTE — PROGRESS NOTES
"Subjective:      Patient ID: Dottie Galeana is a 73 y.o. female.    Vitals:  height is 5' 7" (1.702 m) and weight is 80.4 kg (177 lb 5.8 oz). Her blood pressure is 110/68 and her pulse is 77. Her oxygen saturation is 95%.     Chief Complaint: Shoulder Pain (Patient presents for eval of left shoulder blade pain x1 month. Patient went to ER 6/4/24 and was dx with pinched nerve. Patient stated that exercises and stretching help, but relief is temporary. Patient stated she is alternating Tylenol and Ibuprofen, and having some relief. Patient hasn't followed up with ortho.) and Edema (Patient presents for eval of intermittent worsening edema of bilateral LE. Patient has an appt with her cardiologist, Dr Browning, next month.)    73-year-old female presents for hospital follow-up.  Patient was seen in the ER over 3 weeks ago for pain in the left posterior upper back described to ER physician as sharp and radiating to her left neck area.  She related that it began 2 weeks prior but that symptoms had worsened over the previous couple of days.  She reported mild shortness of breath.  She also reported she has fatigue on exertion.  She denied chest pain.  She denied fever chills cough or congestion. She has a history of hypertension, hyperlipidemia and says she has an "artery in her heart that dips into the muscle".  She does see an outside cardiologist for which we can not review any records. Workup included CBC which was unremarkable.  D-dimer which was normal.  Troponin which was normal.  BNP which was normal.  Chest x-ray with no abnormalities noted.  EKG with normal sinus rhythm with no acute changes.  Her pulse ox was normal in the ED. patient was treated with short course of steroids and asked to follow up with primary care physician.    Patient has taken ibuprofen and Tylenol sporadically.  She is done gentle stretching occasionally.  Pain is worse with movement of reaching out with the left hand.  She still does " physical therapy at home that she learned while going to physical therapy for the lower back.  She has not done physical therapy for the upper back.        Musculoskeletal:  Positive for back pain.   Skin:  Negative for rash.      Objective:     Physical Exam   Constitutional: She does not appear ill. No distress.   HENT:   Head: Normocephalic and atraumatic.   Ears:   Right Ear: External ear normal.   Left Ear: External ear normal.   Eyes: Conjunctivae are normal. Right eye exhibits no discharge. Left eye exhibits no discharge. Extraocular movement intact   Cardiovascular: Normal rate.   Pulmonary/Chest: Effort normal. No respiratory distress.   Abdominal: Normal appearance.   Musculoskeletal: Normal range of motion.         General: Normal range of motion.        Arms:       Comments: Point tenderness to palpation   Neurological: no focal deficit. She is alert.   Skin: Skin is warm, dry and not pale. jaundice  Psychiatric: Her behavior is normal. Mood, judgment and thought content normal.   Nursing note and vitals reviewed.      Assessment:     1. Upper back pain on left side        Plan:       Upper back pain on left side  -     Ambulatory referral/consult to Physical/Occupational Therapy; Future; Expected date: 07/03/2024    Advised ibuprofen as needed over the next 2 weeks.  Do not take on an empty stomach.  Will place referral for physical therapy.  Also recommended Salonpas patches.  Will continue to follow up patient.  Has annual wellness visit scheduled with myself in 2 weeks.    Reviewed imaging and lab results from ER visit as well as previous records.      My total time spent on this encounter was 47 minutes which included  the following activities: preparing to see the patient, performing a medically appropriate and/or evaluation, counseling and educating the patient and family/caregiver, ordering medications, tests, or procedures, referring and communicating with other healthcare providers, documenting  clinical information in the electronic or other health record, and independently interpreting results. This time is independent and non-overlapping.

## 2024-06-27 DIAGNOSIS — F41.1 GENERALIZED ANXIETY DISORDER: ICD-10-CM

## 2024-06-27 DIAGNOSIS — F43.23 ADJUSTMENT DISORDER WITH MIXED ANXIETY AND DEPRESSED MOOD: ICD-10-CM

## 2024-07-01 RX ORDER — VENLAFAXINE HYDROCHLORIDE 75 MG/1
225 CAPSULE, EXTENDED RELEASE ORAL DAILY
Qty: 90 CAPSULE | Refills: 1 | Status: SHIPPED | OUTPATIENT
Start: 2024-07-01 | End: 2024-08-30

## 2024-07-05 DIAGNOSIS — F43.23 ADJUSTMENT DISORDER WITH MIXED ANXIETY AND DEPRESSED MOOD: ICD-10-CM

## 2024-07-05 DIAGNOSIS — F41.1 GENERALIZED ANXIETY DISORDER: ICD-10-CM

## 2024-07-08 RX ORDER — VENLAFAXINE HYDROCHLORIDE 75 MG/1
225 CAPSULE, EXTENDED RELEASE ORAL DAILY
Qty: 90 CAPSULE | Refills: 1 | OUTPATIENT
Start: 2024-07-08 | End: 2024-09-06

## 2024-07-08 RX ORDER — ALPRAZOLAM 0.25 MG/1
0.25 TABLET ORAL DAILY PRN
Qty: 30 TABLET | Refills: 0 | Status: SHIPPED | OUTPATIENT
Start: 2024-07-08 | End: 2024-08-07

## 2024-07-11 ENCOUNTER — OFFICE VISIT (OUTPATIENT)
Dept: PRIMARY CARE CLINIC | Facility: CLINIC | Age: 74
End: 2024-07-11
Payer: MEDICARE

## 2024-07-11 ENCOUNTER — TELEPHONE (OUTPATIENT)
Dept: PRIMARY CARE CLINIC | Facility: CLINIC | Age: 74
End: 2024-07-11

## 2024-07-11 ENCOUNTER — HOSPITAL ENCOUNTER (OUTPATIENT)
Dept: RADIOLOGY | Facility: HOSPITAL | Age: 74
Discharge: HOME | End: 2024-07-11
Attending: INTERNAL MEDICINE
Payer: MEDICARE

## 2024-07-11 VITALS
WEIGHT: 177.81 LBS | BODY MASS INDEX: 27.91 KG/M2 | DIASTOLIC BLOOD PRESSURE: 62 MMHG | HEART RATE: 106 BPM | SYSTOLIC BLOOD PRESSURE: 112 MMHG | HEIGHT: 67 IN | OXYGEN SATURATION: 97 %

## 2024-07-11 DIAGNOSIS — M79.604 PAIN IN BOTH LOWER EXTREMITIES: Primary | ICD-10-CM

## 2024-07-11 DIAGNOSIS — M79.605 PAIN IN BOTH LOWER EXTREMITIES: ICD-10-CM

## 2024-07-11 DIAGNOSIS — M79.604 PAIN IN BOTH LOWER EXTREMITIES: ICD-10-CM

## 2024-07-11 DIAGNOSIS — M79.605 PAIN IN BOTH LOWER EXTREMITIES: Primary | ICD-10-CM

## 2024-07-11 PROCEDURE — 93970 EXTREMITY STUDY: CPT | Mod: 26,HCNC,, | Performed by: RADIOLOGY

## 2024-07-11 PROCEDURE — 3008F BODY MASS INDEX DOCD: CPT | Mod: CPTII,S$GLB,, | Performed by: INTERNAL MEDICINE

## 2024-07-11 PROCEDURE — 1159F MED LIST DOCD IN RCRD: CPT | Mod: CPTII,S$GLB,, | Performed by: INTERNAL MEDICINE

## 2024-07-11 PROCEDURE — 1158F ADVNC CARE PLAN TLK DOCD: CPT | Mod: CPTII,S$GLB,, | Performed by: INTERNAL MEDICINE

## 2024-07-11 PROCEDURE — 3074F SYST BP LT 130 MM HG: CPT | Mod: CPTII,S$GLB,, | Performed by: INTERNAL MEDICINE

## 2024-07-11 PROCEDURE — 3078F DIAST BP <80 MM HG: CPT | Mod: CPTII,S$GLB,, | Performed by: INTERNAL MEDICINE

## 2024-07-11 PROCEDURE — 93970 EXTREMITY STUDY: CPT | Mod: TC,HCNC,PO

## 2024-07-11 PROCEDURE — 99999 PR PBB SHADOW E&M-EST. PATIENT-LVL V: CPT | Mod: PBBFAC,HCNC,, | Performed by: INTERNAL MEDICINE

## 2024-07-11 PROCEDURE — 3288F FALL RISK ASSESSMENT DOCD: CPT | Mod: CPTII,S$GLB,, | Performed by: INTERNAL MEDICINE

## 2024-07-11 PROCEDURE — 99213 OFFICE O/P EST LOW 20 MIN: CPT | Mod: S$GLB,,, | Performed by: INTERNAL MEDICINE

## 2024-07-11 PROCEDURE — 1100F PTFALLS ASSESS-DOCD GE2>/YR: CPT | Mod: CPTII,S$GLB,, | Performed by: INTERNAL MEDICINE

## 2024-07-11 PROCEDURE — 1160F RVW MEDS BY RX/DR IN RCRD: CPT | Mod: CPTII,S$GLB,, | Performed by: INTERNAL MEDICINE

## 2024-07-11 PROCEDURE — 4010F ACE/ARB THERAPY RXD/TAKEN: CPT | Mod: CPTII,S$GLB,, | Performed by: INTERNAL MEDICINE

## 2024-07-11 PROCEDURE — 1125F AMNT PAIN NOTED PAIN PRSNT: CPT | Mod: CPTII,S$GLB,, | Performed by: INTERNAL MEDICINE

## 2024-07-11 NOTE — PROGRESS NOTES
US shows no evidence of DVT. A left sided bakers cyst (fluid filled sac). Resting icing the area can help resolve it. But no DVT.

## 2024-07-11 NOTE — TELEPHONE ENCOUNTER
----- Message from Ml Shipley MD sent at 7/11/2024 10:37 AM CDT -----  US shows no evidence of DVT. A left sided bakers cyst (fluid filled sac). Resting icing the area can help resolve it. But no DVT.

## 2024-07-11 NOTE — PROGRESS NOTES
INTERNAL MEDICINE PROGRESS/URGENT CARE NOTE    CHIEF COMPLAINT     Chief Complaint   Patient presents with    Knee Pain     Patient presents for eval of bilateral knee pain, left knee pain x1 month and right knee pain x2 days. Patient stated she sits for long periods of time and is concerned she mat have a blood clot. Patient has been using Salonpas and Aleve with no relief.       HPI     Dottie Galeana is a 73 y.o.  female who presents for an urgent/follow up visit today.  Patient is here today to be evaluated for possible DVT. Says she's been having b/l k nee pain and suspects as I do that she has OA but yesterday had severe pain in the left calf. Notes she has been immobile since her fall and tib/fib fracture a few months ago, worsneing knee pain with wieght gain so she has not been as active as she should.     Past Medical History:  Past Medical History:   Diagnosis Date    Arthritis     Closed compression fracture of L1 lumbar vertebra 11/07/2018    Colon polyp     Coronary artery calcification     Depression     Fatty liver     Generalized anxiety disorder     GERD (gastroesophageal reflux disease)     History of seasonal allergies     HTN (hypertension)     Hyperlipidemia     Hyponatremia     Insomnia     Osteoporosis     Stress incontinence     Stroke     ocular behind left eye       Home Medications:  Prior to Admission medications    Medication Sig Start Date End Date Taking? Authorizing Provider   ALPRAZolam (XANAX) 0.25 MG tablet Take 1 tablet (0.25 mg total) by mouth daily as needed for Anxiety. 7/8/24 8/7/24 Yes Rema Melgar NP   amLODIPine (NORVASC) 5 MG tablet Take 1 tablet (5 mg total) by mouth once daily. 5/27/24  Yes Ml Shipley MD   aspirin 325 MG tablet Take 1 tablet (325 mg total) by mouth once daily. Pt taking 1/2 daily 12/29/23  Yes Shashi Helton MD   buPROPion (WELLBUTRIN XL) 150 MG TB24 tablet TAKE 1 TABLET BY MOUTH DAILY 6/24/24  Yes Rema Melgar NP   busPIRone  (BUSPAR) 10 MG tablet Take 2 tablets (20 mg total) by mouth 2 (two) times daily. 2/14/24 7/11/24 Yes Rema Melgar NP   fluticasone propionate (FLONASE) 50 mcg/actuation nasal spray 1 spray (50 mcg total) by Each Nostril route once daily. 5/27/24  Yes Ml Shipley MD   gabapentin (NEURONTIN) 300 MG capsule take 3 capsules (900 mg total) by mouth 2 times a day. titrate up to twice daily only if continued symptoms after once daily. 6/24/24  Yes David Wei MD   losartan (COZAAR) 100 MG tablet Take 1 tablet (100 mg total) by mouth once daily. Hold for systolic less than 120 6/20/24  Yes Ml Shipley MD   multivitamin (THERAGRAN) per tablet Take 1 tablet by mouth once daily.  11/23/11  Yes Provider, Historical   nebivoloL (BYSTOLIC) 10 MG Tab Take 1 tablet (10 mg total) by mouth once daily. One tablet in am, 1/2 tab pm 6/9/23  Yes Adriana Stout MD   NITROSTAT 0.4 mg SL tablet Place 0.4 mg under the tongue every 5 (five) minutes as needed.  12/9/16  Yes José Miguel Bynum MD   pantoprazole (PROTONIX) 40 MG tablet TAKE 1 TABLET TWICE DAILY 2/7/24  Yes Seema Crenshaw NP   QUEtiapine (SEROQUEL) 50 MG tablet TAKE 1 TABLET BY MOUTH EVERY EVENING 6/24/24  Yes Rema Melgar NP   RESTASIS 0.05 % ophthalmic emulsion Place 1 drop into both eyes 2 (two) times daily. 9/8/23  Yes Provider, Historical   rosuvastatin (CRESTOR) 10 MG tablet Take 1 tablet (10 mg total) by mouth once daily. 5/17/24  Yes Ml Shipley MD   tretinoin (RETIN-A) 0.025 % cream SMARTSIG:sparingly Topical Every Night 10/27/23  Yes Provider, Historical   venlafaxine (EFFEXOR-XR) 75 MG 24 hr capsule Take 3 capsules (225 mg total) by mouth once daily. 7/1/24 8/30/24 Yes Rema Melgar, NP   cholecalciferol, vitamin D3, 125 mcg (5,000 unit) Tab Take 1 tablet (5,000 Units total) by mouth once daily. 12/29/23   Shashi Helton MD   diphenhydrAMINE (BENADRYL) 25 mg capsule     Provider, Historical   docusate sodium  "(COLACE) 100 MG capsule Take 1 capsule (100 mg total) by mouth 2 (two) times daily. 12/29/23   Shashi Helton MD   HYDROcodone-acetaminophen (NORCO)  mg per tablet Take 1 tablet by mouth every 4 (four) hours as needed for Pain. 12/29/23   Shashi Helton MD       Review of Systems:  Review of Systems        Date: 07/11/2024    Power of   I initiated the process of voluntary advance care planning today and explained the importance of this process to the patient.  I introduced the concept of advance directives to the patient, as well. Then the patient received detailed information about the importance of designating a Health Care Power of  (HCPOA). She was also instructed to communicate with this person about their wishes for future healthcare, should she become sick and lose decision-making capacity. The patient has previously appointed a HCPOA. After our discussion, the patient has decided to complete a HCPOA and has appointed her significant other, health care agent:  on file  & health care agent number:  onfile . I encouraged her to communicate with this person about their wishes for future healthcare, should she become sick and lose decision-making capacity.      A total of 10 min was spent on advance care planning, goals of care discussion, emotional support, formulating and communicating prognosis and exploring burden/benefit of various approaches of treatment. This discussion occurred on a fully voluntary basis with the verbal consent of the patient and/or family.         PHYSICAL EXAM     /62 (BP Location: Left arm, Patient Position: Sitting, BP Method: Medium (Manual))   Pulse 106   Ht 5' 7" (1.702 m)   Wt 80.7 kg (177 lb 12.8 oz)   LMP 04/21/1994   SpO2 97%   BMI 27.85 kg/m²     GEN - A+OX4, NAD   Ext - 2+BDP and radial pulses. No C/C/E. Mild pain on palpation of the left calf. No crepitus of the knees  Skin - No rash.    LABS         ASSESSMENT/PLAN     Dottie " Lilia Galeana is a 73 y.o. female with  1. Pain in both lower extremities  Likely OA.   Discussed pain control with tylenol and rare use of NSAID.   Referral back to her orthopedic doctor to  discuss further pain control  -     Cancel: US Lower Extremity Veins Bilateral; Future; Expected date: 07/11/2024  -     X-Ray Knee 3 View Bilateral; Future; Expected date: 07/11/2024  -     Ambulatory referral/consult to Physical/Occupational Therapy; Future; Expected date: 07/18/2024  -     US Lower Extremity Veins Bilateral; Future; Expected date: 07/11/2024      WORRY SCORE3    RTC in 1 months, sooner if needed and depending on labs.    Ml Shipley MD  Board Certified Internist/Geriatrician  [facility]

## 2024-07-17 ENCOUNTER — PATIENT MESSAGE (OUTPATIENT)
Dept: PRIMARY CARE CLINIC | Facility: CLINIC | Age: 74
End: 2024-07-17
Payer: MEDICARE

## 2024-07-20 DIAGNOSIS — I10 PRIMARY HYPERTENSION: Primary | ICD-10-CM

## 2024-07-22 RX ORDER — AMLODIPINE BESYLATE 10 MG/1
10 TABLET ORAL
Qty: 90 TABLET | Refills: 3 | Status: SHIPPED | OUTPATIENT
Start: 2024-07-22

## 2024-07-24 DIAGNOSIS — F43.23 ADJUSTMENT DISORDER WITH MIXED ANXIETY AND DEPRESSED MOOD: ICD-10-CM

## 2024-07-24 DIAGNOSIS — F51.01 PRIMARY INSOMNIA: ICD-10-CM

## 2024-07-24 DIAGNOSIS — F41.1 GENERALIZED ANXIETY DISORDER: ICD-10-CM

## 2024-07-24 RX ORDER — BUPROPION HYDROCHLORIDE 150 MG/1
150 TABLET ORAL
Qty: 30 TABLET | Refills: 1 | Status: CANCELLED | OUTPATIENT
Start: 2024-07-24

## 2024-07-24 RX ORDER — ALPRAZOLAM 0.25 MG/1
0.25 TABLET ORAL DAILY PRN
Qty: 30 TABLET | Refills: 0 | Status: CANCELLED | OUTPATIENT
Start: 2024-07-24 | End: 2024-08-23

## 2024-07-24 RX ORDER — QUETIAPINE FUMARATE 50 MG/1
50 TABLET, FILM COATED ORAL NIGHTLY
Qty: 30 TABLET | Refills: 1 | Status: CANCELLED | OUTPATIENT
Start: 2024-07-24

## 2024-07-25 RX ORDER — BUPROPION HYDROCHLORIDE 150 MG/1
150 TABLET ORAL DAILY
Qty: 30 TABLET | Refills: 1 | Status: SHIPPED | OUTPATIENT
Start: 2024-07-25 | End: 2024-09-23

## 2024-07-25 RX ORDER — QUETIAPINE FUMARATE 50 MG/1
50 TABLET, FILM COATED ORAL NIGHTLY
Qty: 30 TABLET | Refills: 1 | Status: SHIPPED | OUTPATIENT
Start: 2024-07-25 | End: 2024-09-23

## 2024-08-02 DIAGNOSIS — F41.0 GENERALIZED ANXIETY DISORDER WITH PANIC ATTACKS: ICD-10-CM

## 2024-08-02 DIAGNOSIS — F41.1 GENERALIZED ANXIETY DISORDER: ICD-10-CM

## 2024-08-02 DIAGNOSIS — F41.1 GENERALIZED ANXIETY DISORDER WITH PANIC ATTACKS: ICD-10-CM

## 2024-08-02 RX ORDER — ALPRAZOLAM 0.25 MG/1
0.25 TABLET ORAL DAILY PRN
Qty: 30 TABLET | Refills: 0 | Status: SHIPPED | OUTPATIENT
Start: 2024-08-05 | End: 2024-09-04

## 2024-08-02 RX ORDER — BUSPIRONE HYDROCHLORIDE 10 MG/1
20 TABLET ORAL 2 TIMES DAILY
Qty: 120 TABLET | Refills: 1 | Status: SHIPPED | OUTPATIENT
Start: 2024-08-02 | End: 2024-10-01

## 2024-08-21 ENCOUNTER — PATIENT MESSAGE (OUTPATIENT)
Dept: PRIMARY CARE CLINIC | Facility: CLINIC | Age: 74
End: 2024-08-21
Payer: MEDICARE

## 2024-08-30 ENCOUNTER — PATIENT MESSAGE (OUTPATIENT)
Dept: PSYCHIATRY | Facility: CLINIC | Age: 74
End: 2024-08-30
Payer: MEDICARE

## 2024-09-03 ENCOUNTER — OFFICE VISIT (OUTPATIENT)
Dept: PSYCHIATRY | Facility: CLINIC | Age: 74
End: 2024-09-03
Payer: MEDICARE

## 2024-09-03 DIAGNOSIS — F41.0 GENERALIZED ANXIETY DISORDER WITH PANIC ATTACKS: Primary | ICD-10-CM

## 2024-09-03 DIAGNOSIS — F41.1 GENERALIZED ANXIETY DISORDER WITH PANIC ATTACKS: Primary | ICD-10-CM

## 2024-09-03 DIAGNOSIS — F41.1 GENERALIZED ANXIETY DISORDER: ICD-10-CM

## 2024-09-03 DIAGNOSIS — F51.01 PRIMARY INSOMNIA: ICD-10-CM

## 2024-09-03 DIAGNOSIS — F33.0 MDD (MAJOR DEPRESSIVE DISORDER), RECURRENT EPISODE, MILD: ICD-10-CM

## 2024-09-03 PROCEDURE — 1160F RVW MEDS BY RX/DR IN RCRD: CPT | Mod: HCNC,CPTII,95,

## 2024-09-03 PROCEDURE — 4010F ACE/ARB THERAPY RXD/TAKEN: CPT | Mod: HCNC,CPTII,95,

## 2024-09-03 PROCEDURE — 1159F MED LIST DOCD IN RCRD: CPT | Mod: HCNC,CPTII,95,

## 2024-09-03 PROCEDURE — 99214 OFFICE O/P EST MOD 30 MIN: CPT | Mod: HCNC,95,,

## 2024-09-03 RX ORDER — VENLAFAXINE HYDROCHLORIDE 75 MG/1
225 CAPSULE, EXTENDED RELEASE ORAL DAILY
Qty: 90 CAPSULE | Refills: 1 | Status: SHIPPED | OUTPATIENT
Start: 2024-09-03 | End: 2024-11-02

## 2024-09-03 RX ORDER — ALPRAZOLAM 0.25 MG/1
0.25 TABLET ORAL DAILY PRN
Qty: 30 TABLET | Refills: 0 | Status: SHIPPED | OUTPATIENT
Start: 2024-09-03 | End: 2024-10-03

## 2024-09-03 NOTE — PROGRESS NOTES
Outpatient Psychiatry Follow-Up Visit    Clinical Status of Patient: Outpatient (Ambulatory)  09/03/2024    The patient location is:  [place], [place]  The patient phone number is: [phone]   Visit type: Virtual visit with synchronous audio and video  Each patient to whom he or she provides medical services by telemedicine is:  (1) informed of the relationship between the physician and patient and the respective role of any other health care provider with respect to management of the patient; and (2) notified that he or she may decline to receive medical services by telemedicine and may withdraw from such care at any time.     Chief Complaint: Pt is a 73 y.o. female who presents today for a follow-up. Met with patient.       Interval History and Content of Current Session:  Interim Events/Subjective Report/Content of Current Session:  follow up appointment.    Pt is a 73 y.o. female with past psychiatric hx of MDD (major depressive disorder), recurrent episode, mild, Generalized anxiety disorder with panic attacks, Primary insomnia who presents for follow up treatment.     Past Psychiatric hx:   Pt. is a 72 yo F with a past psychiatric hx of Generalized anxiety disorder, Caregiver stress, Primary insomnia presenting to the clinic for an initial evaluation and treatment. Past medical history outlined below. She is currently taking temazepam (RESTORIL), ARIPiprazole (ABILIFY), venlafaxine (EFFEXOR-XR), EScitalopram oxalate (LEXAPRO), prescribed and managed by Dr. Shipley. She reports that these medications have not adequately managed her symptoms.     6/5/24: Pt presents to OP Psychiatry for routine follow-up for treatment/medication management of MDD, ALFONSO w/panic attacks, Insomnia. Pt reports medications have effectively treated her symptoms, has continue to heal physically from fx leg. Denies any need to changes medications at this time. Pt denies SI/HI/AVH, self-harm, plan, or intent. Pt verbalizes understanding of  medication instructions through teach back. No other issues, concerns, or problems, will reassess symptoms and medications in 3 months or PRN if symptoms worsen.     Past Medical hx:   Past Medical History:   Diagnosis Date    Arthritis     Closed compression fracture of L1 lumbar vertebra 11/07/2018    Colon polyp     Coronary artery calcification     Depression     Fatty liver     Generalized anxiety disorder     GERD (gastroesophageal reflux disease)     History of seasonal allergies     HTN (hypertension)     Hyperlipidemia     Hyponatremia     Insomnia     Osteoporosis     Stress incontinence     Stroke     ocular behind left eye        Interim hx:  Medication changes last visit:     1. Continue buPROPion (WELLBUTRIN XL) 150 MG TB24 tablet - Take 1 tablet (150 mg total) by mouth once daily in the morning for depression.  2. Continue venlafaxine (EFFEXOR-XR) 75 MG MG 24 hr capsule - Take 3 capsules (225 mg) once daily for depression and anxiety.  3. Continue busPIRone (BUSPAR) 10 MG tablet - Take 2 tablet (20 mg total) 2 (two) times daily for anxiety.  4. Continue QUEtiapine (SEROQUEL) 50 MG tablet - Take 1 tablet (50 mg total) by mouth every evening for mood/sleep.  5. Continue ALPRAZolam (XANAX) 0.25 MG tablet - Take 1 tablet (0.25 mg total) by mouth daily as needed for Anxiety. Discussed risk of decreased RT, sedation, addictive potential, and not to mix with alcohol.       Anxiety: Good, no issues  Depression: Good, no issues  Sleep: Good, no issues  Appetite: Good, no issues     Denies suicidal/homicidal ideations.  Denies hopelessness/worthlessness.    Denies auditory/visual hallucinations.      Tobacco:  denies  Alcohol:  occasionally  Drug use:  denies  Caffeine:  3 cups daily      Review of Systems   PSYCHIATRIC: Pertinent items are noted in the narrative.        CONSTITUTIONAL: weight stable        M/S: no pain today         ENT: no allergies noted today        ABD: no n/v/d     Past Medical, Family  and Social History: The patient's past medical, family and social history have been reviewed and updated as appropriate within the electronic medical record. See encounter notes.     Medication Compliance: yes      Side effects: tolerates     Risk Parameters:  Patient reports no suicidal ideation  Patient reports no homicidal ideation  Patient reports no self-injurious behavior  Patient reports no violent behavior     Exam (detailed: at least 9 elements; comprehensive: all 15 elements)   Constitutional  Vitals:  Most recent vital signs, dated less than 90 days prior to this appointment, were reviewed.     General:  unremarkable, age appropriate, casual attire, good eye contact, good rapport       Musculoskeletal  Muscle Strength/Tone:  no flaccidity, no tremor    Gait & Station:  normal      Psychiatric                       Speech:  normal tone, normal rate, rhythm, and volume   Mood & Affect:   Euthymic, congruent, appropriate         Thought Process:   Goal directed; Linear    Associations:   intact   Thought Content:   No SI/HI, delusions, or paranoia, no AV/VH   Insight & Judgement:   Good, adequate to circumstances   Orientation:   grossly intact; alert and oriented x 4    Memory:  intact for content of interview    Language:  grossly intact, can repeat    Attention Span  : Grossly intact for content of interview   Fund of Knowledge:   intact and appropriate to age and level of education        Assessment and Diagnosis   Status/Progress: Based on the examination today, the patient's problem(s) is/are under fair control.  New problems have not been presented today. Comorbidities are not currently complicating management of the primary condition.      Impression:  Pt presents to OP Psychiatry for routine follow-up for treatment/medication management of MDD, ALFONSO w/panic attacks, Insomnia. Pt reports all medications effectively treating her symptoms, states that she was manageable during a 6 week stay from her  "mother in law with dementia while they found her a locked unit d/t wandering at her old care home. "If I could make it through that, I say my medicine is working good!" No need for medication changes at this time. Pt denies SI/HI/AVH, self-harm, plan, or intent. Pt verbalizes understanding of medication instructions through teach back. No other issues, concerns, or problems, will reassess symptoms and medications in 3 months or PRN if symptoms worsen.      Diagnosis:   - MDD (major depressive disorder), recurrent episode, mild  - Generalized anxiety disorder with panic attacks  - Primary insomnia      Intervention/Counseling/Treatment Plan   Medication Management:      1. Continue buPROPion (WELLBUTRIN XL) 150 MG TB24 tablet - Take 1 tablet (150 mg total) by mouth once daily in the morning for depression.     2. Continue venlafaxine (EFFEXOR-XR) 75 MG MG 24 hr capsule - Take 3 capsules (225 mg) once daily for depression and anxiety.     3. Continue busPIRone (BUSPAR) 10 MG tablet - Take 2 tablet (20 mg total) 2 (two) times daily for anxiety.     4. Continue QUEtiapine (SEROQUEL) 50 MG tablet - Take 1 tablet (50 mg total) by mouth every evening for mood/sleep.     5. Continue ALPRAZolam (XANAX) 0.25 MG tablet - Take 1 tablet (0.25 mg total) by mouth daily as needed for Anxiety. Discussed risk of decreased RT, sedation, addictive potential, and not to mix with alcohol.      6. Call to report any worsening of symptoms or problems with the medication. Pt instructed to go to ER with thoughts of harming self, others     7. Patient given contact # for psychotherapists at [facility] and also instructed she may check with insurance for list of providers.          Labs: none         Return to clinic:    3 months or PRN if symptoms worsen      -Spent 30min face to face with the pt; >50% time spent in counseling   -Supportive therapy and psychoeducation provided  -R/B/SE's of medications discussed with the pt who expresses " understanding and chooses to take medications as prescribed.   -Pt instructed to call clinic, 911 or go to nearest emergency room if sxs worsen or pt is in   crisis. The pt expresses understanding.      Rema Melgar NP  Psychiatric-Mental Health Nurse Practitioner  Department of Psychiatry    [facility] [address] [place] [address]  Phone:  [phone]  Fax: [phone]

## 2024-09-04 ENCOUNTER — LAB VISIT (OUTPATIENT)
Dept: LAB | Facility: HOSPITAL | Age: 74
End: 2024-09-04
Payer: MEDICARE

## 2024-09-04 DIAGNOSIS — I10 ESSENTIAL HYPERTENSION, MALIGNANT: ICD-10-CM

## 2024-09-04 DIAGNOSIS — Q25.8: ICD-10-CM

## 2024-09-04 DIAGNOSIS — I34.0 MITRAL INCOMPETENCE: ICD-10-CM

## 2024-09-04 DIAGNOSIS — E78.2 MIXED HYPERLIPIDEMIA: ICD-10-CM

## 2024-09-04 DIAGNOSIS — Z82.49 FAMILY HISTORY OF ISCHEMIC HEART DISEASE: Primary | ICD-10-CM

## 2024-09-04 DIAGNOSIS — F41.9 ANXIETY DISORDER: ICD-10-CM

## 2024-09-04 DIAGNOSIS — Z82.49 FAMILY HISTORY OF ISCHEMIC HEART DISEASE: ICD-10-CM

## 2024-09-04 LAB
ALBUMIN SERPL BCP-MCNC: 3.9 G/DL (ref 3.5–5.2)
ALP SERPL-CCNC: 97 U/L (ref 55–135)
ALT SERPL W/O P-5'-P-CCNC: 19 U/L (ref 10–44)
ANION GAP SERPL CALC-SCNC: 9 MMOL/L (ref 8–16)
AST SERPL-CCNC: 19 U/L (ref 10–40)
BASOPHILS # BLD AUTO: 0.06 K/UL (ref 0–0.2)
BASOPHILS NFR BLD: 0.8 % (ref 0–1.9)
BILIRUB SERPL-MCNC: 0.3 MG/DL (ref 0.1–1)
BUN SERPL-MCNC: 21 MG/DL (ref 8–23)
CALCIUM SERPL-MCNC: 9.4 MG/DL (ref 8.7–10.5)
CHLORIDE SERPL-SCNC: 105 MMOL/L (ref 95–110)
CHOLEST SERPL-MCNC: 190 MG/DL (ref 120–199)
CHOLEST/HDLC SERPL: 2.6 {RATIO} (ref 2–5)
CK SERPL-CCNC: 60 U/L (ref 20–180)
CO2 SERPL-SCNC: 24 MMOL/L (ref 23–29)
CREAT SERPL-MCNC: 0.8 MG/DL (ref 0.5–1.4)
DIFFERENTIAL METHOD BLD: ABNORMAL
EOSINOPHIL # BLD AUTO: 0.4 K/UL (ref 0–0.5)
EOSINOPHIL NFR BLD: 5.6 % (ref 0–8)
ERYTHROCYTE [DISTWIDTH] IN BLOOD BY AUTOMATED COUNT: 13.3 % (ref 11.5–14.5)
EST. GFR  (NO RACE VARIABLE): >60 ML/MIN/1.73 M^2
ESTIMATED AVG GLUCOSE: 114 MG/DL (ref 68–131)
GLUCOSE SERPL-MCNC: 110 MG/DL (ref 70–110)
HBA1C MFR BLD: 5.6 % (ref 4–5.6)
HCT VFR BLD AUTO: 42.8 % (ref 37–48.5)
HDLC SERPL-MCNC: 73 MG/DL (ref 40–75)
HDLC SERPL: 38.4 % (ref 20–50)
HGB BLD-MCNC: 13.8 G/DL (ref 12–16)
IMM GRANULOCYTES # BLD AUTO: 0.02 K/UL (ref 0–0.04)
IMM GRANULOCYTES NFR BLD AUTO: 0.3 % (ref 0–0.5)
LDLC SERPL CALC-MCNC: 84.2 MG/DL (ref 63–159)
LYMPHOCYTES # BLD AUTO: 1.6 K/UL (ref 1–4.8)
LYMPHOCYTES NFR BLD: 20.4 % (ref 18–48)
MCH RBC QN AUTO: 30.9 PG (ref 27–31)
MCHC RBC AUTO-ENTMCNC: 32.2 G/DL (ref 32–36)
MCV RBC AUTO: 96 FL (ref 82–98)
MONOCYTES # BLD AUTO: 0.7 K/UL (ref 0.3–1)
MONOCYTES NFR BLD: 8.3 % (ref 4–15)
NEUTROPHILS # BLD AUTO: 5.1 K/UL (ref 1.8–7.7)
NEUTROPHILS NFR BLD: 64.6 % (ref 38–73)
NONHDLC SERPL-MCNC: 117 MG/DL
NRBC BLD-RTO: 0 /100 WBC
PLATELET # BLD AUTO: 322 K/UL (ref 150–450)
PMV BLD AUTO: 8.9 FL (ref 9.2–12.9)
POTASSIUM SERPL-SCNC: 4.9 MMOL/L (ref 3.5–5.1)
PROT SERPL-MCNC: 6.8 G/DL (ref 6–8.4)
RBC # BLD AUTO: 4.46 M/UL (ref 4–5.4)
SODIUM SERPL-SCNC: 138 MMOL/L (ref 136–145)
TRIGL SERPL-MCNC: 164 MG/DL (ref 30–150)
TSH SERPL DL<=0.005 MIU/L-ACNC: 3.2 UIU/ML (ref 0.4–4)
WBC # BLD AUTO: 7.91 K/UL (ref 3.9–12.7)

## 2024-09-04 PROCEDURE — 80061 LIPID PANEL: CPT | Mod: HCNC | Performed by: INTERNAL MEDICINE

## 2024-09-04 PROCEDURE — 82550 ASSAY OF CK (CPK): CPT | Mod: HCNC | Performed by: INTERNAL MEDICINE

## 2024-09-04 PROCEDURE — 85025 COMPLETE CBC W/AUTO DIFF WBC: CPT | Mod: HCNC | Performed by: INTERNAL MEDICINE

## 2024-09-04 PROCEDURE — 84443 ASSAY THYROID STIM HORMONE: CPT | Mod: HCNC | Performed by: INTERNAL MEDICINE

## 2024-09-04 PROCEDURE — 80053 COMPREHEN METABOLIC PANEL: CPT | Mod: HCNC | Performed by: INTERNAL MEDICINE

## 2024-09-04 PROCEDURE — 83036 HEMOGLOBIN GLYCOSYLATED A1C: CPT | Mod: GA,HCNC | Performed by: INTERNAL MEDICINE

## 2024-09-16 DIAGNOSIS — F43.23 ADJUSTMENT DISORDER WITH MIXED ANXIETY AND DEPRESSED MOOD: ICD-10-CM

## 2024-09-16 DIAGNOSIS — F51.01 PRIMARY INSOMNIA: ICD-10-CM

## 2024-09-16 RX ORDER — BUPROPION HYDROCHLORIDE 150 MG/1
150 TABLET ORAL DAILY
Qty: 30 TABLET | Refills: 1 | Status: SHIPPED | OUTPATIENT
Start: 2024-09-16 | End: 2024-11-15

## 2024-09-16 RX ORDER — QUETIAPINE FUMARATE 50 MG/1
50 TABLET, FILM COATED ORAL NIGHTLY
Qty: 30 TABLET | Refills: 1 | Status: SHIPPED | OUTPATIENT
Start: 2024-09-16 | End: 2024-11-15

## 2024-09-25 ENCOUNTER — TELEPHONE (OUTPATIENT)
Dept: PSYCHIATRY | Facility: CLINIC | Age: 74
End: 2024-09-25
Payer: MEDICARE

## 2024-09-25 NOTE — TELEPHONE ENCOUNTER
Called PT on behalf of SW she was seeing here at the clinic. PT stated that she was referred to a psychologist and does not need any further appointments.

## 2024-10-03 NOTE — TELEPHONE ENCOUNTER
Please see request for refill    Rakan Major,    If you have not viewed these results on [employer] within 3 days, we will use an alternative method to contact you. We will contact you via the following protocol:    - Via letter if your results are normal.  - Via phone ([phone]) if your results are abnormal.     Here are my comments about your recent results:    CBC Results - Your cell counts were normal.    Please call the clinic ([phone]), or message us on [employer] with any questions you may have.     Have a great day,    Dr. Cabello

## 2024-10-04 DIAGNOSIS — F41.0 GENERALIZED ANXIETY DISORDER WITH PANIC ATTACKS: ICD-10-CM

## 2024-10-04 DIAGNOSIS — F41.1 GENERALIZED ANXIETY DISORDER WITH PANIC ATTACKS: ICD-10-CM

## 2024-10-04 RX ORDER — ALPRAZOLAM 0.25 MG/1
0.25 TABLET ORAL DAILY PRN
Qty: 30 TABLET | Refills: 0 | Status: SHIPPED | OUTPATIENT
Start: 2024-10-04 | End: 2024-11-03

## 2024-10-12 ENCOUNTER — PATIENT MESSAGE (OUTPATIENT)
Dept: PRIMARY CARE CLINIC | Facility: CLINIC | Age: 74
End: 2024-10-12
Payer: MEDICARE

## 2024-10-24 DIAGNOSIS — F43.23 ADJUSTMENT DISORDER WITH MIXED ANXIETY AND DEPRESSED MOOD: ICD-10-CM

## 2024-10-24 DIAGNOSIS — F51.01 PRIMARY INSOMNIA: ICD-10-CM

## 2024-10-24 RX ORDER — QUETIAPINE FUMARATE 50 MG/1
50 TABLET, FILM COATED ORAL NIGHTLY
Qty: 30 TABLET | Refills: 1 | Status: SHIPPED | OUTPATIENT
Start: 2024-10-24 | End: 2024-12-23

## 2024-10-31 ENCOUNTER — SOCIAL WORK (OUTPATIENT)
Dept: PRIMARY CARE CLINIC | Facility: CLINIC | Age: 74
End: 2024-10-31
Payer: MEDICARE

## 2024-10-31 DIAGNOSIS — F41.1 GENERALIZED ANXIETY DISORDER WITH PANIC ATTACKS: ICD-10-CM

## 2024-10-31 DIAGNOSIS — F41.0 GENERALIZED ANXIETY DISORDER WITH PANIC ATTACKS: ICD-10-CM

## 2024-10-31 RX ORDER — ALPRAZOLAM 0.25 MG/1
0.25 TABLET ORAL DAILY PRN
Qty: 30 TABLET | Refills: 0 | Status: SHIPPED | OUTPATIENT
Start: 2024-11-01 | End: 2024-12-01

## 2024-11-07 ENCOUNTER — OFFICE VISIT (OUTPATIENT)
Dept: PSYCHIATRY | Facility: CLINIC | Age: 74
End: 2024-11-07
Payer: MEDICARE

## 2024-11-07 VITALS
HEART RATE: 67 BPM | WEIGHT: 178.44 LBS | HEIGHT: 67 IN | SYSTOLIC BLOOD PRESSURE: 109 MMHG | BODY MASS INDEX: 28.01 KG/M2 | DIASTOLIC BLOOD PRESSURE: 76 MMHG

## 2024-11-07 DIAGNOSIS — F41.1 GENERALIZED ANXIETY DISORDER WITH PANIC ATTACKS: Primary | ICD-10-CM

## 2024-11-07 DIAGNOSIS — F33.0 MDD (MAJOR DEPRESSIVE DISORDER), RECURRENT EPISODE, MILD: ICD-10-CM

## 2024-11-07 DIAGNOSIS — F41.0 GENERALIZED ANXIETY DISORDER WITH PANIC ATTACKS: Primary | ICD-10-CM

## 2024-11-07 DIAGNOSIS — F51.01 PRIMARY INSOMNIA: ICD-10-CM

## 2024-11-07 PROCEDURE — 99999 PR PBB SHADOW E&M-EST. PATIENT-LVL III: CPT | Mod: PBBFAC,HCNC,,

## 2024-11-07 PROCEDURE — 3074F SYST BP LT 130 MM HG: CPT | Mod: HCNC,CPTII,S$GLB,

## 2024-11-07 PROCEDURE — 3078F DIAST BP <80 MM HG: CPT | Mod: HCNC,CPTII,S$GLB,

## 2024-11-07 PROCEDURE — 3288F FALL RISK ASSESSMENT DOCD: CPT | Mod: HCNC,CPTII,S$GLB,

## 2024-11-07 PROCEDURE — 1101F PT FALLS ASSESS-DOCD LE1/YR: CPT | Mod: HCNC,CPTII,S$GLB,

## 2024-11-07 PROCEDURE — 1160F RVW MEDS BY RX/DR IN RCRD: CPT | Mod: HCNC,CPTII,S$GLB,

## 2024-11-07 PROCEDURE — 99214 OFFICE O/P EST MOD 30 MIN: CPT | Mod: HCNC,S$GLB,,

## 2024-11-07 PROCEDURE — 4010F ACE/ARB THERAPY RXD/TAKEN: CPT | Mod: HCNC,CPTII,S$GLB,

## 2024-11-07 PROCEDURE — 1126F AMNT PAIN NOTED NONE PRSNT: CPT | Mod: HCNC,CPTII,S$GLB,

## 2024-11-07 PROCEDURE — 1159F MED LIST DOCD IN RCRD: CPT | Mod: HCNC,CPTII,S$GLB,

## 2024-11-07 PROCEDURE — 3044F HG A1C LEVEL LT 7.0%: CPT | Mod: HCNC,CPTII,S$GLB,

## 2024-11-07 PROCEDURE — 3008F BODY MASS INDEX DOCD: CPT | Mod: HCNC,CPTII,S$GLB,

## 2024-11-07 RX ORDER — QUETIAPINE FUMARATE 100 MG/1
100 TABLET, FILM COATED ORAL NIGHTLY PRN
Qty: 90 TABLET | Refills: 0 | Status: SHIPPED | OUTPATIENT
Start: 2024-11-07 | End: 2025-02-05

## 2024-11-07 NOTE — PROGRESS NOTES
"Outpatient Psychiatry Follow-Up Visit    Clinical Status of Patient: Outpatient (Ambulatory)  11/07/2024     Chief Complaint: Pt is a 74 y.o. female who presents today for a follow-up. Met with patient.       Interval History and Content of Current Session:  Interim Events/Subjective Report/Content of Current Session:  follow up appointment.    Pt is a 74 y.o. female with past psychiatric hx of MDD (major depressive disorder), recurrent episode, mild, Generalized anxiety disorder with panic attacks, Primary insomnia who presents for follow up treatment.     Past Psychiatric hx:   Pt. is a 72 yo F with a past psychiatric hx of Generalized anxiety disorder, Caregiver stress, Primary insomnia presenting to the clinic for an initial evaluation and treatment. Past medical history outlined below. She is currently taking temazepam (RESTORIL), ARIPiprazole (ABILIFY), venlafaxine (EFFEXOR-XR), EScitalopram oxalate (LEXAPRO), prescribed and managed by Dr. Shipley. She reports that these medications have not adequately managed her symptoms.     9/3/24: Pt presents to OP Psychiatry for routine follow-up for treatment/medication management of MDD, ALFONSO w/panic attacks, Insomnia. Pt reports all medications effectively treating her symptoms, states that she was manageable during a 6 week stay from her mother in law with dementia while they found her a locked unit d/t wandering at her old [facility]. "If I could make it through that, I say my medicine is working good!" No need for medication changes at this time. Pt denies SI/HI/AVH, self-harm, plan, or intent. Pt verbalizes understanding of medication instructions through teach back. No other issues, concerns, or problems, will reassess symptoms and medications in 3 months or PRN if symptoms worsen.     Past Medical hx:   Past Medical History:   Diagnosis Date    Arthritis     Closed compression fracture of L1 lumbar vertebra 11/07/2018    Colon polyp     Coronary artery calcification     " "Depression     Fatty liver     Generalized anxiety disorder     GERD (gastroesophageal reflux disease)     History of seasonal allergies     HTN (hypertension)     Hyperlipidemia     Hyponatremia     Insomnia     Osteoporosis     Stress incontinence     Stroke     ocular behind left eye        Interim hx:  Medication changes last visit:     1. Continue buPROPion (WELLBUTRIN XL) 150 MG TB24 tablet - Take 1 tablet (150 mg total) by mouth once daily in the morning for depression.  2. Continue venlafaxine (EFFEXOR-XR) 75 MG MG 24 hr capsule - Take 3 capsules (225 mg) once daily for depression and anxiety.  3. Continue busPIRone (BUSPAR) 10 MG tablet - Take 2 tablet (20 mg total) 2 (two) times daily for anxiety.  4. Continue QUEtiapine (SEROQUEL) 50 MG tablet - Take 1 tablet (50 mg total) by mouth every evening for mood/sleep.  5. Continue ALPRAZolam (XANAX) 0.25 MG tablet - Take 1 tablet (0.25 mg total) by mouth daily as needed for Anxiety. Discussed risk of decreased RT, sedation, addictive potential, and not to mix with alcohol.     Anxiety: Good, no issues  Depression: Good, no issues  Sleep: Decreased, "hit or miss"  Appetite: Same, manageable     Denies suicidal/homicidal ideations.  Denies hopelessness/worthlessness.    Denies auditory/visual hallucinations.      Tobacco:  denies  Alcohol:  occasionally  Drug use:  denies  Caffeine:  3 cups daily      Review of Systems   PSYCHIATRIC: Pertinent items are noted in the narrative.        CONSTITUTIONAL: weight stable        M/S: no pain today         ENT: no allergies noted today        ABD: no n/v/d     Past Medical, Family and Social History: The patient's past medical, family and social history have been reviewed and updated as appropriate within the electronic medical record. See encounter notes.     Medication Compliance: yes      Side effects: tolerates     Risk Parameters:  Patient reports no suicidal ideation  Patient reports no homicidal ideation  Patient " "reports no self-injurious behavior  Patient reports no violent behavior     Exam (detailed: at least 9 elements; comprehensive: all 15 elements)   Constitutional  Vitals:  Most recent vital signs, dated less than 90 days prior to this appointment, were reviewed. Pulse:  [67]   BP: (109)/(76)     General:  unremarkable, age appropriate, casual attire, good eye contact, good rapport       Musculoskeletal  Muscle Strength/Tone:  no flaccidity, no tremor    Gait & Station:  normal      Psychiatric                       Speech:  normal tone, normal rate, rhythm, and volume   Mood & Affect:   Euthymic, congruent, appropriate         Thought Process:   Goal directed; Linear    Associations:   intact   Thought Content:   No SI/HI, delusions, or paranoia, no AV/VH   Insight & Judgement:   Good, adequate to circumstances   Orientation:   grossly intact; alert and oriented x 4    Memory:  intact for content of interview    Language:  grossly intact, can repeat    Attention Span  : Grossly intact for content of interview   Fund of Knowledge:   intact and appropriate to age and level of education        Assessment and Diagnosis   Status/Progress: Based on the examination today, the patient's problem(s) is/are under fair control.  New problems have not been presented today. Comorbidities are not currently complicating management of the primary condition.      Impression:  Pt presents to OP Psychiatry for routine follow-up for treatment/medication management of MDD, ALFONSO w/panic attacks, Insomnia. Pt reports all of her medications are working effectively except for maybe Seroquel with sleep. States it worked very well in the beginning but it has been "hit or miss" recently. Discussed that we could increase Seroquel to 100 mg nightly for sleep/mood, and pt would like to try. Feels that she has continued to heal after her surgery and her load has lightened with caring for her mother in law after she moved in with mother in law's " 's daughter. She keeps very busy but everything has been manageable. Pt denies SI/HI/AVH, self-harm, plan, or intent. Pt verbalizes understanding of medication instructions through teach back. No other issues, concerns, or problems, will reassess symptoms and medications in 30 days or PRN if symptoms worsen.      Diagnosis:   - MDD (major depressive disorder), recurrent episode, mild  - Generalized anxiety disorder with panic attacks  - Primary insomnia      Intervention/Counseling/Treatment Plan   Medication Management:      1. Continue buPROPion (WELLBUTRIN XL) 150 MG TB24 tablet - Take 1 tablet (150 mg total) by mouth once daily in the morning for depression.     2. Continue venlafaxine (EFFEXOR-XR) 75 MG MG 24 hr capsule - Take 3 capsules (225 mg) once daily for depression and anxiety.     3. Continue busPIRone (BUSPAR) 10 MG tablet - Take 2 tablet (20 mg total) 2 (two) times daily for anxiety.     4. Increase QUEtiapine (SEROQUEL) 100 MG tablet - Take 1 tablet (100 mg total) by mouth every evening for mood/sleep.     5. Continue ALPRAZolam (XANAX) 0.25 MG tablet - Take 1 tablet (0.25 mg total) by mouth daily as needed for Anxiety. Discussed risk of decreased RT, sedation, addictive potential, and not to mix with alcohol.      6. Call to report any worsening of symptoms or problems with the medication. Pt instructed to go to ER with thoughts of harming self, others     7. Patient given contact # for psychotherapists at [facility] and also instructed she may check with insurance for list of providers.          Labs: none         Return to clinic:    30 days or PRN if symptoms worsen      -Spent 30min face to face with the pt; >50% time spent in counseling   -Supportive therapy and psychoeducation provided  -R/B/SE's of medications discussed with the pt who expresses understanding and chooses to take medications as prescribed.   -Pt instructed to call clinic, 911 or go to nearest emergency room if  sxs worsen or pt is in   crisis. The pt expresses understanding.      Rema Melgar NP  Psychiatric-Mental Health Nurse Practitioner  Department of Psychiatry    [facility] [address] [place] [address]  Phone:  [phone]  Fax: [phone]

## 2024-11-09 DIAGNOSIS — I10 ESSENTIAL HYPERTENSION: ICD-10-CM

## 2024-11-11 RX ORDER — LOSARTAN POTASSIUM 100 MG/1
100 TABLET ORAL DAILY
Qty: 90 TABLET | Refills: 1 | Status: SHIPPED | OUTPATIENT
Start: 2024-11-11

## 2024-11-16 DIAGNOSIS — K21.9 GASTROESOPHAGEAL REFLUX DISEASE WITHOUT ESOPHAGITIS: ICD-10-CM

## 2024-11-16 DIAGNOSIS — R05.3 CHRONIC COUGH: ICD-10-CM

## 2024-11-16 DIAGNOSIS — I10 PRIMARY HYPERTENSION: ICD-10-CM

## 2024-11-18 RX ORDER — AMLODIPINE BESYLATE 10 MG/1
10 TABLET ORAL DAILY
Qty: 90 TABLET | Refills: 3 | Status: SHIPPED | OUTPATIENT
Start: 2024-11-18

## 2024-11-18 RX ORDER — PANTOPRAZOLE SODIUM 40 MG/1
40 TABLET, DELAYED RELEASE ORAL 2 TIMES DAILY
Qty: 180 TABLET | Refills: 0 | Status: SHIPPED | OUTPATIENT
Start: 2024-11-18

## 2024-11-19 ENCOUNTER — PATIENT MESSAGE (OUTPATIENT)
Dept: PSYCHIATRY | Facility: CLINIC | Age: 74
End: 2024-11-19
Payer: MEDICARE

## 2024-11-24 DIAGNOSIS — K21.9 GASTROESOPHAGEAL REFLUX DISEASE WITHOUT ESOPHAGITIS: ICD-10-CM

## 2024-11-24 DIAGNOSIS — R05.3 CHRONIC COUGH: ICD-10-CM

## 2024-11-25 RX ORDER — PANTOPRAZOLE SODIUM 40 MG/1
40 TABLET, DELAYED RELEASE ORAL 2 TIMES DAILY
Qty: 180 TABLET | Refills: 0 | Status: SHIPPED | OUTPATIENT
Start: 2024-11-25

## 2024-12-03 DIAGNOSIS — F43.23 ADJUSTMENT DISORDER WITH MIXED ANXIETY AND DEPRESSED MOOD: ICD-10-CM

## 2024-12-03 RX ORDER — BUPROPION HYDROCHLORIDE 150 MG/1
150 TABLET ORAL EVERY MORNING
Qty: 30 TABLET | Refills: 1 | Status: SHIPPED | OUTPATIENT
Start: 2024-12-03 | End: 2025-02-01

## 2024-12-04 RX ORDER — GABAPENTIN 300 MG/1
900 CAPSULE ORAL 2 TIMES DAILY
Qty: 180 CAPSULE | Refills: 6 | Status: SHIPPED | OUTPATIENT
Start: 2024-12-04

## 2024-12-06 ENCOUNTER — TELEPHONE (OUTPATIENT)
Dept: PSYCHIATRY | Facility: CLINIC | Age: 74
End: 2024-12-06
Payer: MEDICARE

## 2024-12-08 DIAGNOSIS — F41.0 GENERALIZED ANXIETY DISORDER WITH PANIC ATTACKS: ICD-10-CM

## 2024-12-08 DIAGNOSIS — F41.1 GENERALIZED ANXIETY DISORDER WITH PANIC ATTACKS: ICD-10-CM

## 2024-12-09 ENCOUNTER — OFFICE VISIT (OUTPATIENT)
Dept: PSYCHIATRY | Facility: CLINIC | Age: 74
End: 2024-12-09
Payer: MEDICARE

## 2024-12-09 DIAGNOSIS — F41.0 GENERALIZED ANXIETY DISORDER WITH PANIC ATTACKS: Primary | ICD-10-CM

## 2024-12-09 DIAGNOSIS — F51.01 PRIMARY INSOMNIA: ICD-10-CM

## 2024-12-09 DIAGNOSIS — F41.1 GENERALIZED ANXIETY DISORDER WITH PANIC ATTACKS: Primary | ICD-10-CM

## 2024-12-09 DIAGNOSIS — F33.0 MDD (MAJOR DEPRESSIVE DISORDER), RECURRENT EPISODE, MILD: ICD-10-CM

## 2024-12-09 PROCEDURE — 4010F ACE/ARB THERAPY RXD/TAKEN: CPT | Mod: HCNC,CPTII,95,

## 2024-12-09 PROCEDURE — 3044F HG A1C LEVEL LT 7.0%: CPT | Mod: HCNC,CPTII,95,

## 2024-12-09 PROCEDURE — 99214 OFFICE O/P EST MOD 30 MIN: CPT | Mod: HCNC,95,,

## 2024-12-09 PROCEDURE — 1159F MED LIST DOCD IN RCRD: CPT | Mod: HCNC,CPTII,95,

## 2024-12-09 PROCEDURE — 1160F RVW MEDS BY RX/DR IN RCRD: CPT | Mod: HCNC,CPTII,95,

## 2024-12-09 RX ORDER — ALPRAZOLAM 0.25 MG/1
0.25 TABLET ORAL DAILY PRN
Qty: 30 TABLET | Refills: 0 | Status: SHIPPED | OUTPATIENT
Start: 2024-12-09 | End: 2025-01-08

## 2024-12-09 RX ORDER — BUSPIRONE HYDROCHLORIDE 10 MG/1
20 TABLET ORAL 2 TIMES DAILY
Qty: 120 TABLET | Refills: 1 | Status: SHIPPED | OUTPATIENT
Start: 2024-12-09 | End: 2025-02-07

## 2024-12-09 NOTE — TELEPHONE ENCOUNTER
Buspar:    Last ordered: 4 months ago (8/2/2024) by Rema Melgar NP     Xanax:   Last ordered: 1 month ago (10/31/2024) by Rema Melgar NP   LOV: 11/7/2024  NOV: 12/9/2024

## 2024-12-09 NOTE — PROGRESS NOTES
"Outpatient Psychiatry Follow-Up Visit    Clinical Status of Patient: Outpatient (Ambulatory)  12/09/2024    The patient location is:  [place], [place]  The patient phone number is: [phone]   Visit type: Virtual visit with synchronous audio and video  Each patient to whom he or she provides medical services by telemedicine is:  (1) informed of the relationship between the physician and patient and the respective role of any other health care provider with respect to management of the patient; and (2) notified that he or she may decline to receive medical services by telemedicine and may withdraw from such care at any time.     Chief Complaint: Pt is a 74 y.o. female who presents today for a follow-up. Met with patient.       Interval History and Content of Current Session:  Interim Events/Subjective Report/Content of Current Session:  follow up appointment.    Pt is a 74 y.o. female with past psychiatric hx of MDD (major depressive disorder), recurrent episode, mild, Generalized anxiety disorder with panic attacks, Primary insomnia who presents for follow up treatment.     Past Psychiatric hx:   Pt. is a 72 yo F with a past psychiatric hx of Generalized anxiety disorder, Caregiver stress, Primary insomnia presenting to the clinic for an initial evaluation and treatment. Past medical history outlined below. She is currently taking temazepam (RESTORIL), ARIPiprazole (ABILIFY), venlafaxine (EFFEXOR-XR), EScitalopram oxalate (LEXAPRO), prescribed and managed by Dr. Shipley. She reports that these medications have not adequately managed her symptoms.     11/7/24: Pt presents to OP Psychiatry for routine follow-up for treatment/medication management of MDD, ALFONSO w/panic attacks, Insomnia. Pt reports all of her medications are working effectively except for maybe Seroquel with sleep. States it worked very well in the beginning but it has been "hit or miss" recently. Discussed that we could increase Seroquel to 100 mg " "nightly for sleep/mood, and pt would like to try. Feels that she has continued to heal after her surgery and her load has lightened with caring for her mother in law after she moved in with mother in law's 's daughter. She keeps very busy but everything has been manageable. Pt denies SI/HI/AVH, self-harm, plan, or intent. Pt verbalizes understanding of medication instructions through teach back. No other issues, concerns, or problems, will reassess symptoms and medications in 30 days or PRN if symptoms worsen.     Past Medical hx:   Past Medical History:   Diagnosis Date    Arthritis     Closed compression fracture of L1 lumbar vertebra 11/07/2018    Colon polyp     Coronary artery calcification     Depression     Fatty liver     Generalized anxiety disorder     GERD (gastroesophageal reflux disease)     History of seasonal allergies     HTN (hypertension)     Hyperlipidemia     Hyponatremia     Insomnia     Osteoporosis     Stress incontinence     Stroke     ocular behind left eye        Interim hx:  Medication changes last visit:     1. Continue buPROPion (WELLBUTRIN XL) 150 MG TB24 tablet - Take 1 tablet (150 mg total) by mouth once daily in the morning for depression.  2. Continue venlafaxine (EFFEXOR-XR) 75 MG MG 24 hr capsule - Take 3 capsules (225 mg) once daily for depression and anxiety.  3. Continue busPIRone (BUSPAR) 10 MG tablet - Take 2 tablet (20 mg total) 2 (two) times daily for anxiety.  4. Increase QUEtiapine (SEROQUEL) 100 MG tablet - Take 1 tablet (100 mg total) by mouth every evening for mood/sleep.  5. Continue ALPRAZolam (XANAX) 0.25 MG tablet - Take 1 tablet (0.25 mg total) by mouth daily as needed for Anxiety. Discussed risk of decreased RT, sedation, addictive potential, and not to mix with alcohol.          Anxiety: Good, no issues  Depression: Good, no issues  Sleep: Improved  Appetite: "Too good"     Denies suicidal/homicidal ideations.  Denies hopelessness/worthlessness.    Denies " auditory/visual hallucinations.      Tobacco:  denies  Alcohol:  occasionally  Drug use:  denies  Caffeine:  3 cups daily      Review of Systems   PSYCHIATRIC: Pertinent items are noted in the narrative.        CONSTITUTIONAL: weight stable        M/S: no pain today         ENT: no allergies noted today        ABD: no n/v/d     Past Medical, Family and Social History: The patient's past medical, family and social history have been reviewed and updated as appropriate within the electronic medical record. See encounter notes.     Medication Compliance: yes      Side effects: tolerates     Risk Parameters:  Patient reports no suicidal ideation  Patient reports no homicidal ideation  Patient reports no self-injurious behavior  Patient reports no violent behavior     Exam (detailed: at least 9 elements; comprehensive: all 15 elements)   Constitutional  Vitals:  Most recent vital signs, dated less than 90 days prior to this appointment, were reviewed.     General:  unremarkable, age appropriate, casual attire, good eye contact, good rapport       Musculoskeletal  Muscle Strength/Tone:  no flaccidity, no tremor    Gait & Station:  normal      Psychiatric                       Speech:  normal tone, normal rate, rhythm, and volume   Mood & Affect:   Euthymic, congruent, appropriate         Thought Process:   Goal directed; Linear    Associations:   intact   Thought Content:   No SI/HI, delusions, or paranoia, no AV/VH   Insight & Judgement:   Good, adequate to circumstances   Orientation:   grossly intact; alert and oriented x 4    Memory:  intact for content of interview    Language:  grossly intact, can repeat    Attention Span  : Grossly intact for content of interview   Fund of Knowledge:   intact and appropriate to age and level of education        Assessment and Diagnosis   Status/Progress: Based on the examination today, the patient's problem(s) is/are under fair control.  New problems have not been presented today.  Comorbidities are not currently complicating management of the primary condition.      Impression:  Pt presents to OP Psychiatry for routine follow-up for treatment/medication management of MDD, ALFONSO w/panic attacks, Insomnia. Pt reports increase in Seroquel helped with mood and sleep, states all medications effectively treating all symptoms, denies need for any medication changes at this time. Pt denies SI/HI/AVH, self-harm, plan, or intent. Pt verbalizes understanding of medication instructions through teach back. No other issues, concerns, or problems, will reassess symptoms and medications in 3 months or PRN if symptoms worsen.      Diagnosis:   - MDD (major depressive disorder), recurrent episode, mild  - Generalized anxiety disorder with panic attacks  - Primary insomnia      Intervention/Counseling/Treatment Plan   Medication Management:      1. Continue buPROPion (WELLBUTRIN XL) 150 MG TB24 tablet - Take 1 tablet (150 mg total) by mouth once daily in the morning for depression.     2. Continue venlafaxine (EFFEXOR-XR) 75 MG MG 24 hr capsule - Take 3 capsules (225 mg) once daily for depression and anxiety.     3. Continue busPIRone (BUSPAR) 10 MG tablet - Take 2 tablet (20 mg total) 2 (two) times daily for anxiety.     4. Continue QUEtiapine (SEROQUEL) 100 MG tablet - Take 1 tablet (100 mg total) by mouth every evening for mood/sleep.     5. Continue ALPRAZolam (XANAX) 0.25 MG tablet - Take 1 tablet (0.25 mg total) by mouth daily as needed for Anxiety. Discussed risk of decreased RT, sedation, addictive potential, and not to mix with alcohol.      6. Call to report any worsening of symptoms or problems with the medication. Pt instructed to go to ER with thoughts of harming self, others     7. Patient given contact # for psychotherapists at [facility] and also instructed she may check with insurance for list of providers.          Labs: none         Return to clinic:      3 months or PRN if symptoms  worsen    -Spent 30min face to face with the pt; >50% time spent in counseling   -Supportive therapy and psychoeducation provided  -R/B/SE's of medications discussed with the pt who expresses understanding and chooses to take medications as prescribed.   -Pt instructed to call clinic, 911 or go to nearest emergency room if sxs worsen or pt is in   crisis. The pt expresses understanding.      Rema Melgar NP  Psychiatric-Mental Health Nurse Practitioner  Department of Psychiatry    [facility] [place] [address] [place] [address]  Phone:  [phone]  Fax: [phone]

## 2024-12-23 DIAGNOSIS — F51.01 PRIMARY INSOMNIA: ICD-10-CM

## 2024-12-23 DIAGNOSIS — F33.0 MDD (MAJOR DEPRESSIVE DISORDER), RECURRENT EPISODE, MILD: ICD-10-CM

## 2024-12-24 RX ORDER — QUETIAPINE FUMARATE 100 MG/1
100 TABLET, FILM COATED ORAL NIGHTLY PRN
Qty: 90 TABLET | Refills: 0 | Status: SHIPPED | OUTPATIENT
Start: 2024-12-24 | End: 2025-03-24

## 2024-12-27 ENCOUNTER — PATIENT MESSAGE (OUTPATIENT)
Dept: GASTROENTEROLOGY | Facility: CLINIC | Age: 74
End: 2024-12-27
Payer: MEDICARE

## 2024-12-28 ENCOUNTER — OCHSNER VIRTUAL EMERGENCY DEPARTMENT (OUTPATIENT)
Facility: CLINIC | Age: 74
End: 2024-12-28
Payer: MEDICARE

## 2024-12-28 ENCOUNTER — NURSE TRIAGE (OUTPATIENT)
Dept: ADMINISTRATIVE | Facility: CLINIC | Age: 74
End: 2024-12-28
Payer: MEDICARE

## 2024-12-28 DIAGNOSIS — R11.10 VOMITING, UNSPECIFIED VOMITING TYPE, UNSPECIFIED WHETHER NAUSEA PRESENT: Primary | ICD-10-CM

## 2024-12-28 NOTE — TELEPHONE ENCOUNTER
Ms Sinclair began having nausea w/ emesis on Christmas. The emesis began after drinking a glass of red wine. Red in the emesis for about 4 episodes. Unsure if it was blood or wine. Reports +diarrhea at that time. Last vomiting episode was yesterday at 2pm. She is now having chest discomfort (only present with cough). Immediately goes away after cough and no issues taking a deep breath. She also reports severe vaginal pain. She says she has to get on her hands and knees to make this go away. She took a Xanax with some relief of this discomfort. She has decreased urine of a few drops since Christmas.    I have contacted [facility], per protocol. Dr Rea agrees with triage disposition that patient should be evaluated in the ED.  Patient updated and verbalizes understanding.     Reason for Disposition   All other vaginal symptoms  (Exception: Feels like prior yeast infection, minor abrasion, mild rash < 24 hour duration, mild itching.)   [1] Decreased urination and [2] drinking very little AND [3] dehydration suspected (e.g., dark urine, no urine > 12 hours, very dry mouth, very lightheaded)    Additional Information   Negative: Sounds like a life-threatening emergency to the triager   Negative: Patient sounds very sick or weak to the triager   Negative: [1] SEVERE pain AND [2] not improved 2 hours after pain medicine   Negative: [1] Genital area looks infected (e.g., draining sore, spreading redness) AND [2] fever   Negative: [1] Something is hanging out of the vagina AND [2] can't easily be pushed back inside   Negative: MODERATE-SEVERE itching (i.e., interferes with school, work, or sleep)   Negative: [1] MILD-MODERATE pain AND [2] present > 24 hours  (Exception: Chronic pain.)   Negative: Genital area looks infected (e.g., draining sore, spreading redness)   Negative: Rash with painful tiny water blisters   Negative: [1] Rash (e.g., redness, tiny bumps, sore) of genital area AND [2] present > 24 hours   Negative: Tender  "lump (swelling or "ball") at vaginal opening   Negative: [1] Symptoms of a yeast infection (i.e., itchy, white discharge, not bad smelling) AND [2] not improved > 3 days following Care Advice   Negative: [1] Vaginal itching AND [2] not improved > 3 days following Care Advice   Negative: [1] Vaginal odor (bad smell) AND [2] not improved > 3 days following Care Advice   Negative: Patient is worried they have a sexually transmitted infection (STI)   Negative: SEVERE difficulty breathing (e.g., struggling for each breath, speaks in single words)   Negative: Difficult to awaken or acting confused (e.g., disoriented, slurred speech)   Negative: Shock suspected (e.g., cold/pale/clammy skin, too weak to stand, low BP, rapid pulse)   Negative: Passed out (e.g., fainted, lost consciousness, blacked out and was not responding)   Negative: [1] Chest pain lasts > 5 minutes AND [2] age > 44   Negative: [1] Chest pain lasts > 5 minutes AND [2] age > 30 AND [3] one or more cardiac risk factors (e.g., diabetes, high blood pressure, high cholesterol, obesity with BMI 30 or higher, smoker, or strong family history of heart disease)   Negative: [1] Chest pain lasts > 5 minutes AND [2] history of heart disease (i.e., angina, heart attack, heart failure, bypass surgery, takes nitroglycerin)   Negative: [1] Chest pain lasts > 5 minutes AND [2] described as crushing, pressure-like, or heavy   Negative: Heart beating < 50 beats per minute OR > 140 beats per minute   Negative: Visible sweat on face or sweat dripping down face   Negative: Sounds like a life-threatening emergency to the triager   Negative: Shock suspected (e.g., cold/pale/clammy skin, too weak to stand, low BP, rapid pulse)   Negative: Sounds like a life-threatening emergency to the triager   Negative: [1] Unable to urinate (or only a few drops) > 4 hours AND [2] bladder feels very full (e.g., palpable bladder or strong urge to urinate)    Protocols used: Vaginal " Symptoms-A-AH, Chest Pain-A-AH, Urinary Symptoms-A-AH

## 2024-12-28 NOTE — PLAN OF CARE-OVED
[facility] Emergency Department Plan of Care Note    Referral source: Nurse On-Call      Discussed patient with: Nurse On Call      Reason for consult: Vomitingnausea w/ emesis on Christmas. The emesis began after drinking a glass of red wine. Red in the emesis for about 4 episodes. Unsure if it was blood or wine. Reports +diarrhea at that time. Last vomiting episode was yesterday at 2pm. She is now having chest discomfort (only present with cough). Immediately goes away after cough and no issues taking a deep breath. She also reports severe vaginal pain. She says she has to get on her hands and knees to make this go away.       Medical Record Review: reviewed      Disposition recommended: to ED      Additional Recommendations: to ED      Daphne Rea MD

## 2024-12-30 NOTE — TELEPHONE ENCOUNTER
Patient states she had diarrhea x1 week then began vomiting and became dehydrated. She says several members of their family got sick with the same symptoms. She states she feels much improved now. She is scheduled for a hospital follow up on 1/6/25.

## 2025-01-03 NOTE — TELEPHONE ENCOUNTER
This somehow got routed to the wrong person. Patient has a hospital follow up scheduled with you on 1/6/25. It had already been scheduled on 11/16/24 as a follow up. I converted it to a hospital follow up on 12/30/24.

## 2025-01-05 DIAGNOSIS — F41.1 GENERALIZED ANXIETY DISORDER WITH PANIC ATTACKS: ICD-10-CM

## 2025-01-05 DIAGNOSIS — F41.0 GENERALIZED ANXIETY DISORDER WITH PANIC ATTACKS: ICD-10-CM

## 2025-01-06 RX ORDER — ALPRAZOLAM 0.25 MG/1
0.25 TABLET ORAL DAILY PRN
Qty: 30 TABLET | Refills: 0 | Status: SHIPPED | OUTPATIENT
Start: 2025-01-06 | End: 2025-02-05

## 2025-01-30 DIAGNOSIS — F43.23 ADJUSTMENT DISORDER WITH MIXED ANXIETY AND DEPRESSED MOOD: ICD-10-CM

## 2025-01-30 RX ORDER — BUPROPION HYDROCHLORIDE 150 MG/1
150 TABLET ORAL EVERY MORNING
Qty: 30 TABLET | Refills: 1 | Status: SHIPPED | OUTPATIENT
Start: 2025-01-30 | End: 2025-03-31

## 2025-02-07 DIAGNOSIS — F33.0 MDD (MAJOR DEPRESSIVE DISORDER), RECURRENT EPISODE, MILD: ICD-10-CM

## 2025-02-07 DIAGNOSIS — F41.0 GENERALIZED ANXIETY DISORDER WITH PANIC ATTACKS: ICD-10-CM

## 2025-02-07 DIAGNOSIS — F41.1 GENERALIZED ANXIETY DISORDER WITH PANIC ATTACKS: ICD-10-CM

## 2025-02-10 RX ORDER — VENLAFAXINE HYDROCHLORIDE 75 MG/1
225 CAPSULE, EXTENDED RELEASE ORAL DAILY
Qty: 90 CAPSULE | Refills: 1 | Status: SHIPPED | OUTPATIENT
Start: 2025-02-10 | End: 2025-04-11

## 2025-02-13 DIAGNOSIS — F41.1 GENERALIZED ANXIETY DISORDER WITH PANIC ATTACKS: ICD-10-CM

## 2025-02-13 DIAGNOSIS — F41.0 GENERALIZED ANXIETY DISORDER WITH PANIC ATTACKS: ICD-10-CM

## 2025-02-13 RX ORDER — BUSPIRONE HYDROCHLORIDE 10 MG/1
20 TABLET ORAL 2 TIMES DAILY
Qty: 120 TABLET | Refills: 1 | Status: SHIPPED | OUTPATIENT
Start: 2025-02-13 | End: 2025-04-14

## 2025-02-15 DIAGNOSIS — F41.0 GENERALIZED ANXIETY DISORDER WITH PANIC ATTACKS: ICD-10-CM

## 2025-02-15 DIAGNOSIS — F41.1 GENERALIZED ANXIETY DISORDER WITH PANIC ATTACKS: ICD-10-CM

## 2025-02-17 ENCOUNTER — TELEPHONE (OUTPATIENT)
Dept: PSYCHIATRY | Facility: CLINIC | Age: 75
End: 2025-02-17
Payer: MEDICARE

## 2025-02-17 RX ORDER — ALPRAZOLAM 0.25 MG/1
0.25 TABLET ORAL DAILY PRN
Qty: 30 TABLET | Refills: 0 | Status: SHIPPED | OUTPATIENT
Start: 2025-02-17 | End: 2025-03-19

## 2025-03-20 DIAGNOSIS — F41.1 GENERALIZED ANXIETY DISORDER WITH PANIC ATTACKS: ICD-10-CM

## 2025-03-20 DIAGNOSIS — F41.0 GENERALIZED ANXIETY DISORDER WITH PANIC ATTACKS: ICD-10-CM

## 2025-03-20 RX ORDER — ALPRAZOLAM 0.25 MG/1
0.25 TABLET ORAL DAILY PRN
Qty: 30 TABLET | Refills: 0 | Status: SHIPPED | OUTPATIENT
Start: 2025-03-20 | End: 2025-04-19

## 2025-03-25 ENCOUNTER — OFFICE VISIT (OUTPATIENT)
Dept: PSYCHIATRY | Facility: CLINIC | Age: 75
End: 2025-03-25
Payer: MEDICARE

## 2025-03-25 DIAGNOSIS — F51.01 PRIMARY INSOMNIA: ICD-10-CM

## 2025-03-25 DIAGNOSIS — F33.0 MDD (MAJOR DEPRESSIVE DISORDER), RECURRENT EPISODE, MILD: ICD-10-CM

## 2025-03-25 DIAGNOSIS — F43.23 ADJUSTMENT DISORDER WITH MIXED ANXIETY AND DEPRESSED MOOD: ICD-10-CM

## 2025-03-25 DIAGNOSIS — F41.0 GENERALIZED ANXIETY DISORDER WITH PANIC ATTACKS: Primary | ICD-10-CM

## 2025-03-25 DIAGNOSIS — F41.1 GENERALIZED ANXIETY DISORDER WITH PANIC ATTACKS: Primary | ICD-10-CM

## 2025-03-25 RX ORDER — BUPROPION HYDROCHLORIDE 150 MG/1
150 TABLET ORAL EVERY MORNING
Qty: 90 TABLET | Refills: 0 | Status: SHIPPED | OUTPATIENT
Start: 2025-03-25 | End: 2025-06-23

## 2025-03-25 RX ORDER — QUETIAPINE FUMARATE 100 MG/1
100 TABLET, FILM COATED ORAL NIGHTLY PRN
Qty: 90 TABLET | Refills: 0 | Status: SHIPPED | OUTPATIENT
Start: 2025-03-25 | End: 2025-06-23

## 2025-03-25 NOTE — PROGRESS NOTES
Outpatient Psychiatry Follow-Up Visit    Clinical Status of Patient: Outpatient (Ambulatory)  03/25/2025    The patient location is:  [place], [place]  The patient phone number is: [phone]   Visit type: Virtual visit with synchronous audio and video  Each patient to whom he or she provides medical services by telemedicine is:  (1) informed of the relationship between the physician and patient and the respective role of any other health care provider with respect to management of the patient; and (2) notified that he or she may decline to receive medical services by telemedicine and may withdraw from such care at any time.     Chief Complaint: Pt is a 74 y.o. female who presents today for a follow-up. Met with patient.       Interval History and Content of Current Session:  Interim Events/Subjective Report/Content of Current Session:  follow up appointment.    Pt is a 74 y.o. female with past psychiatric hx of MDD (major depressive disorder), recurrent episode, mild, Generalized anxiety disorder with panic attacks, Primary insomnia who presents for follow up treatment.     Past Psychiatric hx:   Pt. is a 72 yo F with a past psychiatric hx of Generalized anxiety disorder, Caregiver stress, Primary insomnia presenting to the clinic for an initial evaluation and treatment. Past medical history outlined below. She is currently taking temazepam (RESTORIL), ARIPiprazole (ABILIFY), venlafaxine (EFFEXOR-XR), EScitalopram oxalate (LEXAPRO), prescribed and managed by Dr. Shipley. She reports that these medications have not adequately managed her symptoms.     12/9/24: Pt presents to OP Psychiatry for routine follow-up for treatment/medication management of MDD, ALFONSO w/panic attacks, Insomnia. Pt reports increase in Seroquel helped with mood and sleep, states all medications effectively treating all symptoms, denies need for any medication changes at this time. Pt denies SI/HI/AVH, self-harm, plan, or intent. Pt verbalizes  understanding of medication instructions through teach back. No other issues, concerns, or problems, will reassess symptoms and medications in 3 months or PRN if symptoms worsen.     Past Medical hx:   Past Medical History:   Diagnosis Date    Arthritis     Closed compression fracture of L1 lumbar vertebra 11/07/2018    Colon polyp     Coronary artery calcification     Depression     Fatty liver     Generalized anxiety disorder     GERD (gastroesophageal reflux disease)     History of seasonal allergies     HTN (hypertension)     Hyperlipidemia     Hyponatremia     Insomnia     Osteoporosis     Stress incontinence     Stroke     ocular behind left eye        Interim hx:  Medication changes last visit:     1. Continue buPROPion (WELLBUTRIN XL) 150 MG TB24 tablet - Take 1 tablet (150 mg total) by mouth once daily in the morning for depression.  2. Continue venlafaxine (EFFEXOR-XR) 75 MG MG 24 hr capsule - Take 3 capsules (225 mg) once daily for depression and anxiety.  3. Continue busPIRone (BUSPAR) 10 MG tablet - Take 2 tablet (20 mg total) 2 (two) times daily for anxiety.  4. Continue QUEtiapine (SEROQUEL) 100 MG tablet - Take 1 tablet (100 mg total) by mouth every evening for mood/sleep.  5. Continue ALPRAZolam (XANAX) 0.25 MG tablet - Take 1 tablet (0.25 mg total) by mouth daily as needed for Anxiety. Discussed risk of decreased RT, sedation, addictive potential, and not to mix with alcohol.     Anxiety: Good, manageable, but increased d/t taking grandchildren for a week in May  Depression: Good, no issues  Sleep: Good, no issues  Appetite: Same, no issues     Denies suicidal/homicidal ideations.  Denies hopelessness/worthlessness.    Denies auditory/visual hallucinations.      Tobacco:  denies  Alcohol:  occasionally  Drug use:  denies  Caffeine:  3 cups daily      Review of Systems   PSYCHIATRIC: Pertinent items are noted in the narrative.        CONSTITUTIONAL: weight stable        M/S: no pain today          ENT: no allergies noted today        ABD: no n/v/d     Past Medical, Family and Social History: The patient's past medical, family and social history have been reviewed and updated as appropriate within the electronic medical record. See encounter notes.     Medication Compliance: yes      Side effects: tolerates     Risk Parameters:  Patient reports no suicidal ideation  Patient reports no homicidal ideation  Patient reports no self-injurious behavior  Patient reports no violent behavior     Exam (detailed: at least 9 elements; comprehensive: all 15 elements)   Constitutional  Vitals:  Most recent vital signs, dated less than 90 days prior to this appointment, were reviewed.     General:  unremarkable, age appropriate, casual attire, good eye contact, good rapport       Musculoskeletal  Muscle Strength/Tone:  no flaccidity, no tremor    Gait & Station:  normal      Psychiatric                       Speech:  normal tone, normal rate, rhythm, and volume   Mood & Affect:   Euthymic, congruent, appropriate         Thought Process:   Goal directed; Linear    Associations:   intact   Thought Content:   No SI/HI, delusions, or paranoia, no AV/VH   Insight & Judgement:   Good, adequate to circumstances   Orientation:   grossly intact; alert and oriented x 4    Memory:  intact for content of interview    Language:  grossly intact, can repeat    Attention Span  : Grossly intact for content of interview   Fund of Knowledge:   intact and appropriate to age and level of education        Assessment and Diagnosis   Status/Progress: Based on the examination today, the patient's problem(s) is/are under fair control.  New problems have not been presented today. Comorbidities are not currently complicating management of the primary condition.      Impression:  Pt presents to OP Psychiatry for routine follow-up for treatment/medication management of MDD, ALFONSO w/panic attacks, Insomnia. Pt reports all medications effectively treating  symptoms, denies need for any medication changes at this time. Only stressor that she has is taking her 3 and 5 yo grandkids for a week while her daughter and  go away on vacation. States it will be fine and she is looking fw to it but worries that she will get them to school late. Pt denies SI/HI/AVH, self-harm, plan, or intent. Pt verbalizes understanding of medication instructions through teach back. No other issues, concerns, or problems, will reassess symptoms and medications in 3 months or PRN if symptoms worsen.      Diagnosis:   - MDD (major depressive disorder), recurrent episode, mild  - Generalized anxiety disorder with panic attacks  - Primary insomnia      Intervention/Counseling/Treatment Plan   Medication Management:      1. Continue buPROPion (WELLBUTRIN XL) 150 MG TB24 tablet - Take 1 tablet (150 mg total) by mouth once daily in the morning for depression.     2. Continue venlafaxine (EFFEXOR-XR) 75 MG MG 24 hr capsule - Take 3 capsules (225 mg) once daily for depression and anxiety.     3. Continue busPIRone (BUSPAR) 10 MG tablet - Take 2 tablet (20 mg total) 2 (two) times daily for anxiety.     4. Continue QUEtiapine (SEROQUEL) 100 MG tablet - Take 1 tablet (100 mg total) by mouth every evening for mood/sleep.     5. Continue ALPRAZolam (XANAX) 0.25 MG tablet - Take 1 tablet (0.25 mg total) by mouth daily as needed for Anxiety. Discussed risk of decreased RT, sedation, addictive potential, and not to mix with alcohol.      6. Call to report any worsening of symptoms or problems with the medication. Pt instructed to go to ER with thoughts of harming self, others     7. Patient given contact # for psychotherapists at [facility] and also instructed she may check with insurance for list of providers.          Labs: none         Return to clinic:      3 months or PRN if symptoms worsen    -Spent 30min face to face with the pt; >50% time spent in counseling   -Supportive therapy and  psychoeducation provided  -R/B/SE's of medications discussed with the pt who expresses understanding and chooses to take medications as prescribed.   -Pt instructed to call clinic, 911 or go to nearest emergency room if sxs worsen or pt is in   crisis. The pt expresses understanding.      Rema Melgar NP  Psychiatric-Mental Health Nurse Practitioner  Department of Psychiatry    [facility] [place] [address] [place] [address]  Phone:  [phone]  Fax: [phone]

## 2025-04-09 DIAGNOSIS — F41.1 GENERALIZED ANXIETY DISORDER WITH PANIC ATTACKS: ICD-10-CM

## 2025-04-09 DIAGNOSIS — F41.0 GENERALIZED ANXIETY DISORDER WITH PANIC ATTACKS: ICD-10-CM

## 2025-04-09 DIAGNOSIS — F43.23 ADJUSTMENT DISORDER WITH MIXED ANXIETY AND DEPRESSED MOOD: ICD-10-CM

## 2025-04-10 RX ORDER — BUSPIRONE HYDROCHLORIDE 10 MG/1
20 TABLET ORAL 2 TIMES DAILY
Qty: 120 TABLET | Refills: 2 | Status: SHIPPED | OUTPATIENT
Start: 2025-04-10 | End: 2025-07-09

## 2025-04-10 RX ORDER — BUPROPION HYDROCHLORIDE 150 MG/1
150 TABLET ORAL EVERY MORNING
Qty: 30 TABLET | Refills: 2 | Status: SHIPPED | OUTPATIENT
Start: 2025-04-10 | End: 2025-07-09

## 2025-04-13 ENCOUNTER — PATIENT MESSAGE (OUTPATIENT)
Dept: PSYCHIATRY | Facility: CLINIC | Age: 75
End: 2025-04-13
Payer: MEDICARE

## 2025-04-28 DIAGNOSIS — F41.0 GENERALIZED ANXIETY DISORDER WITH PANIC ATTACKS: ICD-10-CM

## 2025-04-28 DIAGNOSIS — F41.1 GENERALIZED ANXIETY DISORDER WITH PANIC ATTACKS: ICD-10-CM

## 2025-04-28 RX ORDER — ALPRAZOLAM 0.25 MG/1
0.25 TABLET ORAL DAILY PRN
Qty: 30 TABLET | Refills: 0 | Status: SHIPPED | OUTPATIENT
Start: 2025-04-28 | End: 2025-05-28

## 2025-05-07 NOTE — TELEPHONE ENCOUNTER
Caller: CAMDEN GIRARD    Relationship: SELF    Best call back number: [phone]    Requested Prescriptions: PATIENT ONLY HAS A WEEK LEFT OF HER MOUNAJRO 0.5. SHE IS NEEDING A REFILL ON THIS AND IS WANTING TO GO UP ON IT, BECAUSE SHE IS ONLY SEEING A SLIGHT DIFFERENCE ON WEIGHT LOSS. PLEASE ADVISE    Requested Prescriptions      No prescriptions requested or ordered in this encounter        Pharmacy where request should be sent: [place] [id number] - [phone] [facility] [phone] [facility]     Last office visit with prescribing clinician: Visit date not found   Last telemedicine visit with prescribing clinician: Visit date not found   Next office visit with prescribing clinician: 8/18/2025     Additional details provided by patient:     Does the patient have less than a 3 day supply:  [] Yes  [] No    Would you like a call back once the refill request has been completed: [] Yes [] No    If the office needs to give you a call back, can they leave a voicemail: [] Yes [] No    Jean Carlos Bledsoe Rep   05/07/25 14:43 EDT      ----- Message from Ernie Rosas sent at 8/13/2018 11:57 AM CDT -----  Type: Needs Medical Advice    Who Called:  Patient  Best Call Back Number: [phone]  Additional Information: Patient needs to speak to someone about blood pressure - please call to advise as soon as possible.

## 2025-05-27 DIAGNOSIS — F41.0 GENERALIZED ANXIETY DISORDER WITH PANIC ATTACKS: ICD-10-CM

## 2025-05-27 DIAGNOSIS — F41.1 GENERALIZED ANXIETY DISORDER WITH PANIC ATTACKS: ICD-10-CM

## 2025-05-27 RX ORDER — ALPRAZOLAM 0.25 MG/1
0.25 TABLET ORAL DAILY PRN
Qty: 30 TABLET | Refills: 0 | Status: SHIPPED | OUTPATIENT
Start: 2025-05-27 | End: 2025-06-26

## 2025-06-05 DIAGNOSIS — F41.1 GENERALIZED ANXIETY DISORDER WITH PANIC ATTACKS: ICD-10-CM

## 2025-06-05 DIAGNOSIS — F41.0 GENERALIZED ANXIETY DISORDER WITH PANIC ATTACKS: ICD-10-CM

## 2025-06-05 DIAGNOSIS — K21.9 GASTROESOPHAGEAL REFLUX DISEASE WITHOUT ESOPHAGITIS: ICD-10-CM

## 2025-06-05 DIAGNOSIS — R05.3 CHRONIC COUGH: ICD-10-CM

## 2025-06-05 RX ORDER — BUSPIRONE HYDROCHLORIDE 10 MG/1
20 TABLET ORAL 2 TIMES DAILY
Qty: 120 TABLET | Refills: 2 | Status: SHIPPED | OUTPATIENT
Start: 2025-06-05 | End: 2025-09-03

## 2025-06-05 RX ORDER — PANTOPRAZOLE SODIUM 40 MG/1
40 TABLET, DELAYED RELEASE ORAL 2 TIMES DAILY
Qty: 180 TABLET | Refills: 0 | Status: SHIPPED | OUTPATIENT
Start: 2025-06-05

## 2025-06-24 ENCOUNTER — OFFICE VISIT (OUTPATIENT)
Dept: PSYCHIATRY | Facility: CLINIC | Age: 75
End: 2025-06-24
Payer: MEDICARE

## 2025-06-24 DIAGNOSIS — F33.0 MDD (MAJOR DEPRESSIVE DISORDER), RECURRENT EPISODE, MILD: ICD-10-CM

## 2025-06-24 DIAGNOSIS — F41.0 GENERALIZED ANXIETY DISORDER WITH PANIC ATTACKS: ICD-10-CM

## 2025-06-24 DIAGNOSIS — F51.01 PRIMARY INSOMNIA: ICD-10-CM

## 2025-06-24 DIAGNOSIS — F41.1 GENERALIZED ANXIETY DISORDER WITH PANIC ATTACKS: ICD-10-CM

## 2025-06-24 PROCEDURE — 1160F RVW MEDS BY RX/DR IN RCRD: CPT | Mod: CPTII,95,,

## 2025-06-24 PROCEDURE — 4010F ACE/ARB THERAPY RXD/TAKEN: CPT | Mod: CPTII,95,,

## 2025-06-24 PROCEDURE — 1159F MED LIST DOCD IN RCRD: CPT | Mod: CPTII,95,,

## 2025-06-24 PROCEDURE — 98006 SYNCH AUDIO-VIDEO EST MOD 30: CPT | Mod: 95,,,

## 2025-06-24 RX ORDER — QUETIAPINE FUMARATE 100 MG/1
100 TABLET, FILM COATED ORAL NIGHTLY PRN
Qty: 90 TABLET | Refills: 1 | Status: SHIPPED | OUTPATIENT
Start: 2025-06-24 | End: 2025-12-21

## 2025-06-24 RX ORDER — ALPRAZOLAM 0.25 MG/1
0.25 TABLET ORAL DAILY PRN
Qty: 30 TABLET | Refills: 0 | Status: SHIPPED | OUTPATIENT
Start: 2025-06-24 | End: 2025-07-24

## 2025-06-24 NOTE — PROGRESS NOTES
Ambrosio Denson,    You sent over for a taper on Trulicity for Juan Carlos today. He is in the donut hole already this year, so one box (4 pens)  Is $235.97. With the taper you sent  He would only use 2 pens out of the 0.75mg and 1.5mg dose boxes. Usually we see them do one strength for a month then increase, wondering if it is possible to have him do each strength for the 4 weeks, before tapering to help spread out some of the cost. Please let us know what you'd like to do.          Thank you,  Michelle Traylor [facility]  [phone]         Outpatient Psychiatry Follow-Up Visit    Clinical Status of Patient: Outpatient (Ambulatory)  06/24/2025    The patient location is:  [place], [place]  The patient phone number is: [phone]   Visit type: Virtual visit with synchronous audio and video  Each patient to whom he or she provides medical services by telemedicine is:  (1) informed of the relationship between the physician and patient and the respective role of any other health care provider with respect to management of the patient; and (2) notified that he or she may decline to receive medical services by telemedicine and may withdraw from such care at any time.     Chief Complaint: Pt is a 74 y.o. female who presents today for a follow-up. Met with patient.       Interval History and Content of Current Session:  Interim Events/Subjective Report/Content of Current Session:  follow up appointment.    Pt is a 74 y.o. female with past psychiatric hx of MDD (major depressive disorder), recurrent episode, mild, Generalized anxiety disorder with panic attacks, Primary insomnia who presents for follow up treatment.     Past Psychiatric hx:   Pt. is a 72 yo F with a past psychiatric hx of Generalized anxiety disorder, Caregiver stress, Primary insomnia presenting to the clinic for an initial evaluation and treatment. Past medical history outlined below. She is currently taking temazepam (RESTORIL), ARIPiprazole (ABILIFY), venlafaxine (EFFEXOR-XR), EScitalopram oxalate (LEXAPRO), prescribed and managed by Dr. Shipley. She reports that these medications have not adequately managed her symptoms.     3/25/25: Pt presents to OP Psychiatry for routine follow-up for treatment/medication management of MDD, ALFONSO w/panic attacks, Insomnia. Pt reports all medications effectively treating symptoms, denies need for any medication changes at this time. Only stressor that she has is taking her 3 and 5 yo grandkids for a week while her daughter and  go away on vacation.  States it will be fine and she is looking fw to it but worries that she will get them to school late. Pt denies SI/HI/AVH, self-harm, plan, or intent. Pt verbalizes understanding of medication instructions through teach back. No other issues, concerns, or problems, will reassess symptoms and medications in 3 months or PRN if symptoms worsen.     Past Medical hx:   Past Medical History:   Diagnosis Date    Arthritis     Closed compression fracture of L1 lumbar vertebra 11/07/2018    Colon polyp     Coronary artery calcification     Depression     Fatty liver     Generalized anxiety disorder     GERD (gastroesophageal reflux disease)     History of seasonal allergies     HTN (hypertension)     Hyperlipidemia     Hyponatremia     Insomnia     Osteoporosis     Stress incontinence     Stroke     ocular behind left eye        Interim hx:  Medication changes last visit:     1. Continue buPROPion (WELLBUTRIN XL) 150 MG TB24 tablet - Take 1 tablet (150 mg total) by mouth once daily in the morning for depression.  2. Continue venlafaxine (EFFEXOR-XR) 75 MG MG 24 hr capsule - Take 3 capsules (225 mg) once daily for depression and anxiety.  3. Continue busPIRone (BUSPAR) 10 MG tablet - Take 2 tablet (20 mg total) 2 (two) times daily for anxiety.  4. Continue QUEtiapine (SEROQUEL) 100 MG tablet - Take 1 tablet (100 mg total) by mouth every evening for mood/sleep.  5. Continue ALPRAZolam (XANAX) 0.25 MG tablet - Take 1 tablet (0.25 mg total) by mouth daily as needed for Anxiety. Discussed risk of decreased RT, sedation, addictive potential, and not to mix with alcohol.     Anxiety: Good, no issues  Depression: Good, no issues  Sleep: Good, no issues  Appetite: Same, no issues     Denies suicidal/homicidal ideations.  Denies hopelessness/worthlessness.    Denies auditory/visual hallucinations.      Tobacco:  denies  Alcohol:  occasionally  Drug use:  denies  Caffeine:  3 cups daily      Review of Systems   PSYCHIATRIC: Pertinent  items are noted in the narrative.        CONSTITUTIONAL: weight stable        M/S: no pain today         ENT: no allergies noted today        ABD: no n/v/d     Past Medical, Family and Social History: The patient's past medical, family and social history have been reviewed and updated as appropriate within the electronic medical record. See encounter notes.     Medication Compliance: yes      Side effects: tolerates     Risk Parameters:  Patient reports no suicidal ideation  Patient reports no homicidal ideation  Patient reports no self-injurious behavior  Patient reports no violent behavior     Exam (detailed: at least 9 elements; comprehensive: all 15 elements)   Constitutional  Vitals:  Most recent vital signs, dated less than 90 days prior to this appointment, were reviewed.     General:  unremarkable, age appropriate, casual attire, good eye contact, good rapport       Musculoskeletal  Muscle Strength/Tone:  no flaccidity, no tremor    Gait & Station:  normal      Psychiatric                       Speech:  normal tone, normal rate, rhythm, and volume   Mood & Affect:   Euthymic, congruent, appropriate         Thought Process:   Goal directed; Linear    Associations:   intact   Thought Content:   No SI/HI, delusions, or paranoia, no AV/VH   Insight & Judgement:   Good, adequate to circumstances   Orientation:   grossly intact; alert and oriented x 4    Memory:  intact for content of interview    Language:  grossly intact, can repeat    Attention Span  : Grossly intact for content of interview   Fund of Knowledge:   intact and appropriate to age and level of education        Assessment and Diagnosis   Status/Progress: Based on the examination today, the patient's problem(s) is/are under fair control.  New problems have not been presented today. Comorbidities are not currently complicating management of the primary condition.      Impression:  Pt presents to OP Psychiatry for routine follow-up for  treatment/medication management of MDD, ALFONSO w/panic attacks, Insomnia. Pt reports all medications effectively treating symptoms, denies need for any medication changes at this time. Reports having a great time with the grandchildren. Pt denies SI/HI/AVH, self-harm, plan, or intent. Pt verbalizes understanding of medication instructions through teach back. No other issues, concerns, or problems, will reassess symptoms and medications in 3 months or PRN if symptoms worsen.      Diagnosis:   - MDD (major depressive disorder), recurrent episode, mild  - Generalized anxiety disorder with panic attacks  - Primary insomnia      Intervention/Counseling/Treatment Plan   Medication Management:      1. Continue buPROPion (WELLBUTRIN XL) 150 MG TB24 tablet - Take 1 tablet (150 mg total) by mouth once daily in the morning for depression.     2. Continue venlafaxine (EFFEXOR-XR) 75 MG MG 24 hr capsule - Take 3 capsules (225 mg) once daily for depression and anxiety.     3. Continue busPIRone (BUSPAR) 10 MG tablet - Take 2 tablet (20 mg total) 2 (two) times daily for anxiety.     4. Continue QUEtiapine (SEROQUEL) 100 MG tablet - Take 1 tablet (100 mg total) by mouth every evening for mood/sleep.     5. Continue ALPRAZolam (XANAX) 0.25 MG tablet - Take 1 tablet (0.25 mg total) by mouth daily as needed for Anxiety. Discussed risk of decreased RT, sedation, addictive potential, and not to mix with alcohol.      6. Call to report any worsening of symptoms or problems with the medication. Pt instructed to go to ER with thoughts of harming self, others     7. Patient given contact # for psychotherapists at [facility] and also instructed she may check with insurance for list of providers.          Labs: none         Return to clinic:      3 months or PRN if symptoms worsen    -Spent 30min face to face with the pt; >50% time spent in counseling   -Supportive therapy and psychoeducation provided  -R/B/SE's of medications discussed  with the pt who expresses understanding and chooses to take medications as prescribed.   -Pt instructed to call clinic, 911 or go to nearest emergency room if sxs worsen or pt is in   crisis. The pt expresses understanding.      Rema Melgar NP  Psychiatric-Mental Health Nurse Practitioner  Department of Psychiatry    [facility] [place] [address] [place] [address]  Phone:  [phone]  Fax: [phone]

## 2025-06-27 ENCOUNTER — PATIENT MESSAGE (OUTPATIENT)
Dept: GASTROENTEROLOGY | Facility: CLINIC | Age: 75
End: 2025-06-27
Payer: MEDICARE

## 2025-07-09 RX ORDER — GABAPENTIN 300 MG/1
CAPSULE ORAL
Qty: 180 CAPSULE | Refills: 0 | Status: SHIPPED | OUTPATIENT
Start: 2025-07-09

## 2025-07-09 NOTE — TELEPHONE ENCOUNTER
S/w pt and informed that Dr. Wei refilled 1 time. If she wants continued refills she will need to either: schedule an appt with Dr. Wei or she can get continued refills through her PCP. Pt verbalized understanding and states she will get refills from her PCP.

## 2025-07-27 DIAGNOSIS — F41.0 GENERALIZED ANXIETY DISORDER WITH PANIC ATTACKS: ICD-10-CM

## 2025-07-27 DIAGNOSIS — F41.1 GENERALIZED ANXIETY DISORDER WITH PANIC ATTACKS: ICD-10-CM

## 2025-07-28 RX ORDER — ALPRAZOLAM 0.25 MG/1
0.25 TABLET ORAL DAILY PRN
Qty: 30 TABLET | Refills: 0 | Status: SHIPPED | OUTPATIENT
Start: 2025-07-28 | End: 2025-08-27

## 2025-08-18 DIAGNOSIS — R05.3 CHRONIC COUGH: ICD-10-CM

## 2025-08-18 DIAGNOSIS — K21.9 GASTROESOPHAGEAL REFLUX DISEASE WITHOUT ESOPHAGITIS: ICD-10-CM

## 2025-08-19 RX ORDER — PANTOPRAZOLE SODIUM 40 MG/1
40 TABLET, DELAYED RELEASE ORAL 2 TIMES DAILY
Qty: 180 TABLET | Refills: 0 | Status: SHIPPED | OUTPATIENT
Start: 2025-08-19

## 2025-09-01 DIAGNOSIS — F33.0 MDD (MAJOR DEPRESSIVE DISORDER), RECURRENT EPISODE, MILD: ICD-10-CM

## 2025-09-01 DIAGNOSIS — F41.1 GENERALIZED ANXIETY DISORDER WITH PANIC ATTACKS: ICD-10-CM

## 2025-09-01 DIAGNOSIS — F41.0 GENERALIZED ANXIETY DISORDER WITH PANIC ATTACKS: ICD-10-CM

## 2025-09-01 DIAGNOSIS — F43.23 ADJUSTMENT DISORDER WITH MIXED ANXIETY AND DEPRESSED MOOD: ICD-10-CM

## 2025-09-02 RX ORDER — BUPROPION HYDROCHLORIDE 150 MG/1
150 TABLET ORAL EVERY MORNING
Qty: 30 TABLET | Refills: 2 | Status: SHIPPED | OUTPATIENT
Start: 2025-09-02 | End: 2025-12-01

## 2025-09-02 RX ORDER — VENLAFAXINE HYDROCHLORIDE 75 MG/1
225 CAPSULE, EXTENDED RELEASE ORAL DAILY
Qty: 90 CAPSULE | Refills: 2 | Status: SHIPPED | OUTPATIENT
Start: 2025-09-02 | End: 2025-12-01

## 2025-09-02 RX ORDER — BUSPIRONE HYDROCHLORIDE 10 MG/1
20 TABLET ORAL 2 TIMES DAILY
Qty: 120 TABLET | Refills: 2 | Status: SHIPPED | OUTPATIENT
Start: 2025-09-02 | End: 2025-12-01

## 2025-09-05 ENCOUNTER — OFFICE VISIT (OUTPATIENT)
Dept: GASTROENTEROLOGY | Facility: CLINIC | Age: 75
End: 2025-09-05
Payer: MEDICARE

## 2025-09-05 VITALS — HEIGHT: 67 IN | WEIGHT: 175.5 LBS | BODY MASS INDEX: 27.55 KG/M2

## 2025-09-05 DIAGNOSIS — Z86.0100 HISTORY OF COLON POLYPS: ICD-10-CM

## 2025-09-05 DIAGNOSIS — R13.19 ESOPHAGEAL DYSPHAGIA: Primary | ICD-10-CM

## 2025-09-05 DIAGNOSIS — K21.9 GASTROESOPHAGEAL REFLUX DISEASE WITHOUT ESOPHAGITIS: ICD-10-CM

## 2025-09-05 PROCEDURE — 99999 PR PBB SHADOW E&M-EST. PATIENT-LVL IV: CPT | Mod: PBBFAC,,, | Performed by: NURSE PRACTITIONER

## 2025-09-05 RX ORDER — PANTOPRAZOLE SODIUM 40 MG/1
40 TABLET, DELAYED RELEASE ORAL 2 TIMES DAILY
Qty: 180 TABLET | Refills: 3 | Status: SHIPPED | OUTPATIENT
Start: 2025-09-05

## (undated) DEVICE — DURAPREP SURG SCRUB 26ML

## (undated) DEVICE — SEE MEDLINE ITEM 157128

## (undated) DEVICE — SEE L#120831

## (undated) DEVICE — ADHESIVE DERMABOND ADVANCED

## (undated) DEVICE — CANNULA CEMENT

## (undated) DEVICE — SEE MEDLINE ITEM 157148

## (undated) DEVICE — GLOVE SURGICAL LATEX SZ 7

## (undated) DEVICE — DRAPE C ARM 42 X 120 10/BX

## (undated) DEVICE — DRAPE INCISE IOBAN 2 23X17IN

## (undated) DEVICE — KIT BLLN IVAS 10GA

## (undated) DEVICE — DRILL HAND KYPHOPLASTY 10X7.75

## (undated) DEVICE — Device

## (undated) DEVICE — CHLORAPREP W TINT 26ML APPL

## (undated) DEVICE — REMOVER LOTION

## (undated) DEVICE — NDL SPINAL SPINOCAN 22GX3.5